# Patient Record
Sex: FEMALE | Race: ASIAN | NOT HISPANIC OR LATINO | Employment: OTHER | URBAN - METROPOLITAN AREA
[De-identification: names, ages, dates, MRNs, and addresses within clinical notes are randomized per-mention and may not be internally consistent; named-entity substitution may affect disease eponyms.]

---

## 2018-11-09 ENCOUNTER — OFFICE VISIT (OUTPATIENT)
Dept: URGENT CARE | Facility: CLINIC | Age: 63
End: 2018-11-09
Payer: MEDICARE

## 2018-11-09 VITALS
WEIGHT: 114 LBS | HEIGHT: 63 IN | SYSTOLIC BLOOD PRESSURE: 163 MMHG | DIASTOLIC BLOOD PRESSURE: 89 MMHG | TEMPERATURE: 98 F | BODY MASS INDEX: 20.2 KG/M2 | HEART RATE: 80 BPM | OXYGEN SATURATION: 97 %

## 2018-11-09 DIAGNOSIS — I10 HYPERTENSION, UNSPECIFIED TYPE: Primary | ICD-10-CM

## 2018-11-09 DIAGNOSIS — R51.9 NONINTRACTABLE HEADACHE, UNSPECIFIED CHRONICITY PATTERN, UNSPECIFIED HEADACHE TYPE: ICD-10-CM

## 2018-11-09 PROCEDURE — 99214 OFFICE O/P EST MOD 30 MIN: CPT | Mod: S$GLB,,, | Performed by: FAMILY MEDICINE

## 2018-11-09 PROCEDURE — 3008F BODY MASS INDEX DOCD: CPT | Mod: CPTII,S$GLB,, | Performed by: FAMILY MEDICINE

## 2018-11-09 RX ORDER — LOSARTAN POTASSIUM 25 MG/1
25 TABLET ORAL DAILY
COMMUNITY
Start: 2018-08-07 | End: 2020-08-05 | Stop reason: SDUPTHER

## 2018-11-09 RX ORDER — METRONIDAZOLE 250 MG/1
TABLET ORAL
Status: ON HOLD | COMMUNITY
Start: 2018-11-08 | End: 2019-11-27 | Stop reason: HOSPADM

## 2018-11-09 RX ORDER — BUTALBITAL, ACETAMINOPHEN AND CAFFEINE 50; 325; 40 MG/1; MG/1; MG/1
1 TABLET ORAL EVERY 6 HOURS PRN
Qty: 30 TABLET | Refills: 0 | Status: SHIPPED | OUTPATIENT
Start: 2018-11-09 | End: 2018-12-09

## 2018-11-09 NOTE — PROGRESS NOTES
"Subjective:       Patient ID: Meredith Cuellar is a 63 y.o. female.    Vitals:  height is 5' 3" (1.6 m) and weight is 51.7 kg (114 lb). Her oral temperature is 98.1 °F (36.7 °C). Her blood pressure is 163/89 (abnormal) and her pulse is 80. Her oxygen saturation is 97%.     Chief Complaint: Hypertension    Hypertension   This is a new problem. The current episode started in the past 7 days. The problem has been gradually worsening since onset. Associated symptoms include headaches, malaise/fatigue, palpitations and shortness of breath. Pertinent negatives include no blurred vision or chest pain.     Review of Systems   Constitution: Positive for malaise/fatigue. Negative for chills and fever.   HENT: Negative for sore throat.    Eyes: Negative for blurred vision.   Cardiovascular: Positive for palpitations. Negative for chest pain.   Respiratory: Positive for shortness of breath.    Skin: Negative for rash.   Musculoskeletal: Negative for back pain and joint pain.   Gastrointestinal: Negative for abdominal pain, diarrhea, nausea and vomiting.   Neurological: Positive for headaches and light-headedness.   Psychiatric/Behavioral: The patient is not nervous/anxious.        Objective:      Physical Exam   Constitutional: She is oriented to person, place, and time. She appears well-developed and well-nourished.   HENT:   Head: Normocephalic and atraumatic.   Right Ear: External ear normal.   Left Ear: External ear normal.   Mouth/Throat: Oropharynx is clear and moist.   Eyes: EOM are normal. Pupils are equal, round, and reactive to light.   Neck: Normal range of motion. Neck supple. No JVD present. No tracheal deviation present. No thyromegaly present.   Cardiovascular: Normal rate, regular rhythm and normal heart sounds. Exam reveals no gallop and no friction rub.   No murmur heard.  Pulmonary/Chest: Breath sounds normal. No respiratory distress. She has no wheezes. She has no rales. She exhibits no tenderness.   Abdominal: " Soft. Bowel sounds are normal. She exhibits no distension and no mass. There is no tenderness. There is no rebound and no guarding. No hernia.   Musculoskeletal: Normal range of motion. She exhibits no edema, tenderness or deformity.   Lymphadenopathy:     She has no cervical adenopathy.   Neurological: She is alert and oriented to person, place, and time. She displays normal reflexes. No cranial nerve deficit or sensory deficit. She exhibits normal muscle tone. Coordination normal.   Skin: Skin is warm. Capillary refill takes less than 2 seconds. No rash noted. No erythema. No pallor.   Psychiatric: She has a normal mood and affect. Her behavior is normal. Judgment and thought content normal.   Vitals reviewed.      Assessment:       1. Hypertension, unspecified type    2. Nonintractable headache, unspecified chronicity pattern, unspecified headache type        Plan:         Hypertension, unspecified type    Nonintractable headache, unspecified chronicity pattern, unspecified headache type  -     butalbital-acetaminophen-caffeine -40 mg (FIORICET, ESGIC) -40 mg per tablet; Take 1 tablet by mouth every 6 (six) hours as needed for Pain (headache).  Dispense: 30 tablet; Refill: 0          Patient Instructions     Established High Blood Pressure    High blood pressure (hypertension) is a chronic disease. Often, healthcare providers dont know what causes it. But it can be caused by certain health conditions and medicines.  If you have high blood pressure, you may not have any symptoms. If you do have symptoms, they may include headache, dizziness, changes in your vision, chest pain, and shortness of breath. But even without symptoms, high blood pressure thats not treated raises your risk for heart attack and stroke. High blood pressure is a serious health risk and shouldnt be ignored.  A blood pressure reading is made up of two numbers: a higher number over a lower number. The top number is the systolic  pressure. The bottom number is the diastolic pressure. A normal blood pressure is a systolic pressure of  less than 120 over a diastolic pressure of less than 80. You will see your blood pressure readings written together. For example, a person with a systolic pressure of 188 and a diastolic pressure of 78 will have 118/78 written in the medical record.  High blood pressure is when either the top number is 140 or higher, or the bottom number is 90 or higher. This must be the result when taking your blood pressure a number of times. The blood pressures between normal and high are called prehypertension.  Home care  If you have high blood pressure, you should do what is listed below to lower your blood pressure. If you are taking medicines for high blood pressure, these methods may reduce or end your need for medicines in the future.  · Begin a weight-loss program if you are overweight.  · Cut back on how much salt you get in your diet. Heres how to do this:  ¨ Dont eat foods that have a lot of salt. These include olives, pickles, smoked meats, and salted potato chips.  ¨ Dont add salt to your food at the table.  ¨ Use only small amounts of salt when cooking.  · Start an exercise program. Talk with your healthcare provider about the type of exercise program that would be best for you. It doesn't have to be hard. Even brisk walking for 20 minutes 3 times a week is a good form of exercise.  · Dont take medicines that stimulate the heart. This includes many over-the-counter cold and sinus decongestant pills and sprays, as well as diet pills. Check the warnings about hypertension on the label. Before buying any over-the-counter medicines or supplements, always ask the pharmacist about the product's potential interaction with your high blood pressure and your high blood pressure medicines.  · Stimulants such as amphetamine or cocaine could be deadly for someone with high blood pressure. Never take these.  · Limit how  much caffeine you get in your diet. Switch to caffeine-free products.  · Stop smoking. If you are a long-time smoker, this can be hard. Talk to your healthcare provider about medicines and nicotine replacement options to help you. Also, enroll in a stop-smoking program to make it more likely that you will quit for good.  · Learn how to handle stress. This is an important part of any program to lower blood pressure. Learn about relaxation methods like meditation, yoga, or biofeedback.  · If your provider prescribed medicines, take them exactly as directed. Missing doses may cause your blood pressure get out of control.  · If you miss a dose or doses, check with your healthcare provider or pharmacist about what to do.  · Consider buying an automatic blood pressure machine. Ask your provider for a recommendation. You can get one of these at most pharmacies.     The American Heart Association recommends the following guidelines for home blood pressure monitoring:  · Don't smoke or drink coffee for 30 minutes before taking your blood pressure.  · Go to the bathroom before the test.  · Relax for 5 minutes before taking the measurement.  · Sit with your back supported (don't sit on a couch or soft chair); keep your feet on the floor uncrossed. Place your arm on a solid flat surface (like a table) with the upper part of the arm at heart level. Place the middle of the cuff directly above the eye of the elbow. Check the monitor's instruction manual for an illustration.  · Take multiple readings. When you measure, take 2 to 3 readings one minute apart and record all of the results.  · Take your blood pressure at the same time every day, or as your healthcare provider recommends.  · Record the date, time, and blood pressure reading.  · Take the record with you to your next medical appointment. If your blood pressure monitor has a built-in memory, simply take the monitor with you to your next appointment.  · Call your provider  "if you have several high readings. Don't be frightened by a single high blood pressure reading, but if you get several high readings, check in with your healthcare provider.  · Note: When blood pressure reaches a systolic (top number) of 180 or higher OR diastolic (bottom number) of 110 or higher, seek emergency medical treatment.  Follow-up care  You will need to see your healthcare provider regularly. This is to check your blood pressure and to make changes to your medicines. Make a follow-up appointment as directed. Bring the record of your home blood pressure readings to the appointment.  When to seek medical advice  Call your healthcare provider right away if any of these occur:  · Blood pressure reaches a systolic (upper number) of 180 or higher OR a diastolic (bottom number) of 110 or higher  · Chest pain or shortness of breath  · Severe headache  · Throbbing or rushing sound in the ears  · Nosebleed  · Sudden severe pain in your belly (abdomen)  · Extreme drowsiness, confusion, or fainting  · Dizziness or spinning sensation (vertigo)  · Weakness of an arm or leg or one side of the face  · You have problems speaking or seeing   Date Last Reviewed: 12/1/2016  © 6691-9043 [employer]. [address] [place] [address]. All rights reserved. This information is not intended as a substitute for professional medical care. Always follow your healthcare professional's instructions.      Headache, Unspecified    A number of things can cause headaches. The cause of your headache isnt clear. But it doesnt seem to be a sign of any serious illness.  You could have a tension headache or a migraine headache.  Stress can cause a tension headache. This can happen if you tense the muscles of your shoulders, neck, and scalp without knowing it. If this stress lasts long enough, you may develop a tension headache.  It is not clear why migraines occur, but certain things called" triggers" can raise the risk " of having a migraine attack. Migraine triggers may include emotional stress or depression, or by hormone changes during the menstrual cycle. Other triggers include birth control pills and other medicines, alcohol or caffeine, foods with tyramine (such as aged cheese, wine), eyestrain, weather changes, missed meals, and lack of sleep or oversleeping.  Other causes of headache include:  · Viral illness with high fever  · Head injury with concussion  · Sinus, ear, or throat infection  · Dental pain and jaw joint (TMJ) pain  More serious but less common causes of headache include stroke, brain hemorrhage, brain tumor, meningitis, and encephalitis.  Home care  Follow these tips when taking care of yourself at home:  · Dont drive yourself home if you were given pain medicine for your headache. Instead, have someone else drive you home. Try to sleep when you get home. You should feel much better when you wake up.  · Apply heat to the back of your neck to ease a neck muscle spasm. Take care of a migraine headache by putting an ice pack on your forehead or at the base of your skull.  · If you have nausea or vomiting, eat a light diet until your headache eases.  · If you have a migraine headache, use sunglasses when in the daylight or around bright indoor lighting until your symptoms get better. Bright glaring light can make this type of headache worse.  Follow-up care  Follow up with your healthcare provider, or as advised. Talk with your provider if you have frequent headaches. He or she can help figure out a treatment plan. By knowing the earliest signs of headache, and starting treatment right away, you may be able to stop the pain yourself.  When to seek medical advice  Call your healthcare provider right away if any of these occur:  · Your head pain suddenly gets worse after sexual intercourse or strenuous activity  · Your head pain doesnt get better within 24 hours  · You arent able to keep liquids down (repeated  vomiting)  · Fever of 100.4ºF (38ºC) or higher, or as directed by your healthcare provider  · Stiff neck  · Extreme drowsiness, confusion, or fainting  · Dizziness or dizziness with spinning sensation (vertigo)  · Weakness in an arm or leg or one side of your face  · You have trouble talking or seeing  Date Last Reviewed: 8/1/2016  © 8879-4274 [employer]. [address] [place] [address], [place] [address]. All rights reserved. This information is not intended as a substitute for professional medical care. Always follow your healthcare professional's instructions.          Follow up with your doctor in a few days.  Go to the ER if symptoms get worse.    Loy Cuellar MD

## 2018-11-09 NOTE — PATIENT INSTRUCTIONS
Established High Blood Pressure    High blood pressure (hypertension) is a chronic disease. Often, healthcare providers dont know what causes it. But it can be caused by certain health conditions and medicines.  If you have high blood pressure, you may not have any symptoms. If you do have symptoms, they may include headache, dizziness, changes in your vision, chest pain, and shortness of breath. But even without symptoms, high blood pressure thats not treated raises your risk for heart attack and stroke. High blood pressure is a serious health risk and shouldnt be ignored.  A blood pressure reading is made up of two numbers: a higher number over a lower number. The top number is the systolic pressure. The bottom number is the diastolic pressure. A normal blood pressure is a systolic pressure of  less than 120 over a diastolic pressure of less than 80. You will see your blood pressure readings written together. For example, a person with a systolic pressure of 188 and a diastolic pressure of 78 will have 118/78 written in the medical record.  High blood pressure is when either the top number is 140 or higher, or the bottom number is 90 or higher. This must be the result when taking your blood pressure a number of times. The blood pressures between normal and high are called prehypertension.  Home care  If you have high blood pressure, you should do what is listed below to lower your blood pressure. If you are taking medicines for high blood pressure, these methods may reduce or end your need for medicines in the future.  · Begin a weight-loss program if you are overweight.  · Cut back on how much salt you get in your diet. Heres how to do this:  ¨ Dont eat foods that have a lot of salt. These include olives, pickles, smoked meats, and salted potato chips.  ¨ Dont add salt to your food at the table.  ¨ Use only small amounts of salt when cooking.  · Start an exercise program. Talk with your healthcare  provider about the type of exercise program that would be best for you. It doesn't have to be hard. Even brisk walking for 20 minutes 3 times a week is a good form of exercise.  · Dont take medicines that stimulate the heart. This includes many over-the-counter cold and sinus decongestant pills and sprays, as well as diet pills. Check the warnings about hypertension on the label. Before buying any over-the-counter medicines or supplements, always ask the pharmacist about the product's potential interaction with your high blood pressure and your high blood pressure medicines.  · Stimulants such as amphetamine or cocaine could be deadly for someone with high blood pressure. Never take these.  · Limit how much caffeine you get in your diet. Switch to caffeine-free products.  · Stop smoking. If you are a long-time smoker, this can be hard. Talk to your healthcare provider about medicines and nicotine replacement options to help you. Also, enroll in a stop-smoking program to make it more likely that you will quit for good.  · Learn how to handle stress. This is an important part of any program to lower blood pressure. Learn about relaxation methods like meditation, yoga, or biofeedback.  · If your provider prescribed medicines, take them exactly as directed. Missing doses may cause your blood pressure get out of control.  · If you miss a dose or doses, check with your healthcare provider or pharmacist about what to do.  · Consider buying an automatic blood pressure machine. Ask your provider for a recommendation. You can get one of these at most pharmacies.     The American Heart Association recommends the following guidelines for home blood pressure monitoring:  · Don't smoke or drink coffee for 30 minutes before taking your blood pressure.  · Go to the bathroom before the test.  · Relax for 5 minutes before taking the measurement.  · Sit with your back supported (don't sit on a couch or soft chair); keep your feet on  the floor uncrossed. Place your arm on a solid flat surface (like a table) with the upper part of the arm at heart level. Place the middle of the cuff directly above the eye of the elbow. Check the monitor's instruction manual for an illustration.  · Take multiple readings. When you measure, take 2 to 3 readings one minute apart and record all of the results.  · Take your blood pressure at the same time every day, or as your healthcare provider recommends.  · Record the date, time, and blood pressure reading.  · Take the record with you to your next medical appointment. If your blood pressure monitor has a built-in memory, simply take the monitor with you to your next appointment.  · Call your provider if you have several high readings. Don't be frightened by a single high blood pressure reading, but if you get several high readings, check in with your healthcare provider.  · Note: When blood pressure reaches a systolic (top number) of 180 or higher OR diastolic (bottom number) of 110 or higher, seek emergency medical treatment.  Follow-up care  You will need to see your healthcare provider regularly. This is to check your blood pressure and to make changes to your medicines. Make a follow-up appointment as directed. Bring the record of your home blood pressure readings to the appointment.  When to seek medical advice  Call your healthcare provider right away if any of these occur:  · Blood pressure reaches a systolic (upper number) of 180 or higher OR a diastolic (bottom number) of 110 or higher  · Chest pain or shortness of breath  · Severe headache  · Throbbing or rushing sound in the ears  · Nosebleed  · Sudden severe pain in your belly (abdomen)  · Extreme drowsiness, confusion, or fainting  · Dizziness or spinning sensation (vertigo)  · Weakness of an arm or leg or one side of the face  · You have problems speaking or seeing   Date Last Reviewed: 12/1/2016  © 1742-8989 [employer]. [address] [place] [address]  "[place] [address]. All rights reserved. This information is not intended as a substitute for professional medical care. Always follow your healthcare professional's instructions.      Headache, Unspecified    A number of things can cause headaches. The cause of your headache isnt clear. But it doesnt seem to be a sign of any serious illness.  You could have a tension headache or a migraine headache.  Stress can cause a tension headache. This can happen if you tense the muscles of your shoulders, neck, and scalp without knowing it. If this stress lasts long enough, you may develop a tension headache.  It is not clear why migraines occur, but certain things called" triggers" can raise the risk of having a migraine attack. Migraine triggers may include emotional stress or depression, or by hormone changes during the menstrual cycle. Other triggers include birth control pills and other medicines, alcohol or caffeine, foods with tyramine (such as aged cheese, wine), eyestrain, weather changes, missed meals, and lack of sleep or oversleeping.  Other causes of headache include:  · Viral illness with high fever  · Head injury with concussion  · Sinus, ear, or throat infection  · Dental pain and jaw joint (TMJ) pain  More serious but less common causes of headache include stroke, brain hemorrhage, brain tumor, meningitis, and encephalitis.  Home care  Follow these tips when taking care of yourself at home:  · Dont drive yourself home if you were given pain medicine for your headache. Instead, have someone else drive you home. Try to sleep when you get home. You should feel much better when you wake up.  · Apply heat to the back of your neck to ease a neck muscle spasm. Take care of a migraine headache by putting an ice pack on your forehead or at the base of your skull.  · If you have nausea or vomiting, eat a light diet until your headache eases.  · If you have a migraine headache, use sunglasses when in the " daylight or around bright indoor lighting until your symptoms get better. Bright glaring light can make this type of headache worse.  Follow-up care  Follow up with your healthcare provider, or as advised. Talk with your provider if you have frequent headaches. He or she can help figure out a treatment plan. By knowing the earliest signs of headache, and starting treatment right away, you may be able to stop the pain yourself.  When to seek medical advice  Call your healthcare provider right away if any of these occur:  · Your head pain suddenly gets worse after sexual intercourse or strenuous activity  · Your head pain doesnt get better within 24 hours  · You arent able to keep liquids down (repeated vomiting)  · Fever of 100.4ºF (38ºC) or higher, or as directed by your healthcare provider  · Stiff neck  · Extreme drowsiness, confusion, or fainting  · Dizziness or dizziness with spinning sensation (vertigo)  · Weakness in an arm or leg or one side of your face  · You have trouble talking or seeing  Date Last Reviewed: 8/1/2016  © 1797-4933 [employer]. [address] [place] [address], [place] [address]. All rights reserved. This information is not intended as a substitute for professional medical care. Always follow your healthcare professional's instructions.          Follow up with your doctor in a few days.  Go to the ER if symptoms get worse.    Loy Cuellar MD

## 2019-11-24 ENCOUNTER — HOSPITAL ENCOUNTER (INPATIENT)
Facility: HOSPITAL | Age: 64
LOS: 3 days | Discharge: HOME | DRG: 440 | End: 2019-11-27
Attending: FAMILY MEDICINE | Admitting: FAMILY MEDICINE
Payer: MEDICARE

## 2019-11-24 DIAGNOSIS — K85.90 PANCREATITIS: Primary | ICD-10-CM

## 2019-11-24 DIAGNOSIS — K85.10 ACUTE BILIARY PANCREATITIS WITHOUT INFECTION OR NECROSIS: ICD-10-CM

## 2019-11-24 DIAGNOSIS — R07.9 CHEST PAIN: ICD-10-CM

## 2019-11-24 PROCEDURE — 11000001 HC ACUTE MED/SURG PRIVATE ROOM

## 2019-11-24 RX ORDER — ACETAMINOPHEN 325 MG/1
650 TABLET ORAL EVERY 4 HOURS PRN
Status: DISCONTINUED | OUTPATIENT
Start: 2019-11-24 | End: 2019-11-27 | Stop reason: HOSPADM

## 2019-11-24 RX ORDER — TALC
6 POWDER (GRAM) TOPICAL NIGHTLY PRN
Status: DISCONTINUED | OUTPATIENT
Start: 2019-11-24 | End: 2019-11-27 | Stop reason: HOSPADM

## 2019-11-24 RX ORDER — ONDANSETRON 8 MG/1
8 TABLET, ORALLY DISINTEGRATING ORAL EVERY 8 HOURS PRN
Status: DISCONTINUED | OUTPATIENT
Start: 2019-11-24 | End: 2019-11-27 | Stop reason: HOSPADM

## 2019-11-24 RX ORDER — KETOROLAC TROMETHAMINE 30 MG/ML
15 INJECTION, SOLUTION INTRAMUSCULAR; INTRAVENOUS EVERY 6 HOURS PRN
Status: DISCONTINUED | OUTPATIENT
Start: 2019-11-24 | End: 2019-11-27 | Stop reason: HOSPADM

## 2019-11-24 RX ORDER — SODIUM CHLORIDE 0.9 % (FLUSH) 0.9 %
10 SYRINGE (ML) INJECTION
Status: DISCONTINUED | OUTPATIENT
Start: 2019-11-24 | End: 2019-11-27 | Stop reason: HOSPADM

## 2019-11-25 ENCOUNTER — ANESTHESIA EVENT (OUTPATIENT)
Dept: ENDOSCOPY | Facility: HOSPITAL | Age: 64
DRG: 440 | End: 2019-11-25
Payer: MEDICARE

## 2019-11-25 PROBLEM — K21.9 CHRONIC GERD: Status: ACTIVE | Noted: 2019-11-25

## 2019-11-25 PROBLEM — D72.829 LEUKOCYTOSIS: Status: ACTIVE | Noted: 2019-11-25

## 2019-11-25 PROBLEM — I10 HYPERTENSION: Status: ACTIVE | Noted: 2019-11-25

## 2019-11-25 LAB
ALBUMIN SERPL BCP-MCNC: 3.6 G/DL (ref 3.5–5.2)
ALP SERPL-CCNC: 180 U/L (ref 55–135)
ALT SERPL W/O P-5'-P-CCNC: 176 U/L (ref 10–44)
ANION GAP SERPL CALC-SCNC: 11 MMOL/L (ref 8–16)
ANION GAP SERPL CALC-SCNC: 13 MMOL/L (ref 8–16)
AST SERPL-CCNC: 85 U/L (ref 10–40)
BASOPHILS # BLD AUTO: 0.02 K/UL (ref 0–0.2)
BASOPHILS NFR BLD: 0.2 % (ref 0–1.9)
BILIRUB SERPL-MCNC: 0.6 MG/DL (ref 0.1–1)
BUN SERPL-MCNC: 13 MG/DL (ref 8–23)
BUN SERPL-MCNC: 16 MG/DL (ref 8–23)
CALCIUM SERPL-MCNC: 9.2 MG/DL (ref 8.7–10.5)
CALCIUM SERPL-MCNC: 9.2 MG/DL (ref 8.7–10.5)
CHLORIDE SERPL-SCNC: 105 MMOL/L (ref 95–110)
CHLORIDE SERPL-SCNC: 106 MMOL/L (ref 95–110)
CO2 SERPL-SCNC: 22 MMOL/L (ref 23–29)
CO2 SERPL-SCNC: 22 MMOL/L (ref 23–29)
CREAT SERPL-MCNC: 0.6 MG/DL (ref 0.5–1.4)
CREAT SERPL-MCNC: 0.6 MG/DL (ref 0.5–1.4)
DIFFERENTIAL METHOD: ABNORMAL
EOSINOPHIL # BLD AUTO: 0.1 K/UL (ref 0–0.5)
EOSINOPHIL NFR BLD: 1.2 % (ref 0–8)
ERYTHROCYTE [DISTWIDTH] IN BLOOD BY AUTOMATED COUNT: 14.2 % (ref 11.5–14.5)
EST. GFR  (AFRICAN AMERICAN): >60 ML/MIN/1.73 M^2
EST. GFR  (AFRICAN AMERICAN): >60 ML/MIN/1.73 M^2
EST. GFR  (NON AFRICAN AMERICAN): >60 ML/MIN/1.73 M^2
EST. GFR  (NON AFRICAN AMERICAN): >60 ML/MIN/1.73 M^2
GLUCOSE SERPL-MCNC: 66 MG/DL (ref 70–110)
GLUCOSE SERPL-MCNC: 67 MG/DL (ref 70–110)
HCT VFR BLD AUTO: 36.2 % (ref 37–48.5)
HGB BLD-MCNC: 11.8 G/DL (ref 12–16)
LIPASE SERPL-CCNC: 88 U/L (ref 4–60)
LYMPHOCYTES # BLD AUTO: 0.9 K/UL (ref 1–4.8)
LYMPHOCYTES NFR BLD: 10.8 % (ref 18–48)
MAGNESIUM SERPL-MCNC: 2 MG/DL (ref 1.6–2.6)
MCH RBC QN AUTO: 24.8 PG (ref 27–31)
MCHC RBC AUTO-ENTMCNC: 32.6 G/DL (ref 32–36)
MCV RBC AUTO: 76 FL (ref 82–98)
MONOCYTES # BLD AUTO: 0.6 K/UL (ref 0.3–1)
MONOCYTES NFR BLD: 7 % (ref 4–15)
NEUTROPHILS # BLD AUTO: 6.5 K/UL (ref 1.8–7.7)
NEUTROPHILS NFR BLD: 80.8 % (ref 38–73)
PHOSPHATE SERPL-MCNC: 3 MG/DL (ref 2.7–4.5)
PLATELET # BLD AUTO: 205 K/UL (ref 150–350)
PMV BLD AUTO: 9 FL (ref 9.2–12.9)
POTASSIUM SERPL-SCNC: 3.4 MMOL/L (ref 3.5–5.1)
POTASSIUM SERPL-SCNC: 3.4 MMOL/L (ref 3.5–5.1)
PROT SERPL-MCNC: 6.8 G/DL (ref 6–8.4)
RBC # BLD AUTO: 4.76 M/UL (ref 4–5.4)
SODIUM SERPL-SCNC: 139 MMOL/L (ref 136–145)
SODIUM SERPL-SCNC: 140 MMOL/L (ref 136–145)
WBC # BLD AUTO: 8.15 K/UL (ref 3.9–12.7)

## 2019-11-25 PROCEDURE — 84100 ASSAY OF PHOSPHORUS: CPT

## 2019-11-25 PROCEDURE — 63600175 PHARM REV CODE 636 W HCPCS: Performed by: NURSE PRACTITIONER

## 2019-11-25 PROCEDURE — 83735 ASSAY OF MAGNESIUM: CPT

## 2019-11-25 PROCEDURE — 85025 COMPLETE CBC W/AUTO DIFF WBC: CPT

## 2019-11-25 PROCEDURE — 83690 ASSAY OF LIPASE: CPT

## 2019-11-25 PROCEDURE — 25000003 PHARM REV CODE 250: Performed by: NURSE PRACTITIONER

## 2019-11-25 PROCEDURE — 11000001 HC ACUTE MED/SURG PRIVATE ROOM

## 2019-11-25 PROCEDURE — 36415 COLL VENOUS BLD VENIPUNCTURE: CPT

## 2019-11-25 PROCEDURE — 80048 BASIC METABOLIC PNL TOTAL CA: CPT

## 2019-11-25 PROCEDURE — 80053 COMPREHEN METABOLIC PANEL: CPT

## 2019-11-25 RX ORDER — SODIUM CHLORIDE 9 MG/ML
INJECTION, SOLUTION INTRAVENOUS CONTINUOUS
Status: DISCONTINUED | OUTPATIENT
Start: 2019-11-25 | End: 2019-11-25

## 2019-11-25 RX ORDER — BUTALBITAL, ACETAMINOPHEN AND CAFFEINE 50; 325; 40 MG/1; MG/1; MG/1
1 TABLET ORAL EVERY 4 HOURS PRN
Status: DISCONTINUED | OUTPATIENT
Start: 2019-11-25 | End: 2019-11-27 | Stop reason: HOSPADM

## 2019-11-25 RX ORDER — PANTOPRAZOLE SODIUM 40 MG/1
40 TABLET, DELAYED RELEASE ORAL DAILY
Status: DISCONTINUED | OUTPATIENT
Start: 2019-11-25 | End: 2019-11-27 | Stop reason: HOSPADM

## 2019-11-25 RX ORDER — LOSARTAN POTASSIUM 25 MG/1
25 TABLET ORAL DAILY
Status: DISCONTINUED | OUTPATIENT
Start: 2019-11-25 | End: 2019-11-27 | Stop reason: HOSPADM

## 2019-11-25 RX ORDER — ATORVASTATIN CALCIUM 40 MG/1
40 TABLET, FILM COATED ORAL DAILY
Status: DISCONTINUED | OUTPATIENT
Start: 2019-11-25 | End: 2019-11-27 | Stop reason: HOSPADM

## 2019-11-25 RX ORDER — HYDROMORPHONE HYDROCHLORIDE 2 MG/1
2 TABLET ORAL EVERY 4 HOURS PRN
Status: DISCONTINUED | OUTPATIENT
Start: 2019-11-25 | End: 2019-11-27 | Stop reason: HOSPADM

## 2019-11-25 RX ORDER — DEXTROSE MONOHYDRATE AND SODIUM CHLORIDE 5; .9 G/100ML; G/100ML
INJECTION, SOLUTION INTRAVENOUS CONTINUOUS
Status: DISCONTINUED | OUTPATIENT
Start: 2019-11-25 | End: 2019-11-25

## 2019-11-25 RX ORDER — AMLODIPINE BESYLATE 5 MG/1
5 TABLET ORAL DAILY
Status: DISCONTINUED | OUTPATIENT
Start: 2019-11-25 | End: 2019-11-27 | Stop reason: HOSPADM

## 2019-11-25 RX ADMIN — LOSARTAN POTASSIUM 25 MG: 25 TABLET ORAL at 08:11

## 2019-11-25 RX ADMIN — CEFTRIAXONE 1 G: 1 INJECTION, SOLUTION INTRAVENOUS at 11:11

## 2019-11-25 RX ADMIN — ATORVASTATIN CALCIUM 40 MG: 40 TABLET, FILM COATED ORAL at 08:11

## 2019-11-25 RX ADMIN — PANTOPRAZOLE SODIUM 40 MG: 40 TABLET, DELAYED RELEASE ORAL at 08:11

## 2019-11-25 RX ADMIN — SODIUM CHLORIDE: 0.9 INJECTION, SOLUTION INTRAVENOUS at 08:11

## 2019-11-25 RX ADMIN — CEFTRIAXONE 1 G: 1 INJECTION, SOLUTION INTRAVENOUS at 12:11

## 2019-11-25 RX ADMIN — AMLODIPINE BESYLATE 5 MG: 5 TABLET ORAL at 08:11

## 2019-11-25 NOTE — PROGRESS NOTES
[facility] Hospital Medicine  Progress Note    Patient Name: Meredith Cuellar  MRN: [id number]  Patient Class: IP- Inpatient   Admission Date: 11/24/2019  Length of Stay: 1 days  Attending Physician: Jayne Jackson*  Primary Care Provider: Di Cruz MD        Subjective:     Principal Problem:Pancreatitis        HPI:  64 y.o. female with PMH of SBO in September 2019, HTN and GERD who presents as a transfer from [facility] with pancreatitis.  Of Note, patient is Tajik speaking, daughter is at bedside and provides history.  Per patient's daughter, patient started to complain of aching severe abdominal pain and nausea Sunday morning and went to the hospital.  Denies aggravating or alleviating factors, SOB, chest pain, fever, chills, cough, change in bladder habits or change in bowel habits.  Per records, CT showed intra and extra hepatic ductal dilatation with CBD enlarged at 1.6 cm. AST and ALT of 60, Alk phos 160 and bili of 0.97. Leukocytosis of 14 K but afebrile. Patient transferred to [facility] hospital medicine for further medical management.     Overview/Hospital Course:  Patient admitted to medicine for pancreatitis. GI consulted, recommending ERCP on 11/26.    Interval History: No events overnight. Pain controlled. GI seen, ERCP tomorrow. Daughter at bedside translated, all questions answered.    Review of Systems   Constitutional: Negative for chills and fever.   Respiratory: Negative for chest tightness and shortness of breath.    Cardiovascular: Negative for chest pain and leg swelling.   Gastrointestinal: Positive for abdominal pain. Negative for nausea.   Neurological: Negative for dizziness and weakness.     Objective:     Vital Signs (Most Recent):  Temp: 98.1 °F (36.7 °C) (11/25/19 0750)  Pulse: 76 (11/25/19 1146)  Resp: 18 (11/25/19 0750)  BP: 117/61 (11/25/19 1146)  SpO2: 100 % (11/24/19 2112) Vital Signs (24h Range):  Temp:  [97.4 °F (36.3 °C)-98.7 °F (37.1  °C)] 98.1 °F (36.7 °C)  Pulse:  [72-77] 76  Resp:  [18-22] 18  SpO2:  [97 %-100 %] 100 %  BP: (112-142)/(56-78) 117/61     Weight: 54.2 kg (119 lb 7.8 oz)  Body mass index is 21.85 kg/m².    Intake/Output Summary (Last 24 hours) at 11/25/2019 1432  Last data filed at 11/25/2019 0547  Gross per 24 hour   Intake 50 ml   Output 200 ml   Net -150 ml      Physical Exam   Constitutional: She is oriented to person, place, and time. She appears well-developed and well-nourished.   Eyes: Pupils are equal, round, and reactive to light. EOM are normal.   Cardiovascular: Normal rate and regular rhythm.   Pulmonary/Chest: Effort normal and breath sounds normal.   Abdominal: Soft. There is tenderness in the epigastric area.   Musculoskeletal: She exhibits no edema or tenderness.   Neurological: She is alert and oriented to person, place, and time.   Skin: Skin is warm and dry.   Psychiatric: She has a normal mood and affect. Her behavior is normal.   Nursing note and vitals reviewed.      Significant Labs:   CBC:   Recent Labs   Lab 11/25/19  0803   WBC 8.15   HGB 11.8*   HCT 36.2*        CMP:   Recent Labs   Lab 11/25/19  0803 11/25/19  1047    140   K 3.4* 3.4*    105   CO2 22* 22*   GLU 66* 67*   BUN 13 16   CREATININE 0.6 0.6   CALCIUM 9.2 9.2   PROT  --  6.8   ALBUMIN  --  3.6   BILITOT  --  0.6   ALKPHOS  --  180*   AST  --  85*   ALT  --  176*   ANIONGAP 11 13   EGFRNONAA >60 >60       Significant Imaging: I have reviewed all pertinent imaging results/findings within the past 24 hours.      Assessment/Plan:      * Pancreatitis  Pancreatitis    Full liquid diet, NPO at MN  Daily Lipase//CBC/CMP/Mg/Phos  IVFs  Prn Pain Control   Prn antiemetics  CT Abdomen:CT showed intra and extra hepatic ductal dilatation with CBD enlarged at 1.6 cm.  Consult GI, ERCP on 11/26    Chronic GERD  Stable  Continue Protonix    Hypertension  Continue amlodipine and losartan      VTE Risk Mitigation (From admission, onward)          Ordered     IP VTE LOW RISK PATIENT  Once      11/24/19 2204     Place sequential compression device  Until discontinued      11/24/19 2204                      Thomas Herrera PA-C  Department of Hospital Medicine   [facility]

## 2019-11-25 NOTE — H&P
[facility] Medicine  History & Physical    Patient Name: Meredith Cuellar  MRN: [id number]  Admission Date: 11/24/2019  Attending Physician: Jayne Jackson*   Primary Care Provider: Primary Doctor No         Patient information was obtained from caregiver / friend and ER records.     Subjective:     Principal Problem:Pancreatitis    Chief Complaint: No chief complaint on file.       HPI: 64 y.o. female with PMH of SBO in September 2019, HTN and GERD who presents as a transfer from [facility] with pancreatitis.  Of Note, patient is Indonesian speaking, daughter is at bedside and provides history.  Per patient's daughter, patient started to complain of aching severe abdominal pain and nausea Sunday morning and went to the hospital.  Denies aggravating or alleviating factors, SOB, chest pain, fever, chills, cough, change in bladder habits or change in bowel habits.  Per records, CT showed intra and extra hepatic ductal dilatation with CBD enlarged at 1.6 cm. AST and ALT of 60, Alk phos 160 and bili of 0.97. Leukocytosis of 14 K but afebrile. Patient transferred to [facility] hospital medicine for further medical management.     Past Medical History:   Diagnosis Date    Hypertension        Past Surgical History:   Procedure Laterality Date    APPENDECTOMY      CHOLECYSTECTOMY      HYSTERECTOMY  2012    elective       Review of patient's allergies indicates:  No Known Allergies    No current facility-administered medications on file prior to encounter.      Current Outpatient Medications on File Prior to Encounter   Medication Sig    amlodipine (NORVASC) 5 MG tablet Take 5 mg by mouth once daily.    atorvastatin (LIPITOR) 40 MG tablet Take 40 mg by mouth once daily.    butalbital-acetaminophen-caffeine -40 mg (FIORICET, ESGIC) -40 mg per tablet Take 1 tablet by mouth every 4 (four) hours as needed for Pain.    DEXLANSOPRAZOLE ORAL Take by mouth.    diclofenac  (VOLTAREN) 75 MG EC tablet Take 1 tablet (75 mg total) by mouth 2 (two) times daily.    esomeprazole (NEXIUM) 20 MG capsule Take 20 mg by mouth before breakfast.    losartan (COZAAR) 25 MG tablet     metroNIDAZOLE (FLAGYL) 250 MG tablet     ondansetron (ZOFRAN-ODT) 4 MG TbDL Take 8 mg by mouth every 12 (twelve) hours.     Family History     None        Tobacco Use    Smoking status: Never Smoker    Smokeless tobacco: Never Used   Substance and Sexual Activity    Alcohol use: No    Drug use: No    Sexual activity: Yes     Partners: Male     Review of Systems   Constitutional: Negative for chills and fever.   HENT: Negative for congestion, postnasal drip and rhinorrhea.    Respiratory: Negative for chest tightness and shortness of breath.    Cardiovascular: Negative for chest pain and palpitations.   Gastrointestinal: Positive for abdominal pain and nausea.   Genitourinary: Negative for difficulty urinating.   Musculoskeletal: Negative for arthralgias and myalgias.   Skin: Negative for color change and wound.   Neurological: Negative for dizziness.   Hematological: Does not bruise/bleed easily.   Psychiatric/Behavioral: Negative for agitation.     Objective:     Vital Signs (Most Recent):  Temp: 98.7 °F (37.1 °C) (11/24/19 2112)  Pulse: 75 (11/24/19 2112)  Resp: (!) 22 (11/24/19 2112)  BP: 129/60 (11/24/19 2112)  SpO2: 100 % (11/24/19 2112) Vital Signs (24h Range):  Temp:  [98.4 °F (36.9 °C)-98.7 °F (37.1 °C)] 98.7 °F (37.1 °C)  Pulse:  [75-76] 75  Resp:  [22] 22  SpO2:  [97 %-100 %] 100 %  BP: (129-142)/(60-78) 129/60     Weight: 54.2 kg (119 lb 7.8 oz)  Body mass index is 21.85 kg/m².    Physical Exam   Constitutional: She is oriented to person, place, and time. She appears well-developed and well-nourished.   HENT:   Head: Normocephalic and atraumatic.   Eyes: Pupils are equal, round, and reactive to light. EOM are normal.   Neck: Normal range of motion.   Cardiovascular: Normal rate and regular rhythm.    Pulmonary/Chest: Effort normal and breath sounds normal.   Abdominal: Soft. Bowel sounds are normal.   Musculoskeletal: Normal range of motion. She exhibits no edema or deformity.   Neurological: She is alert and oriented to person, place, and time.   Skin: Skin is warm and dry.   Psychiatric: She has a normal mood and affect. Her behavior is normal.         CRANIAL NERVES     CN III, IV, VI   Pupils are equal, round, and reactive to light.  Extraocular motions are normal.         Significant Imaging: I have reviewed all pertinent imaging results/findings within the past 24 hours.    Assessment/Plan:     * Pancreatitis  leukocytosis    NPO  Daily Lipase//CBC/CMP/Mg/Phos  IVFs  Prn Pain Control   Prn antiemetics  CT Abdomen:CT showed intra and extra hepatic ductal dilatation with CBD enlarged at 1.6 cm.  Consult GI  Continue IV ceftriaxone          Chronic GERD  Stable  Continue Protonix      Hypertension  Continue amlodipine and losartan        VTE Risk Mitigation (From admission, onward)         Ordered     IP VTE LOW RISK PATIENT  Once      11/24/19 2204     Place sequential compression device  Until discontinued      11/24/19 2204                   Nancy Bowden NP  Department of Hospital Medicine   [facility]

## 2019-11-25 NOTE — NURSING
Pt arrived to unit from [facility]. Physician notified of patient's arrival. Admission questions completed by VN. Introduced self as VN for this shift.  Admission interview completed with patient and  daughter Anna. Patient's primary language is Turkmen. Please use  if family is not present in the room. Educated pt and daughter on VTE risk, safety precautions, and VN's role in pt care. Opportunity given for pt's questions. No questions or concerns at this time. Will continue to monitor and intervene as necessary.

## 2019-11-25 NOTE — PLAN OF CARE
Patient AAOx3  Speaks myron Cardonaer at bedside interpreting for patient  No dme or home health  Independent with ADL's    This  put name on white board and explained blue discharge folder to patient. Discharge planning brochure and/or business card given to patient.  Patient verbalized understanding.    PCP is Dr. Di Cruz  Follow-up With  Details  Why  Contact Info   Di Cruz MD  On 12/3/2019  8:15am  [address]  [facility]  [facility] [address]  [phone]                11/25/19 1410   Discharge Assessment   Assessment Type Discharge Planning Assessment   Confirmed/corrected address and phone number on facesheet? Yes   Assessment information obtained from? Patient   Prior to hospitilization cognitive status: Alert/Oriented   Prior to hospitalization functional status: Independent   Current cognitive status: Alert/Oriented   Current Functional Status: Independent   Lives With child(so), adult   Able to Return to Prior Arrangements yes   Is patient able to care for self after discharge? Yes   Patient's perception of discharge disposition home or selfcare   Readmission Within the Last 30 Days no previous admission in last 30 days   Patient currently being followed by outpatient case management? No   Patient currently receives any other outside agency services? No   Equipment Currently Used at Home none   Do you have any problems affording any of your prescribed medications? No   Is the patient taking medications as prescribed? yes   Does the patient have transportation home? Yes   Transportation Anticipated family or friend will provide   Discharge Plan A Home;Home with family   Discharge Plan B Home;Home with family   DME Needed Upon Discharge  none     Yuly Bell, RN, CCM, CMSRN  RN Transition Navigator  [phone]

## 2019-11-25 NOTE — SUBJECTIVE & OBJECTIVE
Interval History: No events overnight. Pain controlled. GI seen, ERCP tomorrow. Daughter at bedside translated, all questions answered.    Review of Systems   Constitutional: Negative for chills and fever.   Respiratory: Negative for chest tightness and shortness of breath.    Cardiovascular: Negative for chest pain and leg swelling.   Gastrointestinal: Positive for abdominal pain. Negative for nausea.   Neurological: Negative for dizziness and weakness.     Objective:     Vital Signs (Most Recent):  Temp: 98.1 °F (36.7 °C) (11/25/19 0750)  Pulse: 76 (11/25/19 1146)  Resp: 18 (11/25/19 0750)  BP: 117/61 (11/25/19 1146)  SpO2: 100 % (11/24/19 2112) Vital Signs (24h Range):  Temp:  [97.4 °F (36.3 °C)-98.7 °F (37.1 °C)] 98.1 °F (36.7 °C)  Pulse:  [72-77] 76  Resp:  [18-22] 18  SpO2:  [97 %-100 %] 100 %  BP: (112-142)/(56-78) 117/61     Weight: 54.2 kg (119 lb 7.8 oz)  Body mass index is 21.85 kg/m².    Intake/Output Summary (Last 24 hours) at 11/25/2019 1432  Last data filed at 11/25/2019 0547  Gross per 24 hour   Intake 50 ml   Output 200 ml   Net -150 ml      Physical Exam   Constitutional: She is oriented to person, place, and time. She appears well-developed and well-nourished.   Eyes: Pupils are equal, round, and reactive to light. EOM are normal.   Cardiovascular: Normal rate and regular rhythm.   Pulmonary/Chest: Effort normal and breath sounds normal.   Abdominal: Soft. There is tenderness in the epigastric area.   Musculoskeletal: She exhibits no edema or tenderness.   Neurological: She is alert and oriented to person, place, and time.   Skin: Skin is warm and dry.   Psychiatric: She has a normal mood and affect. Her behavior is normal.   Nursing note and vitals reviewed.      Significant Labs:   CBC:   Recent Labs   Lab 11/25/19  0803   WBC 8.15   HGB 11.8*   HCT 36.2*        CMP:   Recent Labs   Lab 11/25/19  0803 11/25/19  1047    140   K 3.4* 3.4*    105   CO2 22* 22*   GLU 66* 67*   BUN  13 16   CREATININE 0.6 0.6   CALCIUM 9.2 9.2   PROT  --  6.8   ALBUMIN  --  3.6   BILITOT  --  0.6   ALKPHOS  --  180*   AST  --  85*   ALT  --  176*   ANIONGAP 11 13   EGFRNONAA >60 >60       Significant Imaging: I have reviewed all pertinent imaging results/findings within the past 24 hours.

## 2019-11-25 NOTE — NURSING
Received patient from [facility] via stretcher.  VSS, no acute distress noted.   Patient's native language is Danish, family member at bedside fluent in English.   Patient slept during the night with no complaints of pain or significant needs.  Remained NPO for possible procedure this morning  Currently resting in bed with eyes closed, bed alarm on, call light with reach.  No other significant changes.  Reporting off to oncoming day nurse.

## 2019-11-25 NOTE — PROGRESS NOTES
Pharmacy New Medication Education    Patient and/or Caregiver ACCEPTED medication education.    Pharmacy has provided education on the name, indication, and possible side effects of the medication(s) prescribed, using teach-back method.     Learners of pharmacy medication education includes:  patient      The following medications have also been discussed, during this admission.     Current Facility-Administered Medications   Medication Frequency    0.9%  NaCl infusion Continuous    acetaminophen tablet 650 mg Q4H PRN    amLODIPine tablet 5 mg Daily    atorvastatin tablet 40 mg Daily    butalbital-acetaminophen-caffeine -40 mg per tablet 1 tablet Q4H PRN    cefTRIAXone (ROCEPHIN) 1 g in dextrose 5 % 50 mL IVPB Q24H    HYDROmorphone tablet 2 mg Q4H PRN    influenza (HIGH-DOSE PF) vaccine 0.5 mL vaccine x 1 dose    ketorolac injection 15 mg Q6H PRN    losartan tablet 25 mg Daily    melatonin tablet 6 mg Nightly PRN    ondansetron disintegrating tablet 8 mg Q8H PRN    pantoprazole EC tablet 40 mg Daily    pneumoc 13-niki conj-dip cr(PF) (PREVNAR 13 (PF)) 0.5 mL vaccine x 1 dose    promethazine (PHENERGAN) 6.25 mg in dextrose 5 % 50 mL IVPB Q6H PRN    sodium chloride 0.9% flush 10 mL PRN          Thank you  Bebo Cuellar, PharmD

## 2019-11-25 NOTE — SUBJECTIVE & OBJECTIVE
Past Medical History:   Diagnosis Date    Hypertension        Past Surgical History:   Procedure Laterality Date    APPENDECTOMY      CHOLECYSTECTOMY      HYSTERECTOMY  2012    elective       Review of patient's allergies indicates:  No Known Allergies    No current facility-administered medications on file prior to encounter.      Current Outpatient Medications on File Prior to Encounter   Medication Sig    amlodipine (NORVASC) 5 MG tablet Take 5 mg by mouth once daily.    atorvastatin (LIPITOR) 40 MG tablet Take 40 mg by mouth once daily.    butalbital-acetaminophen-caffeine -40 mg (FIORICET, ESGIC) -40 mg per tablet Take 1 tablet by mouth every 4 (four) hours as needed for Pain.    DEXLANSOPRAZOLE ORAL Take by mouth.    diclofenac (VOLTAREN) 75 MG EC tablet Take 1 tablet (75 mg total) by mouth 2 (two) times daily.    esomeprazole (NEXIUM) 20 MG capsule Take 20 mg by mouth before breakfast.    losartan (COZAAR) 25 MG tablet     metroNIDAZOLE (FLAGYL) 250 MG tablet     ondansetron (ZOFRAN-ODT) 4 MG TbDL Take 8 mg by mouth every 12 (twelve) hours.     Family History     None        Tobacco Use    Smoking status: Never Smoker    Smokeless tobacco: Never Used   Substance and Sexual Activity    Alcohol use: No    Drug use: No    Sexual activity: Yes     Partners: Male     Review of Systems   Constitutional: Negative for chills and fever.   HENT: Negative for congestion, postnasal drip and rhinorrhea.    Respiratory: Negative for chest tightness and shortness of breath.    Cardiovascular: Negative for chest pain and palpitations.   Gastrointestinal: Positive for abdominal pain and nausea.   Genitourinary: Negative for difficulty urinating.   Musculoskeletal: Negative for arthralgias and myalgias.   Skin: Negative for color change and wound.   Neurological: Negative for dizziness.   Hematological: Does not bruise/bleed easily.   Psychiatric/Behavioral: Negative for agitation.     Objective:      Vital Signs (Most Recent):  Temp: 98.7 °F (37.1 °C) (11/24/19 2112)  Pulse: 75 (11/24/19 2112)  Resp: (!) 22 (11/24/19 2112)  BP: 129/60 (11/24/19 2112)  SpO2: 100 % (11/24/19 2112) Vital Signs (24h Range):  Temp:  [98.4 °F (36.9 °C)-98.7 °F (37.1 °C)] 98.7 °F (37.1 °C)  Pulse:  [75-76] 75  Resp:  [22] 22  SpO2:  [97 %-100 %] 100 %  BP: (129-142)/(60-78) 129/60     Weight: 54.2 kg (119 lb 7.8 oz)  Body mass index is 21.85 kg/m².    Physical Exam   Constitutional: She is oriented to person, place, and time. She appears well-developed and well-nourished.   HENT:   Head: Normocephalic and atraumatic.   Eyes: Pupils are equal, round, and reactive to light. EOM are normal.   Neck: Normal range of motion.   Cardiovascular: Normal rate and regular rhythm.   Pulmonary/Chest: Effort normal and breath sounds normal.   Abdominal: Soft. Bowel sounds are normal.   Musculoskeletal: Normal range of motion. She exhibits no edema or deformity.   Neurological: She is alert and oriented to person, place, and time.   Skin: Skin is warm and dry.   Psychiatric: She has a normal mood and affect. Her behavior is normal.         CRANIAL NERVES     CN III, IV, VI   Pupils are equal, round, and reactive to light.  Extraocular motions are normal.         Significant Imaging: I have reviewed all pertinent imaging results/findings within the past 24 hours.

## 2019-11-25 NOTE — ASSESSMENT & PLAN NOTE
Pancreatitis    NPO  Daily Lipase//CBC/CMP/Mg/Phos  IVFs  Prn Pain Control   Prn antiemetics  CT Abdomen:CT showed intra and extra hepatic ductal dilatation with CBD enlarged at 1.6 cm.  Consult GI

## 2019-11-25 NOTE — HOSPITAL COURSE
Patient admitted to medicine for pancreatitis. GI consulted, recommending ERCP on 11/26.  11/126 pt seen, she is comfortable, she atre her clear liquid diet denies pain, and does not seems in pain or doscomfort, will advance her diet and monitor today.  She had an EUS:  Impression:           - Pancreatic parenchymal abnormalities consisting                         of lobularity were noted in the entire pancreas                         which can be seen with pancreatitis.                        - There was dilation in the common bile duct which                         measured up to 10 mm. Endosonographic imaging in                         the common bile duct showed no stones or sludge.                        - No specimens collected.  Recommendation:       - Return patient to hospital [facility] for ongoing care.                        - Observe patient's clinical course. If no                         improvement of symptoms or increasing LFTs, then                         may need ERCP.  11/27 pt seen, she is feeling good, she had roasted chicken last night and oatmeal this morning, tolerating oral food well , no N, V. LFT's trending down.  Pt is eager to go home  She will be dc home and f/u with pcp and gi.  Family at bedside updated

## 2019-11-25 NOTE — HPI
64 y.o. female with PMH of SBO in September 2019, HTN and GERD who presents as a transfer from [facility] with pancreatitis.  Of Note, patient is Kazakh speaking, daughter is at bedside and provides history.  Per patient's daughter, patient started to complain of aching severe abdominal pain and nausea Sunday morning and went to the hospital.  Denies aggravating or alleviating factors, SOB, chest pain, fever, chills, cough, change in bladder habits or change in bowel habits.  Per records, CT showed intra and extra hepatic ductal dilatation with CBD enlarged at 1.6 cm. AST and ALT of 60, Alk phos 160 and bili of 0.97. Leukocytosis of 14 K but afebrile. Patient transferred to [facility] hospital medicine for further medical management.

## 2019-11-25 NOTE — ASSESSMENT & PLAN NOTE
Pancreatitis    Full liquid diet, NPO at MN  Daily Lipase//CBC/CMP/Mg/Phos  IVFs  Prn Pain Control   Prn antiemetics  CT Abdomen:CT showed intra and extra hepatic ductal dilatation with CBD enlarged at 1.6 cm.  Consult GI, ERCP on 11/26

## 2019-11-26 ENCOUNTER — ANESTHESIA (OUTPATIENT)
Dept: ENDOSCOPY | Facility: HOSPITAL | Age: 64
DRG: 440 | End: 2019-11-26
Payer: MEDICARE

## 2019-11-26 LAB
ALBUMIN SERPL BCP-MCNC: 3.3 G/DL (ref 3.5–5.2)
ALP SERPL-CCNC: 156 U/L (ref 55–135)
ALT SERPL W/O P-5'-P-CCNC: 133 U/L (ref 10–44)
ANION GAP SERPL CALC-SCNC: 9 MMOL/L (ref 8–16)
AST SERPL-CCNC: 52 U/L (ref 10–40)
BASOPHILS # BLD AUTO: 0.03 K/UL (ref 0–0.2)
BASOPHILS NFR BLD: 0.5 % (ref 0–1.9)
BILIRUB SERPL-MCNC: 0.4 MG/DL (ref 0.1–1)
BUN SERPL-MCNC: 11 MG/DL (ref 8–23)
CALCIUM SERPL-MCNC: 8.9 MG/DL (ref 8.7–10.5)
CHLORIDE SERPL-SCNC: 108 MMOL/L (ref 95–110)
CO2 SERPL-SCNC: 22 MMOL/L (ref 23–29)
CREAT SERPL-MCNC: 0.6 MG/DL (ref 0.5–1.4)
DIFFERENTIAL METHOD: ABNORMAL
EOSINOPHIL # BLD AUTO: 0.3 K/UL (ref 0–0.5)
EOSINOPHIL NFR BLD: 4.9 % (ref 0–8)
ERYTHROCYTE [DISTWIDTH] IN BLOOD BY AUTOMATED COUNT: 14.1 % (ref 11.5–14.5)
EST. GFR  (AFRICAN AMERICAN): >60 ML/MIN/1.73 M^2
EST. GFR  (NON AFRICAN AMERICAN): >60 ML/MIN/1.73 M^2
GLUCOSE SERPL-MCNC: 79 MG/DL (ref 70–110)
HCT VFR BLD AUTO: 34.1 % (ref 37–48.5)
HGB BLD-MCNC: 11.3 G/DL (ref 12–16)
LYMPHOCYTES # BLD AUTO: 1.2 K/UL (ref 1–4.8)
LYMPHOCYTES NFR BLD: 18.2 % (ref 18–48)
MAGNESIUM SERPL-MCNC: 2.1 MG/DL (ref 1.6–2.6)
MCH RBC QN AUTO: 25 PG (ref 27–31)
MCHC RBC AUTO-ENTMCNC: 33.1 G/DL (ref 32–36)
MCV RBC AUTO: 75 FL (ref 82–98)
MONOCYTES # BLD AUTO: 0.9 K/UL (ref 0.3–1)
MONOCYTES NFR BLD: 12.9 % (ref 4–15)
NEUTROPHILS # BLD AUTO: 4.2 K/UL (ref 1.8–7.7)
NEUTROPHILS NFR BLD: 63.5 % (ref 38–73)
PHOSPHATE SERPL-MCNC: 3.2 MG/DL (ref 2.7–4.5)
PLATELET # BLD AUTO: 210 K/UL (ref 150–350)
PMV BLD AUTO: 8.5 FL (ref 9.2–12.9)
POTASSIUM SERPL-SCNC: 3.5 MMOL/L (ref 3.5–5.1)
PROT SERPL-MCNC: 6.3 G/DL (ref 6–8.4)
RBC # BLD AUTO: 4.52 M/UL (ref 4–5.4)
SODIUM SERPL-SCNC: 139 MMOL/L (ref 136–145)
WBC # BLD AUTO: 6.58 K/UL (ref 3.9–12.7)

## 2019-11-26 PROCEDURE — 43259 PR ENDOSCOPIC ULTRASOUND EXAM: ICD-10-PCS | Mod: ,,, | Performed by: INTERNAL MEDICINE

## 2019-11-26 PROCEDURE — 36415 COLL VENOUS BLD VENIPUNCTURE: CPT

## 2019-11-26 PROCEDURE — 63600175 PHARM REV CODE 636 W HCPCS: Performed by: NURSE PRACTITIONER

## 2019-11-26 PROCEDURE — 99223 1ST HOSP IP/OBS HIGH 75: CPT | Mod: ,,, | Performed by: INTERNAL MEDICINE

## 2019-11-26 PROCEDURE — 80053 COMPREHEN METABOLIC PANEL: CPT

## 2019-11-26 PROCEDURE — 99223 PR INITIAL HOSPITAL CARE,LEVL III: ICD-10-PCS | Mod: ,,, | Performed by: INTERNAL MEDICINE

## 2019-11-26 PROCEDURE — 11000001 HC ACUTE MED/SURG PRIVATE ROOM

## 2019-11-26 PROCEDURE — 63600175 PHARM REV CODE 636 W HCPCS: Performed by: NURSE ANESTHETIST, CERTIFIED REGISTERED

## 2019-11-26 PROCEDURE — 25000003 PHARM REV CODE 250: Performed by: NURSE PRACTITIONER

## 2019-11-26 PROCEDURE — 25000003 PHARM REV CODE 250: Performed by: NURSE ANESTHETIST, CERTIFIED REGISTERED

## 2019-11-26 PROCEDURE — 43259 EGD US EXAM DUODENUM/JEJUNUM: CPT | Mod: ,,, | Performed by: INTERNAL MEDICINE

## 2019-11-26 PROCEDURE — 43259 EGD US EXAM DUODENUM/JEJUNUM: CPT | Performed by: INTERNAL MEDICINE

## 2019-11-26 PROCEDURE — 37000008 HC ANESTHESIA 1ST 15 MINUTES: Performed by: INTERNAL MEDICINE

## 2019-11-26 PROCEDURE — 85025 COMPLETE CBC W/AUTO DIFF WBC: CPT

## 2019-11-26 PROCEDURE — 84100 ASSAY OF PHOSPHORUS: CPT

## 2019-11-26 PROCEDURE — 83735 ASSAY OF MAGNESIUM: CPT

## 2019-11-26 PROCEDURE — 37000009 HC ANESTHESIA EA ADD 15 MINS: Performed by: INTERNAL MEDICINE

## 2019-11-26 RX ORDER — LIDOCAINE HCL/PF 100 MG/5ML
SYRINGE (ML) INTRAVENOUS
Status: DISCONTINUED | OUTPATIENT
Start: 2019-11-26 | End: 2019-11-26

## 2019-11-26 RX ORDER — PROPOFOL 10 MG/ML
VIAL (ML) INTRAVENOUS CONTINUOUS PRN
Status: DISCONTINUED | OUTPATIENT
Start: 2019-11-26 | End: 2019-11-26

## 2019-11-26 RX ORDER — PROPOFOL 10 MG/ML
VIAL (ML) INTRAVENOUS
Status: DISCONTINUED | OUTPATIENT
Start: 2019-11-26 | End: 2019-11-26

## 2019-11-26 RX ORDER — SODIUM CHLORIDE 9 MG/ML
INJECTION, SOLUTION INTRAVENOUS CONTINUOUS PRN
Status: DISCONTINUED | OUTPATIENT
Start: 2019-11-26 | End: 2019-11-26

## 2019-11-26 RX ADMIN — TOPICAL ANESTHETIC 1 EACH: 200 SPRAY DENTAL; PERIODONTAL at 09:11

## 2019-11-26 RX ADMIN — ATORVASTATIN CALCIUM 40 MG: 40 TABLET, FILM COATED ORAL at 08:11

## 2019-11-26 RX ADMIN — PROPOFOL 80 MG: 10 INJECTION, EMULSION INTRAVENOUS at 09:11

## 2019-11-26 RX ADMIN — LOSARTAN POTASSIUM 25 MG: 25 TABLET ORAL at 08:11

## 2019-11-26 RX ADMIN — AMLODIPINE BESYLATE 5 MG: 5 TABLET ORAL at 08:11

## 2019-11-26 RX ADMIN — LIDOCAINE HYDROCHLORIDE 100 MG: 20 INJECTION, SOLUTION INTRAVENOUS at 09:11

## 2019-11-26 RX ADMIN — PANTOPRAZOLE SODIUM 40 MG: 40 TABLET, DELAYED RELEASE ORAL at 08:11

## 2019-11-26 RX ADMIN — PROPOFOL 150 MCG/KG/MIN: 10 INJECTION, EMULSION INTRAVENOUS at 09:11

## 2019-11-26 RX ADMIN — CEFTRIAXONE 1 G: 1 INJECTION, SOLUTION INTRAVENOUS at 10:11

## 2019-11-26 RX ADMIN — PROPOFOL 20 MG: 10 INJECTION, EMULSION INTRAVENOUS at 09:11

## 2019-11-26 RX ADMIN — SODIUM CHLORIDE: 9 INJECTION, SOLUTION INTRAVENOUS at 09:11

## 2019-11-26 NOTE — PLAN OF CARE
Pt's family requesting to speak w/physician regarding plan of care.  Nurse explained POC to pt's english speaking daughter and they continue to have questions regarding POC and procedure.  Informed Dr. Akbar and he stated he will come later due to admitting patients at this time. Family informed.

## 2019-11-26 NOTE — ANESTHESIA POSTPROCEDURE EVALUATION
Anesthesia Post Evaluation    Patient: Meredith Cuellar    Procedure(s) Performed: Procedure(s) (LRB):  ULTRASOUND, UPPER GI TRACT, ENDOSCOPIC (N/A)    Final Anesthesia Type: MAC    Patient location during evaluation: GI PACU  Patient participation: Yes- Able to Participate  Level of consciousness: awake and alert and oriented  Post-procedure vital signs: reviewed and stable  Pain management: adequate  Airway patency: patent    PONV status at discharge: No PONV  Anesthetic complications: no      Cardiovascular status: blood pressure returned to baseline, hemodynamically stable and stable  Respiratory status: unassisted, spontaneous ventilation and room air  Hydration status: euvolemic  Follow-up not needed.          Vitals Value Taken Time   BP 93/51 11/26/2019  9:39 AM   Temp 36.8 °C (98.3 °F) 11/26/2019  9:39 AM   Pulse 70 11/26/2019  9:39 AM   Resp 15 11/26/2019  9:39 AM   SpO2 97 % 11/26/2019  9:39 AM         No case tracking events are documented in the log.      Pain/Carlos Manuel Score: Carlos Manuel Score: 9 (11/26/2019  9:40 AM)

## 2019-11-26 NOTE — CONSULTS
[facility]  Gastroenterology  Consult Note    Patient Name: Meredith Cuellar  MRN: [id number]  Admission Date: 11/24/2019  Hospital Length of Stay: 2 days  Code Status: Full Code   Attending Provider: Jayne Jackson*   Consulting Provider: Pam Rich MD  Primary Care Physician: Di Cruz MD  Principal Problem:Pancreatitis    Inpatient consult to [facility]  Consult performed by: Pam Rich MD  Consult ordered by: Nancy Bowden NP  Reason for consult: Pancreatitis        Subjective:     HPI:  This is a 65yo female with a PMHx of htn, cholecystectomy remotely for suspected gallstones here with abdominal pain x 1 day. It is upper abdominal, sharp and non-radiating associated with nausea. No dark urine, jaundice. No changes in  Medications, etoh intake. No relation to food intake. Family present to give additional history.     The following portions of the patient's history were reviewed and updated as appropriate: allergies, current medications, past family history, past medical history, past social history, past surgical history and problem list.      Past Medical History:   Diagnosis Date    Hypertension        Past Surgical History:   Procedure Laterality Date    APPENDECTOMY      CHOLECYSTECTOMY      HYSTERECTOMY  2012    elective       Review of patient's allergies indicates:  No Known Allergies  Family History     None        Tobacco Use    Smoking status: Never Smoker    Smokeless tobacco: Never Used   Substance and Sexual Activity    Alcohol use: No    Drug use: No    Sexual activity: Yes     Partners: Male     Review of Systems   Constitutional: Negative for appetite change and fever.   HENT: Negative for postnasal drip and trouble swallowing.    Eyes: Negative for pain and redness.   Respiratory: Negative for cough, choking, chest tightness and shortness of breath.    Cardiovascular: Negative for chest pain and leg swelling.   Gastrointestinal:  Positive for abdominal pain and nausea. Negative for anal bleeding, blood in stool, constipation, diarrhea, rectal pain and vomiting.   Endocrine: Negative for cold intolerance and heat intolerance.   Genitourinary: Negative for difficulty urinating and hematuria.   Musculoskeletal: Negative for arthralgias and back pain.   Skin: Negative for color change and pallor.   Allergic/Immunologic: Negative for environmental allergies and food allergies.   Neurological: Negative for dizziness and light-headedness.   Hematological: Negative for adenopathy. Does not bruise/bleed easily.   Psychiatric/Behavioral: Negative for agitation and behavioral problems.     Objective:     Vital Signs (Most Recent):  Temp: 98.3 °F (36.8 °C) (11/26/19 0450)  Pulse: 72 (11/26/19 0450)  Resp: 16 (11/26/19 0450)  BP: 136/74 (11/26/19 0450)  SpO2: 95 % (11/26/19 0450) Vital Signs (24h Range):  Temp:  [96.3 °F (35.7 °C)-98.3 °F (36.8 °C)] 98.3 °F (36.8 °C)  Pulse:  [68-77] 72  Resp:  [16-18] 16  SpO2:  [95 %-98 %] 95 %  BP: (117-168)/(60-79) 136/74     Weight: 48.6 kg (107 lb 2.3 oz) (11/26/19 0450)  Body mass index is 19.6 kg/m².      Intake/Output Summary (Last 24 hours) at 11/26/2019 0724  Last data filed at 11/26/2019 0500  Gross per 24 hour   Intake 170 ml   Output 750 ml   Net -580 ml       Lines/Drains/Airways     Peripheral Intravenous Line                 Peripheral IV - Single Lumen 11/25/19 2000 20 G Anterior;Right Forearm less than 1 day                Physical Exam   Constitutional: She is oriented to person, place, and time. She appears well-developed and well-nourished. No distress.   HENT:   Head: Normocephalic and atraumatic.   Eyes: Conjunctivae are normal. No scleral icterus.   Neck: Normal range of motion. Neck supple. No tracheal deviation present. No thyromegaly present.   Cardiovascular: Normal rate and regular rhythm. Exam reveals no gallop and no friction rub.   Pulmonary/Chest: Effort normal and breath sounds normal.  No respiratory distress. She has no wheezes.   Abdominal: Soft. Bowel sounds are normal. She exhibits no distension. There is tenderness.   Musculoskeletal:        Right wrist: She exhibits normal range of motion and no tenderness.        Left wrist: She exhibits normal range of motion and no tenderness.   Lymphadenopathy:        Head (right side): No submental and no submandibular adenopathy present.        Head (left side): No submental and no submandibular adenopathy present.   Neurological: She is alert and oriented to person, place, and time.   Skin: Skin is warm and dry. No rash noted. She is not diaphoretic. No erythema.   Psychiatric: She has a normal mood and affect. Her behavior is normal.   Nursing note and vitals reviewed.      Significant Labs:  CBC:   Recent Labs   Lab 11/25/19  0803 11/26/19  0119   WBC 8.15 6.58   HGB 11.8* 11.3*   HCT 36.2* 34.1*    210     CMP:   Recent Labs   Lab 11/26/19  0119   GLU 79   CALCIUM 8.9   ALBUMIN 3.3*   PROT 6.3      K 3.5   CO2 22*      BUN 11   CREATININE 0.6   ALKPHOS 156*   *   AST 52*   BILITOT 0.4     Lipase:   Recent Labs   Lab 11/25/19  0803   LIPASE 88*       Significant Imaging:  Imaging results within the past 24 hours have been reviewed.    Assessment/Plan:     * Pancreatitis  - clinically improving, unclear etiology with significant ductal dilation  - will plan EUS +/- ERCP  - NPO  - IVF and pain control  - monitor clinically  - d/w family as well        Thank you for your consult. I will follow-up with patient. Please contact us if you have any additional questions.    Pam Rich MD  Gastroenterology  [facility]

## 2019-11-26 NOTE — PROGRESS NOTES
Pharmacy New Medication Education    Patient and/or Caregiver ACCEPTED medication education.    Pharmacy has provided education on the name, indication, and possible side effects of the medication(s) prescribed, using teach-back method.     Learners of pharmacy medication education includes:    patient  The following medications have also been discussed, during this admission.     Current Facility-Administered Medications   Medication Frequency    acetaminophen tablet 650 mg Q4H PRN    amLODIPine tablet 5 mg Daily    atorvastatin tablet 40 mg Daily    butalbital-acetaminophen-caffeine -40 mg per tablet 1 tablet Q4H PRN    cefTRIAXone (ROCEPHIN) 1 g in dextrose 5 % 50 mL IVPB Q24H    HYDROmorphone tablet 2 mg Q4H PRN    influenza (HIGH-DOSE PF) vaccine 0.5 mL vaccine x 1 dose    ketorolac injection 15 mg Q6H PRN    losartan tablet 25 mg Daily    melatonin tablet 6 mg Nightly PRN    ondansetron disintegrating tablet 8 mg Q8H PRN    pantoprazole EC tablet 40 mg Daily    pneumoc 13-niki conj-dip cr(PF) (PREVNAR 13 (PF)) 0.5 mL vaccine x 1 dose    promethazine (PHENERGAN) 6.25 mg in dextrose 5 % 50 mL IVPB Q6H PRN    sodium chloride 0.9% flush 10 mL PRN          Thank you  Bebo Cuellar, JiD

## 2019-11-26 NOTE — PROGRESS NOTES
[facility] Hospital Medicine  Progress Note    Patient Name: Meredith Cuellar  MRN: [id number]  Patient Class: IP- Inpatient   Admission Date: 11/24/2019  Length of Stay: 2 days  Attending Physician: Cholo Akbar DO  Primary Care Provider: Di Cruz MD        Subjective:     Principal Problem:Pancreatitis        HPI:  64 y.o. female with PMH of SBO in September 2019, HTN and GERD who presents as a transfer from [facility] with pancreatitis.  Of Note, patient is Greek speaking, daughter is at bedside and provides history.  Per patient's daughter, patient started to complain of aching severe abdominal pain and nausea Sunday morning and went to the hospital.  Denies aggravating or alleviating factors, SOB, chest pain, fever, chills, cough, change in bladder habits or change in bowel habits.  Per records, CT showed intra and extra hepatic ductal dilatation with CBD enlarged at 1.6 cm. AST and ALT of 60, Alk phos 160 and bili of 0.97. Leukocytosis of 14 K but afebrile. Patient transferred to [facility] hospital medicine for further medical management.     Overview/Hospital Course:  Patient admitted to medicine for pancreatitis. GI consulted, recommending ERCP on 11/26.  11/126 pt seen, she is comfortable, she atre her clear liquid diet denies pain, and does not seems in pain or doscomfort, will advance her diet and monitor today.  She had an EUS:  Impression:           - Pancreatic parenchymal abnormalities consisting                         of lobularity were noted in the entire pancreas                         which can be seen with pancreatitis.                        - There was dilation in the common bile duct which                         measured up to 10 mm. Endosonographic imaging in                         the common bile duct showed no stones or sludge.                        - No specimens collected.  Recommendation:       - Return patient to hospital [facility] for ongoing care.                         - Observe patient's clinical course. If no                         improvement of symptoms or increasing LFTs, then                         may need ERCP.    Past Medical History:   Diagnosis Date    Hypertension        Past Surgical History:   Procedure Laterality Date    APPENDECTOMY      CHOLECYSTECTOMY      HYSTERECTOMY  2012    elective       Review of patient's allergies indicates:  No Known Allergies  Family History     None        Tobacco Use    Smoking status: Never Smoker    Smokeless tobacco: Never Used   Substance and Sexual Activity    Alcohol use: No    Drug use: No    Sexual activity: Yes     Partners: Male     Review of Systems   Constitutional: Negative for appetite change and fever.   HENT: Negative for congestion and trouble swallowing.    Respiratory: Negative for chest tightness and shortness of breath.    Cardiovascular: Negative for chest pain and leg swelling.   Gastrointestinal: Negative for abdominal pain, nausea and vomiting.   Genitourinary: Negative for hematuria.   Musculoskeletal: Negative for arthralgias and back pain.   Skin: Negative for color change and pallor.   Neurological: Negative for light-headedness.   Psychiatric/Behavioral: Negative for agitation and behavioral problems.     Objective:     Vital Signs (Most Recent):  Temp: 98 °F (36.7 °C) (11/26/19 1635)  Pulse: 69 (11/26/19 1635)  Resp: 16 (11/26/19 1635)  BP: 132/65 (11/26/19 1635)  SpO2: 97 % (11/26/19 1635) Vital Signs (24h Range):  Temp:  [96.5 °F (35.8 °C)-98.3 °F (36.8 °C)] 98 °F (36.7 °C)  Pulse:  [62-73] 69  Resp:  [15-19] 16  SpO2:  [95 %-100 %] 97 %  BP: ()/(51-80) 132/65     Weight: 48.6 kg (107 lb 2.3 oz) (11/26/19 0450)  Body mass index is 19.6 kg/m².      Intake/Output Summary (Last 24 hours) at 11/26/2019 1729  Last data filed at 11/26/2019 0909  Gross per 24 hour   Intake 370 ml   Output 350 ml   Net 20 ml       Lines/Drains/Airways     Airway                 Airway -  Non-Surgical 11/26/19 0916 Nasal Cannula less than 1 day          Peripheral Intravenous Line                 Peripheral IV - Single Lumen 11/25/19 2000 20 G Anterior;Right Forearm less than 1 day                Physical Exam   Constitutional: She appears well-developed and well-nourished. No distress.   HENT:   Head: Normocephalic and atraumatic.   Eyes: Conjunctivae and EOM are normal. No scleral icterus.   Neck: Normal range of motion. Neck supple. No tracheal deviation present. No thyromegaly present.   Cardiovascular: Normal rate and regular rhythm. Exam reveals no gallop and no friction rub.   Pulmonary/Chest: Effort normal and breath sounds normal. No respiratory distress. She has no wheezes.   Abdominal: Soft. Bowel sounds are normal. She exhibits no distension. There is no tenderness.   Musculoskeletal:        Right wrist: She exhibits normal range of motion and no tenderness.        Left wrist: She exhibits normal range of motion and no tenderness.   Lymphadenopathy:        Head (right side): No submental and no submandibular adenopathy present.        Head (left side): No submental and no submandibular adenopathy present.   Neurological: She is alert.   Skin: Skin is warm and dry. No rash noted. She is not diaphoretic. No erythema.   Psychiatric: She has a normal mood and affect. Her behavior is normal.   Nursing note and vitals reviewed.      Significant Labs:  Recent Labs   Lab 11/25/19  0803 11/26/19  0119   WBC 8.15 6.58   HGB 11.8* 11.3*   HCT 36.2* 34.1*    210     Recent Labs   Lab 11/25/19  0803 11/25/19  1047 11/26/19  0119    140 139   K 3.4* 3.4* 3.5    105 108   CO2 22* 22* 22*   BUN 13 16 11   CREATININE 0.6 0.6 0.6   GLU 66* 67* 79   CALCIUM 9.2 9.2 8.9   MG 2.0  --  2.1   PHOS 3.0  --  3.2   LIPASE 88*  --   --      Recent Labs   Lab 11/25/19  1047 11/26/19  0119   ALKPHOS 180* 156*   * 133*   AST 85* 52*   ALBUMIN 3.6 3.3*   PROT 6.8 6.3   BILITOT 0.6 0.4      No  results for input(s): CPK, CPKMB, MB, TROPONINI in the last 72 hours.  No results for input(s): POCTGLUCOSE in the last 168 hours.  No results found for: HGBA1C  Scheduled Meds:   amLODIPine  5 mg Oral Daily    atorvastatin  40 mg Oral Daily    cefTRIAXone (ROCEPHIN) IVPB  1 g Intravenous Q24H    losartan  25 mg Oral Daily    pantoprazole  40 mg Oral Daily     Continuous Infusions:  As Needed: acetaminophen, butalbital-acetaminophen-caffeine -40 mg, HYDROmorphone, influenza, ketorolac, melatonin, ondansetron, pneumoc 13-niki conj-dip cr(PF), promethazine (PHENERGAN) IVPB, sodium chloride 0.9%    Significant Imaging:  Mammo Digital Screening Bilat With CAD  Narrative: BILATERAL MAMMOGRAM FINDINGS:  There are scattered fibroglandular densities.    No definite evidence of dominant nor spiculated mass, secondary malignant  characteristics, nor clustered microcalcifications is identified.  There are  small benign appearing nodes seen in the left axillary region.    These images  were processed to produce digital images analyzed for potential  abnormalities.  Impression: Findings in both breasts are benign-negative.    Mammogram in 1 year is recommended.    ACR BI-RADS Category 2: Benign    URMILA FANG M.D.  09/23/2015          Assessment/Plan:      * Pancreatitis  Pancreatitis    Full liquid diet, NPO at MN  Daily Lipase//CBC/CMP/Mg/Phos  IVFs  Prn Pain Control   Prn antiemetics  CT Abdomen:CT showed intra and extra hepatic ductal dilatation with CBD enlarged at 1.6 cm.  Consult GI, ERCP on 11/26 11/26 had unremarkble eus.  F/u cilnically with advancing diet     Chronic GERD  Stable  Continue Protonix    Hypertension  Continue amlodipine and losartan      VTE Risk Mitigation (From admission, onward)         Ordered     IP VTE LOW RISK PATIENT  Once      11/24/19 2204     Place sequential compression device  Until discontinued      11/24/19 2204                      Cholo Akbar DO  Department of  Hospital Medicine [facility]

## 2019-11-26 NOTE — SUBJECTIVE & OBJECTIVE
Past Medical History:   Diagnosis Date    Hypertension        Past Surgical History:   Procedure Laterality Date    APPENDECTOMY      CHOLECYSTECTOMY      HYSTERECTOMY  2012    elective       Review of patient's allergies indicates:  No Known Allergies  Family History     None        Tobacco Use    Smoking status: Never Smoker    Smokeless tobacco: Never Used   Substance and Sexual Activity    Alcohol use: No    Drug use: No    Sexual activity: Yes     Partners: Male     Review of Systems   Constitutional: Negative for appetite change and fever.   HENT: Negative for congestion and trouble swallowing.    Respiratory: Negative for chest tightness and shortness of breath.    Cardiovascular: Negative for chest pain and leg swelling.   Gastrointestinal: Negative for abdominal pain, nausea and vomiting.   Genitourinary: Negative for hematuria.   Musculoskeletal: Negative for arthralgias and back pain.   Skin: Negative for color change and pallor.   Neurological: Negative for light-headedness.   Psychiatric/Behavioral: Negative for agitation and behavioral problems.     Objective:     Vital Signs (Most Recent):  Temp: 98 °F (36.7 °C) (11/26/19 1635)  Pulse: 69 (11/26/19 1635)  Resp: 16 (11/26/19 1635)  BP: 132/65 (11/26/19 1635)  SpO2: 97 % (11/26/19 1635) Vital Signs (24h Range):  Temp:  [96.5 °F (35.8 °C)-98.3 °F (36.8 °C)] 98 °F (36.7 °C)  Pulse:  [62-73] 69  Resp:  [15-19] 16  SpO2:  [95 %-100 %] 97 %  BP: ()/(51-80) 132/65     Weight: 48.6 kg (107 lb 2.3 oz) (11/26/19 0450)  Body mass index is 19.6 kg/m².      Intake/Output Summary (Last 24 hours) at 11/26/2019 1726  Last data filed at 11/26/2019 0936  Gross per 24 hour   Intake 370 ml   Output 350 ml   Net 20 ml       Lines/Drains/Airways     Airway                 Airway - Non-Surgical 11/26/19 0916 Nasal Cannula less than 1 day          Peripheral Intravenous Line                 Peripheral IV - Single Lumen 11/25/19 2000 20 G Anterior;Right Forearm  less than 1 day                Physical Exam   Constitutional: She appears well-developed and well-nourished. No distress.   HENT:   Head: Normocephalic and atraumatic.   Eyes: Conjunctivae and EOM are normal. No scleral icterus.   Neck: Normal range of motion. Neck supple. No tracheal deviation present. No thyromegaly present.   Cardiovascular: Normal rate and regular rhythm. Exam reveals no gallop and no friction rub.   Pulmonary/Chest: Effort normal and breath sounds normal. No respiratory distress. She has no wheezes.   Abdominal: Soft. Bowel sounds are normal. She exhibits no distension. There is no tenderness.   Musculoskeletal:        Right wrist: She exhibits normal range of motion and no tenderness.        Left wrist: She exhibits normal range of motion and no tenderness.   Lymphadenopathy:        Head (right side): No submental and no submandibular adenopathy present.        Head (left side): No submental and no submandibular adenopathy present.   Neurological: She is alert.   Skin: Skin is warm and dry. No rash noted. She is not diaphoretic. No erythema.   Psychiatric: She has a normal mood and affect. Her behavior is normal.   Nursing note and vitals reviewed.      Significant Labs:  Recent Labs   Lab 11/25/19  0803 11/26/19  0119   WBC 8.15 6.58   HGB 11.8* 11.3*   HCT 36.2* 34.1*    210     Recent Labs   Lab 11/25/19  0803 11/25/19  1047 11/26/19  0119    140 139   K 3.4* 3.4* 3.5    105 108   CO2 22* 22* 22*   BUN 13 16 11   CREATININE 0.6 0.6 0.6   GLU 66* 67* 79   CALCIUM 9.2 9.2 8.9   MG 2.0  --  2.1   PHOS 3.0  --  3.2   LIPASE 88*  --   --      Recent Labs   Lab 11/25/19  1047 11/26/19  0119   ALKPHOS 180* 156*   * 133*   AST 85* 52*   ALBUMIN 3.6 3.3*   PROT 6.8 6.3   BILITOT 0.6 0.4      No results for input(s): CPK, CPKMB, MB, TROPONINI in the last 72 hours.  No results for input(s): POCTGLUCOSE in the last 168 hours.  No results found for: HGBA1C  Scheduled Meds:    amLODIPine  5 mg Oral Daily    atorvastatin  40 mg Oral Daily    cefTRIAXone (ROCEPHIN) IVPB  1 g Intravenous Q24H    losartan  25 mg Oral Daily    pantoprazole  40 mg Oral Daily     Continuous Infusions:  As Needed: acetaminophen, butalbital-acetaminophen-caffeine -40 mg, HYDROmorphone, influenza, ketorolac, melatonin, ondansetron, pneumoc 13-niki conj-dip cr(PF), promethazine (PHENERGAN) IVPB, sodium chloride 0.9%    Significant Imaging:  Mammo Digital Screening Bilat With CAD  Narrative: BILATERAL MAMMOGRAM FINDINGS:  There are scattered fibroglandular densities.    No definite evidence of dominant nor spiculated mass, secondary malignant  characteristics, nor clustered microcalcifications is identified.  There are  small benign appearing nodes seen in the left axillary region.    These images  were processed to produce digital images analyzed for potential  abnormalities.  Impression: Findings in both breasts are benign-negative.    Mammogram in 1 year is recommended.    ACR BI-RADS Category 2: Benign    URMILA FANG M.D.  09/23/2015

## 2019-11-26 NOTE — PLAN OF CARE
Report obtained from DOUG Bryant. VSS. NPO at MN. IV gauge 20 at Right Forearm. Speaks very little English. Speaks Ukrainian.

## 2019-11-26 NOTE — ANESTHESIA PREPROCEDURE EVALUATION
11/25/2019  Meredith Cuellar is a 64 y.o. [place]-speaking female with HTN and symptomatic GERD admitted 11/25/19 for acute pancreatitis.CT Abdomen shows intra -and extra hepatic ductal dilatation with CBD enlarged at 1.6 cm; for  ERCP.    Patient Active Problem List   Diagnosis    Headache(784.0)    Pancreatitis    Hypertension    Chronic GERD    Leukocytosis     Past Medical History Pertinent Negatives:   Diagnosis Date Noted    Encounter for blood transfusion 11/24/2019     Past Surgical History:   Procedure Laterality Date    APPENDECTOMY      CHOLECYSTECTOMY      HYSTERECTOMY  2012    elective     Review of patient's allergies indicates:  No Known Allergies    ANES-RELATED MAR [id number]  amlodipine pantoprazole  Losartan    Anesthesia Evaluation      I have reviewed the Medications.     Review of Systems  Anesthesia Hx:  History of prior surgery of interest to airway management or planning: Previous anesthesia: General   Social:  Non-Smoker, No Alcohol Use   Cardiovascular:   Exercise tolerance: good Hypertension, well controlled    Renal/:  Renal/ Normal     Hepatic/GI:   GERD, poorly controlled GERD symptoms two times daily on Nexium and Zofran. Unable to lie flat without symptoms, sleeps with three pillows at night.    Musculoskeletal:   Arthritis  Mild chronic neck pain without radiculopathy or ROM limitations.   Neurological:   Headaches    Endocrine:  Endocrine Normal      Wt Readings from Last 1 Encounters:   11/26/19 48.6 kg (107 lb 2.3 oz)     Temp Readings from Last 1 Encounters:   11/26/19 36.8 °C (98.3 °F) (Oral)     BP Readings from Last 1 Encounters:   11/26/19 136/74     Pulse Readings from Last 1 Encounters:   11/26/19 72     SpO2 Readings from Last 1 Encounters:   11/26/19 95%         Physical Exam  General:  Well nourished    Airway/Jaw/Neck:  Airway Findings: Mouth Opening:  Normal Mallampati: IV  TM Distance: Normal, at least 6 cm  Jaw/Neck Findings:  Neck ROM: Normal ROM  Neck Findings:      Dental:  Dental Findings: In tact, Upper partial dentures, Lower partial dentures   Chest/Lungs:  Chest/Lungs Findings: Clear to auscultation, Normal Respiratory Rate     Heart/Vascular:  Heart Findings: Rate: Normal  Rhythm: Regular Rhythm  Heart murmur: negative       Mental Status:  Mental Status Findings:  Cooperative, Alert and Oriented       Lab Results   Component Value Date    WBC 6.58 11/26/2019    HGB 11.3 (L) 11/26/2019    HCT 34.1 (L) 11/26/2019    MCV 75 (L) 11/26/2019     11/26/2019        Chemistry        Component Value Date/Time     11/26/2019 0119    K 3.5 11/26/2019 0119     11/26/2019 0119    CO2 22 (L) 11/26/2019 0119    BUN 11 11/26/2019 0119    CREATININE 0.6 11/26/2019 0119    GLU 79 11/26/2019 0119        Component Value Date/Time    CALCIUM 8.9 11/26/2019 0119    ALKPHOS 156 (H) 11/26/2019 0119    AST 52 (H) 11/26/2019 0119     (H) 11/26/2019 0119    BILITOT 0.4 11/26/2019 0119    ESTGFRAFRICA >60 11/26/2019 0119    EGFRNONAA >60 11/26/2019 0119            Lab Results   Component Value Date    ALBUMIN 3.3 (L) 11/26/2019    No results found for: TSH, D4ZQTUT, V0GCJCL, THYROIDAB   Lab Results   Component Value Date    APTT 31.6 10/19/2015      Lab Results   Component Value Date    INR 0.9 10/19/2015       CXR      ECG 10/19/19:  Normal sinus rhythm  Within normal limits  When compared with ECG of 15-OCT-2015 12:36,  No significant change was found  Confirmed    Anesthesia Plan  Type of Anesthesia, risks & benefits discussed:  Anesthesia Type:  MAC  Patient's Preference:   Intra-op Monitoring Plan: standard ASA monitors  Intra-op Monitoring Plan Comments:   Post Op Pain Control Plan: per primary service following discharge from PACU  Post Op Pain Control Plan Comments:   Induction:    Beta Blocker:  Patient is not currently on a Beta-Blocker (No  further documentation required).       Informed Consent: Patient understands risks and agrees with Anesthesia plan.  Questions answered.   ASA Score: 2     Day of Surgery Review of History & Physical:            Ready For Surgery From Anesthesia Perspective.

## 2019-11-26 NOTE — PLAN OF CARE
Nurse called Santos and Jeanne (nurse in the procedure w/pt today) to come up and explain in detail the procedure and findings w/the pt and pt's two daughters at the bedside.  Procedure explained and daughters given time to ask questions.  All questions answered.  Daughters translated in Telugu to the pt which triggered more questions and all questions answered.  Bedside nurse, charge nurse, Santos RN and Jeanne RN at bedside w/family.

## 2019-11-26 NOTE — PROVATION PATIENT INSTRUCTIONS
Discharge Summary/Instructions after an Endoscopic Procedure  Patient Name: Meredith Cuellar  Patient MRN: [id number]  Patient YOB: 1955 Tuesday, November 26, 2019  Britton Martinez MD  RESTRICTIONS:  During your procedure today, you received medications for sedation.  These   medications may affect your judgment, balance and coordination.  Therefore,   for 24 hours, you have the following restrictions:   - DO NOT drive a car, operate machinery, make legal/financial decisions,   sign important papers or drink alcohol.    ACTIVITY:  Today: no heavy lifting, straining or running due to procedural   sedation/anesthesia.  The following day: return to full activity including work.  DIET:  Eat and drink normally unless instructed otherwise.     TREATMENT FOR COMMON SIDE EFFECTS:  - Mild abdominal pain, nausea, belching, bloating or excessive gas:  rest,   eat lightly and use a heating pad.  - Sore Throat: treat with throat lozenges and/or gargle with warm salt   water.  - Because air was used during the procedure, expelling large amounts of air   from your rectum or belching is normal.  - If a bowel prep was taken, you may not have a bowel movement for 1-3 days.    This is normal.  SYMPTOMS TO WATCH FOR AND REPORT TO YOUR PHYSICIAN:  1. Abdominal pain or bloating, other than gas cramps.  2. Chest pain.  3. Back pain.  4. Signs of infection such as: chills or fever occurring within 24 hours   after the procedure.  5. Rectal bleeding, which would show as bright red, maroon, or black stools.   (A tablespoon of blood from the rectum is not serious, especially if   hemorrhoids are present.)  6. Vomiting.  7. Weakness or dizziness.  GO DIRECTLY TO THE NEAREST EMERGENCY ROOM IF YOU HAVE ANY OF THE FOLLOWING:      Difficulty breathing              Chills and/or fever over 101 F   Persistent vomiting and/or vomiting blood   Severe abdominal pain   Severe chest pain   Black, tarry stools   Bleeding- more than one  tablespoon   Any other symptom or condition that you feel may need urgent attention  Your doctor recommends these additional instructions:  If any biopsies were taken, your doctors clinic will contact you in 1 to 2   weeks with any results.  - Return patient to hospital [facility] for ongoing care.   - Observe patient's clinical course. If no improvement of symptoms or   increasing LFTs, then may need ERCP.  For questions, problems or results please call your physician - Britton Martinez MD at Work:  [phone].  EMERGENCY PHONE NUMBER: [phone],  LAB RESULTS: [phone]  IF A COMPLICATION OR EMERGENCY SITUATION ARISES AND YOU ARE UNABLE TO REACH   YOUR PHYSICIAN - GO DIRECTLY TO THE EMERGENCY ROOM.  Britton Martinez MD  11/26/2019 9:49:05 AM  This report has been verified and signed electronically.  [employer]ATION

## 2019-11-26 NOTE — HPI
This is a 65yo female with a PMHx of htn, cholecystectomy remotely for suspected gallstones here with abdominal pain x 1 day. It is upper abdominal, sharp and non-radiating associated with nausea. No dark urine, jaundice. No changes in  Medications, etoh intake. No relation to food intake. Family present to give additional history.     The following portions of the patient's history were reviewed and updated as appropriate: allergies, current medications, past family history, past medical history, past social history, past surgical history and problem list.

## 2019-11-26 NOTE — TRANSFER OF CARE
"Anesthesia Transfer of Care Note    Patient: Meredith Cuellar    Procedure(s) Performed: Procedure(s) (LRB):  ULTRASOUND, UPPER GI TRACT, ENDOSCOPIC (N/A)    Patient location: GI    Anesthesia Type: MAC    Transport from OR: Transported from OR on room air with adequate spontaneous ventilation    Post pain: adequate analgesia    Post assessment: no apparent anesthetic complications and tolerated procedure well    Post vital signs: stable    Level of consciousness: awake and alert    Nausea/Vomiting: no nausea/vomiting    Complications: none    Transfer of care protocol was followed      Last vitals:   Visit Vitals  BP 93/51   Pulse 72   Temp 36.4 °C (97.5 °F)   Resp 18   Ht 5' 2" (1.575 m)   Wt 48.6 kg (107 lb 2.3 oz)   SpO2 98%   Breastfeeding? No   BMI 19.60 kg/m²     "

## 2019-11-26 NOTE — PLAN OF CARE
Recovery complete. Patient recovered to baseline. Discharge instructions reviewed with patient. VSS.     Report call to attending RN Annette

## 2019-11-26 NOTE — SUBJECTIVE & OBJECTIVE
Past Medical History:   Diagnosis Date    Hypertension        Past Surgical History:   Procedure Laterality Date    APPENDECTOMY      CHOLECYSTECTOMY      HYSTERECTOMY  2012    elective       Review of patient's allergies indicates:  No Known Allergies  Family History     None        Tobacco Use    Smoking status: Never Smoker    Smokeless tobacco: Never Used   Substance and Sexual Activity    Alcohol use: No    Drug use: No    Sexual activity: Yes     Partners: Male     Review of Systems   Constitutional: Negative for appetite change and fever.   HENT: Negative for postnasal drip and trouble swallowing.    Eyes: Negative for pain and redness.   Respiratory: Negative for cough, choking, chest tightness and shortness of breath.    Cardiovascular: Negative for chest pain and leg swelling.   Gastrointestinal: Positive for abdominal pain and nausea. Negative for anal bleeding, blood in stool, constipation, diarrhea, rectal pain and vomiting.   Endocrine: Negative for cold intolerance and heat intolerance.   Genitourinary: Negative for difficulty urinating and hematuria.   Musculoskeletal: Negative for arthralgias and back pain.   Skin: Negative for color change and pallor.   Allergic/Immunologic: Negative for environmental allergies and food allergies.   Neurological: Negative for dizziness and light-headedness.   Hematological: Negative for adenopathy. Does not bruise/bleed easily.   Psychiatric/Behavioral: Negative for agitation and behavioral problems.     Objective:     Vital Signs (Most Recent):  Temp: 98.3 °F (36.8 °C) (11/26/19 0450)  Pulse: 72 (11/26/19 0450)  Resp: 16 (11/26/19 0450)  BP: 136/74 (11/26/19 0450)  SpO2: 95 % (11/26/19 0450) Vital Signs (24h Range):  Temp:  [96.3 °F (35.7 °C)-98.3 °F (36.8 °C)] 98.3 °F (36.8 °C)  Pulse:  [68-77] 72  Resp:  [16-18] 16  SpO2:  [95 %-98 %] 95 %  BP: (117-168)/(60-79) 136/74     Weight: 48.6 kg (107 lb 2.3 oz) (11/26/19 0450)  Body mass index is 19.6  kg/m².      Intake/Output Summary (Last 24 hours) at 11/26/2019 0724  Last data filed at 11/26/2019 0500  Gross per 24 hour   Intake 170 ml   Output 750 ml   Net -580 ml       Lines/Drains/Airways     Peripheral Intravenous Line                 Peripheral IV - Single Lumen 11/25/19 2000 20 G Anterior;Right Forearm less than 1 day                Physical Exam   Constitutional: She is oriented to person, place, and time. She appears well-developed and well-nourished. No distress.   HENT:   Head: Normocephalic and atraumatic.   Eyes: Conjunctivae are normal. No scleral icterus.   Neck: Normal range of motion. Neck supple. No tracheal deviation present. No thyromegaly present.   Cardiovascular: Normal rate and regular rhythm. Exam reveals no gallop and no friction rub.   Pulmonary/Chest: Effort normal and breath sounds normal. No respiratory distress. She has no wheezes.   Abdominal: Soft. Bowel sounds are normal. She exhibits no distension. There is tenderness.   Musculoskeletal:        Right wrist: She exhibits normal range of motion and no tenderness.        Left wrist: She exhibits normal range of motion and no tenderness.   Lymphadenopathy:        Head (right side): No submental and no submandibular adenopathy present.        Head (left side): No submental and no submandibular adenopathy present.   Neurological: She is alert and oriented to person, place, and time.   Skin: Skin is warm and dry. No rash noted. She is not diaphoretic. No erythema.   Psychiatric: She has a normal mood and affect. Her behavior is normal.   Nursing note and vitals reviewed.      Significant Labs:  CBC:   Recent Labs   Lab 11/25/19  0803 11/26/19 0119   WBC 8.15 6.58   HGB 11.8* 11.3*   HCT 36.2* 34.1*    210     CMP:   Recent Labs   Lab 11/26/19 0119   GLU 79   CALCIUM 8.9   ALBUMIN 3.3*   PROT 6.3      K 3.5   CO2 22*      BUN 11   CREATININE 0.6   ALKPHOS 156*   *   AST 52*   BILITOT 0.4     Lipase:    Recent Labs   Lab 11/25/19  0803   LIPASE 88*       Significant Imaging:  Imaging results within the past 24 hours have been reviewed.

## 2019-11-26 NOTE — PLAN OF CARE
No complaints of pain this shift. Fall precautions maintained.  Bed alarm on, patient was found turning alarm off.  Clear liquids tolerated last night.  NPO for procedure today.  Continue with plan of care.

## 2019-11-26 NOTE — ASSESSMENT & PLAN NOTE
- clinically improving, unclear etiology with significant ductal dilation  - will plan EUS +/- ERCP  - NPO  - IVF and pain control  - monitor clinically  - d/w family as well

## 2019-11-27 VITALS
TEMPERATURE: 98 F | HEIGHT: 62 IN | BODY MASS INDEX: 19.71 KG/M2 | HEART RATE: 73 BPM | OXYGEN SATURATION: 96 % | DIASTOLIC BLOOD PRESSURE: 59 MMHG | WEIGHT: 107.13 LBS | SYSTOLIC BLOOD PRESSURE: 114 MMHG | RESPIRATION RATE: 16 BRPM

## 2019-11-27 LAB
ALBUMIN SERPL BCP-MCNC: 3.4 G/DL (ref 3.5–5.2)
ALP SERPL-CCNC: 152 U/L (ref 55–135)
ALT SERPL W/O P-5'-P-CCNC: 101 U/L (ref 10–44)
ANION GAP SERPL CALC-SCNC: 9 MMOL/L (ref 8–16)
AST SERPL-CCNC: 34 U/L (ref 10–40)
BASOPHILS # BLD AUTO: 0.04 K/UL (ref 0–0.2)
BASOPHILS NFR BLD: 0.7 % (ref 0–1.9)
BILIRUB SERPL-MCNC: 0.3 MG/DL (ref 0.1–1)
BUN SERPL-MCNC: 14 MG/DL (ref 8–23)
CALCIUM SERPL-MCNC: 9 MG/DL (ref 8.7–10.5)
CHLORIDE SERPL-SCNC: 107 MMOL/L (ref 95–110)
CO2 SERPL-SCNC: 23 MMOL/L (ref 23–29)
CREAT SERPL-MCNC: 0.6 MG/DL (ref 0.5–1.4)
DIFFERENTIAL METHOD: ABNORMAL
EOSINOPHIL # BLD AUTO: 0.3 K/UL (ref 0–0.5)
EOSINOPHIL NFR BLD: 4.5 % (ref 0–8)
ERYTHROCYTE [DISTWIDTH] IN BLOOD BY AUTOMATED COUNT: 14.2 % (ref 11.5–14.5)
EST. GFR  (AFRICAN AMERICAN): >60 ML/MIN/1.73 M^2
EST. GFR  (NON AFRICAN AMERICAN): >60 ML/MIN/1.73 M^2
GLUCOSE SERPL-MCNC: 136 MG/DL (ref 70–110)
HCT VFR BLD AUTO: 34.7 % (ref 37–48.5)
HGB BLD-MCNC: 11.5 G/DL (ref 12–16)
LYMPHOCYTES # BLD AUTO: 1 K/UL (ref 1–4.8)
LYMPHOCYTES NFR BLD: 17.9 % (ref 18–48)
MAGNESIUM SERPL-MCNC: 2 MG/DL (ref 1.6–2.6)
MCH RBC QN AUTO: 24.9 PG (ref 27–31)
MCHC RBC AUTO-ENTMCNC: 33.1 G/DL (ref 32–36)
MCV RBC AUTO: 75 FL (ref 82–98)
MONOCYTES # BLD AUTO: 0.5 K/UL (ref 0.3–1)
MONOCYTES NFR BLD: 8.5 % (ref 4–15)
NEUTROPHILS # BLD AUTO: 3.8 K/UL (ref 1.8–7.7)
NEUTROPHILS NFR BLD: 68.4 % (ref 38–73)
PHOSPHATE SERPL-MCNC: 2.6 MG/DL (ref 2.7–4.5)
PLATELET # BLD AUTO: 243 K/UL (ref 150–350)
PMV BLD AUTO: 8.8 FL (ref 9.2–12.9)
POTASSIUM SERPL-SCNC: 3.6 MMOL/L (ref 3.5–5.1)
PROT SERPL-MCNC: 6.6 G/DL (ref 6–8.4)
RBC # BLD AUTO: 4.62 M/UL (ref 4–5.4)
SODIUM SERPL-SCNC: 139 MMOL/L (ref 136–145)
WBC # BLD AUTO: 5.54 K/UL (ref 3.9–12.7)

## 2019-11-27 PROCEDURE — 80053 COMPREHEN METABOLIC PANEL: CPT

## 2019-11-27 PROCEDURE — 36415 COLL VENOUS BLD VENIPUNCTURE: CPT

## 2019-11-27 PROCEDURE — 84100 ASSAY OF PHOSPHORUS: CPT

## 2019-11-27 PROCEDURE — 83735 ASSAY OF MAGNESIUM: CPT

## 2019-11-27 PROCEDURE — 85025 COMPLETE CBC W/AUTO DIFF WBC: CPT

## 2019-11-27 PROCEDURE — 25000003 PHARM REV CODE 250: Performed by: NURSE PRACTITIONER

## 2019-11-27 RX ORDER — METRONIDAZOLE 500 MG/1
500 TABLET ORAL EVERY 12 HOURS
Qty: 10 TABLET | Refills: 0 | Status: SHIPPED | OUTPATIENT
Start: 2019-11-27 | End: 2019-12-02

## 2019-11-27 RX ADMIN — PANTOPRAZOLE SODIUM 40 MG: 40 TABLET, DELAYED RELEASE ORAL at 08:11

## 2019-11-27 RX ADMIN — ATORVASTATIN CALCIUM 40 MG: 40 TABLET, FILM COATED ORAL at 08:11

## 2019-11-27 RX ADMIN — LOSARTAN POTASSIUM 25 MG: 25 TABLET ORAL at 08:11

## 2019-11-27 RX ADMIN — AMLODIPINE BESYLATE 5 MG: 5 TABLET ORAL at 08:11

## 2019-11-27 NOTE — NURSING
VN went into the patient's room with permission. I have reviewed the AVS in detail with the patient and her daughter.  was provided. The patient was informed about her diagnosis and when to follow up with her physician. Her medications and education were thoroughly explained. All questions and concerns were addressed.

## 2019-11-27 NOTE — NURSING
Pt AAOx4, resting comfortably, VSS, call light within reach, bed alarms on. Instructed to call with any questions/concerns and when wanting to get out of bed. No complaints of pain. POC reviewed with patient and daughter.

## 2019-11-27 NOTE — DISCHARGE SUMMARY
[facility] Hospital Medicine  Discharge Summary      Patient Name: Meredith Cuellar  MRN: [id number]  Admission Date: 11/24/2019  Hospital Length of Stay: 3 days  Discharge Date and Time: No discharge date for patient encounter.  Attending Physician: Cholo Akbar DO   Discharging Provider: Cholo Akbar DO  Primary Care Provider: Di Cruz MD      HPI:   64 y.o. female with PMH of SBO in September 2019, HTN and GERD who presents as a transfer from [facility] with pancreatitis.  Of Note, patient is Japanese speaking, daughter is at bedside and provides history.  Per patient's daughter, patient started to complain of aching severe abdominal pain and nausea Sunday morning and went to the hospital.  Denies aggravating or alleviating factors, SOB, chest pain, fever, chills, cough, change in bladder habits or change in bowel habits.  Per records, CT showed intra and extra hepatic ductal dilatation with CBD enlarged at 1.6 cm. AST and ALT of 60, Alk phos 160 and bili of 0.97. Leukocytosis of 14 K but afebrile. Patient transferred to [facility] hospital medicine for further medical management.     Procedure(s) (LRB):  ULTRASOUND, UPPER GI TRACT, ENDOSCOPIC (N/A)      Hospital Course:   Patient admitted to medicine for pancreatitis. GI consulted, recommending ERCP on 11/26.  11/126 pt seen, she is comfortable, she atre her clear liquid diet denies pain, and does not seems in pain or doscomfort, will advance her diet and monitor today.  She had an EUS:  Impression:           - Pancreatic parenchymal abnormalities consisting                         of lobularity were noted in the entire pancreas                         which can be seen with pancreatitis.                        - There was dilation in the common bile duct which                         measured up to 10 mm. Endosonographic imaging in                         the common bile duct showed no stones or sludge.                        - No  specimens collected.  Recommendation:       - Return patient to hospital [facility] for ongoing care.                        - Observe patient's clinical course. If no                         improvement of symptoms or increasing LFTs, then                         may need ERCP.  11/27 pt seen, she is feeling good, she had roasted chicken last night and oatmeal this morning, tolerating oral food well , no N, V. LFT's trending down.  Pt is eager to go home  She will be dc home and f/u with pcp and gi.  Family at bedside updated     Consults:   Consults (From admission, onward)        Status Ordering Provider     Inpatient consult to [facility]     Provider:  (Not yet assigned)    Completed ARIS SHUKLA          No new Assessment & Plan notes have been filed under this hospital service since the last note was generated.  Service: Hospital Medicine    Final Active Diagnoses:    Diagnosis Date Noted POA    PRINCIPAL PROBLEM:  Pancreatitis [K85.90] 11/24/2019 Yes    Hypertension [I10] 11/25/2019 Yes    Chronic GERD [K21.9] 11/25/2019 Yes    Leukocytosis [D72.829] 11/25/2019 Yes      Problems Resolved During this Admission:       Discharged Condition: stable    Disposition: Home or Self Care    Follow Up:  Follow-up Information     Di Cruz MD On 12/3/2019.    Specialty:  General Practice  Why:  8:15am  Contact information:  [address] [facility]  [place] [address]  [phone]             Pam Rich MD.    Specialty:  Gastroenterology  Why:  Office will contact you to schedule your next appointment. If you have not received a call or appointment within 1 week please call the office.  Contact information:  [address]  [address]  [place] [id number] [phone]             Di Cruz MD In 3 days.    Specialty:  General Practice  Contact information:  [address] [facility]  [place] [address]  [phone]                 Patient Instructions:       Ambulatory referral to Gastroenterology   Referral Priority: Routine Referral Type: Consultation   Referral Reason: Specialty Services Required   Requested Specialty: Gastroenterology   Number of Visits Requested: 1     Diet Adult Regular   Order Comments: Low fat diet     Notify your health care provider if you experience any of the following:  temperature >100.4     Notify your health care provider if you experience any of the following:  persistent nausea and vomiting or diarrhea     Notify your health care provider if you experience any of the following:  severe uncontrolled pain     Notify your health care provider if you experience any of the following:  increased confusion or weakness     Activity as tolerated       Significant Diagnostic Studies: Labs:   CMP   Recent Labs   Lab 11/26/19  0119 11/27/19  0426    139   K 3.5 3.6    107   CO2 22* 23   GLU 79 136*   BUN 11 14   CREATININE 0.6 0.6   CALCIUM 8.9 9.0   PROT 6.3 6.6   ALBUMIN 3.3* 3.4*   BILITOT 0.4 0.3   ALKPHOS 156* 152*   AST 52* 34   * 101*   ANIONGAP 9 9   ESTGFRAFRICA >60 >60   EGFRNONAA >60 >60    and CBC   Recent Labs   Lab 11/26/19  0119 11/27/19  0426   WBC 6.58 5.54   HGB 11.3* 11.5*   HCT 34.1* 34.7*    243     Microbiology: Blood Culture No results found for: LABBLOO    Pending Diagnostic Studies:     None         Medications:  Reconciled Home Medications:      Medication List      CHANGE how you take these medications    * metroNIDAZOLE 250 MG tablet  Commonly known as:  FLAGYL  What changed:  Another medication with the same name was added. Make sure you understand how and when to take each.     * metroNIDAZOLE 500 MG tablet  Commonly known as:  FLAGYL  Take 1 tablet (500 mg total) by mouth every 12 (twelve) hours. for 5 days  What changed:  You were already taking a medication with the same name, and this prescription was added. Make sure you understand how and when to take each.         * This list has 2  medication(s) that are the same as other medications prescribed for you. Read the directions carefully, and ask your doctor or other care provider to review them with you.            CONTINUE taking these medications    amLODIPine 5 MG tablet  Commonly known as:  NORVASC  Take 5 mg by mouth once daily.     atorvastatin 40 MG tablet  Commonly known as:  LIPITOR  Take 40 mg by mouth once daily.     butalbital-acetaminophen-caffeine -40 mg -40 mg per tablet  Commonly known as:  FIORICET, ESGIC  Take 1 tablet by mouth every 4 (four) hours as needed for Pain.     DEXLANSOPRAZOLE ORAL  Take by mouth.     diclofenac 75 MG EC tablet  Commonly known as:  VOLTAREN  Take 1 tablet (75 mg total) by mouth 2 (two) times daily.     esomeprazole 20 MG capsule  Commonly known as:  NEXIUM  Take 20 mg by mouth before breakfast.     losartan 25 MG tablet  Commonly known as:  COZAAR     ondansetron 4 MG Tbdl  Commonly known as:  ZOFRAN-ODT  Take 8 mg by mouth every 12 (twelve) hours.            Indwelling Lines/Drains at time of discharge:   Lines/Drains/Airways     Airway                 Airway - Non-Surgical 11/26/19 0916 Nasal Cannula 1 day                Time spent on the discharge of patient: 43 minutes  Patient was seen and examined on the date of discharge and determined to be suitable for discharge.         Cholo Akbar DO  Department of Hospital Medicine  [facility]

## 2019-11-27 NOTE — PLAN OF CARE
Problem: Adult Inpatient Plan of Care  Goal: Plan of Care Review  Outcome: Ongoing, Progressing  Goal: Patient-Specific Goal (Individualization)  Outcome: Ongoing, Progressing  Goal: Absence of Hospital-Acquired Illness or Injury  Outcome: Ongoing, Progressing  Goal: Optimal Comfort and Wellbeing  Outcome: Ongoing, Progressing  Goal: Readiness for Transition of Care  Outcome: Ongoing, Progressing     Problem: Infection  Goal: Infection Symptom Resolution  Outcome: Ongoing, Progressing     Problem: Fluid Imbalance (Pancreatitis)  Goal: Fluid Balance  Outcome: Ongoing, Progressing     Problem: Infection (Pancreatitis)  Goal: Infection Symptom Resolution  Outcome: Ongoing, Progressing     Problem: Nutrition Impaired (Pancreatitis)  Goal: Optimal Nutrition Intake  Outcome: Ongoing, Progressing     Problem: Pain (Pancreatitis)  Goal: Acceptable Pain Control  Outcome: Ongoing, Progressing     Problem: Fall Injury Risk  Goal: Absence of Fall and Fall-Related Injury  Outcome: Ongoing, Progressing     Problem: Hypertension Comorbidity  Goal: Blood Pressure in Desired Range  Outcome: Ongoing, Progressing

## 2019-12-31 ENCOUNTER — TELEPHONE (OUTPATIENT)
Dept: GASTROENTEROLOGY | Facility: CLINIC | Age: 64
End: 2019-12-31

## 2020-02-03 ENCOUNTER — OFFICE VISIT (OUTPATIENT)
Dept: GASTROENTEROLOGY | Facility: CLINIC | Age: 65
End: 2020-02-03
Payer: MEDICARE

## 2020-02-03 VITALS
WEIGHT: 106.5 LBS | DIASTOLIC BLOOD PRESSURE: 67 MMHG | HEART RATE: 70 BPM | SYSTOLIC BLOOD PRESSURE: 154 MMHG | BODY MASS INDEX: 19.48 KG/M2

## 2020-02-03 DIAGNOSIS — M54.9 BACK PAIN, UNSPECIFIED BACK LOCATION, UNSPECIFIED BACK PAIN LATERALITY, UNSPECIFIED CHRONICITY: ICD-10-CM

## 2020-02-03 DIAGNOSIS — K85.10 ACUTE BILIARY PANCREATITIS WITHOUT INFECTION OR NECROSIS: Primary | ICD-10-CM

## 2020-02-03 DIAGNOSIS — K59.00 CONSTIPATION, UNSPECIFIED CONSTIPATION TYPE: ICD-10-CM

## 2020-02-03 PROCEDURE — 1126F PR PAIN SEVERITY QUANTIFIED, NO PAIN PRESENT: ICD-10-PCS | Mod: S$GLB,,, | Performed by: INTERNAL MEDICINE

## 2020-02-03 PROCEDURE — 3078F PR MOST RECENT DIASTOLIC BLOOD PRESSURE < 80 MM HG: ICD-10-PCS | Mod: CPTII,S$GLB,, | Performed by: INTERNAL MEDICINE

## 2020-02-03 PROCEDURE — 99999 PR PBB SHADOW E&M-EST. PATIENT-LVL III: ICD-10-PCS | Mod: PBBFAC,,, | Performed by: INTERNAL MEDICINE

## 2020-02-03 PROCEDURE — 99214 OFFICE O/P EST MOD 30 MIN: CPT | Mod: S$GLB,,, | Performed by: INTERNAL MEDICINE

## 2020-02-03 PROCEDURE — 3077F PR MOST RECENT SYSTOLIC BLOOD PRESSURE >= 140 MM HG: ICD-10-PCS | Mod: CPTII,S$GLB,, | Performed by: INTERNAL MEDICINE

## 2020-02-03 PROCEDURE — 1159F PR MEDICATION LIST DOCUMENTED IN MEDICAL RECORD: ICD-10-PCS | Mod: S$GLB,,, | Performed by: INTERNAL MEDICINE

## 2020-02-03 PROCEDURE — 1101F PR PT FALLS ASSESS DOC 0-1 FALLS W/OUT INJ PAST YR: ICD-10-PCS | Mod: CPTII,S$GLB,, | Performed by: INTERNAL MEDICINE

## 2020-02-03 PROCEDURE — 3078F DIAST BP <80 MM HG: CPT | Mod: CPTII,S$GLB,, | Performed by: INTERNAL MEDICINE

## 2020-02-03 PROCEDURE — 99214 PR OFFICE/OUTPT VISIT, EST, LEVL IV, 30-39 MIN: ICD-10-PCS | Mod: S$GLB,,, | Performed by: INTERNAL MEDICINE

## 2020-02-03 PROCEDURE — 1126F AMNT PAIN NOTED NONE PRSNT: CPT | Mod: S$GLB,,, | Performed by: INTERNAL MEDICINE

## 2020-02-03 PROCEDURE — 1159F MED LIST DOCD IN RCRD: CPT | Mod: S$GLB,,, | Performed by: INTERNAL MEDICINE

## 2020-02-03 PROCEDURE — 99999 PR PBB SHADOW E&M-EST. PATIENT-LVL III: CPT | Mod: PBBFAC,,, | Performed by: INTERNAL MEDICINE

## 2020-02-03 PROCEDURE — 1101F PT FALLS ASSESS-DOCD LE1/YR: CPT | Mod: CPTII,S$GLB,, | Performed by: INTERNAL MEDICINE

## 2020-02-03 PROCEDURE — 3077F SYST BP >= 140 MM HG: CPT | Mod: CPTII,S$GLB,, | Performed by: INTERNAL MEDICINE

## 2020-02-03 NOTE — PROGRESS NOTES
Subjective:       Patient ID: Meredith Cuellar is a 64 y.o. female.    Chief Complaint: Abdominal Pain and Back Pain    This is a 64-year-old female here for a follow-up visit, accompanied by her family.  She presented to the hospital previously with suspected acute, biliary pancreatitis with a remote history of cholecystectomy for cholelithiasis.  She was noted biliary ductal dilation in addition to pain consistent with pancreatitis.  She underwent an a endoscopic ultrasound noting some pancreatic parenchymal abnormalities, no filling defects noted in the common bile duct.  No further abdominal discomfort is noted, though she does note mid thoracic back pain without another explanation for the symptoms.  There has been an associated constipation as well. They report that she had labs done approximately 1 week ago at her PCPs office.    The following portions of the patient's history were reviewed and updated as appropriate: allergies, current medications, past family history, past medical history, past social history, past surgical history and problem list.    (Portions of this note were dictated using voice recognition software and may contain dictation related errors in spelling/grammar/syntax not found on text review)  HPI  Review of Systems   Constitutional: Negative for appetite change and unexpected weight change.   Respiratory: Negative for shortness of breath and wheezing.    Cardiovascular: Negative for chest pain and palpitations.   Gastrointestinal: Negative for abdominal distention and abdominal pain.   Musculoskeletal: Positive for back pain. Negative for gait problem.         Objective:      Physical Exam   Constitutional: She is oriented to person, place, and time. She appears well-developed and well-nourished. No distress.   HENT:   Head: Normocephalic and atraumatic.   Eyes: Conjunctivae are normal. No scleral icterus.   Neck: Normal range of motion. Neck supple. No tracheal deviation present. No  thyromegaly present.   Cardiovascular: Normal rate and regular rhythm. Exam reveals no gallop and no friction rub.   No murmur heard.  Pulmonary/Chest: Effort normal and breath sounds normal. No respiratory distress. She has no wheezes.   Abdominal: Soft. Bowel sounds are normal. She exhibits no distension. There is no tenderness.   Neurological: She is alert and oriented to person, place, and time.   Skin: Skin is warm and dry. No rash noted. She is not diaphoretic. No erythema.   Psychiatric: She has a normal mood and affect. Her behavior is normal.   Nursing note and vitals reviewed.        Labs/imaging; reviewed  Assessment:       1. Acute biliary pancreatitis without infection or necrosis    2. Back pain, unspecified back location, unspecified back pain laterality, unspecified chronicity    3. Constipation, unspecified constipation type        Plan:   1. Trial of Linzess  2. Family to get copy of recent labs and message us; if LFTs elevated will d/w AES re: ercp

## 2020-02-27 RX ORDER — LINACLOTIDE 145 UG/1
CAPSULE, GELATIN COATED ORAL
Qty: 30 CAPSULE | Refills: 0 | Status: SHIPPED | OUTPATIENT
Start: 2020-02-27 | End: 2020-03-27

## 2020-03-02 ENCOUNTER — TELEPHONE (OUTPATIENT)
Dept: ENDOSCOPY | Facility: HOSPITAL | Age: 65
End: 2020-03-02

## 2020-03-02 ENCOUNTER — HOSPITAL ENCOUNTER (EMERGENCY)
Facility: HOSPITAL | Age: 65
Discharge: HOME | End: 2020-03-02
Attending: EMERGENCY MEDICINE
Payer: MEDICARE

## 2020-03-02 VITALS
HEART RATE: 56 BPM | SYSTOLIC BLOOD PRESSURE: 140 MMHG | HEIGHT: 62 IN | RESPIRATION RATE: 19 BRPM | WEIGHT: 107 LBS | DIASTOLIC BLOOD PRESSURE: 70 MMHG | OXYGEN SATURATION: 100 % | BODY MASS INDEX: 19.69 KG/M2 | TEMPERATURE: 98 F

## 2020-03-02 DIAGNOSIS — R10.9 ABDOMINAL PAIN: Primary | ICD-10-CM

## 2020-03-02 DIAGNOSIS — R06.02 SHORTNESS OF BREATH: ICD-10-CM

## 2020-03-02 LAB
ALBUMIN SERPL BCP-MCNC: 3.9 G/DL (ref 3.5–5.2)
ALP SERPL-CCNC: 110 U/L (ref 55–135)
ALT SERPL W/O P-5'-P-CCNC: 14 U/L (ref 10–44)
ANION GAP SERPL CALC-SCNC: 9 MMOL/L (ref 8–16)
AST SERPL-CCNC: 18 U/L (ref 10–40)
BASOPHILS # BLD AUTO: 0.04 K/UL (ref 0–0.2)
BASOPHILS NFR BLD: 0.6 % (ref 0–1.9)
BILIRUB SERPL-MCNC: 0.3 MG/DL (ref 0.1–1)
BUN SERPL-MCNC: 19 MG/DL (ref 8–23)
CALCIUM SERPL-MCNC: 9.7 MG/DL (ref 8.7–10.5)
CHLORIDE SERPL-SCNC: 107 MMOL/L (ref 95–110)
CO2 SERPL-SCNC: 22 MMOL/L (ref 23–29)
CREAT SERPL-MCNC: 0.6 MG/DL (ref 0.5–1.4)
DIFFERENTIAL METHOD: ABNORMAL
EOSINOPHIL # BLD AUTO: 0.2 K/UL (ref 0–0.5)
EOSINOPHIL NFR BLD: 3 % (ref 0–8)
ERYTHROCYTE [DISTWIDTH] IN BLOOD BY AUTOMATED COUNT: 14.1 % (ref 11.5–14.5)
EST. GFR  (AFRICAN AMERICAN): >60 ML/MIN/1.73 M^2
EST. GFR  (NON AFRICAN AMERICAN): >60 ML/MIN/1.73 M^2
GLUCOSE SERPL-MCNC: 106 MG/DL (ref 70–110)
HCT VFR BLD AUTO: 36.3 % (ref 37–48.5)
HGB BLD-MCNC: 11.8 G/DL (ref 12–16)
IMM GRANULOCYTES # BLD AUTO: 0.01 K/UL (ref 0–0.04)
IMM GRANULOCYTES NFR BLD AUTO: 0.2 % (ref 0–0.5)
LIPASE SERPL-CCNC: 77 U/L (ref 4–60)
LYMPHOCYTES # BLD AUTO: 1 K/UL (ref 1–4.8)
LYMPHOCYTES NFR BLD: 16.3 % (ref 18–48)
MCH RBC QN AUTO: 24.5 PG (ref 27–31)
MCHC RBC AUTO-ENTMCNC: 32.5 G/DL (ref 32–36)
MCV RBC AUTO: 75 FL (ref 82–98)
MONOCYTES # BLD AUTO: 0.5 K/UL (ref 0.3–1)
MONOCYTES NFR BLD: 7.3 % (ref 4–15)
NEUTROPHILS # BLD AUTO: 4.7 K/UL (ref 1.8–7.7)
NEUTROPHILS NFR BLD: 72.6 % (ref 38–73)
NRBC BLD-RTO: 0 /100 WBC
PLATELET # BLD AUTO: 218 K/UL (ref 150–350)
PMV BLD AUTO: 9.3 FL (ref 9.2–12.9)
POTASSIUM SERPL-SCNC: 3.8 MMOL/L (ref 3.5–5.1)
PROT SERPL-MCNC: 7.2 G/DL (ref 6–8.4)
RBC # BLD AUTO: 4.82 M/UL (ref 4–5.4)
SODIUM SERPL-SCNC: 138 MMOL/L (ref 136–145)
TROPONIN I SERPL DL<=0.01 NG/ML-MCNC: <0.006 NG/ML (ref 0–0.03)
WBC # BLD AUTO: 6.4 K/UL (ref 3.9–12.7)

## 2020-03-02 PROCEDURE — 85025 COMPLETE CBC W/AUTO DIFF WBC: CPT

## 2020-03-02 PROCEDURE — 83690 ASSAY OF LIPASE: CPT

## 2020-03-02 PROCEDURE — 84484 ASSAY OF TROPONIN QUANT: CPT

## 2020-03-02 PROCEDURE — 96374 THER/PROPH/DIAG INJ IV PUSH: CPT

## 2020-03-02 PROCEDURE — 93005 ELECTROCARDIOGRAM TRACING: CPT

## 2020-03-02 PROCEDURE — 96375 TX/PRO/DX INJ NEW DRUG ADDON: CPT

## 2020-03-02 PROCEDURE — 80053 COMPREHEN METABOLIC PANEL: CPT

## 2020-03-02 PROCEDURE — 99285 EMERGENCY DEPT VISIT HI MDM: CPT | Mod: 25

## 2020-03-02 PROCEDURE — 96376 TX/PRO/DX INJ SAME DRUG ADON: CPT

## 2020-03-02 PROCEDURE — 63600175 PHARM REV CODE 636 W HCPCS: Performed by: PHYSICIAN ASSISTANT

## 2020-03-02 RX ORDER — ONDANSETRON 4 MG/1
4 TABLET, ORALLY DISINTEGRATING ORAL EVERY 8 HOURS PRN
Qty: 20 TABLET | Refills: 0 | Status: SHIPPED | OUTPATIENT
Start: 2020-03-02 | End: 2020-08-03

## 2020-03-02 RX ORDER — HYDROCODONE BITARTRATE AND ACETAMINOPHEN 5; 325 MG/1; MG/1
1 TABLET ORAL EVERY 6 HOURS PRN
Qty: 10 TABLET | Refills: 0 | Status: SHIPPED | OUTPATIENT
Start: 2020-03-02 | End: 2022-05-18 | Stop reason: ALTCHOICE

## 2020-03-02 RX ORDER — ONDANSETRON 2 MG/ML
4 INJECTION INTRAMUSCULAR; INTRAVENOUS
Status: COMPLETED | OUTPATIENT
Start: 2020-03-02 | End: 2020-03-02

## 2020-03-02 RX ORDER — MORPHINE SULFATE 4 MG/ML
4 INJECTION, SOLUTION INTRAMUSCULAR; INTRAVENOUS
Status: COMPLETED | OUTPATIENT
Start: 2020-03-02 | End: 2020-03-02

## 2020-03-02 RX ADMIN — MORPHINE SULFATE 4 MG: 4 INJECTION INTRAVENOUS at 02:03

## 2020-03-02 RX ADMIN — ONDANSETRON 4 MG: 2 INJECTION INTRAMUSCULAR; INTRAVENOUS at 02:03

## 2020-03-02 RX ADMIN — ONDANSETRON 4 MG: 2 INJECTION INTRAMUSCULAR; INTRAVENOUS at 11:03

## 2020-03-02 RX ADMIN — MORPHINE SULFATE 4 MG: 4 INJECTION INTRAVENOUS at 11:03

## 2020-03-02 NOTE — TELEPHONE ENCOUNTER
----- Message from Renata Vicente sent at 3/2/2020  2:27 PM CST -----  Contact: El Linares with [facility]#[phone]  She states that pt needs and appt with Dr Moser. Pt needs an ERCP. Please call pt asap

## 2020-03-02 NOTE — ED TRIAGE NOTES
Pt presents to ED with C/O 8/10 R upper abd pain that radiates to the R upper back with onset x2 weeks and has progressively gotten worse.. Pt states she did have N/V last night ans SOB. Pt denies any lower abd pain or any urinary symptoms. Pt states a HX of pancreatitis but was treated with antibiotics.

## 2020-03-02 NOTE — ED NOTES
KRISTIAN Venegas at bedside updating pt with discharge instructions. Pt verbalized understanding. Provider states discontinue urinalysis and okay to discharge.

## 2020-03-02 NOTE — ED PROVIDER NOTES
Encounter Date: 3/2/2020       History     Chief Complaint   Patient presents with    Back Pain     mid back pain x 2 weeks.  Denies falling or trauma.  Denies dysuria.      Patient is a 64-year-old female with history of hypertension and pancreatitis who presents to the emergency department with abdominal pain.  Patient is Sammarinese speaking and have her daughter translate.  Patient states over the last 2 days she has had intermittent pain in the epigastric region.  She states the pain has been intermittent over the last couple of days.  She states over the last 24 hr the pain has become more severe.  She describes the pain is a burning, stabbing pain that radiates from the epigastric region into her mid back.  She states she has shortness of breath when the pain is severe.  She reports nausea with multiple episodes of vomiting. She denies fevers or chills. She denies urinary symptoms.  She states this is how she felt when she had pancreatitis.  She denies alcohol use.  She states she saw the GI doctor back in November and had a GI study.  She states she was told everything looked fine.  She states she had her gallbladder removed several years back.  She states she does take Nexium to help with her acid reflux.  She denies any chest pain.  She denies any recent travel.  She denies any recent antibiotic use.    The history is provided by the patient.     Review of patient's allergies indicates:  No Known Allergies  Past Medical History:   Diagnosis Date    Hypertension      Past Surgical History:   Procedure Laterality Date    APPENDECTOMY      CHOLECYSTECTOMY      ENDOSCOPIC ULTRASOUND OF UPPER GASTROINTESTINAL TRACT N/A 11/26/2019    Procedure: ULTRASOUND, UPPER GI TRACT, ENDOSCOPIC;  Surgeon: Britton Martinez MD;  Location: [facility];  Service: Endoscopy;  Laterality: N/A;    HYSTERECTOMY  2012    elective     No family history on file.  Social History     Tobacco Use    Smoking status: Never Smoker     Smokeless tobacco: Never Used   Substance Use Topics    Alcohol use: No    Drug use: No     Review of Systems   Constitutional: Positive for appetite change. Negative for activity change, chills, fatigue and fever.   HENT: Negative for congestion, ear discharge, ear pain, postnasal drip, rhinorrhea, sore throat and trouble swallowing.    Respiratory: Negative for cough.    Cardiovascular: Negative for chest pain.   Gastrointestinal: Positive for abdominal pain, nausea and vomiting. Negative for blood in stool, constipation and diarrhea.   Genitourinary: Negative for dysuria, flank pain and hematuria.   Musculoskeletal: Positive for back pain. Negative for neck pain and neck stiffness.   Skin: Negative for rash and wound.   Neurological: Negative for dizziness, weakness, light-headedness and headaches.       Physical Exam     Initial Vitals [03/02/20 1042]   BP Pulse Resp Temp SpO2   133/70 71 16 97.9 °F (36.6 °C) 98 %      MAP       --         Physical Exam    Nursing note and vitals reviewed.  Constitutional: She appears well-developed and well-nourished. She is not diaphoretic.  Non-toxic appearance. No distress.   HENT:   Head: Normocephalic.   Right Ear: Hearing and external ear normal.   Left Ear: Hearing and external ear normal.   Nose: Nose normal.   Mouth/Throat: Uvula is midline, oropharynx is clear and moist and mucous membranes are normal. No oropharyngeal exudate.   Eyes: Conjunctivae are normal. Pupils are equal, round, and reactive to light.   Neck: Normal range of motion.   Cardiovascular: Normal rate and regular rhythm.   Pulmonary/Chest: Breath sounds normal.   Abdominal: Soft. Bowel sounds are normal. She exhibits no distension and no mass. There is tenderness. There is no rebound, no guarding and no CVA tenderness.   Lymphadenopathy:     She has no cervical adenopathy.   Neurological: She is alert and oriented to person, place, and time.   Skin: Skin is warm and dry.   Psychiatric: She has a  normal mood and affect.         ED Course   Procedures  Labs Reviewed   CBC W/ AUTO DIFFERENTIAL - Abnormal; Notable for the following components:       Result Value    Hemoglobin 11.8 (*)     Hematocrit 36.3 (*)     Mean Corpuscular Volume 75 (*)     Mean Corpuscular Hemoglobin 24.5 (*)     Lymph% 16.3 (*)     All other components within normal limits   COMPREHENSIVE METABOLIC PANEL - Abnormal; Notable for the following components:    CO2 22 (*)     All other components within normal limits   LIPASE - Abnormal; Notable for the following components:    Lipase 77 (*)     All other components within normal limits   TROPONIN I   URINALYSIS, REFLEX TO URINE CULTURE     EKG Readings: (Independently Interpreted)   Initial Reading: No STEMI.     ECG Results          EKG 12-lead (In process)  Result time 03/02/20 14:21:24    In process by Interface, Lab In [facility] (03/02/20 14:21:24)                 Narrative:    Test Reason : R06.02,    Vent. Rate : 060 BPM     Atrial Rate : 060 BPM     P-R Int : 144 ms          QRS Dur : 078 ms      QT Int : 414 ms       P-R-T Axes : 000 089 069 degrees     QTc Int : 414 ms    Normal sinus rhythm  Normal ECG  When compared with ECG of 19-OCT-2015 12:04,  Nonspecific T wave abnormality now evident in Anterior leads    Referred By: [email or web address]   SELF           Confirmed By:                             Imaging Results          X-Ray Chest PA And Lateral (Final result)  Result time 03/02/20 12:03:26    Final result by Delfino Gimenez MD (03/02/20 12:03:26)                 Impression:      See above      Electronically signed by: Delfino Gimenez MD  Date:    03/02/2020  Time:    12:03             Narrative:    EXAMINATION:  XR CHEST PA AND LATERAL    CLINICAL HISTORY:  Shortness of breath    TECHNIQUE:  PA and lateral views of the chest were performed.    COMPARISON:  None    FINDINGS:  Heart size normal.  The lungs are clear.  No pleural effusion                               X-Ray Abdomen Flat And  Erect (Final result)  Result time 03/02/20 12:04:16    Final result by Delfino Gimenez MD (03/02/20 12:04:16)                 Impression:      See above      Electronically signed by: Delfino Gimenez MD  Date:    03/02/2020  Time:    12:04             Narrative:    EXAMINATION:  XR ABDOMEN FLAT AND ERECT    CLINICAL HISTORY:  Unspecified abdominal pain    TECHNIQUE:  Flat and erect AP views of the abdomen were performed.    COMPARISON:  None    FINDINGS:  Postoperative changes in the right upper quadrant of the abdomen and in the pelvis.  No free air in the abdomen.  No significant bowel dilatation identified.  Mild constipation.                              X-Rays:   Independently Interpreted Readings:   Other Readings:  No acute cardiopulmonary process.    Normal bowel gas pattern with no air under diaphragm.    Medical Decision Making:   Initial Assessment:   Urgent evaluation of a 64-year-old female with history of hypertension and pancreatitis who presents to the emergency department with abdominal pain. Patient is afebrile, nontoxic appearing, and hemodynamically stable.  On my initial exam, patient does appear uncomfortable.  She has significant epigastric tenderness. Patient has history of pancreatitis.  History of cholecystectomy.  No history of alcohol abuse.  Patient's chart is reviewed in depth.  Patient sees GI regularly, with her most recent visit being in February.  Plans for an ERCP was discussed, but this has not been completed.  With patient's presentation, I will obtain lab work to evaluate liver function tests and to ensure no acute pancreatitis.  I did consider aortic dissection, but doubt with patient's history and presentation.  Patient will be given antiemetics and analgesics.  Patient will be observed and re-evaluated.  Clinical Tests:   Lab Tests: Ordered and Reviewed  Radiological Study: Ordered and Reviewed  ED Management:  1230  Labs reveal mild anemia.  No elevated LFTs or significantly  elevated lipase to suggest acute pancreatitis.  No elevation in troponin.  Normal EKG.  Chest x-ray reveals no widened mediastinum.  Flat and erect reveals normal bowel gas pattern with no air under diaphragm.  With these findings, I do not suspect gastric perforation, ACS, aortic dissection.  Will discuss with patient's GI doctor.    1:30pm  Dr. Rich advised follow-up with Dr. Martinez for possible ERCP.  Discussed with patient who agrees with plan.  Pain is controlled at this time.    2:26 PM  On discharge, pt states her pain started again.  Will give another dose of pain meds at this time.  Offered admission for pain management but pt and daughter decline.  Attempting to schedule appt with Dr. Martinez at this time.    2:33 PM  Spoke with GI nurse.  They will call patient today to schedule appt for this week.  Discussed case in depth with Dr. Rich and Dr. Martinez.  Pt is discharged in stable condition with strict return precautions with any worsening symptoms.  Patient and daughter express understanding.  Other:   I have discussed this case with another health care provider.       <> Summary of the Discussion: Dr. Rich, Dr. Martinez                                 Clinical Impression:       ICD-10-CM ICD-9-CM   1. Abdominal pain R10.9 789.00   2. Shortness of breath R06.02 786.05             ED Disposition Condition    Discharge Stable        ED Prescriptions     Medication Sig Dispense Start Date End Date Auth. Provider    ondansetron (ZOFRAN-ODT) 4 MG TbDL Take 1 tablet (4 mg total) by mouth every 8 (eight) hours as needed (nausea and vomiting). 20 tablet 3/2/2020  Rubi Lua PA-C    HYDROcodone-acetaminophen (NORCO) 5-325 mg per tablet Take 1 tablet by mouth every 6 (six) hours as needed for Pain. 10 tablet 3/2/2020  Rubi Lua PA-C        Follow-up Information     Follow up With Specialties Details Why Contact Info    Di Cruz MD General Practice   [address]  [facility]  [place] [address]  [phone]      Britton Martinez MD Gastroenterology  at scheduled appt [address] [facility] [address]  [phone]                                       Rubi Lua PA-C  03/02/20 1213

## 2020-03-03 NOTE — TELEPHONE ENCOUNTER
MD Rebecca Maddox MA   Caller: Unspecified (Yesterday,  3:03 PM)             LFT's are normal. I can see her in clinic or she can see Dr Rich. I not convince that doing an ERCP with normal LFT's will help.   R

## 2020-03-27 RX ORDER — LINACLOTIDE 145 UG/1
CAPSULE, GELATIN COATED ORAL
Qty: 30 CAPSULE | Refills: 0 | Status: SHIPPED | OUTPATIENT
Start: 2020-03-27 | End: 2020-07-13

## 2020-04-28 ENCOUNTER — TELEPHONE (OUTPATIENT)
Dept: GASTROENTEROLOGY | Facility: CLINIC | Age: 65
End: 2020-04-28

## 2020-04-28 NOTE — TELEPHONE ENCOUNTER
----- Message from Amparo Fair sent at 4/28/2020  2:41 PM CDT -----  Contact: Patient  Patient would like to get an appointment as soon as one is available    Please call [phone]

## 2020-04-29 ENCOUNTER — OFFICE VISIT (OUTPATIENT)
Dept: GASTROENTEROLOGY | Facility: CLINIC | Age: 65
End: 2020-04-29
Payer: MEDICARE

## 2020-04-29 DIAGNOSIS — R10.10 UPPER ABDOMINAL PAIN: Primary | ICD-10-CM

## 2020-04-29 DIAGNOSIS — K59.00 CONSTIPATION, UNSPECIFIED CONSTIPATION TYPE: ICD-10-CM

## 2020-04-29 PROCEDURE — 99214 OFFICE O/P EST MOD 30 MIN: CPT | Mod: 95,,, | Performed by: INTERNAL MEDICINE

## 2020-04-29 PROCEDURE — 1101F PT FALLS ASSESS-DOCD LE1/YR: CPT | Mod: CPTII,95,, | Performed by: INTERNAL MEDICINE

## 2020-04-29 PROCEDURE — 1101F PR PT FALLS ASSESS DOC 0-1 FALLS W/OUT INJ PAST YR: ICD-10-PCS | Mod: CPTII,95,, | Performed by: INTERNAL MEDICINE

## 2020-04-29 PROCEDURE — 1159F MED LIST DOCD IN RCRD: CPT | Mod: 95,,, | Performed by: INTERNAL MEDICINE

## 2020-04-29 PROCEDURE — 1159F PR MEDICATION LIST DOCUMENTED IN MEDICAL RECORD: ICD-10-PCS | Mod: 95,,, | Performed by: INTERNAL MEDICINE

## 2020-04-29 PROCEDURE — 99214 PR OFFICE/OUTPT VISIT, EST, LEVL IV, 30-39 MIN: ICD-10-PCS | Mod: 95,,, | Performed by: INTERNAL MEDICINE

## 2020-04-29 NOTE — PROGRESS NOTES
Subjective:       Patient ID: Meredith Cuellar is a 64 y.o. female.    Chief Complaint: Abdominal pain    This is a 64-year-old female with a follow-up regarding abdominal/back pain.  She history of remote cholecystectomy due to biliary pancreatitis.  She was noted to have a chronically dilated common bile duct associated with some abdominal and back pain and underwent a subsequent endoscopic ultrasound previously.  Since that time she has noted intermittent elevation of liver function tests along with recurrent, relapsing symptoms.  Family present to give additional history.  She notes persistent back pain in the thoracic region associated with some flank pain and upper abdominal discomfort.  It is severe, nonradiating and aching.  No vomiting, melena, change in bowel habits.  She has chronic constipation.  Dark urine, jaundice, weight loss, flushing.  Recently she went to urgent care and was noted to have a mild elevation in amylase.  Most recent liver function tests are improved from prior. Prior CT noted a small bowel anastomosis, she is unaware of any prior intestinal surgery.     The following portions of the patient's history were reviewed and updated as appropriate: allergies, current medications, past family history, past medical history, past social history, past surgical history and problem list.    (Portions of this note were dictated using voice recognition software and may contain dictation related errors in spelling/grammar/syntax not found on text review)  HPI  Review of Systems   Constitutional: Negative for appetite change and unexpected weight change.   Respiratory: Negative for apnea and chest tightness.    Gastrointestinal: Positive for abdominal pain and constipation.   Genitourinary: Positive for flank pain. Negative for dysuria.   Musculoskeletal: Positive for back pain. Negative for arthralgias.         Objective:      Physical Exam   Constitutional: She is oriented to person, place, and time.  She appears well-nourished. No distress.   HENT:   Head: Normocephalic and atraumatic.   Eyes: Conjunctivae are normal. No scleral icterus.   Neck: Normal range of motion. No tracheal deviation present.   Pulmonary/Chest: Effort normal. No respiratory distress.   Neurological: She is alert and oriented to person, place, and time.   Skin: She is not diaphoretic. No erythema. No pallor.   Psychiatric: She has a normal mood and affect. Her behavior is normal. Thought content normal.         Labs/imaging; reviewed  Assessment:       1. Upper abdominal pain    2. Constipation, unspecified constipation type        Plan:   1. CT abd/pelvis  2. Endoscopic evaluation if needed      The patient location is: home  The chief complaint leading to consultation is: abdominal pain  Visit type: audiovisual  Each patient to whom he or she provides medical services by telemedicine is:  (1) informed of the relationship between the physician and patient and the respective role of any other health care provider with respect to management of the patient; and (2) notified that he or she may decline to receive medical services by telemedicine and may withdraw from such care at any time.

## 2020-04-30 ENCOUNTER — TELEPHONE (OUTPATIENT)
Dept: GASTROENTEROLOGY | Facility: CLINIC | Age: 65
End: 2020-04-30

## 2020-05-26 ENCOUNTER — TELEPHONE (OUTPATIENT)
Dept: GASTROENTEROLOGY | Facility: CLINIC | Age: 65
End: 2020-05-26

## 2020-05-26 ENCOUNTER — PATIENT MESSAGE (OUTPATIENT)
Dept: GASTROENTEROLOGY | Facility: CLINIC | Age: 65
End: 2020-05-26

## 2020-05-26 NOTE — TELEPHONE ENCOUNTER
Sent patient a [employer] message to call radiology to schedule her CT Scan. [phone].

## 2020-06-11 ENCOUNTER — TELEPHONE (OUTPATIENT)
Dept: GASTROENTEROLOGY | Facility: CLINIC | Age: 65
End: 2020-06-11

## 2020-06-11 NOTE — TELEPHONE ENCOUNTER
Called patient several times to schedule CT scan. Left message on voice mail to call [phone].

## 2020-06-11 NOTE — TELEPHONE ENCOUNTER
----- Message from Caity Sher MA sent at 6/10/2020  1:07 PM CDT -----  Contact: [phone]/ Anna Jin      ----- Message -----  From: Kashif Johnson  Sent: 6/10/2020  11:27 AM CDT  To: Hilario Orozco Staff    Patient daughter is requesting to speak with you regarding CT scan. Please call.

## 2020-06-16 ENCOUNTER — HOSPITAL ENCOUNTER (OUTPATIENT)
Dept: RADIOLOGY | Facility: HOSPITAL | Age: 65
Discharge: HOME | End: 2020-06-16
Attending: INTERNAL MEDICINE
Payer: MEDICARE

## 2020-06-16 DIAGNOSIS — R10.10 UPPER ABDOMINAL PAIN: ICD-10-CM

## 2020-06-16 LAB
CREAT SERPL-MCNC: 0.6 MG/DL (ref 0.5–1.4)
SAMPLE: NORMAL

## 2020-06-16 PROCEDURE — 74177 CT ABD & PELVIS W/CONTRAST: CPT | Mod: 26,,, | Performed by: RADIOLOGY

## 2020-06-16 PROCEDURE — A9698 NON-RAD CONTRAST MATERIALNOC: HCPCS | Performed by: INTERNAL MEDICINE

## 2020-06-16 PROCEDURE — 25500020 PHARM REV CODE 255: Performed by: INTERNAL MEDICINE

## 2020-06-16 PROCEDURE — 74177 CT ABDOMEN PELVIS WITH CONTRAST: ICD-10-PCS | Mod: 26,,, | Performed by: RADIOLOGY

## 2020-06-16 PROCEDURE — 74177 CT ABD & PELVIS W/CONTRAST: CPT | Mod: TC

## 2020-06-16 RX ADMIN — IOHEXOL 75 ML: 350 INJECTION, SOLUTION INTRAVENOUS at 10:06

## 2020-06-16 RX ADMIN — IOHEXOL 1000 ML: 9 SOLUTION ORAL at 09:06

## 2020-06-17 ENCOUNTER — TELEPHONE (OUTPATIENT)
Dept: ENDOSCOPY | Facility: HOSPITAL | Age: 65
End: 2020-06-17

## 2020-06-17 DIAGNOSIS — R93.5 ABNORMAL CT OF THE ABDOMEN: Primary | ICD-10-CM

## 2020-06-17 NOTE — TELEPHONE ENCOUNTER
----- Message from Britton Martinez MD sent at 6/17/2020  9:00 AM CDT -----  Please schedule EUS (linear) for abnormal CT scan. Urgent. Can be done by any [facility] physician.  Britton Martinez MD  ----- Message -----  From: Pam Rich MD  Sent: 6/17/2020   7:38 AM CDT  To: Britton Martinez MD, Hilario Orozco Staff    There is an abnormal area adjacent to the stomach; I think an egd/eus is warranted. She has had prior EUS with Dr. Martinez so I think it would be a good idea if she could do it again with him for continuity of care

## 2020-06-22 ENCOUNTER — TELEPHONE (OUTPATIENT)
Dept: GASTROENTEROLOGY | Facility: CLINIC | Age: 65
End: 2020-06-22

## 2020-06-22 ENCOUNTER — TELEPHONE (OUTPATIENT)
Dept: ENDOSCOPY | Facility: HOSPITAL | Age: 65
End: 2020-06-22

## 2020-06-22 ENCOUNTER — LAB VISIT (OUTPATIENT)
Dept: URGENT CARE | Facility: CLINIC | Age: 65
End: 2020-06-22
Payer: MEDICARE

## 2020-06-22 VITALS — HEART RATE: 66 BPM | TEMPERATURE: 98 F | WEIGHT: 107 LBS | BODY MASS INDEX: 19.57 KG/M2 | OXYGEN SATURATION: 97 %

## 2020-06-22 PROCEDURE — U0003 INFECTIOUS AGENT DETECTION BY NUCLEIC ACID (DNA OR RNA); SEVERE ACUTE RESPIRATORY SYNDROME CORONAVIRUS 2 (SARS-COV-2) (CORONAVIRUS DISEASE [COVID-19]), AMPLIFIED PROBE TECHNIQUE, MAKING USE OF HIGH THROUGHPUT TECHNOLOGIES AS DESCRIBED BY CMS-2020-01-R: HCPCS

## 2020-06-22 NOTE — TELEPHONE ENCOUNTER
----- Message from Delfino Jasso MD sent at 6/20/2020  3:20 PM CDT -----  yes  ----- Message -----  From: Rebecca Loaiza MA  Sent: 6/19/2020  11:04 AM CDT  To: Delfino Jasso MD    Can I add one day next week?  ----- Message -----  From: Petra Cook LPN  Sent: 6/18/2020   5:21 PM CDT  To: Rebecca Loaiza MA    This is marked urgent.  No availability here. Peewee is leaving early next week for meetings so that has decreased a lot of procedure time.  ----- Message -----  From: Rebecca Loaiza MA  Sent: 6/17/2020  11:56 AM CDT  To: Petra Cook LPN    Please advise if yall can get her in  ----- Message -----  From: Britton Martinez MD  Sent: 6/17/2020   9:00 AM CDT  To: Rebecca Loaiza MA    Please schedule EUS (linear) for abnormal CT scan. Urgent. Can be done by any [facility] physician.  Britton Martinez MD  ----- Message -----  From: Pam Rich MD  Sent: 6/17/2020   7:38 AM CDT  To: Britton Martinez MD, Hilario Orozco Staff    There is an abnormal area adjacent to the stomach; I think an egd/eus is warranted. She has had prior EUS with Dr. Martinez so I think it would be a good idea if she could do it again with him for continuity of care

## 2020-06-22 NOTE — TELEPHONE ENCOUNTER
----- Message from Mine Olivares sent at 6/22/2020 10:23 AM CDT -----  Regarding: Endoscopy Appointment  Contact: daughter [phone]  Patient's daughter is calling to schedule her mom endoscopy. Please call

## 2020-06-22 NOTE — TELEPHONE ENCOUNTER
Spoke with patient's daughter, janette, about instructions for UEUS scheduled 6/24/20 at 1130.  Patient speaks Cypriot; declined  and states daughter will accompany patient as .  Covid-19 test today 6/22/20 at 1230 at [facility].

## 2020-06-23 ENCOUNTER — TELEPHONE (OUTPATIENT)
Dept: GASTROENTEROLOGY | Facility: CLINIC | Age: 65
End: 2020-06-23

## 2020-06-23 LAB — SARS-COV-2 RNA RESP QL NAA+PROBE: NOT DETECTED

## 2020-06-23 NOTE — TELEPHONE ENCOUNTER
Called and spoke to pt's daughter and she stated pt is already scheduled at main campus for 6-24 for EGD/EUS

## 2020-06-24 ENCOUNTER — ANESTHESIA EVENT (OUTPATIENT)
Dept: ENDOSCOPY | Facility: HOSPITAL | Age: 65
End: 2020-06-24
Payer: MEDICARE

## 2020-06-24 ENCOUNTER — HOSPITAL ENCOUNTER (OUTPATIENT)
Facility: HOSPITAL | Age: 65
Discharge: HOME | End: 2020-06-24
Attending: INTERNAL MEDICINE | Admitting: INTERNAL MEDICINE
Payer: MEDICARE

## 2020-06-24 ENCOUNTER — ANESTHESIA (OUTPATIENT)
Dept: ENDOSCOPY | Facility: HOSPITAL | Age: 65
End: 2020-06-24
Payer: MEDICARE

## 2020-06-24 VITALS
TEMPERATURE: 97 F | DIASTOLIC BLOOD PRESSURE: 67 MMHG | HEIGHT: 62 IN | HEART RATE: 62 BPM | RESPIRATION RATE: 18 BRPM | WEIGHT: 113 LBS | SYSTOLIC BLOOD PRESSURE: 143 MMHG | OXYGEN SATURATION: 99 % | BODY MASS INDEX: 20.8 KG/M2

## 2020-06-24 DIAGNOSIS — R93.89 ABNORMAL FINDING OF DIAGNOSTIC IMAGING: Primary | ICD-10-CM

## 2020-06-24 PROCEDURE — 37000008 HC ANESTHESIA 1ST 15 MINUTES: Performed by: INTERNAL MEDICINE

## 2020-06-24 PROCEDURE — D9220A PRA ANESTHESIA: ICD-10-PCS | Mod: ANES,,, | Performed by: ANESTHESIOLOGY

## 2020-06-24 PROCEDURE — 88341 IMHCHEM/IMCYTCHM EA ADD ANTB: CPT | Mod: 59 | Performed by: PATHOLOGY

## 2020-06-24 PROCEDURE — 88173 PR  INTERPRETATION OF FNA SMEAR: ICD-10-PCS | Mod: 26,,, | Performed by: PATHOLOGY

## 2020-06-24 PROCEDURE — 88305 TISSUE EXAM BY PATHOLOGIST: CPT | Performed by: PATHOLOGY

## 2020-06-24 PROCEDURE — 88173 CYTOPATH EVAL FNA REPORT: CPT | Mod: 26,,, | Performed by: PATHOLOGY

## 2020-06-24 PROCEDURE — 25000003 PHARM REV CODE 250: Performed by: INTERNAL MEDICINE

## 2020-06-24 PROCEDURE — 88342 CHG IMMUNOCYTOCHEMISTRY: ICD-10-PCS | Mod: 26,,, | Performed by: PATHOLOGY

## 2020-06-24 PROCEDURE — 88177 CYTP FNA EVAL EA ADDL: CPT | Performed by: PATHOLOGY

## 2020-06-24 PROCEDURE — 88177 PR  EVALUATION OF FNA SMEAR TO DETERMINE ADEQUACY, EA ADD EVAL: ICD-10-PCS | Mod: 26,,, | Performed by: PATHOLOGY

## 2020-06-24 PROCEDURE — 88172 CYTP DX EVAL FNA 1ST EA SITE: CPT | Mod: 26,,, | Performed by: PATHOLOGY

## 2020-06-24 PROCEDURE — 88173 CYTOPATH EVAL FNA REPORT: CPT | Performed by: PATHOLOGY

## 2020-06-24 PROCEDURE — 43242 EGD US FINE NEEDLE BX/ASPIR: CPT | Mod: ,,, | Performed by: INTERNAL MEDICINE

## 2020-06-24 PROCEDURE — 43242 PR UPGI ENDOSCOPY,FN NEEDLE BX,GUIDED: ICD-10-PCS | Mod: ,,, | Performed by: INTERNAL MEDICINE

## 2020-06-24 PROCEDURE — 37000009 HC ANESTHESIA EA ADD 15 MINS: Performed by: INTERNAL MEDICINE

## 2020-06-24 PROCEDURE — 94761 N-INVAS EAR/PLS OXIMETRY MLT: CPT

## 2020-06-24 PROCEDURE — 25000003 PHARM REV CODE 250: Performed by: NURSE ANESTHETIST, CERTIFIED REGISTERED

## 2020-06-24 PROCEDURE — D9220A PRA ANESTHESIA: Mod: ANES,,, | Performed by: ANESTHESIOLOGY

## 2020-06-24 PROCEDURE — D9220A PRA ANESTHESIA: ICD-10-PCS | Mod: CRNA,,, | Performed by: NURSE ANESTHETIST, CERTIFIED REGISTERED

## 2020-06-24 PROCEDURE — 88172 PR  EVALUATION OF FNA SMEAR TO DETERMINE ADEQUACY, FIRST EVAL: ICD-10-PCS | Mod: 26,,, | Performed by: PATHOLOGY

## 2020-06-24 PROCEDURE — D9220A PRA ANESTHESIA: Mod: CRNA,,, | Performed by: NURSE ANESTHETIST, CERTIFIED REGISTERED

## 2020-06-24 PROCEDURE — 43242 EGD US FINE NEEDLE BX/ASPIR: CPT | Performed by: INTERNAL MEDICINE

## 2020-06-24 PROCEDURE — 88305 TISSUE EXAM BY PATHOLOGIST: CPT | Mod: 26,,, | Performed by: PATHOLOGY

## 2020-06-24 PROCEDURE — 27202131 HC NEEDLE, FNB SINGLE (ANY): Performed by: INTERNAL MEDICINE

## 2020-06-24 PROCEDURE — 88341 PR IHC OR ICC EACH ADD'L SINGLE ANTIBODY  STAINPR: ICD-10-PCS | Mod: 26,,, | Performed by: PATHOLOGY

## 2020-06-24 PROCEDURE — 88342 IMHCHEM/IMCYTCHM 1ST ANTB: CPT | Mod: 26,,, | Performed by: PATHOLOGY

## 2020-06-24 PROCEDURE — 63600175 PHARM REV CODE 636 W HCPCS: Performed by: NURSE ANESTHETIST, CERTIFIED REGISTERED

## 2020-06-24 PROCEDURE — 88172 CYTP DX EVAL FNA 1ST EA SITE: CPT | Performed by: PATHOLOGY

## 2020-06-24 PROCEDURE — 88342 IMHCHEM/IMCYTCHM 1ST ANTB: CPT | Performed by: PATHOLOGY

## 2020-06-24 PROCEDURE — 88305 TISSUE EXAM BY PATHOLOGIST: ICD-10-PCS | Mod: 26,,, | Performed by: PATHOLOGY

## 2020-06-24 PROCEDURE — 88177 CYTP FNA EVAL EA ADDL: CPT | Mod: 26,,, | Performed by: PATHOLOGY

## 2020-06-24 PROCEDURE — 88341 IMHCHEM/IMCYTCHM EA ADD ANTB: CPT | Mod: 26,,, | Performed by: PATHOLOGY

## 2020-06-24 RX ORDER — PHENYLEPHRINE HYDROCHLORIDE 10 MG/ML
INJECTION INTRAVENOUS
Status: DISCONTINUED | OUTPATIENT
Start: 2020-06-24 | End: 2020-06-24

## 2020-06-24 RX ORDER — LIDOCAINE HYDROCHLORIDE 10 MG/ML
INJECTION, SOLUTION INTRAVENOUS
Status: DISCONTINUED | OUTPATIENT
Start: 2020-06-24 | End: 2020-06-24

## 2020-06-24 RX ORDER — MIDAZOLAM HYDROCHLORIDE 1 MG/ML
INJECTION, SOLUTION INTRAMUSCULAR; INTRAVENOUS
Status: DISCONTINUED | OUTPATIENT
Start: 2020-06-24 | End: 2020-06-24

## 2020-06-24 RX ORDER — ONDANSETRON 2 MG/ML
INJECTION INTRAMUSCULAR; INTRAVENOUS
Status: DISCONTINUED | OUTPATIENT
Start: 2020-06-24 | End: 2020-06-24

## 2020-06-24 RX ORDER — SODIUM CHLORIDE 9 MG/ML
INJECTION, SOLUTION INTRAVENOUS CONTINUOUS
Status: DISCONTINUED | OUTPATIENT
Start: 2020-06-24 | End: 2020-06-24 | Stop reason: HOSPADM

## 2020-06-24 RX ORDER — PROPOFOL 10 MG/ML
VIAL (ML) INTRAVENOUS CONTINUOUS PRN
Status: DISCONTINUED | OUTPATIENT
Start: 2020-06-24 | End: 2020-06-24

## 2020-06-24 RX ORDER — PROPOFOL 10 MG/ML
VIAL (ML) INTRAVENOUS
Status: DISCONTINUED | OUTPATIENT
Start: 2020-06-24 | End: 2020-06-24

## 2020-06-24 RX ORDER — FENTANYL CITRATE 50 UG/ML
INJECTION, SOLUTION INTRAMUSCULAR; INTRAVENOUS
Status: DISCONTINUED | OUTPATIENT
Start: 2020-06-24 | End: 2020-06-24

## 2020-06-24 RX ADMIN — PROPOFOL 100 MG: 10 INJECTION, EMULSION INTRAVENOUS at 12:06

## 2020-06-24 RX ADMIN — PHENYLEPHRINE HYDROCHLORIDE 100 MCG: 10 INJECTION INTRAVENOUS at 01:06

## 2020-06-24 RX ADMIN — FENTANYL CITRATE 25 MCG: 50 INJECTION, SOLUTION INTRAMUSCULAR; INTRAVENOUS at 12:06

## 2020-06-24 RX ADMIN — SODIUM CHLORIDE, SODIUM GLUCONATE, SODIUM ACETATE, POTASSIUM CHLORIDE, MAGNESIUM CHLORIDE, SODIUM PHOSPHATE, DIBASIC, AND POTASSIUM PHOSPHATE: .53; .5; .37; .037; .03; .012; .00082 INJECTION, SOLUTION INTRAVENOUS at 01:06

## 2020-06-24 RX ADMIN — LIDOCAINE HYDROCHLORIDE 50 MG: 10 INJECTION, SOLUTION INTRAVENOUS at 12:06

## 2020-06-24 RX ADMIN — ONDANSETRON 4 MG: 2 INJECTION INTRAMUSCULAR; INTRAVENOUS at 01:06

## 2020-06-24 RX ADMIN — MIDAZOLAM HYDROCHLORIDE 2 MG: 1 INJECTION, SOLUTION INTRAMUSCULAR; INTRAVENOUS at 12:06

## 2020-06-24 RX ADMIN — SODIUM CHLORIDE: 0.9 INJECTION, SOLUTION INTRAVENOUS at 12:06

## 2020-06-24 RX ADMIN — PROPOFOL 50 MCG/KG/MIN: 10 INJECTION, EMULSION INTRAVENOUS at 12:06

## 2020-06-24 NOTE — PROGRESS NOTES
Dr. Hanson discussed procedure findings with patient and her daughter at bedside.  Discharge instructions given verbally and in writing. All answers answered to patient's and daughter's satisfaction. Patient denies nausea and pain. No complaints verbalized. Appears calm and comfortable. Patient dressed self without difficulty. To be discharged to home with daughter.  .

## 2020-06-24 NOTE — TRANSFER OF CARE
"Anesthesia Transfer of Care Note    Patient: Meredith Cuellar    Procedure(s) Performed: Procedure(s) (LRB):  ULTRASOUND, UPPER GI TRACT, ENDOSCOPIC (N/A)    Patient location: PACU    Anesthesia Type: general    Transport from OR: Transported from OR on 2-3 L/min O2 by NC with adequate spontaneous ventilation    Post pain: adequate analgesia    Post assessment: no apparent anesthetic complications    Post vital signs: stable    Level of consciousness: awake and alert    Nausea/Vomiting: no nausea/vomiting    Complications: none    Transfer of care protocol was followed      Last vitals:   Visit Vitals  BP (!) 142/68 (BP Location: Left arm, Patient Position: Lying)   Pulse 72   Temp 36.6 °C (97.9 °F) (Temporal)   Resp 18   Ht 5' 2" (1.575 m)   Wt 51.3 kg (113 lb)   SpO2 100%   Breastfeeding No   BMI 20.67 kg/m²     "

## 2020-06-24 NOTE — [employer] PATIENT INSTRUCTIONS
Discharge Summary/Instructions after an Endoscopic Procedure  Patient Name: Meredith Culelar  Patient MRN: [id number]  Patient YOB: 1955 Wednesday, June 24, 2020  Delfino Jasso MD  RESTRICTIONS:  During your procedure today, you received medications for sedation.  These   medications may affect your judgment, balance and coordination.  Therefore,   for 24 hours, you have the following restrictions:   - DO NOT drive a car, operate machinery, make legal/financial decisions,   sign important papers or drink alcohol.    ACTIVITY:  Today: no heavy lifting, straining or running due to procedural   sedation/anesthesia.  The following day: return to full activity including work.  DIET:  Eat and drink normally unless instructed otherwise.     TREATMENT FOR COMMON SIDE EFFECTS:  - Mild abdominal pain, nausea, belching, bloating or excessive gas:  rest,   eat lightly and use a heating pad.  - Sore Throat: treat with throat lozenges and/or gargle with warm salt   water.  - Because air was used during the procedure, expelling large amounts of air   from your rectum or belching is normal.  - If a bowel prep was taken, you may not have a bowel movement for 1-3 days.    This is normal.  SYMPTOMS TO WATCH FOR AND REPORT TO YOUR PHYSICIAN:  1. Abdominal pain or bloating, other than gas cramps.  2. Chest pain.  3. Back pain.  4. Signs of infection such as: chills or fever occurring within 24 hours   after the procedure.  5. Rectal bleeding, which would show as bright red, maroon, or black stools.   (A tablespoon of blood from the rectum is not serious, especially if   hemorrhoids are present.)  6. Vomiting.  7. Weakness or dizziness.  GO DIRECTLY TO THE NEAREST EMERGENCY ROOM IF YOU HAVE ANY OF THE FOLLOWING:      Difficulty breathing              Chills and/or fever over 101 F   Persistent vomiting and/or vomiting blood   Severe abdominal pain   Severe chest pain   Black, tarry stools   Bleeding- more than one tablespoon   Any  other symptom or condition that you feel may need urgent attention  Your doctor recommends these additional instructions:  If any biopsies were taken, your doctors clinic will contact you in 1 to 2   weeks with any results.  - Discharge patient to home.   - Resume previous diet.   - Await cytology results.   - Return to referring physician at appointment to be scheduled.   - Refer to a surgeon at appointment to be scheduled.  For questions, problems or results please call your physician - Delfino Jasso MD at Work:  [phone].  [facility], EMERGENCY ROOM PHONE NUMBER: [phone]  IF A COMPLICATION OR EMERGENCY SITUATION ARISES AND YOU ARE UNABLE TO REACH   YOUR PHYSICIAN - GO DIRECTLY TO THE EMERGENCY ROOM.  Delfino Jasso MD  6/24/2020 1:25:48 PM  This report has been verified and signed electronically.  [employer]

## 2020-06-24 NOTE — ANESTHESIA POSTPROCEDURE EVALUATION
Anesthesia Post Evaluation    Patient: Meredith Cuellar    Procedure(s) Performed: Procedure(s) (LRB):  ULTRASOUND, UPPER GI TRACT, ENDOSCOPIC (N/A)    Final Anesthesia Type: general    Patient location during evaluation: PACU  Patient participation: Yes- Able to Participate  Level of consciousness: awake and alert  Post-procedure vital signs: reviewed and stable  Pain management: adequate  Airway patency: patent    PONV status at discharge: No PONV  Anesthetic complications: no      Cardiovascular status: blood pressure returned to baseline  Respiratory status: unassisted  Hydration status: euvolemic  Follow-up not needed.          Vitals Value Taken Time   /67 06/24/20 1430   Temp 36.3 °C (97.3 °F) 06/24/20 1415   Pulse 63 06/24/20 1431   Resp 19 06/24/20 1431   SpO2 99 % 06/24/20 1431   Vitals shown include unvalidated device data.      Event Time   Out of Recovery 14:45:00         Pain/Carlos Manuel Score: Pain Rating Prior to Med Admin: 0 (6/24/2020  1:30 PM)  Carlos Manuel Score: 10 (6/24/2020  2:30 PM)

## 2020-06-24 NOTE — DISCHARGE INSTRUCTIONS
Endoscopic Ultrasound (EUS)    An endoscopic ultrasound (EUS) is a test to look at the inside of your gastrointestinal (GI) tract. It's commonly used to look for cancers or growths in the esophagus, stomach, pancreas, liver, and rectum. It can help to stage cancer (see how advanced a cancer is). EUS may also be used to help diagnose certain diseases or to drain cysts or abscesses.  What is EUS?  EUS shows both ultrasound images and live video of the GI tract. During the test, a flexible tube called an endoscope (scope) is used. At the end of the scope is a tiny video camera and light. The video camera sends live images to a monitor. The scope also contains a very small ultrasound device. This uses sound waves to create images and send them to a monitor.  A needle is passed through the scope. The needle can be used take a small sample of tissue for testing. This is called a biopsy. The needle can be used to take a sample of fluid. This is called fine-needle aspiration (FNA).  Risks and possible complications of EUS  Risks and possible complications include the following:  · Bleeding  · Infection  · A perforation (hole) in the digestive tract   · Risks of sedation or anesthesia   Before the test  Be prepared prior to the test:  · Tell your healthcare provider what medicine you take. This includes vitamins, herbs, and over-the-counter medicine. It also includes any blood thinners, such as warfarin, clopidogrel, ibuprofen, or daily aspirin. Ask your healthcare provider if you need to stop taking some or all of them before the test.  · You may be prescribed antibiotics to take before or after the test. This depends on the area being studied and what is done during the test. These medicines help prevent infection.  · Carefully follow the instructions for preparing for the test to make sure results are accurate. Instructions may include:  ¨ If youre having an EUS of the upper GI tract (esophagus, stomach, duodenum,  pancreas, liver):  § Do not eat or drink for 6 hours before the test.  ¨ If youre having an EUS of the lower GI tract (rectum):  § Before the test, do bowel prep as instructed to clean your rectum of stool. This may involve a clear liquid diet and using a laxative (liquid or pills) the night before the test. Or it may mean doing one or more enemas the morning of the test.  § Do not eat or drink for 6 hours before the test.  · Be sure to arrive on time at the facility. Bring your identification and health insurance card. Leave valuables at home. If you have them, bring X-rays or other test results with you.  Let the healthcare provider know  For your safety, tell the healthcare provider if you:  · Take insulin. Your dose may need to be changed on the day of your test.  · Are allergic to latex.  · Have any other allergies.  · Are taking blood thinners.   During the test  An endoscopic ultrasound usually takes place in a hospital. The procedure itself may take 1 to 2 hours. You will likely go home soon afterward. During the test:  · You lie on your left side on an exam table.  · An intravenous (IV) line will be put into a vein in your arm or hand. This line supplies fluids and medicines. To keep you comfortable during the test, you will be given a sedative medicine. This medicine prevents discomfort and will make you sleepy.  · If you are having an EUS of the upper GI tract, local anesthetic may be sprayed in your throat. This will help you be more comfortable as the healthcare provider inserts the scope. The healthcare provider then gently puts the flexible scope into your mouth or nose and down your throat.  · If youre having an EUS of the lower GI tract, the healthcare provider gently puts the flexible scope into your anus.  · During the test, the scope sends live video and ultrasound images from inside your body to nearby monitors. These are used to examine your GI tract. Specialized procedures, such as drainage,  are done as needed.  · The healthcare provider may discuss the results with you soon after the test. Biopsy results take several  days.  · In most cases, you can go home within a few hours of the test. When you leave the facility, have an adult family member or friend drive you, even if you don't feel that sleepy.  After the test  Here is what to expect after the test:  · You may feel tired from the sedative. This should wear off by the end of the day.  · If you had an upper digestive endoscopy, your throat may feel sore for a day or two. Over-the-counter sore throat lozenges and spray should help.  · You can eat and drink normally as soon as the test is done.  When to call the healthcare provider  Call your healthcare provider if you notice any of the following:  · Fever of 100.4°F (38.0°C) or higher, or as advised by your healthcare provider  · Shortness of breath  · Vomiting blood, blood in stool, or black stools  · Coughing or hoarse voice that wont go away   Date Last Reviewed: 7/1/2016  © 0289-3927 [employer]. [address] [place] [address]. All rights reserved. This information is not intended as a substitute for professional medical care. Always follow your healthcare professional's instructions.

## 2020-06-24 NOTE — H&P
Short Stay Endoscopy History and Physical    PCP - Di Cruz MD  Referring Physician - Britton Martinez MD  [address] [place] [address]    Procedure - eus  ASA - per anesthesia  Mallampati - per anesthesia  History of Anesthesia problems - no  Family history Anesthesia problems -  no   Plan of anesthesia - General    HPI:  This is a 64 y.o. female here for evaluation of: abnormal image    Reflux - no  Dysphagia - no  Abdominal pain - no  Diarrhea - no    ROS:  Constitutional: No fevers, chills, No weight loss  CV: No chest pain  Pulm: No cough, No shortness of breath  Ophtho: No vision changes  GI: see HPI  Derm: No rash    Medical History:  has a past medical history of Abdominal pain, Cholelithiasis, Constipation, chronic, Dilated bile duct, Headache, Hypertension, and Pancreatitis.    Surgical History:  has a past surgical history that includes Cholecystectomy; Appendectomy; Hysterectomy (2012); and Endoscopic ultrasound of upper gastrointestinal tract (N/A, 11/26/2019).    Family History: family history is not on file..    Social History:  reports that she has never smoked. She has never used smokeless tobacco. She reports that she does not drink alcohol or use drugs.    Review of patient's allergies indicates:  No Known Allergies    Medications:   Medications Prior to Admission   Medication Sig Dispense Refill Last Dose    amlodipine (NORVASC) 5 MG tablet Take 5 mg by mouth once daily.   6/24/2020 at Unknown time    atorvastatin (LIPITOR) 40 MG tablet Take 40 mg by mouth once daily.   6/23/2020 at Unknown time    butalbital-acetaminophen-caffeine -40 mg (FIORICET, ESGIC) -40 mg per tablet Take 1 tablet by mouth every 4 (four) hours as needed for Pain.   Past Month at Unknown time    HYDROcodone-acetaminophen (NORCO) 5-325 mg per tablet Take 1 tablet by mouth every 6 (six) hours as needed for Pain. 10 tablet 0 Past Month at Unknown time    LINZESS 145 mcg Cap capsule TAKE  1 CAPSULE (145 MCG TOTAL) BY MOUTH ONCE DAILY. 30 capsule 0 6/23/2020 at Unknown time    losartan (COZAAR) 25 MG tablet    6/23/2020 at Unknown time    ondansetron (ZOFRAN-ODT) 4 MG TbDL Take 1 tablet (4 mg total) by mouth every 8 (eight) hours as needed (nausea and vomiting). 20 tablet 0 Past Month at Unknown time    DEXLANSOPRAZOLE ORAL Take by mouth.       diclofenac (VOLTAREN) 75 MG EC tablet Take 1 tablet (75 mg total) by mouth 2 (two) times daily. 60 tablet 1 More than a month at Unknown time    esomeprazole (NEXIUM) 20 MG capsule Take 20 mg by mouth before breakfast.   More than a month at Unknown time       Physical Exam:    Vital Signs:   Vitals:    06/24/20 1031   BP: (!) 142/68   Pulse: 72   Resp: 18   Temp: 97.9 °F (36.6 °C)       General Appearance: Well appearing in no acute distress    Labs:  Lab Results   Component Value Date    WBC 6.40 03/02/2020    HGB 11.8 (L) 03/02/2020    HCT 36.3 (L) 03/02/2020     03/02/2020    ALT 14 03/02/2020    AST 18 03/02/2020     03/02/2020    K 3.8 03/02/2020     03/02/2020    CREATININE 0.6 03/02/2020    BUN 19 03/02/2020    CO2 22 (L) 03/02/2020    INR 0.9 10/19/2015       I have explained the risks and benefits of this endoscopic procedure to the patient including but not limited to bleeding, inflammation, infection, perforation, and death.      Delfino Jasso MD

## 2020-06-24 NOTE — ANESTHESIA PREPROCEDURE EVALUATION
06/24/2020  Meredith Cuellar is a 64 y.o., female.  Pre-operative evaluation for Procedure(s) (LRB):  ULTRASOUND, UPPER GI TRACT, ENDOSCOPIC (N/A)    Meredith Cuellar is a 64 y.o. female with history of back pain and abnormal CT.     LDA:     Prev airway:     Drips:     Patient Active Problem List   Diagnosis    Headache(784.0)    Pancreatitis    Hypertension    Chronic GERD    Leukocytosis       Review of patient's allergies indicates:  No Known Allergies     No current facility-administered medications on file prior to encounter.      Current Outpatient Medications on File Prior to Encounter   Medication Sig Dispense Refill    amlodipine (NORVASC) 5 MG tablet Take 5 mg by mouth once daily.      atorvastatin (LIPITOR) 40 MG tablet Take 40 mg by mouth once daily.      butalbital-acetaminophen-caffeine -40 mg (FIORICET, ESGIC) -40 mg per tablet Take 1 tablet by mouth every 4 (four) hours as needed for Pain.      HYDROcodone-acetaminophen (NORCO) 5-325 mg per tablet Take 1 tablet by mouth every 6 (six) hours as needed for Pain. 10 tablet 0    LINZESS 145 mcg Cap capsule TAKE 1 CAPSULE (145 MCG TOTAL) BY MOUTH ONCE DAILY. 30 capsule 0    losartan (COZAAR) 25 MG tablet       ondansetron (ZOFRAN-ODT) 4 MG TbDL Take 1 tablet (4 mg total) by mouth every 8 (eight) hours as needed (nausea and vomiting). 20 tablet 0    DEXLANSOPRAZOLE ORAL Take by mouth.      diclofenac (VOLTAREN) 75 MG EC tablet Take 1 tablet (75 mg total) by mouth 2 (two) times daily. 60 tablet 1    esomeprazole (NEXIUM) 20 MG capsule Take 20 mg by mouth before breakfast.         Past Surgical History:   Procedure Laterality Date    APPENDECTOMY      CHOLECYSTECTOMY      ENDOSCOPIC ULTRASOUND OF UPPER GASTROINTESTINAL TRACT N/A 11/26/2019    Procedure: ULTRASOUND, UPPER GI TRACT, ENDOSCOPIC;  Surgeon: Britton Martinez MD;   Location: [facility];  Service: Endoscopy;  Laterality: N/A;    HYSTERECTOMY  2012    elective           Vital Signs Range (Last 24H):  Temp:  [36.6 °C (97.9 °F)]   Pulse:  [72]   Resp:  [18]   BP: (142)/(68)   SpO2:  [100 %]             Diagnostic Studies:      [facility] Echo:        Anesthesia Evaluation    I have reviewed the Patient Summary Reports.    I have reviewed the Nursing Notes.    I have reviewed the Medications.     Review of Systems  Anesthesia Hx:  No problems with previous Anesthesia    Social:  Non-Smoker    Hematology/Oncology:  Hematology Normal   Oncology Normal     EENT/Dental:EENT/Dental Normal   Pulmonary:  Pulmonary Normal    Renal/:  Renal/ Normal     Musculoskeletal:  Musculoskeletal Normal    Endocrine:  Endocrine Normal    Dermatological:  Skin Normal    Psych:  Psychiatric Normal           Physical Exam  General:  Obesity, Well nourished    Airway/Jaw/Neck:  Airway Findings: Mouth Opening: Normal Tongue: Normal  General Airway Assessment: Adult  Mallampati: II  Improves to II with phonation.  TM Distance: Normal, at least 6 cm      Dental:  Dental Findings: In tact        Mental Status:  Mental Status Findings:  Cooperative, Alert and Oriented         Anesthesia Plan  Type of Anesthesia, risks & benefits discussed:  Anesthesia Type:  general  Patient's Preference:   Intra-op Monitoring Plan:   Intra-op Monitoring Plan Comments:   Post Op Pain Control Plan: multimodal analgesia  Post Op Pain Control Plan Comments:   Induction:   IV  Beta Blocker:         Informed Consent: Patient understands risks and agrees with Anesthesia plan.  Questions answered.   ASA Score: 2     Day of Surgery Review of History & Physical:            Ready For Surgery From Anesthesia Perspective.

## 2020-06-25 LAB
ADEQUACY: ABNORMAL
FINAL PATHOLOGIC DIAGNOSIS: ABNORMAL
Lab: ABNORMAL

## 2020-06-26 ENCOUNTER — TELEPHONE (OUTPATIENT)
Dept: GASTROENTEROLOGY | Facility: HOSPITAL | Age: 65
End: 2020-06-26

## 2020-06-26 ENCOUNTER — TELEPHONE (OUTPATIENT)
Dept: ENDOSCOPY | Facility: HOSPITAL | Age: 65
End: 2020-06-26

## 2020-06-26 DIAGNOSIS — D21.4 BENIGN GASTROINTESTINAL STROMAL TUMOR (GIST): Primary | ICD-10-CM

## 2020-06-26 NOTE — TELEPHONE ENCOUNTER
Delfino Jasso MD  You 2 hours ago (9:21 AM)     Can you set up surg onc appt for a GIST    Routing comment      Please sign referral

## 2020-06-26 NOTE — TELEPHONE ENCOUNTER
I discussed the FNA results with her daughter Anna  She has a GIST, we'll set her up to see surg onc

## 2020-07-07 ENCOUNTER — NURSE TRIAGE (OUTPATIENT)
Dept: ADMINISTRATIVE | Facility: CLINIC | Age: 65
End: 2020-07-07

## 2020-07-08 NOTE — TELEPHONE ENCOUNTER
RN contacted pt through the Post Procedural Symptom Tracker for Day 14.  RN spoke with pt and her daughter via phone.  Pt speaks primarily Kazakh and understands a little bit of english.  Pt is not currently having any fever or SOB, but she is experiencing H/As, body aches, congestion and a mild cough.  Pt was also exposed to two other family members who are +Covid, but is not interested in getting tested at this time.  Home care advised.  Pt and daughter instructed to contact [facility] or pt's provider for any further questions.  Pt and daughter also advised for pt to report to the nearest ED should pt's symptoms worsen and she begins to experience any distress.  Pt and daughter verbalized understanding to all.  No additional action needed at this time per protocol.    Reason for Disposition   [1] Symptoms of COVID-19 (e.g., cough, fever, SOB, or others) AND [2] within 14 days of EXPOSURE (close contact) with diagnosed or suspected COVID-19 patient   COVID-19, questions about    Protocols used: CORONAVIRUS (COVID-19) EXPOSURE-A-OH, CORONAVIRUS (COVID-19) DIAGNOSED OR [id number]

## 2020-07-16 ENCOUNTER — TELEPHONE (OUTPATIENT)
Dept: SURGERY | Facility: CLINIC | Age: 65
End: 2020-07-16

## 2020-08-03 ENCOUNTER — INITIAL CONSULT (OUTPATIENT)
Dept: SURGERY | Facility: CLINIC | Age: 65
End: 2020-08-03
Payer: MEDICARE

## 2020-08-03 VITALS
BODY MASS INDEX: 20.77 KG/M2 | DIASTOLIC BLOOD PRESSURE: 69 MMHG | TEMPERATURE: 97 F | WEIGHT: 112.88 LBS | SYSTOLIC BLOOD PRESSURE: 135 MMHG | HEART RATE: 72 BPM | HEIGHT: 62 IN

## 2020-08-03 DIAGNOSIS — D21.4 BENIGN GASTROINTESTINAL STROMAL TUMOR (GIST): ICD-10-CM

## 2020-08-03 DIAGNOSIS — Z01.818 PRE-OP TESTING: ICD-10-CM

## 2020-08-03 PROCEDURE — 1101F PR PT FALLS ASSESS DOC 0-1 FALLS W/OUT INJ PAST YR: ICD-10-PCS | Mod: CPTII,S$GLB,, | Performed by: SURGERY

## 2020-08-03 PROCEDURE — 1125F AMNT PAIN NOTED PAIN PRSNT: CPT | Mod: S$GLB,,, | Performed by: SURGERY

## 2020-08-03 PROCEDURE — 1159F PR MEDICATION LIST DOCUMENTED IN MEDICAL RECORD: ICD-10-PCS | Mod: S$GLB,,, | Performed by: SURGERY

## 2020-08-03 PROCEDURE — 3075F PR MOST RECENT SYSTOLIC BLOOD PRESS GE 130-139MM HG: ICD-10-PCS | Mod: CPTII,S$GLB,, | Performed by: SURGERY

## 2020-08-03 PROCEDURE — 99999 PR PBB SHADOW E&M-EST. PATIENT-LVL V: ICD-10-PCS | Mod: PBBFAC,,, | Performed by: SURGERY

## 2020-08-03 PROCEDURE — 3008F PR BODY MASS INDEX (BMI) DOCUMENTED: ICD-10-PCS | Mod: CPTII,S$GLB,, | Performed by: SURGERY

## 2020-08-03 PROCEDURE — 99204 PR OFFICE/OUTPT VISIT, NEW, LEVL IV, 45-59 MIN: ICD-10-PCS | Mod: S$GLB,,, | Performed by: SURGERY

## 2020-08-03 PROCEDURE — 3078F DIAST BP <80 MM HG: CPT | Mod: CPTII,S$GLB,, | Performed by: SURGERY

## 2020-08-03 PROCEDURE — 99204 OFFICE O/P NEW MOD 45 MIN: CPT | Mod: S$GLB,,, | Performed by: SURGERY

## 2020-08-03 PROCEDURE — 1159F MED LIST DOCD IN RCRD: CPT | Mod: S$GLB,,, | Performed by: SURGERY

## 2020-08-03 PROCEDURE — 99999 PR PBB SHADOW E&M-EST. PATIENT-LVL V: CPT | Mod: PBBFAC,,, | Performed by: SURGERY

## 2020-08-03 PROCEDURE — 3075F SYST BP GE 130 - 139MM HG: CPT | Mod: CPTII,S$GLB,, | Performed by: SURGERY

## 2020-08-03 PROCEDURE — 1101F PT FALLS ASSESS-DOCD LE1/YR: CPT | Mod: CPTII,S$GLB,, | Performed by: SURGERY

## 2020-08-03 PROCEDURE — 3008F BODY MASS INDEX DOCD: CPT | Mod: CPTII,S$GLB,, | Performed by: SURGERY

## 2020-08-03 PROCEDURE — 1125F PR PAIN SEVERITY QUANTIFIED, PAIN PRESENT: ICD-10-PCS | Mod: S$GLB,,, | Performed by: SURGERY

## 2020-08-03 PROCEDURE — 3078F PR MOST RECENT DIASTOLIC BLOOD PRESSURE < 80 MM HG: ICD-10-PCS | Mod: CPTII,S$GLB,, | Performed by: SURGERY

## 2020-08-03 RX ORDER — ENOXAPARIN SODIUM 100 MG/ML
40 INJECTION SUBCUTANEOUS EVERY 24 HOURS
Status: CANCELLED | OUTPATIENT
Start: 2020-08-03

## 2020-08-03 RX ORDER — ERGOCALCIFEROL 1.25 MG/1
CAPSULE ORAL
COMMUNITY
Start: 2020-07-23 | End: 2022-05-18

## 2020-08-03 RX ORDER — SODIUM CHLORIDE 9 MG/ML
INJECTION, SOLUTION INTRAVENOUS CONTINUOUS
Status: CANCELLED | OUTPATIENT
Start: 2020-08-03

## 2020-08-03 NOTE — PROGRESS NOTES
History & Physical    SUBJECTIVE:     History of Present Illness:  Patient is a 65 y.o. 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 Patient has undergone extensive workup for severe epigastric and back pain that has been worsening over the past several years. This pain is described as sharp, is unrelated to eating, and seems to be worse at night. Its character seems similar to the pain she experienced with prior episodes for pancreatitis - presumed biliary pancreatitis. She underwent cholecystectomy about 10 years ago; however, she states she had a subsequent episode of pancreatitis last Nov. Most recent lipase in 3/2020 was elevated (77).    As part of her workup for the above pain, she underwent a CT scan that revealed a roughly 3 x 2 cm mass adjacent to the GE junction. Subsequent EUS was performed revealing an intramural mass that appeared to originate from the muscularis propria. FNA was performed and final path consistent with GIST.     Patient has prior CT images dating back to 2013 and 2015. This lesion is not apparent on her 2013 study; however, it is seen on the 2015 images but was significantly smaller, indicated significant interval growth.     Surgical history includes cholecystectomy as noted above as well as hysterectomy - daughter notes that this surgery was associated with an early post operative complication requiring a return to the operating room, but does not recall the details of this.     Chief Complaint   Patient presents with    Consult       Review of patient's allergies indicates:  No Known Allergies    Current Outpatient Medications   Medication Sig Dispense Refill    atorvastatin (LIPITOR) 40 MG tablet Take 40 mg by mouth once daily.      ergocalciferol (ERGOCALCIFEROL) 50,000 unit Cap       HYDROcodone-acetaminophen (NORCO) 5-325 mg per tablet Take 1 tablet by mouth every 6 (six) hours as needed for Pain. 10 tablet 0     losartan (COZAAR) 25 MG tablet        No current facility-administered medications for this visit.        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     Past Surgical History:   Procedure Laterality Date    APPENDECTOMY      CHOLECYSTECTOMY      ENDOSCOPIC ULTRASOUND OF UPPER GASTROINTESTINAL TRACT N/A 11/26/2019    Procedure: ULTRASOUND, UPPER GI TRACT, ENDOSCOPIC;  Surgeon: Britton Martinez MD;  Location: [facility] ENDO;  Service: Endoscopy;  Laterality: N/A;    ENDOSCOPIC ULTRASOUND OF UPPER GASTROINTESTINAL TRACT N/A 6/24/2020    Procedure: ULTRASOUND, UPPER GI TRACT, ENDOSCOPIC;  Surgeon: Delfino Jasso MD;  Location: [facility] ENDO (2ND FLR);  Service: Endoscopy;  Laterality: N/A;  EUS (linear) for abnormal CT scan. Urgent. Can be done by any [facility] physician.  Britton Martinez MD  Covid-19 test 6/22/20 at [facility] - pg  Speaks Azerbaijani;  offered and declined; daughter will accompany patient as interp    HYSTERECTOMY  2012    elective     No family history on file.  Social History     Tobacco Use    Smoking status: Never Smoker    Smokeless tobacco: Never Used   Substance Use Topics    Alcohol use: No    Drug use: No        Review of Systems:  Review of Systems   Constitutional: Negative for activity change, appetite change, fatigue and unexpected weight change.   HENT: Negative.    Respiratory: Negative for shortness of breath and wheezing.    Cardiovascular: Negative for chest pain and palpitations.   Gastrointestinal: Positive for abdominal distention, abdominal pain (epigastric, radiating to back) and constipation (chronic). Negative for blood in stool, diarrhea, nausea and vomiting.   Genitourinary: Negative.    Musculoskeletal: Positive for back pain. Negative for arthralgias and myalgias.  "  Skin: Negative for color change and pallor.   Neurological: Negative for dizziness, weakness and numbness.   Hematological: Negative for adenopathy.       OBJECTIVE:     Vital Signs (Most Recent)  Temp: 97.4 °F (36.3 °C) (08/03/20 1006)  Pulse: 72 (08/03/20 1006)  BP: 135/69 (08/03/20 1006)  5' 2" (1.575 m)  51.2 kg (112 lb 14.4 oz)     Physical Exam:  Physical Exam  Constitutional:       General: She is not in acute distress.     Appearance: Normal appearance. She is normal weight.   HENT:      Head: Normocephalic and atraumatic.   Eyes:      General: No scleral icterus.     Extraocular Movements: Extraocular movements intact.      Conjunctiva/sclera: Conjunctivae normal.   Neck:      Musculoskeletal: Normal range of motion and neck supple.   Cardiovascular:      Rate and Rhythm: Normal rate and regular rhythm.   Pulmonary:      Effort: Pulmonary effort is normal. No respiratory distress.   Abdominal:      General: There is no distension.      Palpations: Abdomen is soft.      Tenderness: There is abdominal tenderness (epigastric). There is no guarding or rebound.      Comments: Prior laparoscopic incisional scars  Lower midline laparotomy scar   Musculoskeletal: Normal range of motion.         General: No swelling.   Skin:     General: Skin is warm and dry.   Neurological:      Mental Status: She is oriented to person, place, and time.   Psychiatric:         Mood and Affect: Mood normal.         Behavior: Behavior normal.         Laboratory  None recent. Labs from 3/2020 reveal slightly elevated Lipase at 77; T bili normal at 0.3    Diagnostic Results:  CT reviewed: 3 x 2 cm mass noted about the GE junction; difficult to say if this is involving the esophagus or the gastric cardia; based on comparison to prior studies, favor gastric origin. Biliary ductal dilation noted in this patient with prior cholecystectomy, as well as slight pancreatic ductal dilation.     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    PLAN:Plan     - Plan for robotic assisted resection which may include partial gastrectomy vs esophagectomy with or without fundoplication. Ultimate surgical planning will have to be made intraoperatively, depending upon the exact origin of the tumor.   - In regards to her ongoing pain, it seems to represent ongoing chronic pancreatitis and is very unlikely to be associated with this GIST, which is more likely an incidental finding. Unclear etiology at this time, and we recommend continued workup by GI.   - Surgical and blood consents signed in clinic.

## 2020-08-03 NOTE — LETTER
August 7, 2020      Delfino Jasso MD  [facility] [address] [facility]  [address]  [facility] [address]           [facility] On[facility]  [address]  [place] [address]  Phone: [phone]          Patient: Meredith Cuellar   MR Number: [id number]   YOB: 1955   Date of Visit: 8/3/2020       Dear Dr. Delfino Jasso:    Thank you for referring Meredith Cuellar to me for evaluation. Attached you will find relevant portions of my assessment and plan of care.    If you have questions, please do not hesitate to call me. I look forward to following Meredith Cuellar along with you.    Sincerely,    Devyn Miles MD    Enclosure  CC:  No Recipients    If you would like to receive this communication electronically, please contact [email or web address] or [phone] to request more information on [employer] Link access.    For providers and/or their staff who would like to refer a patient to [facility], please contact us through our one-stop-shop provider referral line, [facility], at [phone].    If you feel you have received this communication in error or would no longer like to receive these types of communications, please e-mail [email or web address]

## 2020-08-04 ENCOUNTER — TELEPHONE (OUTPATIENT)
Dept: SURGERY | Facility: CLINIC | Age: 65
End: 2020-08-04

## 2020-08-04 DIAGNOSIS — D21.4 BENIGN GASTROINTESTINAL STROMAL TUMOR (GIST): Primary | ICD-10-CM

## 2020-08-05 ENCOUNTER — OFFICE VISIT (OUTPATIENT)
Dept: CARDIOLOGY | Facility: CLINIC | Age: 65
End: 2020-08-05
Payer: MEDICARE

## 2020-08-05 VITALS
HEIGHT: 62 IN | SYSTOLIC BLOOD PRESSURE: 130 MMHG | WEIGHT: 112.44 LBS | HEART RATE: 68 BPM | DIASTOLIC BLOOD PRESSURE: 70 MMHG | BODY MASS INDEX: 20.69 KG/M2

## 2020-08-05 DIAGNOSIS — Z01.810 PREOPERATIVE CARDIOVASCULAR EXAMINATION: ICD-10-CM

## 2020-08-05 DIAGNOSIS — I10 ESSENTIAL HYPERTENSION: Primary | ICD-10-CM

## 2020-08-05 DIAGNOSIS — E78.2 MIXED HYPERLIPIDEMIA: ICD-10-CM

## 2020-08-05 DIAGNOSIS — D21.4 BENIGN GASTROINTESTINAL STROMAL TUMOR (GIST): ICD-10-CM

## 2020-08-05 DIAGNOSIS — C49.A0 GASTROINTESTINAL STROMAL TUMOR (GIST): ICD-10-CM

## 2020-08-05 PROCEDURE — 93000 ELECTROCARDIOGRAM COMPLETE: CPT | Mod: S$GLB,,, | Performed by: INTERNAL MEDICINE

## 2020-08-05 PROCEDURE — 1159F MED LIST DOCD IN RCRD: CPT | Mod: S$GLB,,, | Performed by: INTERNAL MEDICINE

## 2020-08-05 PROCEDURE — 1101F PT FALLS ASSESS-DOCD LE1/YR: CPT | Mod: CPTII,S$GLB,, | Performed by: INTERNAL MEDICINE

## 2020-08-05 PROCEDURE — 99999 PR PBB SHADOW E&M-EST. PATIENT-LVL III: CPT | Mod: PBBFAC,,, | Performed by: INTERNAL MEDICINE

## 2020-08-05 PROCEDURE — 1126F PR PAIN SEVERITY QUANTIFIED, NO PAIN PRESENT: ICD-10-PCS | Mod: S$GLB,,, | Performed by: INTERNAL MEDICINE

## 2020-08-05 PROCEDURE — 99203 OFFICE O/P NEW LOW 30 MIN: CPT | Mod: 25,S$GLB,, | Performed by: INTERNAL MEDICINE

## 2020-08-05 PROCEDURE — 99999 PR PBB SHADOW E&M-EST. PATIENT-LVL III: ICD-10-PCS | Mod: PBBFAC,,, | Performed by: INTERNAL MEDICINE

## 2020-08-05 PROCEDURE — 93010 EKG 12-LEAD: ICD-10-PCS | Mod: S$GLB,,, | Performed by: INTERNAL MEDICINE

## 2020-08-05 PROCEDURE — 93005 ELECTROCARDIOGRAM TRACING: CPT

## 2020-08-05 PROCEDURE — 99203 PR OFFICE/OUTPT VISIT, NEW, LEVL III, 30-44 MIN: ICD-10-PCS | Mod: 25,S$GLB,, | Performed by: INTERNAL MEDICINE

## 2020-08-05 PROCEDURE — 1159F PR MEDICATION LIST DOCUMENTED IN MEDICAL RECORD: ICD-10-PCS | Mod: S$GLB,,, | Performed by: INTERNAL MEDICINE

## 2020-08-05 PROCEDURE — 1126F AMNT PAIN NOTED NONE PRSNT: CPT | Mod: S$GLB,,, | Performed by: INTERNAL MEDICINE

## 2020-08-05 PROCEDURE — 3078F PR MOST RECENT DIASTOLIC BLOOD PRESSURE < 80 MM HG: ICD-10-PCS | Mod: CPTII,S$GLB,, | Performed by: INTERNAL MEDICINE

## 2020-08-05 PROCEDURE — 3008F PR BODY MASS INDEX (BMI) DOCUMENTED: ICD-10-PCS | Mod: CPTII,S$GLB,, | Performed by: INTERNAL MEDICINE

## 2020-08-05 PROCEDURE — 1101F PR PT FALLS ASSESS DOC 0-1 FALLS W/OUT INJ PAST YR: ICD-10-PCS | Mod: CPTII,S$GLB,, | Performed by: INTERNAL MEDICINE

## 2020-08-05 PROCEDURE — 3078F DIAST BP <80 MM HG: CPT | Mod: CPTII,S$GLB,, | Performed by: INTERNAL MEDICINE

## 2020-08-05 PROCEDURE — 3008F BODY MASS INDEX DOCD: CPT | Mod: CPTII,S$GLB,, | Performed by: INTERNAL MEDICINE

## 2020-08-05 PROCEDURE — 3075F PR MOST RECENT SYSTOLIC BLOOD PRESS GE 130-139MM HG: ICD-10-PCS | Mod: CPTII,S$GLB,, | Performed by: INTERNAL MEDICINE

## 2020-08-05 PROCEDURE — 93000 PR ELECTROCARDIOGRAM, COMPLETE: ICD-10-PCS | Mod: S$GLB,,, | Performed by: INTERNAL MEDICINE

## 2020-08-05 PROCEDURE — 3075F SYST BP GE 130 - 139MM HG: CPT | Mod: CPTII,S$GLB,, | Performed by: INTERNAL MEDICINE

## 2020-08-05 PROCEDURE — 93010 ELECTROCARDIOGRAM REPORT: CPT | Mod: S$GLB,,, | Performed by: INTERNAL MEDICINE

## 2020-08-05 RX ORDER — LOSARTAN POTASSIUM 25 MG/1
25 TABLET ORAL DAILY
Qty: 90 TABLET | Refills: 1 | Status: SHIPPED | OUTPATIENT
Start: 2020-08-05 | End: 2022-05-18 | Stop reason: DRUGHIGH

## 2020-08-11 ENCOUNTER — HOSPITAL ENCOUNTER (OUTPATIENT)
Dept: CARDIOLOGY | Facility: OTHER | Age: 65
Discharge: HOME | End: 2020-08-11
Attending: INTERNAL MEDICINE
Payer: MEDICARE

## 2020-08-11 VITALS
SYSTOLIC BLOOD PRESSURE: 130 MMHG | HEIGHT: 62 IN | DIASTOLIC BLOOD PRESSURE: 70 MMHG | BODY MASS INDEX: 20.61 KG/M2 | WEIGHT: 112 LBS

## 2020-08-11 DIAGNOSIS — Z01.810 PREOPERATIVE CARDIOVASCULAR EXAMINATION: ICD-10-CM

## 2020-08-11 DIAGNOSIS — I10 ESSENTIAL HYPERTENSION: ICD-10-CM

## 2020-08-11 LAB
ASCENDING AORTA: 2.35 CM
AV INDEX (PROSTH): 0.75
AV MEAN GRADIENT: 4 MMHG
AV PEAK GRADIENT: 6 MMHG
AV VALVE AREA: 1.94 CM2
AV VELOCITY RATIO: 0.73
BSA FOR ECHO PROCEDURE: 1.49 M2
CV ECHO LV RWT: 0.36 CM
DOP CALC AO PEAK VEL: 1.25 M/S
DOP CALC AO VTI: 26.25 CM
DOP CALC LVOT AREA: 2.6 CM2
DOP CALC LVOT DIAMETER: 1.81 CM
DOP CALC LVOT PEAK VEL: 0.91 M/S
DOP CALC LVOT STROKE VOLUME: 50.87 CM3
DOP CALCLVOT PEAK VEL VTI: 19.78 CM
E WAVE DECELERATION TIME: 219.29 MSEC
E/A RATIO: 0.84
E/E' RATIO: 7.53 M/S
ECHO LV POSTERIOR WALL: 0.85 CM (ref 0.6–1.1)
FRACTIONAL SHORTENING: 41 % (ref 28–44)
INTERVENTRICULAR SEPTUM: 0.84 CM (ref 0.6–1.1)
LA MAJOR: 5.13 CM
LA MINOR: 4.6 CM
LA WIDTH: 3.9 CM
LEFT ATRIUM SIZE: 3.62 CM
LEFT ATRIUM VOLUME INDEX MOD: 24.8 ML/M2
LEFT ATRIUM VOLUME INDEX: 39 ML/M2
LEFT ATRIUM VOLUME MOD: 37 CM3
LEFT ATRIUM VOLUME: 58.21 CM3
LEFT INTERNAL DIMENSION IN SYSTOLE: 2.74 CM (ref 2.1–4)
LEFT VENTRICLE DIASTOLIC VOLUME INDEX: 67.58 ML/M2
LEFT VENTRICLE DIASTOLIC VOLUME: 100.96 ML
LEFT VENTRICLE MASS INDEX: 87 G/M2
LEFT VENTRICLE SYSTOLIC VOLUME INDEX: 18.8 ML/M2
LEFT VENTRICLE SYSTOLIC VOLUME: 28.02 ML
LEFT VENTRICULAR INTERNAL DIMENSION IN DIASTOLE: 4.67 CM (ref 3.5–6)
LEFT VENTRICULAR MASS: 129.9 G
LV LATERAL E/E' RATIO: 7.11 M/S
LV SEPTAL E/E' RATIO: 8 M/S
MV PEAK A VEL: 0.76 M/S
MV PEAK E VEL: 0.64 M/S
MV STENOSIS PRESSURE HALF TIME: 63.59 MS
MV VALVE AREA P 1/2 METHOD: 3.46 CM2
PISA MRMAX VEL: 0.04 M/S
PISA TR MAX VEL: 2.51 M/S
PV PEAK VELOCITY: 0.92 CM/S
RA MAJOR: 4.91 CM
RA PRESSURE: 3 MMHG
RIGHT VENTRICULAR END-DIASTOLIC DIMENSION: 3.08 CM
SINUS: 2.91 CM
STJ: 2.38 CM
TDI LATERAL: 0.09 M/S
TDI SEPTAL: 0.08 M/S
TDI: 0.09 M/S
TR MAX PG: 25 MMHG
TRICUSPID ANNULAR PLANE SYSTOLIC EXCURSION: 1.91 CM
TV REST PULMONARY ARTERY PRESSURE: 28 MMHG

## 2020-08-11 PROCEDURE — 93306 ECHO (CUPID ONLY): ICD-10-PCS | Mod: 26,,, | Performed by: INTERNAL MEDICINE

## 2020-08-11 PROCEDURE — 93306 TTE W/DOPPLER COMPLETE: CPT | Mod: 26,,, | Performed by: INTERNAL MEDICINE

## 2020-08-11 PROCEDURE — 93306 TTE W/DOPPLER COMPLETE: CPT

## 2020-08-19 ENCOUNTER — TELEPHONE (OUTPATIENT)
Dept: UROLOGY | Facility: CLINIC | Age: 65
End: 2020-08-19

## 2020-08-19 NOTE — TELEPHONE ENCOUNTER
----- Message from Ofelia Horton sent at 5/7/2020 10:01 AM CDT -----  Contact: Patient   Type:  Patient Returning Call    Who Called : Patient   Who Left Message for Patient: Nurse Lou   Does the patient know what this is regarding?: Scheduling   Would the patient rather a call back or a response via [facility]? Yes   Best Call Back Number:[phone] call back anytime   Additional Information: Just giving office call back. Received call from nurse

## 2020-08-22 ENCOUNTER — LAB VISIT (OUTPATIENT)
Dept: URGENT CARE | Facility: CLINIC | Age: 65
End: 2020-08-22
Payer: MEDICARE

## 2020-08-22 VITALS — TEMPERATURE: 98 F

## 2020-08-22 DIAGNOSIS — Z01.818 PRE-OP TESTING: ICD-10-CM

## 2020-08-22 PROCEDURE — U0003 INFECTIOUS AGENT DETECTION BY NUCLEIC ACID (DNA OR RNA); SEVERE ACUTE RESPIRATORY SYNDROME CORONAVIRUS 2 (SARS-COV-2) (CORONAVIRUS DISEASE [COVID-19]), AMPLIFIED PROBE TECHNIQUE, MAKING USE OF HIGH THROUGHPUT TECHNOLOGIES AS DESCRIBED BY CMS-2020-01-R: HCPCS

## 2020-08-23 LAB — SARS-COV-2 RNA RESP QL NAA+PROBE: NOT DETECTED

## 2020-08-24 ENCOUNTER — TELEPHONE (OUTPATIENT)
Dept: SURGERY | Facility: CLINIC | Age: 65
End: 2020-08-24

## 2020-08-24 NOTE — TELEPHONE ENCOUNTER
----- Message from Bernardo Otero sent at 8/24/2020 12:59 PM CDT -----  Regarding: confirming new surgery date        The Caller(Pt's daughter) is confirming the new surgery date of 9/09/20.      Phone # [phone]

## 2020-08-24 NOTE — TELEPHONE ENCOUNTER
12:43pm- called patient's daughter to inform her mother's surgery has been postponed, per Dr. Devyn Miles's order to 09/09 due to weather conditions. Left message.

## 2020-09-04 ENCOUNTER — TELEPHONE (OUTPATIENT)
Dept: SURGERY | Facility: CLINIC | Age: 65
End: 2020-09-04

## 2020-09-04 DIAGNOSIS — Z01.818 PRE-OP TESTING: ICD-10-CM

## 2020-09-07 ENCOUNTER — LAB VISIT (OUTPATIENT)
Dept: URGENT CARE | Facility: CLINIC | Age: 65
DRG: 983 | End: 2020-09-07
Payer: MEDICARE

## 2020-09-07 VITALS — HEART RATE: 97 BPM | TEMPERATURE: 98 F | OXYGEN SATURATION: 96 %

## 2020-09-07 DIAGNOSIS — Z01.818 PRE-OP TESTING: ICD-10-CM

## 2020-09-07 PROCEDURE — U0003 INFECTIOUS AGENT DETECTION BY NUCLEIC ACID (DNA OR RNA); SEVERE ACUTE RESPIRATORY SYNDROME CORONAVIRUS 2 (SARS-COV-2) (CORONAVIRUS DISEASE [COVID-19]), AMPLIFIED PROBE TECHNIQUE, MAKING USE OF HIGH THROUGHPUT TECHNOLOGIES AS DESCRIBED BY CMS-2020-01-R: HCPCS

## 2020-09-08 ENCOUNTER — TELEPHONE (OUTPATIENT)
Dept: SURGERY | Facility: CLINIC | Age: 65
End: 2020-09-08

## 2020-09-08 ENCOUNTER — ANESTHESIA EVENT (OUTPATIENT)
Dept: SURGERY | Facility: HOSPITAL | Age: 65
DRG: 983 | End: 2020-09-08
Payer: MEDICARE

## 2020-09-08 LAB — SARS-COV-2 RNA RESP QL NAA+PROBE: NOT DETECTED

## 2020-09-08 NOTE — TELEPHONE ENCOUNTER
----- Message from Alisia Malik sent at 9/8/2020 12:51 PM CDT -----  Pt daughter has some questions about the pt surgery on 9/9/ and would like for the nurse to give her a call back at [phone]

## 2020-09-08 NOTE — TELEPHONE ENCOUNTER
Pre-operative instructions given to daughter Anna verbally as pt sleeping. Instructed to park on second level in patient parking garage and report to Day of Surgery for 1030. Taught to shower with Hibiclens/drink Recovery Aid tonight and morning of surgery. Instructed to wear comfortable clothes, bring robe and wear  shoes for after surgery. Instructed clear liquid diet today, nothing to eat after midnight. Instructed no meds morn ing of surgery. COVID negative, Visitor policy given.  Daughter verbalized, pt's other daughter Winifred will bring her tomorrow for surgery. All questions addressed, Pt verbalized understanding.

## 2020-09-09 ENCOUNTER — HOSPITAL ENCOUNTER (INPATIENT)
Facility: HOSPITAL | Age: 65
LOS: 2 days | Discharge: HOME | DRG: 983 | End: 2020-09-11
Attending: SURGERY | Admitting: SURGERY
Payer: MEDICARE

## 2020-09-09 ENCOUNTER — ANESTHESIA (OUTPATIENT)
Dept: SURGERY | Facility: HOSPITAL | Age: 65
DRG: 983 | End: 2020-09-09
Payer: MEDICARE

## 2020-09-09 DIAGNOSIS — D21.4 BENIGN GASTROINTESTINAL STROMAL TUMOR (GIST): ICD-10-CM

## 2020-09-09 LAB
ABO + RH BLD: NORMAL
BLD GP AB SCN CELLS X3 SERPL QL: NORMAL

## 2020-09-09 PROCEDURE — 63600175 PHARM REV CODE 636 W HCPCS: Performed by: STUDENT IN AN ORGANIZED HEALTH CARE EDUCATION/TRAINING PROGRAM

## 2020-09-09 PROCEDURE — 94761 N-INVAS EAR/PLS OXIMETRY MLT: CPT

## 2020-09-09 PROCEDURE — 76942 ECHO GUIDE FOR BIOPSY: CPT | Performed by: STUDENT IN AN ORGANIZED HEALTH CARE EDUCATION/TRAINING PROGRAM

## 2020-09-09 PROCEDURE — 25000003 PHARM REV CODE 250: Performed by: STUDENT IN AN ORGANIZED HEALTH CARE EDUCATION/TRAINING PROGRAM

## 2020-09-09 PROCEDURE — 27200750 HC INSULATED NEEDLE/ STIMUPLEX: Performed by: STUDENT IN AN ORGANIZED HEALTH CARE EDUCATION/TRAINING PROGRAM

## 2020-09-09 PROCEDURE — 43280 PR LAP,ESOPHAGOGAST FUNDOPLASTY: ICD-10-PCS | Mod: ,,, | Performed by: SURGERY

## 2020-09-09 PROCEDURE — 36000712 HC OR TIME LEV V 1ST 15 MIN: Performed by: SURGERY

## 2020-09-09 PROCEDURE — D9220A PRA ANESTHESIA: ICD-10-PCS | Mod: ,,, | Performed by: ANESTHESIOLOGY

## 2020-09-09 PROCEDURE — 86920 COMPATIBILITY TEST SPIN: CPT

## 2020-09-09 PROCEDURE — 64461 ERECTOR SPINAE PLANE SINGLE INJECTION BLOCK: ICD-10-PCS | Mod: 59,50,, | Performed by: ANESTHESIOLOGY

## 2020-09-09 PROCEDURE — 88307 TISSUE EXAM BY PATHOLOGIST: CPT | Performed by: PATHOLOGY

## 2020-09-09 PROCEDURE — D9220A PRA ANESTHESIA: Mod: ,,, | Performed by: ANESTHESIOLOGY

## 2020-09-09 PROCEDURE — 25000003 PHARM REV CODE 250: Performed by: SURGERY

## 2020-09-09 PROCEDURE — 88307 TISSUE EXAM BY PATHOLOGIST: CPT | Mod: 26,,, | Performed by: PATHOLOGY

## 2020-09-09 PROCEDURE — 43280 LAPAROSCOPY FUNDOPLASTY: CPT | Mod: ,,, | Performed by: SURGERY

## 2020-09-09 PROCEDURE — 63600175 PHARM REV CODE 636 W HCPCS: Performed by: NURSE ANESTHETIST, CERTIFIED REGISTERED

## 2020-09-09 PROCEDURE — 37000009 HC ANESTHESIA EA ADD 15 MINS: Performed by: SURGERY

## 2020-09-09 PROCEDURE — 64461 PVB THORACIC SINGLE INJ SITE: CPT | Performed by: STUDENT IN AN ORGANIZED HEALTH CARE EDUCATION/TRAINING PROGRAM

## 2020-09-09 PROCEDURE — 99900035 HC TECH TIME PER 15 MIN (STAT)

## 2020-09-09 PROCEDURE — 43289 UNLISTED LAPS PX ESOPH: CPT | Mod: ,,, | Performed by: SURGERY

## 2020-09-09 PROCEDURE — 37000008 HC ANESTHESIA 1ST 15 MINUTES: Performed by: SURGERY

## 2020-09-09 PROCEDURE — 43289: ICD-10-PCS | Mod: ,,, | Performed by: SURGERY

## 2020-09-09 PROCEDURE — 94664 DEMO&/EVAL PT USE INHALER: CPT

## 2020-09-09 PROCEDURE — 71000015 HC POSTOP RECOV 1ST HR: Performed by: SURGERY

## 2020-09-09 PROCEDURE — 88307 PR  SURG PATH,LEVEL V: ICD-10-PCS | Mod: 26,,, | Performed by: PATHOLOGY

## 2020-09-09 PROCEDURE — 27201423 OPTIME MED/SURG SUP & DEVICES STERILE SUPPLY: Performed by: SURGERY

## 2020-09-09 PROCEDURE — 64461 PVB THORACIC SINGLE INJ SITE: CPT | Mod: 59,50,, | Performed by: ANESTHESIOLOGY

## 2020-09-09 PROCEDURE — 63600175 PHARM REV CODE 636 W HCPCS: Performed by: SURGERY

## 2020-09-09 PROCEDURE — S0020 INJECTION, BUPIVICAINE HYDRO: HCPCS | Performed by: STUDENT IN AN ORGANIZED HEALTH CARE EDUCATION/TRAINING PROGRAM

## 2020-09-09 PROCEDURE — 36000713 HC OR TIME LEV V EA ADD 15 MIN: Performed by: SURGERY

## 2020-09-09 PROCEDURE — 86901 BLOOD TYPING SEROLOGIC RH(D): CPT

## 2020-09-09 PROCEDURE — 20600001 HC STEP DOWN PRIVATE ROOM

## 2020-09-09 PROCEDURE — 71000033 HC RECOVERY, INTIAL HOUR: Performed by: SURGERY

## 2020-09-09 PROCEDURE — 71000016 HC POSTOP RECOV ADDL HR: Performed by: SURGERY

## 2020-09-09 RX ORDER — FENTANYL CITRATE 50 UG/ML
25 INJECTION, SOLUTION INTRAMUSCULAR; INTRAVENOUS EVERY 5 MIN PRN
Status: DISCONTINUED | OUTPATIENT
Start: 2020-09-09 | End: 2020-09-09 | Stop reason: HOSPADM

## 2020-09-09 RX ORDER — ONDANSETRON 4 MG/1
4 TABLET, ORALLY DISINTEGRATING ORAL EVERY 6 HOURS
Status: COMPLETED | OUTPATIENT
Start: 2020-09-09 | End: 2020-09-10

## 2020-09-09 RX ORDER — LOSARTAN POTASSIUM 25 MG/1
25 TABLET ORAL DAILY
Status: DISCONTINUED | OUTPATIENT
Start: 2020-09-10 | End: 2020-09-12 | Stop reason: HOSPADM

## 2020-09-09 RX ORDER — DIPHENHYDRAMINE HYDROCHLORIDE 50 MG/ML
12.5 INJECTION INTRAMUSCULAR; INTRAVENOUS EVERY 6 HOURS PRN
Status: DISCONTINUED | OUTPATIENT
Start: 2020-09-09 | End: 2020-09-10

## 2020-09-09 RX ORDER — HYDROMORPHONE HYDROCHLORIDE 1 MG/ML
0.2 INJECTION, SOLUTION INTRAMUSCULAR; INTRAVENOUS; SUBCUTANEOUS EVERY 5 MIN PRN
Status: DISCONTINUED | OUTPATIENT
Start: 2020-09-09 | End: 2020-09-09 | Stop reason: HOSPADM

## 2020-09-09 RX ORDER — ONDANSETRON 2 MG/ML
INJECTION INTRAMUSCULAR; INTRAVENOUS
Status: DISCONTINUED | OUTPATIENT
Start: 2020-09-09 | End: 2020-09-09

## 2020-09-09 RX ORDER — NALOXONE HCL 0.4 MG/ML
0.2 VIAL (ML) INJECTION
Status: DISCONTINUED | OUTPATIENT
Start: 2020-09-09 | End: 2020-09-10

## 2020-09-09 RX ORDER — ONDANSETRON HYDROCHLORIDE 4 MG/5ML
4 SOLUTION ORAL EVERY 6 HOURS
Status: DISCONTINUED | OUTPATIENT
Start: 2020-09-09 | End: 2020-09-09

## 2020-09-09 RX ORDER — KETAMINE HCL IN 0.9 % NACL 50 MG/5 ML
SYRINGE (ML) INTRAVENOUS
Status: DISCONTINUED | OUTPATIENT
Start: 2020-09-09 | End: 2020-09-09

## 2020-09-09 RX ORDER — LIDOCAINE HYDROCHLORIDE 10 MG/ML
1 INJECTION INFILTRATION; PERINEURAL ONCE
Status: COMPLETED | OUTPATIENT
Start: 2020-09-09 | End: 2020-09-09

## 2020-09-09 RX ORDER — PROMETHAZINE HYDROCHLORIDE 12.5 MG/1
12.5 TABLET ORAL EVERY 6 HOURS PRN
Status: DISCONTINUED | OUTPATIENT
Start: 2020-09-09 | End: 2020-09-11

## 2020-09-09 RX ORDER — DEXTROSE MONOHYDRATE, SODIUM CHLORIDE, AND POTASSIUM CHLORIDE 50; 1.49; 9 G/1000ML; G/1000ML; G/1000ML
INJECTION, SOLUTION INTRAVENOUS CONTINUOUS
Status: DISCONTINUED | OUTPATIENT
Start: 2020-09-09 | End: 2020-09-11

## 2020-09-09 RX ORDER — DEXAMETHASONE SODIUM PHOSPHATE 4 MG/ML
INJECTION, SOLUTION INTRA-ARTICULAR; INTRALESIONAL; INTRAMUSCULAR; INTRAVENOUS; SOFT TISSUE
Status: DISCONTINUED | OUTPATIENT
Start: 2020-09-09 | End: 2020-09-09

## 2020-09-09 RX ORDER — DEXTROSE MONOHYDRATE, SODIUM CHLORIDE, AND POTASSIUM CHLORIDE 50; 1.49; 9 G/1000ML; G/1000ML; G/1000ML
INJECTION, SOLUTION INTRAVENOUS CONTINUOUS
Status: DISCONTINUED | OUTPATIENT
Start: 2020-09-09 | End: 2020-09-09

## 2020-09-09 RX ORDER — LEVALBUTEROL INHALATION SOLUTION 0.63 MG/3ML
0.63 SOLUTION RESPIRATORY (INHALATION)
Status: DISCONTINUED | OUTPATIENT
Start: 2020-09-09 | End: 2020-09-11 | Stop reason: HOSPADM

## 2020-09-09 RX ORDER — PROPOFOL 10 MG/ML
VIAL (ML) INTRAVENOUS
Status: DISCONTINUED | OUTPATIENT
Start: 2020-09-09 | End: 2020-09-09

## 2020-09-09 RX ORDER — ONDANSETRON 2 MG/ML
4 INJECTION INTRAMUSCULAR; INTRAVENOUS EVERY 12 HOURS PRN
Status: DISCONTINUED | OUTPATIENT
Start: 2020-09-09 | End: 2020-09-11

## 2020-09-09 RX ORDER — ENOXAPARIN SODIUM 100 MG/ML
40 INJECTION SUBCUTANEOUS EVERY 24 HOURS
Status: DISCONTINUED | OUTPATIENT
Start: 2020-09-09 | End: 2020-09-09 | Stop reason: HOSPADM

## 2020-09-09 RX ORDER — ONDANSETRON 2 MG/ML
4 INJECTION INTRAMUSCULAR; INTRAVENOUS ONCE AS NEEDED
Status: DISCONTINUED | OUTPATIENT
Start: 2020-09-09 | End: 2020-09-09 | Stop reason: HOSPADM

## 2020-09-09 RX ORDER — BUPIVACAINE HYDROCHLORIDE 7.5 MG/ML
INJECTION, SOLUTION EPIDURAL; RETROBULBAR
Status: DISCONTINUED | OUTPATIENT
Start: 2020-09-09 | End: 2020-09-09

## 2020-09-09 RX ORDER — MIDAZOLAM HYDROCHLORIDE 1 MG/ML
0.5 INJECTION INTRAMUSCULAR; INTRAVENOUS
Status: DISCONTINUED | OUTPATIENT
Start: 2020-09-09 | End: 2020-09-09 | Stop reason: HOSPADM

## 2020-09-09 RX ORDER — HYDROMORPHONE HCL IN 0.9% NACL 6 MG/30 ML
PATIENT CONTROLLED ANALGESIA SYRINGE INTRAVENOUS CONTINUOUS
Status: DISCONTINUED | OUTPATIENT
Start: 2020-09-09 | End: 2020-09-10

## 2020-09-09 RX ORDER — ATORVASTATIN CALCIUM 20 MG/1
40 TABLET, FILM COATED ORAL DAILY
Status: DISCONTINUED | OUTPATIENT
Start: 2020-09-10 | End: 2020-09-12 | Stop reason: HOSPADM

## 2020-09-09 RX ORDER — FENTANYL CITRATE 50 UG/ML
INJECTION, SOLUTION INTRAMUSCULAR; INTRAVENOUS
Status: DISCONTINUED | OUTPATIENT
Start: 2020-09-09 | End: 2020-09-09

## 2020-09-09 RX ORDER — PHENYLEPHRINE HYDROCHLORIDE 10 MG/ML
INJECTION INTRAVENOUS
Status: DISCONTINUED | OUTPATIENT
Start: 2020-09-09 | End: 2020-09-09

## 2020-09-09 RX ORDER — ROCURONIUM BROMIDE 10 MG/ML
INJECTION, SOLUTION INTRAVENOUS
Status: DISCONTINUED | OUTPATIENT
Start: 2020-09-09 | End: 2020-09-09

## 2020-09-09 RX ORDER — ACETAMINOPHEN 10 MG/ML
INJECTION, SOLUTION INTRAVENOUS
Status: DISCONTINUED | OUTPATIENT
Start: 2020-09-09 | End: 2020-09-09

## 2020-09-09 RX ORDER — SODIUM CHLORIDE 9 MG/ML
INJECTION, SOLUTION INTRAVENOUS CONTINUOUS
Status: DISCONTINUED | OUTPATIENT
Start: 2020-09-09 | End: 2020-09-09

## 2020-09-09 RX ORDER — LIDOCAINE HCL/PF 100 MG/5ML
SYRINGE (ML) INTRAVENOUS
Status: DISCONTINUED | OUTPATIENT
Start: 2020-09-09 | End: 2020-09-09

## 2020-09-09 RX ORDER — SODIUM CHLORIDE 9 MG/ML
INJECTION, SOLUTION INTRAVENOUS CONTINUOUS PRN
Status: DISCONTINUED | OUTPATIENT
Start: 2020-09-09 | End: 2020-09-09

## 2020-09-09 RX ORDER — EPHEDRINE SULFATE 50 MG/ML
INJECTION, SOLUTION INTRAVENOUS
Status: DISCONTINUED | OUTPATIENT
Start: 2020-09-09 | End: 2020-09-09

## 2020-09-09 RX ADMIN — ROCURONIUM BROMIDE 10 MG: 10 INJECTION, SOLUTION INTRAVENOUS at 02:09

## 2020-09-09 RX ADMIN — DEXAMETHASONE SODIUM PHOSPHATE 4 MG: 4 INJECTION, SOLUTION INTRAMUSCULAR; INTRAVENOUS at 12:09

## 2020-09-09 RX ADMIN — LIDOCAINE HYDROCHLORIDE 60 MG: 20 INJECTION, SOLUTION INTRAVENOUS at 12:09

## 2020-09-09 RX ADMIN — Medication: at 04:09

## 2020-09-09 RX ADMIN — ACETAMINOPHEN 1000 MG: 10 INJECTION, SOLUTION INTRAVENOUS at 12:09

## 2020-09-09 RX ADMIN — PROPOFOL 200 MG: 10 INJECTION, EMULSION INTRAVENOUS at 12:09

## 2020-09-09 RX ADMIN — ROCURONIUM BROMIDE 50 MG: 10 INJECTION, SOLUTION INTRAVENOUS at 12:09

## 2020-09-09 RX ADMIN — PIPERACILLIN AND TAZOBACTAM 4.5 G: 4; .5 INJECTION, POWDER, LYOPHILIZED, FOR SOLUTION INTRAVENOUS; PARENTERAL at 02:09

## 2020-09-09 RX ADMIN — SODIUM CHLORIDE, SODIUM GLUCONATE, SODIUM ACETATE, POTASSIUM CHLORIDE, MAGNESIUM CHLORIDE, SODIUM PHOSPHATE, DIBASIC, AND POTASSIUM PHOSPHATE: .53; .5; .37; .037; .03; .012; .00082 INJECTION, SOLUTION INTRAVENOUS at 02:09

## 2020-09-09 RX ADMIN — FENTANYL CITRATE 50 MCG: 50 INJECTION, SOLUTION INTRAMUSCULAR; INTRAVENOUS at 03:09

## 2020-09-09 RX ADMIN — MIDAZOLAM 1 MG: 1 INJECTION INTRAMUSCULAR; INTRAVENOUS at 11:09

## 2020-09-09 RX ADMIN — Medication 10 MG: at 01:09

## 2020-09-09 RX ADMIN — ONDANSETRON 4 MG: 2 INJECTION, SOLUTION INTRAMUSCULAR; INTRAVENOUS at 03:09

## 2020-09-09 RX ADMIN — EPHEDRINE SULFATE 5 MG: 50 INJECTION INTRAVENOUS at 03:09

## 2020-09-09 RX ADMIN — EPHEDRINE SULFATE 10 MG: 50 INJECTION INTRAVENOUS at 02:09

## 2020-09-09 RX ADMIN — EPHEDRINE SULFATE 10 MG: 50 INJECTION INTRAVENOUS at 03:09

## 2020-09-09 RX ADMIN — PIPERACILLIN AND TAZOBACTAM 4.5 G: 4; .5 INJECTION, POWDER, LYOPHILIZED, FOR SOLUTION INTRAVENOUS; PARENTERAL at 12:09

## 2020-09-09 RX ADMIN — FENTANYL CITRATE 50 MCG: 50 INJECTION, SOLUTION INTRAMUSCULAR; INTRAVENOUS at 12:09

## 2020-09-09 RX ADMIN — PHENYLEPHRINE HYDROCHLORIDE 200 MCG: 10 INJECTION INTRAVENOUS at 01:09

## 2020-09-09 RX ADMIN — PHENYLEPHRINE HYDROCHLORIDE 100 MCG: 10 INJECTION INTRAVENOUS at 12:09

## 2020-09-09 RX ADMIN — LIDOCAINE HYDROCHLORIDE 0.2 ML: 10 INJECTION, SOLUTION INFILTRATION; PERINEURAL at 11:09

## 2020-09-09 RX ADMIN — BUPIVACAINE HYDROCHLORIDE 30 ML: 7.5 INJECTION, SOLUTION EPIDURAL; RETROBULBAR at 11:09

## 2020-09-09 RX ADMIN — ONDANSETRON 4 MG: 4 TABLET, ORALLY DISINTEGRATING ORAL at 10:09

## 2020-09-09 RX ADMIN — PHENYLEPHRINE HYDROCHLORIDE 100 MCG: 10 INJECTION INTRAVENOUS at 01:09

## 2020-09-09 RX ADMIN — SODIUM CHLORIDE: 0.9 INJECTION, SOLUTION INTRAVENOUS at 11:09

## 2020-09-09 RX ADMIN — ROCURONIUM BROMIDE 10 MG: 10 INJECTION, SOLUTION INTRAVENOUS at 01:09

## 2020-09-09 RX ADMIN — MIDAZOLAM HYDROCHLORIDE 2 MG: 1 INJECTION, SOLUTION INTRAMUSCULAR; INTRAVENOUS at 11:09

## 2020-09-09 RX ADMIN — ROCURONIUM BROMIDE 10 MG: 10 INJECTION, SOLUTION INTRAVENOUS at 12:09

## 2020-09-09 RX ADMIN — Medication 10 MG: at 03:09

## 2020-09-09 RX ADMIN — EPHEDRINE SULFATE 10 MG: 50 INJECTION INTRAVENOUS at 01:09

## 2020-09-09 RX ADMIN — FENTANYL CITRATE 50 MCG: 50 INJECTION INTRAMUSCULAR; INTRAVENOUS at 11:09

## 2020-09-09 RX ADMIN — DEXTROSE MONOHYDRATE, SODIUM CHLORIDE, AND POTASSIUM CHLORIDE: 50; 9; 1.49 INJECTION, SOLUTION INTRAVENOUS at 04:09

## 2020-09-09 RX ADMIN — Medication 20 MG: at 12:09

## 2020-09-09 RX ADMIN — ENOXAPARIN SODIUM 40 MG: 40 INJECTION SUBCUTANEOUS at 11:09

## 2020-09-09 RX ADMIN — Medication 10 MG: at 02:09

## 2020-09-09 RX ADMIN — SODIUM CHLORIDE, SODIUM GLUCONATE, SODIUM ACETATE, POTASSIUM CHLORIDE, MAGNESIUM CHLORIDE, SODIUM PHOSPHATE, DIBASIC, AND POTASSIUM PHOSPHATE: .53; .5; .37; .037; .03; .012; .00082 INJECTION, SOLUTION INTRAVENOUS at 12:09

## 2020-09-09 NOTE — H&P
History & Physical     SUBJECTIVE:      History of Present Illness:  Patient is a 65 y.o. 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 Patient has undergone extensive workup for severe epigastric and back pain that has been worsening over the past several years. This pain is described as sharp, is unrelated to eating, and seems to be worse at night. Its character seems similar to the pain she experienced with prior episodes for pancreatitis - presumed biliary pancreatitis. She underwent cholecystectomy about 10 years ago; however, she states she had a subsequent episode of pancreatitis last Nov. Most recent lipase in 3/2020 was elevated (77).     As part of her workup for the above pain, she underwent a CT scan that revealed a roughly 3 x 2 cm mass adjacent to the GE junction. Subsequent EUS was performed revealing an intramural mass that appeared to originate from the muscularis propria. FNA was performed and final path consistent with GIST.      Patient has prior CT images dating back to 2013 and 2015. This lesion is not apparent on her 2013 study; however, it is seen on the 2015 images but was significantly smaller, indicated significant interval growth.      Surgical history includes cholecystectomy as noted above as well as hysterectomy - daughter notes that this surgery was associated with an early post operative complication requiring a return to the operating room, but does not recall the details of this.          Chief Complaint   Patient presents with    Consult         Review of patient's allergies indicates:  No Known Allergies     Current Medications          Current Outpatient Medications   Medication Sig Dispense Refill    atorvastatin (LIPITOR) 40 MG tablet Take 40 mg by mouth once daily.        ergocalciferol (ERGOCALCIFEROL) 50,000 unit Cap          HYDROcodone-acetaminophen (NORCO) 5-325 mg per tablet Take 1 tablet by mouth every 6  (six) hours as needed for Pain. 10 tablet 0    losartan (COZAAR) 25 MG tablet            No current facility-administered medications for this visit.                 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 ENDO (2ND FLR);  Service: Endoscopy;  Laterality: N/A;  EUS (linear) for abnormal CT scan. Urgent. Can be done by any [facility] physician.  Britton Martinez MD  Covid-19 test 6/22/20 at [facility]  Speaks Bol[place]ian;  offered and declined; daughter will accompany patient as interp    HYSTERECTOMY   2012     elective      No family history on file.  Social History           Tobacco Use    Smoking status: Never Smoker    Smokeless tobacco: Never Used   Substance Use Topics    Alcohol use: No    Drug use: No         Review of Systems:  Review of Systems   Constitutional: Negative for activity change, appetite change, fatigue and unexpected weight change.   HENT: Negative.    Respiratory: Negative for shortness of breath and wheezing.    Cardiovascular: Negative for chest pain and palpitations.   Gastrointestinal: Positive for abdominal distention, abdominal pain (epigastric, radiating to back) and constipation (chronic). Negative for blood in stool, diarrhea, nausea and vomiting.   Genitourinary:  "Negative.    Musculoskeletal: Positive for back pain. Negative for arthralgias and myalgias.   Skin: Negative for color change and pallor.   Neurological: Negative for dizziness, weakness and numbness.   Hematological: Negative for adenopathy.         OBJECTIVE:      Vital Signs (Most Recent)  Temp: 97.4 °F (36.3 °C) (08/03/20 1006)  Pulse: 72 (08/03/20 1006)  BP: 135/69 (08/03/20 1006)  5' 2" (1.575 m)  51.2 kg (112 lb 14.4 oz)      Physical Exam:  Physical Exam  Constitutional:       General: She is not in acute distress.     Appearance: Normal appearance. She is normal weight.   HENT:      Head: Normocephalic and atraumatic.   Eyes:      General: No scleral icterus.     Extraocular Movements: Extraocular movements intact.      Conjunctiva/sclera: Conjunctivae normal.   Neck:      Musculoskeletal: Normal range of motion and neck supple.   Cardiovascular:      Rate and Rhythm: Normal rate and regular rhythm.   Pulmonary:      Effort: Pulmonary effort is normal. No respiratory distress.   Abdominal:      General: There is no distension.      Palpations: Abdomen is soft.      Tenderness: There is abdominal tenderness (epigastric). There is no guarding or rebound.      Comments: Prior laparoscopic incisional scars  Lower midline laparotomy scar   Musculoskeletal: Normal range of motion.         General: No swelling.   Skin:     General: Skin is warm and dry.   Neurological:      Mental Status: She is oriented to person, place, and time.   Psychiatric:         Mood and Affect: Mood normal.         Behavior: Behavior normal.            Laboratory  None recent. Labs from 3/2020 reveal slightly elevated Lipase at 77; T bili normal at 0.3     Diagnostic Results:  CT reviewed: 3 x 2 cm mass noted about the GE junction; difficult to say if this is involving the esophagus or the gastric cardia; based on comparison to prior studies, favor gastric origin. Biliary ductal dilation noted in this patient with prior cholecystectomy, " as well as slight pancreatic ductal dilation.      ASSESSMENT/PLAN:      64 yo F with a proximal gastric vs distal esophageal GIST that has shown significant growth in the last 5 years and meets criteria for resection.     PLAN:Plan      - Plan for robotic assisted resection which may include partial gastrectomy vs esophagectomy with or without fundoplication. Ultimate surgical planning will have to be made intraoperatively, depending upon the exact origin of the tumor.   - In regards to her ongoing pain, it seems to represent ongoing chronic pancreatitis and is very unlikely to be associated with this GIST, which is more likely an incidental finding. Unclear etiology at this time, and we recommend continued workup by GI.   - Surgical and blood consents signed in clinic.

## 2020-09-09 NOTE — ANESTHESIA PREPROCEDURE EVALUATION
[facility]  Anesthesia Pre-Operative Evaluation         Patient Name: Meredith Cuellar  YOB: 1955  MRN: [id number]    SUBJECTIVE:     Pre-operative evaluation for Procedure(s) (LRB):  XI ROBOTIC GASTRECTOMY/GIST PROXIMAL STOMACH (N/A)     09/08/2020    Meredith Cuellar is a 65 y.o. female w/ a significant PMHx of HTN, HLD, chronic GERD, hx of pancreatitis, 3x2cm mass @ GE junction.     Patient now presents for the above procedure(s).       Prev airway: None documented.      Patient Active Problem List   Diagnosis    Headache(784.0)    Pancreatitis    Hypertension    Chronic GERD    Leukocytosis    Gastrointestinal stromal tumor (GIST)    Mixed hyperlipidemia       Review of patient's allergies indicates:  No Known Allergies    Current Inpatient Medications:      No current facility-administered medications on file prior to encounter.      Current Outpatient Medications on File Prior to Encounter   Medication Sig Dispense Refill    atorvastatin (LIPITOR) 40 MG tablet Take 40 mg by mouth once daily.      ergocalciferol (ERGOCALCIFEROL) 50,000 unit Cap       HYDROcodone-acetaminophen (NORCO) 5-325 mg per tablet Take 1 tablet by mouth every 6 (six) hours as needed for Pain. 10 tablet 0       Past Surgical History:   Procedure Laterality Date    APPENDECTOMY      CHOLECYSTECTOMY      ENDOSCOPIC ULTRASOUND OF UPPER GASTROINTESTINAL TRACT N/A 11/26/2019    Procedure: ULTRASOUND, UPPER GI TRACT, ENDOSCOPIC;  Surgeon: Britton Martinez MD;  Location: [facility];  Service: Endoscopy;  Laterality: N/A;    ENDOSCOPIC ULTRASOUND OF UPPER GASTROINTESTINAL TRACT N/A 6/24/2020    Procedure: ULTRASOUND, UPPER GI TRACT, ENDOSCOPIC;  Surgeon: Delfino Jasso MD;  Location: [facility] ([address] [place] [address]);  Service: Endoscopy;  Laterality: N/A;  EUS (linear) for abnormal CT scan. Urgent. Can be done by any [facility] physician.  Britton Martinez MD  Covid-19 test 6/22/20 at [facility]  Speaks  [place]o;  offered and declined; daughter will accompany patient as interp    HYSTERECTOMY  2012    elective       Social History     Socioeconomic History    Marital status:      Spouse name: Not on file    Number of children: Not on file    Years of education: Not on file    Highest education level: Not on file   Occupational History    Not on file   Social Needs    Financial resource strain: Not on file    Food insecurity     Worry: Not on file     Inability: Not on file    Transportation needs     Medical: Not on file     Non-medical: Not on file   Tobacco Use    Smoking status: Never Smoker    Smokeless tobacco: Never Used   Substance and Sexual Activity    Alcohol use: No    Drug use: No    Sexual activity: Yes     Partners: Male   Lifestyle    Physical activity     Days per week: Not on file     Minutes per session: Not on file    Stress: Not on file   Relationships    Social connections     Talks on phone: Not on file     Gets together: Not on file     Attends Yazdanism service: Not on file     Active member of club or organization: Not on file     Attends meetings of clubs or organizations: Not on file     Relationship status: Not on file   Other Topics Concern    Not on file   Social History Narrative    Not on file       OBJECTIVE:     Vital Signs Range (Last 24H):         Significant Labs:  Lab Results   Component Value Date    WBC 6.40 03/02/2020    HGB 11.8 (L) 03/02/2020    HCT 36.3 (L) 03/02/2020     03/02/2020    ALT 14 03/02/2020    AST 18 03/02/2020     03/02/2020    K 3.8 03/02/2020     03/02/2020    CREATININE 0.6 03/02/2020    BUN 19 03/02/2020    CO2 22 (L) 03/02/2020    INR 0.9 10/19/2015       Diagnostic Studies: No relevant studies.    EKG:   Results for orders placed or performed in visit on 08/05/20   IN OFFICE EKG 12-LEAD (to [place])    Collection Time: 08/05/20  9:39 AM    Narrative    Test Reason : I10,Z01.810,I10,    Vent. Rate :  068 BPM     Atrial Rate : 068 BPM     P-R Int : 148 ms          QRS Dur : 094 ms      QT Int : 414 ms       P-R-T Axes : -09 089 066 degrees     QTc Int : 440 ms    Normal sinus rhythm  Normal ECG  When compared with ECG of 02-MAR-2020 11:27,  Nonspecific T wave abnormality no longer evident in Anterior leads  Confirmed by Alisa FRANCISCO, Dharmesh CABAN ([id number]) on 8/11/2020 1:29:47 PM    Referred By: [email or web address]   SELF           Confirmed By:Dharmesh Cuellar MD       2D ECHO:  TTE:  Results for orders placed or performed during the hospital encounter of 08/11/20   Echo Color Flow Doppler? Yes   Result Value Ref Range    TDI SEPTAL 0.08 m/s    LV LATERAL E/E' RATIO 7.11 m/s    LV SEPTAL E/E' RATIO 8.00 m/s    LA WIDTH 3.90 cm    TDI LATERAL 0.09 m/s    PV PEAK VELOCITY 0.92 cm/s    LVIDD 4.67 3.5 - 6.0 cm    IVS 0.84 0.6 - 1.1 cm    PW 0.85 0.6 - 1.1 cm    LVIDS 2.74 2.1 - 4.0 cm    FS 41 28 - 44 %    LA volume 58.21 cm3    Sinus 2.91 cm    STJ 2.38 cm    Ascending aorta 2.35 cm    LV mass 129.90 g    LA size 3.62 cm    RVDD 3.08 cm    TAPSE 1.91 cm    Left Ventricle Relative Wall Thickness 0.36 cm    AV mean gradient 4 mmHg    AV valve area 1.94 cm2    AV Velocity Ratio 0.73     AV index (prosthetic) 0.75     MV valve area p 1/2 method 3.46 cm2    E/A ratio 0.84     Mean e' 0.09 m/s    E wave decelartion time 219.29 msec    LVOT diameter 1.81 cm    LVOT area 2.6 cm2    LVOT peak roberto 0.91 m/s    LVOT peak VTI 19.78 cm    Ao peak roberto 1.25 m/s    Ao VTI 26.25 cm    Mr max roberto 0.04 m/s    LVOT stroke volume 50.87 cm3    AV peak gradient 6 mmHg    E/E' ratio 7.53 m/s    MV Peak E Roberto 0.64 m/s    TR Max Roberto 2.51 m/s    MV stenosis pressure 1/2 time 63.59 ms    MV Peak A Roberto 0.76 m/s    LV Systolic Volume 28.02 mL    LV Diastolic Volume 100.96 mL    RA Major Axis 4.91 cm    Left Atrium Minor Axis 4.60 cm    Left Atrium Major Axis 5.13 cm    Triscuspid Valve Regurgitation Peak Gradient 25 mmHg    LA volume (mod) 37.00 cm3    BSA 1.49 m2     Right Atrial Pressure (from IVC) 3 mmHg    TV rest pulmonary artery pressure 28 mmHg    LV Systolic Volume Index 18.8 mL/m2    LV Diastolic Volume Index 67.58 mL/m2    LA Volume Index 39.0 mL/m2    LV Mass Index 87 g/m2    LA Volume Index (Mod) 24.8 mL/m2    Narrative    · Mild left atrial enlargement.  · Normal central venous pressure (3 mmHg).  · The estimated PA systolic pressure is 28 mmHg.  · Normal left ventricular systolic function. The estimated ejection   fraction is 72%.  · Indeterminate left ventricular diastolic function.  · No wall motion abnormalities.  · Normal right ventricular systolic function.          STEFANI:  No results found for this or any previous visit.    ASSESSMENT/PLAN:         Anesthesia Evaluation    I have reviewed the Patient Summary Reports.      I have reviewed the Medications.     Review of Systems  Anesthesia Hx:  No problems with previous Anesthesia Denies Hx of Anesthetic complications  History of prior surgery of interest to airway management or planning:  Denies Personal Hx of Anesthesia complications.   Social:  Non-Smoker    Cardiovascular:   Exercise tolerance: good Hypertension Denies MI.  Denies CAD.     Denies Angina.     hyperlipidemia  Functional Capacity Can you climb two flights of stairs? ==> Yes    Pulmonary:  Pulmonary Normal  Denies Asthma.  Denies Recent URI.  Denies Sleep Apnea.    Renal/:  Renal/ Normal     Hepatic/GI:   Denies PUD. Denies Hiatal Hernia. GERD, well controlled Denies Liver Disease.  Denies Hepatitis.    Neurological:   Denies CVA. Headaches Denies Seizures.    Endocrine:  Endocrine Normal Denies Diabetes. Denies Hypothyroidism.        Physical Exam  General:  Well nourished    Airway/Jaw/Neck:  Airway Findings: Mouth Opening: Normal Tongue: Normal  General Airway Assessment: Adult  Mallampati: II  Improves to II with phonation.  TM Distance: Normal, at least 6 cm         Dental:  Dental Findings: Periodontal disease, Severe      Abdomen:  Abdomen Findings: Normal    Musculoskeletal:  Musculoskeletal Findings: Normal    Mental Status:  Mental Status Findings:  Cooperative, Alert and Oriented         Anesthesia Plan  Type of Anesthesia, risks & benefits discussed:  Anesthesia Type:  general  Patient's Preference: Proceed with anesthesia understanding that the risks are very small but could be serious or life threatening.  Intra-op Monitoring Plan: standard ASA monitors and arterial line  Intra-op Monitoring Plan Comments:   Post Op Pain Control Plan: multimodal analgesia, IV/PO Opioids PRN and per primary service following discharge from PACU  Post Op Pain Control Plan Comments:   Induction:   IV  Beta Blocker:  Patient is not currently on a Beta-Blocker (No further documentation required).       Informed Consent: Patient understands risks and agrees with Anesthesia plan.  Questions answered. Anesthesia consent signed with patient.  ASA Score: 2     Day of Surgery Review of History & Physical: I have interviewed and examined the patient. I have reviewed the patient's H&P dated:    H&P update referred to the surgeon.         Ready For Surgery From Anesthesia Perspective.

## 2020-09-09 NOTE — TRANSFER OF CARE
"Anesthesia Transfer of Care Note    Patient: Meredith Cuellar    Procedure(s) Performed: Procedure(s) (LRB):  XI ROBOTIC GASTRECTOMY/GIST PROXIMAL STOMACH (N/A)  LYSIS, ADHESIONS, LAPAROSCOPIC  EGD (ESOPHAGOGASTRODUODENOSCOPY) (N/A)    Patient location: PACU    Anesthesia Type: general    Transport from OR: Transported from OR on 6-10 L/min O2 by face mask with adequate spontaneous ventilation    Post pain: adequate analgesia    Post assessment: no apparent anesthetic complications and tolerated procedure well    Post vital signs: stable    Level of consciousness: awake and alert    Nausea/Vomiting: no nausea/vomiting    Complications: none    Transfer of care protocol was followed      Last vitals:   Visit Vitals  BP (!) 116/59 (BP Location: Left arm, Patient Position: Lying)   Pulse 83   Temp 36.1 °C (97 °F) (Temporal)   Resp 20   Ht 5' 1" (1.549 m)   Wt 51.7 kg (114 lb)   SpO2 98%   Breastfeeding No   BMI 21.54 kg/m²     "

## 2020-09-09 NOTE — ANESTHESIA PROCEDURE NOTES
Erector Spinae Plane Single Injection Block    Patient location during procedure: pre-op   Block not for primary anesthetic.  Reason for block: at surgeon's request and post-op pain management   Post-op Pain Location: bilateral abdominal pain  Start time: 9/9/2020 11:30 AM  Timeout: 9/9/2020 11:30 AM   End time: 9/9/2020 11:45 AM    Staffing  Authorizing Provider: Gia Hodgson MD  Performing Provider: Raul Reid MD    Preanesthetic Checklist  Completed: patient identified, site marked, surgical consent, pre-op evaluation, timeout performed, IV checked, risks and benefits discussed and monitors and equipment checked  Peripheral Block  Patient position: sitting  Prep: ChloraPrep  Patient monitoring: heart rate, cardiac monitor, continuous pulse ox, continuous capnometry and frequent blood pressure checks  Block type: erector spinae plane (Erector Spinae Plane Block)  Laterality: bilateral  Injection technique: single shot  Location: T7-8  Needle  Needle type: Tuohy   Needle gauge: 17 G  Needle length: 3.5 in  Needle localization: anatomical landmarks and ultrasound guidance   -ultrasound image captured on disc.  Assessment  Injection assessment: negative aspiration, negative parasthesia and local visualized surrounding nerve  Paresthesia pain: none  Heart rate change: no  Slow fractionated injection: yes  Additional Notes  Patient tolerated well.  See [facility] RN record for vitals.  30cc 0.345% Bupiv with epi and additives administered per side.

## 2020-09-09 NOTE — ANESTHESIA PROCEDURE NOTES
Airway Management  Performed by: Stephen Fisher MD  Authorized by: Tahir Temple MD     Intubation:     Induction:  Intravenous    Intubated:  Postinduction    Mask Ventilation:  Easy mask    Attempts:  2    Attempted By:  Resident anesthesiologist    Method of Intubation:  Direct    Blade:  [place] 2    Laryngeal View Grade: Grade IIb - only the arytenoids and epiglottis seen      Attempted By (2nd Attempt):  Staff anesthesiologist    Method of Intubation (2nd Attempt):  Direct    Blade (2nd Attempt):  Neno 3    Laryngeal View Grade (2nd Attempt): Grade IIa - cords partially seen      Difficult Airway Encountered?: No      Complications:  Esophageal intubation - immediately recognized and removed    Airway Device:  Oral endotracheal tube    Airway Device Size:  7.0    Style/Cuff Inflation:  Cuffed    Tube secured:  23    Secured at:  The lips    Placement Verified By:  Capnometry    Complicating Factors:  Anterior larynx    Findings Post-Intubation:  BS equal bilateral

## 2020-09-09 NOTE — BRIEF OP NOTE
[facility]  Brief Operative Note    SUMMARY     Surgery Date: 9/9/2020     Surgeon(s) and Role:     * Devyn Miles MD - Primary     * Jenn Beltrán MD - Resident - Assisting    Pre-op Diagnosis:  Benign gastrointestinal stromal tumor (GIST) [D21.4]    Post-op Diagnosis:  Post-Op Diagnosis Codes:     * Benign gastrointestinal stromal tumor (GIST) [D21.4]    Procedure(s) (LRB):  XI ROBOTIC GASTRECTOMY/GIST PROXIMAL STOMACH (N/A)  LYSIS, ADHESIONS, LAPAROSCOPIC  EGD (ESOPHAGOGASTRODUODENOSCOPY) (N/A)    Anesthesia: General/MAC    Description of Procedure:   1) robotic laparoscopic assisted enucleation of esophageal mass  2) robotic nissen fundoplication  3) laparoscopic lysis of adhesions  4) EGD    Description of the findings of the procedure:   1) no intraperitoneal metastatic disease noted  2) 3 x 2 cm esophageal mass at GEJ   3) no mucosal defects noted on EGD    Estimated Blood Loss: 25 cc    Estimated Blood Loss has been documented.         Specimens:   1) esophageal mass    [id number]

## 2020-09-10 LAB
ALBUMIN SERPL BCP-MCNC: 3.5 G/DL (ref 3.5–5.2)
ALP SERPL-CCNC: 101 U/L (ref 55–135)
ALT SERPL W/O P-5'-P-CCNC: 101 U/L (ref 10–44)
ANION GAP SERPL CALC-SCNC: 5 MMOL/L (ref 8–16)
AST SERPL-CCNC: 71 U/L (ref 10–40)
BASOPHILS # BLD AUTO: 0.01 K/UL (ref 0–0.2)
BASOPHILS NFR BLD: 0.1 % (ref 0–1.9)
BILIRUB SERPL-MCNC: 0.5 MG/DL (ref 0.1–1)
BUN SERPL-MCNC: 10 MG/DL (ref 8–23)
CALCIUM SERPL-MCNC: 8.9 MG/DL (ref 8.7–10.5)
CHLORIDE SERPL-SCNC: 113 MMOL/L (ref 95–110)
CO2 SERPL-SCNC: 24 MMOL/L (ref 23–29)
CREAT SERPL-MCNC: 0.6 MG/DL (ref 0.5–1.4)
DIFFERENTIAL METHOD: ABNORMAL
EOSINOPHIL # BLD AUTO: 0 K/UL (ref 0–0.5)
EOSINOPHIL NFR BLD: 0 % (ref 0–8)
ERYTHROCYTE [DISTWIDTH] IN BLOOD BY AUTOMATED COUNT: 14.6 % (ref 11.5–14.5)
EST. GFR  (AFRICAN AMERICAN): >60 ML/MIN/1.73 M^2
EST. GFR  (NON AFRICAN AMERICAN): >60 ML/MIN/1.73 M^2
GLUCOSE SERPL-MCNC: 124 MG/DL (ref 70–110)
HCT VFR BLD AUTO: 35 % (ref 37–48.5)
HGB BLD-MCNC: 11.2 G/DL (ref 12–16)
IMM GRANULOCYTES # BLD AUTO: 0.03 K/UL (ref 0–0.04)
IMM GRANULOCYTES NFR BLD AUTO: 0.3 % (ref 0–0.5)
LYMPHOCYTES # BLD AUTO: 1 K/UL (ref 1–4.8)
LYMPHOCYTES NFR BLD: 10.1 % (ref 18–48)
MAGNESIUM SERPL-MCNC: 2 MG/DL (ref 1.6–2.6)
MCH RBC QN AUTO: 24.9 PG (ref 27–31)
MCHC RBC AUTO-ENTMCNC: 32 G/DL (ref 32–36)
MCV RBC AUTO: 78 FL (ref 82–98)
MONOCYTES # BLD AUTO: 1 K/UL (ref 0.3–1)
MONOCYTES NFR BLD: 9.7 % (ref 4–15)
NEUTROPHILS # BLD AUTO: 7.9 K/UL (ref 1.8–7.7)
NEUTROPHILS NFR BLD: 79.8 % (ref 38–73)
NRBC BLD-RTO: 0 /100 WBC
PHOSPHATE SERPL-MCNC: 3.5 MG/DL (ref 2.7–4.5)
PLATELET # BLD AUTO: 201 K/UL (ref 150–350)
PMV BLD AUTO: 9.3 FL (ref 9.2–12.9)
POTASSIUM SERPL-SCNC: 4.4 MMOL/L (ref 3.5–5.1)
PROT SERPL-MCNC: 6.3 G/DL (ref 6–8.4)
RBC # BLD AUTO: 4.5 M/UL (ref 4–5.4)
SODIUM SERPL-SCNC: 142 MMOL/L (ref 136–145)
WBC # BLD AUTO: 9.85 K/UL (ref 3.9–12.7)

## 2020-09-10 PROCEDURE — 97161 PT EVAL LOW COMPLEX 20 MIN: CPT

## 2020-09-10 PROCEDURE — 25000003 PHARM REV CODE 250: Performed by: STUDENT IN AN ORGANIZED HEALTH CARE EDUCATION/TRAINING PROGRAM

## 2020-09-10 PROCEDURE — 63600175 PHARM REV CODE 636 W HCPCS: Performed by: NURSE PRACTITIONER

## 2020-09-10 PROCEDURE — 80053 COMPREHEN METABOLIC PANEL: CPT

## 2020-09-10 PROCEDURE — 94770 HC EXHALED C02 TEST: CPT

## 2020-09-10 PROCEDURE — 63600175 PHARM REV CODE 636 W HCPCS: Performed by: STUDENT IN AN ORGANIZED HEALTH CARE EDUCATION/TRAINING PROGRAM

## 2020-09-10 PROCEDURE — 97165 OT EVAL LOW COMPLEX 30 MIN: CPT

## 2020-09-10 PROCEDURE — 25000242 PHARM REV CODE 250 ALT 637 W/ HCPCS: Performed by: NURSE PRACTITIONER

## 2020-09-10 PROCEDURE — 84100 ASSAY OF PHOSPHORUS: CPT

## 2020-09-10 PROCEDURE — 83735 ASSAY OF MAGNESIUM: CPT

## 2020-09-10 PROCEDURE — 97530 THERAPEUTIC ACTIVITIES: CPT

## 2020-09-10 PROCEDURE — 94640 AIRWAY INHALATION TREATMENT: CPT

## 2020-09-10 PROCEDURE — 94761 N-INVAS EAR/PLS OXIMETRY MLT: CPT

## 2020-09-10 PROCEDURE — 85025 COMPLETE CBC W/AUTO DIFF WBC: CPT

## 2020-09-10 PROCEDURE — 20600001 HC STEP DOWN PRIVATE ROOM

## 2020-09-10 PROCEDURE — 97535 SELF CARE MNGMENT TRAINING: CPT

## 2020-09-10 PROCEDURE — 36415 COLL VENOUS BLD VENIPUNCTURE: CPT

## 2020-09-10 PROCEDURE — 99900035 HC TECH TIME PER 15 MIN (STAT)

## 2020-09-10 RX ORDER — HYDROCODONE BITARTRATE AND ACETAMINOPHEN 7.5; 325 MG/15ML; MG/15ML
10 SOLUTION ORAL EVERY 4 HOURS PRN
Status: DISCONTINUED | OUTPATIENT
Start: 2020-09-10 | End: 2020-09-12 | Stop reason: HOSPADM

## 2020-09-10 RX ORDER — ENOXAPARIN SODIUM 100 MG/ML
40 INJECTION SUBCUTANEOUS DAILY
Qty: 5.6 ML | Refills: 0 | Status: SHIPPED | OUTPATIENT
Start: 2020-09-10 | End: 2020-09-25

## 2020-09-10 RX ORDER — MIDAZOLAM HYDROCHLORIDE 1 MG/ML
INJECTION, SOLUTION INTRAMUSCULAR; INTRAVENOUS
Status: DISCONTINUED | OUTPATIENT
Start: 2020-09-09 | End: 2020-09-10

## 2020-09-10 RX ORDER — HYDROCODONE BITARTRATE AND ACETAMINOPHEN 7.5; 325 MG/15ML; MG/15ML
15 SOLUTION ORAL EVERY 4 HOURS PRN
Status: DISCONTINUED | OUTPATIENT
Start: 2020-09-10 | End: 2020-09-12 | Stop reason: HOSPADM

## 2020-09-10 RX ORDER — ENOXAPARIN SODIUM 100 MG/ML
40 INJECTION SUBCUTANEOUS EVERY 24 HOURS
Status: DISCONTINUED | OUTPATIENT
Start: 2020-09-10 | End: 2020-09-12 | Stop reason: HOSPADM

## 2020-09-10 RX ADMIN — LEVALBUTEROL HYDROCHLORIDE 0.63 MG: 0.63 SOLUTION RESPIRATORY (INHALATION) at 08:09

## 2020-09-10 RX ADMIN — ENOXAPARIN SODIUM 40 MG: 40 INJECTION SUBCUTANEOUS at 04:09

## 2020-09-10 RX ADMIN — ATORVASTATIN CALCIUM 40 MG: 20 TABLET, FILM COATED ORAL at 09:09

## 2020-09-10 RX ADMIN — DEXTROSE MONOHYDRATE, SODIUM CHLORIDE, AND POTASSIUM CHLORIDE: 50; 9; 1.49 INJECTION, SOLUTION INTRAVENOUS at 03:09

## 2020-09-10 RX ADMIN — ONDANSETRON 4 MG: 2 INJECTION INTRAMUSCULAR; INTRAVENOUS at 12:09

## 2020-09-10 RX ADMIN — ONDANSETRON 4 MG: 4 TABLET, ORALLY DISINTEGRATING ORAL at 05:09

## 2020-09-10 RX ADMIN — LOSARTAN POTASSIUM 25 MG: 25 TABLET ORAL at 09:09

## 2020-09-10 RX ADMIN — ONDANSETRON 4 MG: 4 TABLET, ORALLY DISINTEGRATING ORAL at 01:09

## 2020-09-10 RX ADMIN — DEXTROSE MONOHYDRATE, SODIUM CHLORIDE, AND POTASSIUM CHLORIDE: 50; 9; 1.49 INJECTION, SOLUTION INTRAVENOUS at 06:09

## 2020-09-10 NOTE — PT/OT/SLP EVAL
Physical Therapy Evaluation and Discharge Note    Patient Name:  Meredith Cuellar   MRN:  [id number]    Recommendations:     Discharge Recommendations:  home   Discharge Equipment Recommendations: none   Barriers to discharge: None    Assessment:     Meredith Cuellar is a 65 y.o. female admitted with a medical diagnosis of Benign gastrointestinal stromal tumor (GIST). Patient speaks Bolivian and phone translation used for session.  Patient demonstrated good functional mobility and performed bed mobility, transfers and ambulation with supervision.  Patient understood importance of ambulate and encouraged to continue to ambulate during hospitalization. Patient is without further skilled PT needs at this time.      Recent Surgery: Procedure(s) (LRB):  XI ROBOTIC GASTRECTOMY/GIST PROXIMAL STOMACH (N/A)  LYSIS, ADHESIONS, LAPAROSCOPIC  EGD (ESOPHAGOGASTRODUODENOSCOPY) (N/A) 1 Day Post-Op    Plan:     During this hospitalization, patient does not require further acute PT services.  Please re-consult if situation changes.      Subjective     Chief Complaint: pain  Patient/Family Comments/goals: To go home.  Pain/Comfort:  · Pain Rating 1: 6/10  · Location - Orientation 1: generalized  · Location 1: abdomen  · Pain Addressed 1: Pre-medicate for activity, Reposition, Cessation of Activity    Patients cultural, spiritual, Cheondoism conflicts given the current situation: no    Living Environment:  Prior level of function: independent  Residence: lives with her daughter apartment, number of outside stairs: 0, walk-in shower, built in shower chair   Support available upon discharge: family  Equipment owned: none  Objective:     Communicated with RN prior to session.  Patient found supine with peripheral IV  upon PT entry to room.    General Precautions: Standard, fall   Orthopedic Precautions:N/A   Braces: N/A     Exams:  · RLE ROM: WFL  · RLE Strength: WFL  · LLE ROM: WFL  · LLE Strength: WFL    Functional Mobility:  · Bed Mobility:      · Supine to Sit: supervision  · Transfers:     · Sit to Stand:  supervision with no AD  · Gait: Patient ambulated 150 ft with no AD and supervision.    Therapeutic Activities and Exercises:   patient demonstrated good endurance and ambulated increased distance to ensure appropriate home mobility.    Patient Education:    Patient educated on Fall risk, gait training, home safety, Home exercise program, plan of care and transfer training by translation.  Patient was receptive to education and verbalizes understanding.   AM-PAC 6 CLICK MOBILITY  Total Score:24     Patient left up in chair with all lines intact and call button in reach.    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       Past Surgical History:   Procedure Laterality Date    APPENDECTOMY      CHOLECYSTECTOMY      ENDOSCOPIC ULTRASOUND OF UPPER GASTROINTESTINAL TRACT N/A 11/26/2019    Procedure: ULTRASOUND, UPPER GI TRACT, ENDOSCOPIC;  Surgeon: Britton Martinez MD;  Location: [facility];  Service: Endoscopy;  Laterality: N/A;    ENDOSCOPIC ULTRASOUND OF UPPER GASTROINTESTINAL TRACT N/A 6/24/2020    Procedure: ULTRASOUND, UPPER GI TRACT, ENDOSCOPIC;  Surgeon: Delfino Jasso MD;  Location: [facility] ([address] [place] [address]);  Service: Endoscopy;  Laterality: N/A;  EUS (linear) for abnormal CT scan. Urgent. Can be done by any [facility] physician.  Britton Martinez MD  Covid-19 test 6/22/20 at [facility] - [facility]  Speaks Chinese;  offered and  declined; daughter will accompany patient as interp    ESOPHAGOGASTRODUODENOSCOPY N/A 9/9/2020    Procedure: EGD (ESOPHAGOGASTRODUODENOSCOPY);  Surgeon: Devyn Miles MD;  Location: [facility] OR [address] [place] [address];  Service: General;  Laterality: N/A;    HYSTERECTOMY  2012    elective    LAPAROSCOPIC LYSIS OF ADHESIONS  9/9/2020    Procedure: LYSIS, ADHESIONS, LAPAROSCOPIC;  Surgeon: Devyn Miles MD;  Location: [facility] OR [address] [place] [address];  Service: General;;    ROBOT-ASSISTED SURGICAL REMOVAL OF STOMACH USING DA RACHEL XI N/A 9/9/2020    Procedure: XI ROBOTIC GASTRECTOMY/GIST PROXIMAL STOMACH;  Surgeon: Devyn Miles MD;  Location: [facility] OR [address] [place] [address];  Service: General;  Laterality: N/A;       Time Tracking:     PT Received On: 09/10/20  PT Start Time: 1403     PT Stop Time: 1422  PT Total Time (min): 19 min     Billable Minutes: Evaluation 10 min Therapeutic Activity 9 min      Jamal Wang, PT  09/10/2020

## 2020-09-10 NOTE — ASSESSMENT & PLAN NOTE
65 y F s/p robotic gastrectomy for GIST on 9/9/20.  - POD1  - Diet: CLD  - Pain: d/c PCA, begin hycet  - PRN nausea  - OOBTC, ambulate in halls  - DVTppx

## 2020-09-10 NOTE — PLAN OF CARE
Pt arrived from PACU around 1850. Daughter approved to stay - pt Chinese speaking, daughter helps with most translations.    POC reviewed with pt/daughter, verbalized understanding. VSS on RA, drowsy but awakes easily with touch. Remains free of falls and injury.     - lap sites x 7 babatunde w/ dermabond   - voiding post lucero removal via bedpan     IVF infusing. NPO, denies nausea. Pain controlled w/ PCA. Patient denies chest pain & SOB. TEDS /SCDS in place. No acute events or distress noted. Bed in lowest position, call light in reach, frequent rounds made for safety.     Night shift nurse will be resuming care.

## 2020-09-10 NOTE — PLAN OF CARE
Problem: Physical Therapy Goal  Goal: Physical Therapy Goal  Outcome: Met    PT evaluation completed and patient demonstrates safe mobility.  Patient speaks Pashto and phone translation used for session.  Patient demonstrated good functional mobility and performed bed mobility, transfers and ambulation with supervision.  Patient understood importance of ambulate and encouraged to continue to ambulate during hospitalization. Patient is without further skilled PT needs at this time.      Jamal Wang PT, DPT  9/10/2020  Pager #: [phone]

## 2020-09-10 NOTE — ANESTHESIA POSTPROCEDURE EVALUATION
Anesthesia Post Evaluation    Patient: Meredith Cuellar    Procedure(s) Performed: Procedure(s) (LRB):  XI ROBOTIC GASTRECTOMY/GIST PROXIMAL STOMACH (N/A)  LYSIS, ADHESIONS, LAPAROSCOPIC  EGD (ESOPHAGOGASTRODUODENOSCOPY) (N/A)    Final Anesthesia Type: general    Patient location during evaluation: PACU  Patient participation: Yes- Able to Participate  Level of consciousness: awake  Post-procedure vital signs: reviewed and stable  Pain management: adequate  Airway patency: patent    PONV status at discharge: No PONV  Anesthetic complications: no      Cardiovascular status: blood pressure returned to baseline  Respiratory status: unassisted  Hydration status: euvolemic  Follow-up not needed.          Vitals Value Taken Time   /69 09/10/20 0426   Temp 36.8 °C (98.3 °F) 09/10/20 0426   Pulse 67 09/10/20 0426   Resp 20 09/10/20 0426   SpO2 96 % 09/10/20 0426         Event Time   Out of Recovery 17:00:00         Pain/Carlos Manuel Score: Pain Rating Prior to Med Admin: 0 (9/9/2020  4:27 PM)  Pain Rating Post Med Admin: 0 (9/9/2020 11:40 AM)  Carlos Manuel Score: 10 (9/9/2020  6:00 PM)

## 2020-09-10 NOTE — PLAN OF CARE
POC reviewed w/ pt and daughter, verbalized understanding. Pt AAOx4. VSS on RA. x7 lap sites w/ dermabond. Pain managed w/ PCA pain meds. Pt voids per bedpan w/ adequate UOP overnight. Pt tolerated NPO diet. Pt reports nausea overnight PRN zofran administered. Daughter at bedside, helps w/ most translations. Pt slept b/w care. Frequent rounds made for pt safety. Call light in reach. Bed in lowest position and locked. WCTM.

## 2020-09-10 NOTE — SUBJECTIVE & OBJECTIVE
Interval History: Feeling okay this morning. Some tenderness in belly. No nausea. Pain under control. Ambulating.     Medications:  Continuous Infusions:   dextrose 5 % and 0.9 % NaCl with KCl 20 mEq 100 mL/hr at 09/10/20 0305     Scheduled Meds:   atorvastatin  40 mg Oral Daily    levalbuterol  0.63 mg Nebulization Q6H WAKE    losartan  25 mg Oral Daily    ondansetron  4 mg Oral Q6H     PRN Meds:hydrocodone-apap 7.5-325 MG/15 ML, hydrocodone-apap 7.5-325 MG/15 ML, ondansetron, promethazine     Review of patient's allergies indicates:  No Known Allergies  Objective:     Vital Signs (Most Recent):  Temp: 98.4 °F (36.9 °C) (09/10/20 0724)  Pulse: 75 (09/10/20 0855)  Resp: 18 (09/10/20 0855)  BP: 133/65 (09/10/20 0724)  SpO2: 98 % (09/10/20 0855) Vital Signs (24h Range):  Temp:  [97 °F (36.1 °C)-98.4 °F (36.9 °C)] 98.4 °F (36.9 °C)  Pulse:  [65-95] 75  Resp:  [13-25] 18  SpO2:  [95 %-100 %] 98 %  BP: (109-170)/(57-80) 133/65     Weight: 51.7 kg (114 lb)  Body mass index is 21.54 kg/m².    Intake/Output - Last 3 Shifts       09/08 0700 - 09/09 0659 09/09 0700 - 09/10 0659 09/10 0700 - 09/11 0659    I.V. (mL/kg)  4826.7 (93.4)     Total Intake(mL/kg)  4826.7 (93.4)     Urine (mL/kg/hr)  1725     Emesis/NG output  0     Other  0     Stool  0     Total Output  1725     Net  +3101.7            Urine Occurrence  0 x     Stool Occurrence  0 x     Emesis Occurrence  0 x           Physical Exam  Constitutional:       Appearance: She is normal weight.   HENT:      Head: Normocephalic and atraumatic.      Mouth/Throat:      Mouth: Mucous membranes are moist.   Cardiovascular:      Rate and Rhythm: Normal rate.   Pulmonary:      Effort: Pulmonary effort is normal. No respiratory distress.   Abdominal:      General: Abdomen is flat.      Palpations: Abdomen is soft.      Tenderness: There is abdominal tenderness.   Skin:     General: Skin is warm and dry.   Neurological:      General: No focal deficit present.      Mental  Status: She is alert and oriented to person, place, and time.   Psychiatric:         Mood and Affect: Mood normal.         Behavior: Behavior normal.         Significant Labs:  CBC:   Recent Labs   Lab 09/10/20  0718   WBC 9.85   RBC 4.50   HGB 11.2*   HCT 35.0*      MCV 78*   MCH 24.9*   MCHC 32.0     BMP:   Recent Labs   Lab 09/10/20  0718   *      K 4.4   *   CO2 24   BUN 10   CREATININE 0.6   CALCIUM 8.9   MG 2.0     CMP:   Recent Labs   Lab 09/10/20  0718   *   CALCIUM 8.9   ALBUMIN 3.5   PROT 6.3      K 4.4   CO2 24   *   BUN 10   CREATININE 0.6   ALKPHOS 101   *   AST 71*   BILITOT 0.5       Significant Diagnostics:  I have reviewed all pertinent imaging results/findings within the past 24 hours.

## 2020-09-10 NOTE — PLAN OF CARE
Patient is a 65 year old female admitted from home 9/9/2020 and underwent Robotic Laparoscopic HERNANDEZ, Partial Gastrectomy (GIST).  Patient is expected to discharge home with no needs +/- 9/12/2020.    Patient alone in room sleeping when attempted to visit, NP stated patient does not speak English.  Daughter just left, called on phone and got information over the phone for admit assessment.  Patient lives in a two story home with her daughter with a bed and bathroom on the first floor.  Patient's daughter will drive her home and obtain anything she needs after discharge.  [facility] Dis[facility] Booklet given to patient and/or family with understanding verbalized.  CM name and number written on white board in patient's room with request to call for any questions and concerns.  Will continue to follow for needs.    PCP  Bebo Cuellar MD  [address] [place] [address] / [place] [address]  [phone]      [id number] - [place], [place] - [address] [facility]  [address] [place] [address]  Phone: [phone] Fax: [phone]    [id number] - [place], [place] - [address]  [address]  [facility] [address]  Phone: [phone] Fax: [phone]    Extended Emergency Contact Information  Primary Emergency Contact: Winifred Cuellar  Address: [address] [place] [address] of [facility]  Home Phone: [phone]  Mobile Phone: [phone]  Relation: Daughter       09/10/20 1318   Discharge Assessment   Assessment Type Discharge Planning Assessment   Confirmed/corrected address and phone number on facesheet? Yes   Assessment information obtained from? Other  (daughter)   Expected Length of Stay (days) 3   Communicated expected length of stay with patient/caregiver yes   Prior to hospitilization cognitive status: Alert/Oriented   Prior to hospitalization functional status: Independent   Current cognitive status: Alert/Oriented   Current Functional Status: Independent;Needs Assistance    Lives With child(so), adult   Able to Return to Prior Arrangements yes   Is patient able to care for self after discharge? Yes   Who are your caregiver(s) and their phone number(s)? family   Patient's perception of discharge disposition home or selfcare   Equipment Currently Used at Home none   Do you have any problems affording any of your prescribed medications? No   Is the patient taking medications as prescribed? yes   Does the patient have transportation home? Yes   Transportation Anticipated family or friend will provide   Discharge Plan A Home with family   Discharge Plan B Home Health   Patient/Family in Agreement with Plan yes

## 2020-09-10 NOTE — PLAN OF CARE
Patient alert and oriented.  Able to make needs known. Up and down to the bathroom and the chair.  Pain rated at about a 5/6 but refused pain meds.  She said if it was about an 8/9, then she would take medicine.  Lap sites dry and intact.  No draiange noted.  Wll continue to monitor.

## 2020-09-10 NOTE — PROGRESS NOTES
[facility]  General Surgery  Progress Note    Subjective:       Post-Op Info:  Procedure(s) (LRB):  XI ROBOTIC GASTRECTOMY/GIST PROXIMAL STOMACH (N/A)  LYSIS, ADHESIONS, LAPAROSCOPIC  EGD (ESOPHAGOGASTRODUODENOSCOPY) (N/A)   1 Day Post-Op     Interval History: Feeling okay this morning. Some tenderness in belly. Some nausea overnight. Pain under control. Ambulating.     Medications:  Continuous Infusions:   dextrose 5 % and 0.9 % NaCl with KCl 20 mEq 100 mL/hr at 09/10/20 0305     Scheduled Meds:   atorvastatin  40 mg Oral Daily    levalbuterol  0.63 mg Nebulization Q6H WAKE    losartan  25 mg Oral Daily    ondansetron  4 mg Oral Q6H     PRN Meds:hydrocodone-apap 7.5-325 MG/15 ML, hydrocodone-apap 7.5-325 MG/15 ML, ondansetron, promethazine     Review of patient's allergies indicates:  No Known Allergies  Objective:     Vital Signs (Most Recent):  Temp: 98.4 °F (36.9 °C) (09/10/20 0724)  Pulse: 75 (09/10/20 0855)  Resp: 18 (09/10/20 0855)  BP: 133/65 (09/10/20 0724)  SpO2: 98 % (09/10/20 0855) Vital Signs (24h Range):  Temp:  [97 °F (36.1 °C)-98.4 °F (36.9 °C)] 98.4 °F (36.9 °C)  Pulse:  [65-95] 75  Resp:  [13-25] 18  SpO2:  [95 %-100 %] 98 %  BP: (109-170)/(57-80) 133/65     Weight: 51.7 kg (114 lb)  Body mass index is 21.54 kg/m².    Intake/Output - Last 3 Shifts       09/08 0700 - 09/09 0659 09/09 0700 - 09/10 0659 09/10 0700 - 09/11 0659    I.V. (mL/kg)  4826.7 (93.4)     Total Intake(mL/kg)  4826.7 (93.4)     Urine (mL/kg/hr)  1725     Emesis/NG output  0     Other  0     Stool  0     Total Output  1725     Net  +3101.7            Urine Occurrence  0 x     Stool Occurrence  0 x     Emesis Occurrence  0 x           Physical Exam  Constitutional:       Appearance: She is normal weight.   HENT:      Head: Normocephalic and atraumatic.      Mouth/Throat:      Mouth: Mucous membranes are moist.   Cardiovascular:      Rate and Rhythm: Normal rate.   Pulmonary:      Effort: Pulmonary effort is  normal. No respiratory distress.   Abdominal:      General: Abdomen is flat.      Palpations: Abdomen is soft.      Tenderness: There is abdominal tenderness.   Skin:     General: Skin is warm and dry.   Neurological:      General: No focal deficit present.      Mental Status: She is alert and oriented to person, place, and time.   Psychiatric:         Mood and Affect: Mood normal.         Behavior: Behavior normal.         Significant Labs:  CBC:   Recent Labs   Lab 09/10/20  0718   WBC 9.85   RBC 4.50   HGB 11.2*   HCT 35.0*      MCV 78*   MCH 24.9*   MCHC 32.0     BMP:   Recent Labs   Lab 09/10/20  0718   *      K 4.4   *   CO2 24   BUN 10   CREATININE 0.6   CALCIUM 8.9   MG 2.0     CMP:   Recent Labs   Lab 09/10/20  0718   *   CALCIUM 8.9   ALBUMIN 3.5   PROT 6.3      K 4.4   CO2 24   *   BUN 10   CREATININE 0.6   ALKPHOS 101   *   AST 71*   BILITOT 0.5       Significant Diagnostics:  I have reviewed all pertinent imaging results/findings within the past 24 hours.    Assessment/Plan:     Benign gastrointestinal stromal tumor (GIST)  65 y F s/p robotic gastrectomy for GIST on 9/9/20.  - POD1  - Diet: CLD  - Pain: d/c PCA, begin hycet  - PRN nausea  - OOBTC, ambulate in halls  - DVTppx         Jess Duarte MD  General Surgery  [facility]

## 2020-09-10 NOTE — CONSULTS
"      Received consult for "full liquid, post nissen diet education".       Pt resting in bed with no family members at bedside. Spoke with pt's daughter via phone. Will provide diet education to pt and pt's daughter tomorrow AM. Left "Diet After Gastric Surgery" handout at bedside.       Follow-up: Yes     Please re-consult as needed.     "

## 2020-09-10 NOTE — PT/OT/SLP EVAL
Occupational Therapy   Evaluation and Discharge Note    Name: Meredith Cuellar  MRN: [id number]    1 Day Post-Op  Pre-op Diagnosis: Benign gastrointestinal stromal tumor (GIST) [D21.4]    Procedure(s):  XI ROBOTIC GASTRECTOMY/GIST PROXIMAL STOMACH  LYSIS, ADHESIONS, LAPAROSCOPIC  EGD (ESOPHAGOGASTRODUODENOSCOPY)     Recommendations:     Discharge Recommendations: home  Discharge Equipment Recommendations:  none  Barriers to discharge:  None    Assessment:     Meredith Cuellar is a 65 y.o. female with a medical diagnosis of Benign gastrointestinal stromal tumor (GIST). At this time, patient is functioning at their prior level of function and does not require further acute OT services.     Plan:     During this hospitalization, patient does not require further acute OT services.  Please re-consult if situation changes.    · Plan of Care Reviewed with: patient    Subjective     Chief Complaint: abdominal pain  Patient Comments: to get better and go home    Occupational Profile:  Living Environment: Patient lives with daughter in ground floor apartment. Patient has a walk in shower with built in bench and grab bar.  Previous level of function: Indepdent  Roles and Routines: Active; drives  Equipment Used at home: none  Assistance upon Discharge: Daughter can assist    Pain/Comfort:  · Pain Rating 1: 6/10  · Location 1: abdomen  · Pain Addressed 1: Reposition, Distraction, Cessation of Activity    Patients cultural, spiritual, Congregational conflicts given the current situation: no    Objective:     Communicated with: RN prior to session. Patient found HOB elevated with peripheral IV upon OT entry to room. OT student present for session. SOTERO used for Algerian translation.    General Precautions: Standard  Orthopedic Precautions:N/A   Braces: N/A     Occupational Performance:    Bed Mobility:    · Patient completed Supine to Sit with modified independence; HOB elevated    Functional Mobility/Transfers:  · Patient completed Sit <>  Stand Transfer with independence  with no assistive device   · Patient completed Bed > Chair Transfer using Step Transfer technique with supervision with no assistive device  · Patient completed Toilet Transfer Step Transfer technique with supervision with no AD  · Functional Mobility: Patient ambulated EOB > bathroom and bathroom > bedside chair with supervision with no AD    Activities of Daily Living:  · Grooming: independence standing at sink  · Toileting: independence clothing management and hygiene    Cognitive/Visual Perceptual:  Cognitive/Psychosocial Skills:    -       Oriented to: Person, Place, Time and Situation   -       Follows Commands/attention:Follows multistep commands  -       Communication: clear/fluent  -       Memory: No Deficits noted  -       Safety awareness/insight to disability: intact   -       Mood/Affect/Coping skills/emotional control: Appropriate to situation, Cooperative and Pleasant    Physical Exam:  Postural examination/scapula alignment:    -       No postural abnormalities identified  Upper Extremity Range of Motion:     -       Right Upper Extremity: WNL  -       Left Upper Extremity: WNL  Upper Extremity Strength:    -       Right Upper Extremity: WFL  -       Left Upper Extremity: WFL    AMPAC 6 Click ADL:  AMPAC Total Score: 22    Treatment & Education:   Therapist provided facilitation and instruction of proper body mechanics and fall prevention strategies during tasks listed above.   Instructed patient to sit in bedside chair daily to increase OOB/activity tolerance.   Instructed patient to use call light to have nursing staff assist with needs/transfers.   Discussed OT POC and answered all questions within OT scope of practice.   Whiteboard updated   Education:    Patient left up in chair with all lines intact and call button in reach    GOALS:   Multidisciplinary Problems     Occupational Therapy Goals     Not on file          Multidisciplinary Problems (Resolved)         Problem: Occupational Therapy Goal    Goal Priority Disciplines Outcome Interventions   Occupational Therapy Goal   (Resolved)     OT, PT/OT Met    Description: Skilled OT services not necessary at this time secondary to patient performing ADLs at [facility]. Patient in agreement. Please re-consult OT if change in patient's functional status is noted.                    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;  Service: Endoscopy;  Laterality: N/A;    ENDOSCOPIC ULTRASOUND OF UPPER GASTROINTESTINAL TRACT N/A 6/24/2020    Procedure: ULTRASOUND, UPPER GI TRACT, ENDOSCOPIC;  Surgeon: Delfino Jasso MD;  Location: [facility] (2ND FLR);  Service: Endoscopy;  Laterality: N/A;  EUS (linear) for abnormal CT scan. Urgent. Can be done by any [facility] physician.  Britton Martinez MD  Covid-19 test 6/22/20 at [facility]  Speaks Fijian;  offered and declined; daughter will accompany patient as interp    ESOPHAGOGASTRODUODENOSCOPY N/A 9/9/2020    Procedure: EGD (ESOPHAGOGASTRODUODENOSCOPY);  Surgeon: Devyn Miles MD;  Location: [facility] OR [address] [place] [address];  Service: General;  Laterality: N/A;    HYSTERECTOMY  2012    elective    LAPAROSCOPIC LYSIS OF ADHESIONS  9/9/2020    Procedure: LYSIS, ADHESIONS, LAPAROSCOPIC;  Surgeon: Devyn Miles MD;  Location: [facility] OR [address] [place] [address];  Service: General;;    ROBOT-ASSISTED SURGICAL REMOVAL OF STOMACH USING DA RACHEL XI N/A 9/9/2020    Procedure: XI ROBOTIC GASTRECTOMY/GIST PROXIMAL STOMACH;   Surgeon: Devyn Miles MD;  Location: [facility] OR [address] [place] [address];  Service: General;  Laterality: N/A;       Time Tracking:     OT Date of Treatment: 09/10/20  OT Start Time: 1012  OT Stop Time: 1030  OT Total Time (min): 18 min    Billable Minutes:Evaluation 10  Self Care/Home Management 8    Elena Dobbins OT  9/10/2020

## 2020-09-11 VITALS
SYSTOLIC BLOOD PRESSURE: 131 MMHG | TEMPERATURE: 100 F | RESPIRATION RATE: 20 BRPM | HEIGHT: 61 IN | DIASTOLIC BLOOD PRESSURE: 66 MMHG | BODY MASS INDEX: 21.52 KG/M2 | OXYGEN SATURATION: 95 % | HEART RATE: 85 BPM | WEIGHT: 114 LBS

## 2020-09-11 LAB
ALBUMIN SERPL BCP-MCNC: 3.4 G/DL (ref 3.5–5.2)
ALP SERPL-CCNC: 103 U/L (ref 55–135)
ALT SERPL W/O P-5'-P-CCNC: 77 U/L (ref 10–44)
ANION GAP SERPL CALC-SCNC: 6 MMOL/L (ref 8–16)
AST SERPL-CCNC: 43 U/L (ref 10–40)
BASOPHILS # BLD AUTO: 0.04 K/UL (ref 0–0.2)
BASOPHILS NFR BLD: 0.5 % (ref 0–1.9)
BILIRUB SERPL-MCNC: 0.6 MG/DL (ref 0.1–1)
BUN SERPL-MCNC: 9 MG/DL (ref 8–23)
CALCIUM SERPL-MCNC: 8.7 MG/DL (ref 8.7–10.5)
CHLORIDE SERPL-SCNC: 112 MMOL/L (ref 95–110)
CO2 SERPL-SCNC: 24 MMOL/L (ref 23–29)
CREAT SERPL-MCNC: 0.7 MG/DL (ref 0.5–1.4)
DIFFERENTIAL METHOD: ABNORMAL
EOSINOPHIL # BLD AUTO: 0.1 K/UL (ref 0–0.5)
EOSINOPHIL NFR BLD: 1.3 % (ref 0–8)
ERYTHROCYTE [DISTWIDTH] IN BLOOD BY AUTOMATED COUNT: 15 % (ref 11.5–14.5)
EST. GFR  (AFRICAN AMERICAN): >60 ML/MIN/1.73 M^2
EST. GFR  (NON AFRICAN AMERICAN): >60 ML/MIN/1.73 M^2
GLUCOSE SERPL-MCNC: 116 MG/DL (ref 70–110)
HCT VFR BLD AUTO: 33.7 % (ref 37–48.5)
HGB BLD-MCNC: 10.4 G/DL (ref 12–16)
IMM GRANULOCYTES # BLD AUTO: 0.02 K/UL (ref 0–0.04)
IMM GRANULOCYTES NFR BLD AUTO: 0.2 % (ref 0–0.5)
LYMPHOCYTES # BLD AUTO: 1.4 K/UL (ref 1–4.8)
LYMPHOCYTES NFR BLD: 17.5 % (ref 18–48)
MAGNESIUM SERPL-MCNC: 1.9 MG/DL (ref 1.6–2.6)
MCH RBC QN AUTO: 24.2 PG (ref 27–31)
MCHC RBC AUTO-ENTMCNC: 30.9 G/DL (ref 32–36)
MCV RBC AUTO: 78 FL (ref 82–98)
MONOCYTES # BLD AUTO: 0.5 K/UL (ref 0.3–1)
MONOCYTES NFR BLD: 6.2 % (ref 4–15)
NEUTROPHILS # BLD AUTO: 6.1 K/UL (ref 1.8–7.7)
NEUTROPHILS NFR BLD: 74.3 % (ref 38–73)
NRBC BLD-RTO: 0 /100 WBC
PHOSPHATE SERPL-MCNC: 2.9 MG/DL (ref 2.7–4.5)
PLATELET # BLD AUTO: 201 K/UL (ref 150–350)
PMV BLD AUTO: 9.2 FL (ref 9.2–12.9)
POTASSIUM SERPL-SCNC: 3.6 MMOL/L (ref 3.5–5.1)
PROT SERPL-MCNC: 6.1 G/DL (ref 6–8.4)
RBC # BLD AUTO: 4.3 M/UL (ref 4–5.4)
SODIUM SERPL-SCNC: 142 MMOL/L (ref 136–145)
WBC # BLD AUTO: 8.22 K/UL (ref 3.9–12.7)

## 2020-09-11 PROCEDURE — 94640 AIRWAY INHALATION TREATMENT: CPT

## 2020-09-11 PROCEDURE — 99900035 HC TECH TIME PER 15 MIN (STAT)

## 2020-09-11 PROCEDURE — 94664 DEMO&/EVAL PT USE INHALER: CPT

## 2020-09-11 PROCEDURE — 25000003 PHARM REV CODE 250: Performed by: STUDENT IN AN ORGANIZED HEALTH CARE EDUCATION/TRAINING PROGRAM

## 2020-09-11 PROCEDURE — 94761 N-INVAS EAR/PLS OXIMETRY MLT: CPT

## 2020-09-11 PROCEDURE — 83735 ASSAY OF MAGNESIUM: CPT

## 2020-09-11 PROCEDURE — 36415 COLL VENOUS BLD VENIPUNCTURE: CPT

## 2020-09-11 PROCEDURE — 80053 COMPREHEN METABOLIC PANEL: CPT

## 2020-09-11 PROCEDURE — 85025 COMPLETE CBC W/AUTO DIFF WBC: CPT

## 2020-09-11 PROCEDURE — 63600175 PHARM REV CODE 636 W HCPCS: Performed by: NURSE PRACTITIONER

## 2020-09-11 PROCEDURE — 25000242 PHARM REV CODE 250 ALT 637 W/ HCPCS: Performed by: NURSE PRACTITIONER

## 2020-09-11 PROCEDURE — 94799 UNLISTED PULMONARY SVC/PX: CPT

## 2020-09-11 PROCEDURE — 97803 MED NUTRITION INDIV SUBSEQ: CPT

## 2020-09-11 PROCEDURE — 84100 ASSAY OF PHOSPHORUS: CPT

## 2020-09-11 RX ORDER — HYDROCODONE BITARTRATE AND ACETAMINOPHEN 7.5; 325 MG/15ML; MG/15ML
10 SOLUTION ORAL EVERY 6 HOURS PRN
Qty: 236 ML | Refills: 0 | Status: SHIPPED | OUTPATIENT
Start: 2020-09-11 | End: 2022-05-18 | Stop reason: ALTCHOICE

## 2020-09-11 RX ORDER — ONDANSETRON 4 MG/1
4 TABLET, FILM COATED ORAL EVERY 6 HOURS PRN
Status: DISCONTINUED | OUTPATIENT
Start: 2020-09-11 | End: 2020-09-12 | Stop reason: HOSPADM

## 2020-09-11 RX ADMIN — ATORVASTATIN CALCIUM 40 MG: 20 TABLET, FILM COATED ORAL at 09:09

## 2020-09-11 RX ADMIN — LEVALBUTEROL HYDROCHLORIDE 0.63 MG: 0.63 SOLUTION RESPIRATORY (INHALATION) at 07:09

## 2020-09-11 RX ADMIN — LOSARTAN POTASSIUM 25 MG: 25 TABLET ORAL at 09:09

## 2020-09-11 RX ADMIN — LEVALBUTEROL HYDROCHLORIDE 0.63 MG: 0.63 SOLUTION RESPIRATORY (INHALATION) at 02:09

## 2020-09-11 RX ADMIN — ENOXAPARIN SODIUM 40 MG: 40 INJECTION SUBCUTANEOUS at 05:09

## 2020-09-11 NOTE — PLAN OF CARE
Patient discharging home today with no needs.    Future Appointments   Date Time Provider Department Center   9/21/2020  9:30 AM Devyn Miles MD [facility]          09/11/20 1647   Final Note   Assessment Type Final Discharge Note   Anticipated Discharge Disposition Home   Hospital Follow Up  Appt(s) scheduled? Yes   Right Care Referral Info   Post Acute Recommendation No Care   Post-Acute Status   Post-Acute Authorization Other   Other Status No Post-Acute Service Needs

## 2020-09-11 NOTE — PLAN OF CARE
POC reviewed w/ pt and daughter, verbalized understanding. Pt AAOx4. VSS on RA. x7 lap sites w/ dermabond. Pt stated pain at 6/10, but refused pain meds. Pt up to bathroom w/ adequate UOP overnight. Pt ambulated in room and through halls overnight. Pt tolerated clear liquid diet. No c/o nausea overnight. Daughter at bedside, helps w/ most translations. Pt slept b/w care. Frequent rounds made for pt safety. Call light in reach. Bed in lowest position and locked. WCTM.

## 2020-09-11 NOTE — OP NOTE
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 Same    Anesthesia: General/MAC    Operative Findings:   1. No evidence of distant intraperitoneal metastases   2. Large mass originating from the esophageal wall    Procedures:  1. Excision esophageal tumor  2. Closure of esophagotomy via abdominal approach  3. Nissen fundoplication    Estimated Blood Loss (EBL):  10 mL           Indications:  Meredith Cuellar presents for the above procedures.  Risks and benefits were reviewed including bleeding, infection, damage to local structures, cardiovascular and pulmonary complications, need for additional procedures, death, and imponderables.  She understands and gave informed consent to proceed.    Details:  The patient was transported to the operating room and satisfactory anesthesia established.  Preoperative antibiotics were administered.  The patient was placed in the supine position and extremities were padded and protected throughout.  A lucero catheter was placed. An appropriate timeout was performed.    Peritoneal access is obtained with the Optiview trocar and the abdomen is insufflated and inspected.  Robotic trocars are placed across the abdomen in the [place]en robot is docked after a liver retractor disposition and to elevate the left bryan liver.  The gastrohepatic ligament is divided to expose the lesser sac and the right efrain.  There is a sizable visible tumor and as I dissected up the right efrain and get into the mediastinum it becomes clear that this tumor is really  esophageal in origin not gastric.  Peritoneum overlying the esophagus is divided and the mediastinal esophagus is circumferentially mobilized to bring the entire GE junction further down into the abdomen.  The omentum is then divided along the greater curve moving superiorly to divide the short gastrics and mobilized the fundus of the stomach.  Attachments of this mass were then divided with cautery until it is essentially hanging directly off the distal esophagus to the patient's right.  Using cautery is then removed from the esophagus leaving a small defect.  This esophagoscopy is repaired with a series of interrupted 3 0 Monocryl sutures longitudinal.  An endoscopy is performed which reveals a patent esophagus without any narrowing.    To protect this repair number vent reflux due to esophageal mobilization and reduction of the GE junction a floppy Nissen fundoplication is performed over a 52 Syriac bougie dilator.  The fundus having artery then mobilized this brought posteriorly to wrap around the esophageal repair.  The fundoplication is performed with interrupted 2 0 silk suture, tacking the 1st 2 stitches to the esophagus to prevent slippage.  The bougie is removed and again an EGD is performed by inserting the endoscope through the oropharynx, through the esophagus and into the stomach and duodenum.  This then drawn back to the area of the repair and the wrap and examined.  This is not angulated or narrowed.    The tumors then removed through the midline trocar and an Endo-Catch bag.  This site is closed with interrupted Vicryl and the remaining 8 mm trocar sites are closed with 4 0 Monocryl and Dermabond.    All needle, lap, and sponge counts were reported as correct. I communicated the intraoperative findings with the family following the procedure.     Implants: * No implants in log *    Specimens:   Specimen (12h ago, onward)    None                  Condition: Good    Disposition: PACU - hemodynamically  stable.    Attestation: I was present and scrubbed for the entire procedure.

## 2020-09-11 NOTE — DISCHARGE SUMMARY
[facility]  DISCHARGE SUMMARY  General Surgery      Admit Date:  9/9/2020    Discharge Date and Time:  9/11/2020  4 PM    Attending Physician:  Devyn Miles MD     Discharge Provider:  Jess Duarte MD     Reason for Admission:  Benign gastrointestinal stromal tumor (GIST)     Procedures Performed:  Procedure(s) (LRB):  XI ROBOTIC GASTRECTOMY/GIST PROXIMAL STOMACH (N/A)  LYSIS, ADHESIONS, LAPAROSCOPIC  EGD (ESOPHAGOGASTRODUODENOSCOPY) (N/A)    Hospital Course:  Please see the preoperative H&P and other available documentation for full details related to history prior to this admission.  Briefly, Meredith Cuellar is a 65 y.o. female who was admitted following scheduled elective surgery for Benign gastrointestinal stromal tumor (GIST)    Following a complete preoperative discussion of the risks and benefits of surgery with signed informed consent, the patient was taken to the operating room on 9/9/2020 and underwent the above stated procedures.  The patient tolerated surgery well and there were no complications.  Please see the operative report for full intraoperative findings and details.      Postoperatively, the patient did well and was transferred from the PACU to the floor in stable condition where they had a stable and uncomplicated hospital course. POD1 the patient had some nausea and pain that was managed well with PO and PCA medications. She was progressed to PO medications, her diet was advanced, and she and her family received teaching on her post-nissen soft diet.     Labs and vital signs remained stable and appropriate throughout course.  Diet was advanced as tolerated and the patient's pain was controlled on oral pain medications without problem.  Currently, the patient is doing well at 2 Days Post-Op and is stable and appropriate for discharge home at this time.      Physical Exam   Constitutional: She is oriented to person, place, and time and well-developed, well-nourished,  and in no distress.   HENT:   Head: Normocephalic and atraumatic.   Eyes: Conjunctivae and EOM are normal.   Neck: Normal range of motion. Neck supple.   Cardiovascular: Normal rate.   Pulmonary/Chest: Effort normal. No respiratory distress.   Abdominal: Soft. Bowel sounds are normal. She exhibits no distension. There is abdominal tenderness.   Incisions clean, dry, intact. Minimal tenderness.    Neurological: She is alert and oriented to person, place, and time.   Skin: Skin is warm.   Psychiatric: Affect and judgment normal.       Consults:  None.    Significant Diagnostic Studies:   Recent Labs   Lab 09/10/20  0718 09/11/20  0512   WBC 9.85 8.22   HGB 11.2* 10.4*   HCT 35.0* 33.7*    201     Recent Labs   Lab 09/10/20  0718 09/11/20  0512    142   K 4.4 3.6   * 112*   CO2 24 24   BUN 10 9   CREATININE 0.6 0.7   * 116*   CALCIUM 8.9 8.7   MG 2.0 1.9   PHOS 3.5 2.9   AST 71* 43*   * 77*   ALKPHOS 101 103   BILITOT 0.5 0.6   PROT 6.3 6.1   ALBUMIN 3.5 3.4*   No results for input(s): INR, PTT, LABHEPA, LACTATE, TROPONINI, CPK, CPKMB, MB, BNP in the last 72 hours.No results for input(s): PH, PCO2, PO2, HCO3 in the last 72 hours.      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       Discharged Condition:  Good    Disposition:  Home or Self Care    Follow Up/Patient Instructions:     Medications:  Reconciled Home Medications:    Current Discharge Medication List      START taking these medications    Details   enoxaparin (LOVENOX) 40 mg/0.4 mL Syrg Inject 0.4 mLs (40 mg total) into the skin once daily. for 14 days  Qty: 5.6 mL, Refills: 0      hydrocodone-acetaminophen (HYCET) solution 7.5-325 mg/15mL Take 10 mLs by mouth every 6 (six) hours as  needed.  Qty: 236 mL, Refills: 0    Comments: Quantity prescribed more than 7 day supply? No         CONTINUE these medications which have NOT CHANGED    Details   atorvastatin (LIPITOR) 40 MG tablet Take 40 mg by mouth once daily.      ergocalciferol (ERGOCALCIFEROL) 50,000 unit Cap       losartan (COZAAR) 25 MG tablet Take 1 tablet (25 mg total) by mouth once daily.  Qty: 90 tablet, Refills: 1    Comments: .  Associated Diagnoses: Essential hypertension      HYDROcodone-acetaminophen (NORCO) 5-325 mg per tablet Take 1 tablet by mouth every 6 (six) hours as needed for Pain.  Qty: 10 tablet, Refills: 0    Comments: Quantity prescribed more than 7 day supply? No           Discharge Procedure Orders   Diet Dysphagia Soft     Lifting restrictions   Order Comments: Do not lift anything >10lbs for 6 weeks.     Notify your health care provider if you experience any of the following:  temperature >100.4     Notify your health care provider if you experience any of the following:  persistent nausea and vomiting or diarrhea     Notify your health care provider if you experience any of the following:  severe uncontrolled pain     Notify your health care provider if you experience any of the following:  redness, tenderness, or signs of infection (pain, swelling, redness, odor or green/yellow discharge around incision site)     Notify your health care provider if you experience any of the following:  difficulty breathing or increased cough     Notify your health care provider if you experience any of the following:  severe persistent headache     Notify your health care provider if you experience any of the following:  worsening rash     No dressing needed   Order Comments: OK to shower, do not soak or submerge in water.     Activity as tolerated     Follow-up Information     Devyn Miles MD On 9/21/2020.    Specialties: General Surgery, Surgical Oncology  Why: SURGICAL FOLLOW UP  Contact information:  [address]  [facility] [address]  [phone]                   Jess Duarte MD

## 2020-09-11 NOTE — PROGRESS NOTES
Food & Nutrition  Education    Diet Education: Nissen   Learners: Pt and daughter       Nutrition Education provided with handouts: Diet After Gastric Surgery       Comments: S/p robotic esophageal mass enuclation and nissen fundoplication 9/9. Spoke with daughter via phone with pt present. No questions or concerns about post nissen diet. Noted pt diet advanced to soft diet this AM.       Pt reports UBW 114lbs. Pt appears nourished.       Follow-Up: Yes     Please consult as needed.

## 2020-09-11 NOTE — PLAN OF CARE
Patient alert and oriented.  Able to make needs known.  Ambulated up and down the dumont several times independently. Refused pain medicine.  She says she doesn't have that much pain. Incision clean and dry.  She and her daughter comprehends her plan and her discharge instructions. PIV's removed.  Awaiting transport for discharge.

## 2020-09-13 LAB
BLD PROD TYP BPU: NORMAL
BLD PROD TYP BPU: NORMAL
BLOOD UNIT EXPIRATION DATE: NORMAL
BLOOD UNIT EXPIRATION DATE: NORMAL
BLOOD UNIT TYPE CODE: 6200
BLOOD UNIT TYPE CODE: 6200
BLOOD UNIT TYPE: NORMAL
BLOOD UNIT TYPE: NORMAL
CODING SYSTEM: NORMAL
CODING SYSTEM: NORMAL
DISPENSE STATUS: NORMAL
DISPENSE STATUS: NORMAL
TRANS ERYTHROCYTES VOL PATIENT: NORMAL ML
TRANS ERYTHROCYTES VOL PATIENT: NORMAL ML

## 2020-09-21 ENCOUNTER — OFFICE VISIT (OUTPATIENT)
Dept: SURGERY | Facility: CLINIC | Age: 65
End: 2020-09-21
Payer: MEDICARE

## 2020-09-21 VITALS
HEART RATE: 69 BPM | WEIGHT: 112.69 LBS | BODY MASS INDEX: 20.74 KG/M2 | SYSTOLIC BLOOD PRESSURE: 112 MMHG | TEMPERATURE: 98 F | HEIGHT: 62 IN | DIASTOLIC BLOOD PRESSURE: 64 MMHG

## 2020-09-21 DIAGNOSIS — D21.4 BENIGN GASTROINTESTINAL STROMAL TUMOR (GIST): Primary | ICD-10-CM

## 2020-09-21 LAB — FINAL PATHOLOGIC DIAGNOSIS: NORMAL

## 2020-09-21 PROCEDURE — 99024 POSTOP FOLLOW-UP VISIT: CPT | Mod: S$GLB,,, | Performed by: SURGERY

## 2020-09-21 PROCEDURE — 99024 PR POST-OP FOLLOW-UP VISIT: ICD-10-PCS | Mod: S$GLB,,, | Performed by: SURGERY

## 2020-09-21 PROCEDURE — 99999 PR PBB SHADOW E&M-EST. PATIENT-LVL III: ICD-10-PCS | Mod: PBBFAC,,, | Performed by: SURGERY

## 2020-09-21 PROCEDURE — 99999 PR PBB SHADOW E&M-EST. PATIENT-LVL III: CPT | Mod: PBBFAC,,, | Performed by: SURGERY

## 2020-09-21 NOTE — PROGRESS NOTES
GENERAL SURGERY CLINIC  HISTORY AND PHYSICAL      HPI:  Patient is a 65 y.o. female with GIST of the proximal stomach vs distal esophagus. Patient has undergone extensive workup for severe epigastric and back pain that has been worsening over the past several years. This pain is described as sharp, is unrelated to eating, and seems to be worse at night. Its character seems similar to the pain she experienced with prior episodes for pancreatitis - presumed biliary pancreatitis. She underwent cholecystectomy about 10 years ago; however, she states she had a subsequent episode of pancreatitis last Nov. Most recent lipase in 3/2020 was elevated (77).     As part of her workup for the above pain, she underwent a CT scan that revealed a roughly 3 x 2 cm mass adjacent to the GE junction. Subsequent EUS was performed revealing an intramural mass that appeared to originate from the muscularis propria. FNA was performed and final path consistent with GIST.      Interval History:   Patient presents to clinic today for f/u s/p robotic laparoscopic assisted enucleation of esophageal mass, robotic nissen fundoplication, laparoscopic lysis of adhesions. She is here today with her daughter. She denies abdominal pain. Her incisions are healing well with minor bruising. She is eating a soft diet, nausea under control - only slight nausea in mornings.    Her main complaint today is back pain. She takes her pain meds at night for the back pain. Also reports taking a steroid that she had from another doctor for her pain. She had the pain prior to the surgery, but it has increased in severity post-op. It is a sharp stabbing.  Also endorses constipation, not a new problem for her for which she uses enema and stool softeners.   Pathology still pending.     ROS:   Review of Systems   Constitutional: Positive for malaise/fatigue. Negative for chills and fever.   Respiratory: Negative for cough and shortness of breath.    Cardiovascular:  Negative for chest pain and palpitations.   Gastrointestinal: Positive for constipation. Negative for abdominal pain, diarrhea, nausea and vomiting.   Musculoskeletal: Positive for back pain.   Neurological: Negative for dizziness and headaches.        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       Past Surgical History:   Procedure Laterality Date    APPENDECTOMY      CHOLECYSTECTOMY      ENDOSCOPIC ULTRASOUND OF UPPER GASTROINTESTINAL TRACT N/A 11/26/2019    Procedure: ULTRASOUND, UPPER GI TRACT, ENDOSCOPIC;  Surgeon: Britton Martinez MD;  Location: [facility] ENDO;  Service: Endoscopy;  Laterality: N/A;    ENDOSCOPIC ULTRASOUND OF UPPER GASTROINTESTINAL TRACT N/A 6/24/2020    Procedure: ULTRASOUND, UPPER GI TRACT, ENDOSCOPIC;  Surgeon: Delfino Jasso MD;  Location: [facility] (2ND FLR);  Service: Endoscopy;  Laterality: N/A;  EUS (linear) for abnormal CT scan. Urgent. Can be done by any [facility] physician.  Britton Martinez MD  Covid-19 test 6/22/20 at [facility]  Speaks Turkmen;  offered and declined; daughter will accompany patient as interp    ESOPHAGOGASTRODUODENOSCOPY N/A 9/9/2020    Procedure: EGD (ESOPHAGOGASTRODUODENOSCOPY);  Surgeon: Devyn Miles MD;  Location: [facility] OR [address] [place] [address];  Service: General;  Laterality: N/A;    HYSTERECTOMY  2012    elective    LAPAROSCOPIC LYSIS OF ADHESIONS  9/9/2020    Procedure: LYSIS, ADHESIONS, LAPAROSCOPIC;  Surgeon: Devyn Miles MD;  Location: [facility] OR [address] [place] [address];  Service: General;;    ROBOT-ASSISTED SURGICAL REMOVAL OF STOMACH USING DA RACHEL XI N/A 9/9/2020    Procedure: XI ROBOTIC GASTRECTOMY/GIST PROXIMAL STOMACH;  Surgeon: Devyn Miles MD;  Location: [facility] OR [address] [place] [address];  Service: General;  Laterality: N/A;       Social History      Socioeconomic History    Marital status:      Spouse name: Not on file    Number of children: Not on file    Years of education: Not on file    Highest education level: Not on file   Occupational History    Not on file   Social Needs    Financial resource strain: Not on file    Food insecurity     Worry: Not on file     Inability: Not on file    Transportation needs     Medical: Not on file     Non-medical: Not on file   Tobacco Use    Smoking status: Never Smoker    Smokeless tobacco: Never Used   Substance and Sexual Activity    Alcohol use: No    Drug use: No    Sexual activity: Yes     Partners: Male   Lifestyle    Physical activity     Days per week: Not on file     Minutes per session: Not on file    Stress: Not on file   Relationships    Social connections     Talks on phone: Not on file     Gets together: Not on file     Attends Bahai service: Not on file     Active member of club or organization: Not on file     Attends meetings of clubs or organizations: Not on file     Relationship status: Not on file   Other Topics Concern    Not on file   Social History Narrative    Not on file       Review of patient's allergies indicates:  No Known Allergies      PHYSICAL EXAM:  There were no vitals filed for this visit.    General: NAD  Neuro: AAOx3  Cardio: RRR  Resp: Breathing even and unlabored  Abd: Soft, ND, NT, no palpable mass, BS+. Incisions clean, dry, intact. Healing well. Minor bruising.   Ext: Warm and well perfused      PERTINENT LABS:  Reviewed.       PERTINENT IMAGING:  Reviewed.       ASSESSMENT/PLAN:  Mrs. Cuellar presents to clinic today for f/u s/p robotic laparoscopic assisted enucleation of esophageal mass, robotic nissen fundoplication, laparoscopic lysis of adhesions on 9/9/20.  - f/u pathology       Jess Duarte MD  [facility] PGY1  Pager: [phone]

## 2020-09-21 NOTE — Clinical Note
Can we set up Ms. Cuellar for CT C/A/P 1 year for surveillance. I will call her family today with her path results.

## 2020-11-09 ENCOUNTER — LAB VISIT (OUTPATIENT)
Dept: LAB | Facility: HOSPITAL | Age: 65
End: 2020-11-09
Attending: STUDENT IN AN ORGANIZED HEALTH CARE EDUCATION/TRAINING PROGRAM
Payer: MEDICARE

## 2020-11-09 ENCOUNTER — OFFICE VISIT (OUTPATIENT)
Dept: GASTROENTEROLOGY | Facility: CLINIC | Age: 65
End: 2020-11-09
Payer: MEDICARE

## 2020-11-09 VITALS
DIASTOLIC BLOOD PRESSURE: 79 MMHG | SYSTOLIC BLOOD PRESSURE: 134 MMHG | HEART RATE: 72 BPM | WEIGHT: 110.88 LBS | HEIGHT: 62 IN | BODY MASS INDEX: 20.4 KG/M2

## 2020-11-09 DIAGNOSIS — Z01.818 PRE-OP TESTING: ICD-10-CM

## 2020-11-09 DIAGNOSIS — M54.9 BACK PAIN, UNSPECIFIED BACK LOCATION, UNSPECIFIED BACK PAIN LATERALITY, UNSPECIFIED CHRONICITY: ICD-10-CM

## 2020-11-09 DIAGNOSIS — R13.10 DYSPHAGIA, UNSPECIFIED TYPE: Primary | ICD-10-CM

## 2020-11-09 DIAGNOSIS — R93.2 ABNORMAL FINDINGS ON DIAGNOSTIC IMAGING OF LIVER AND BILIARY TRACT: ICD-10-CM

## 2020-11-09 DIAGNOSIS — C49.A0 GASTROINTESTINAL STROMAL TUMOR (GIST): ICD-10-CM

## 2020-11-09 DIAGNOSIS — R13.10 DYSPHAGIA, UNSPECIFIED TYPE: ICD-10-CM

## 2020-11-09 LAB
ALBUMIN SERPL BCP-MCNC: 3.9 G/DL (ref 3.5–5.2)
ALP SERPL-CCNC: 107 U/L (ref 55–135)
ALT SERPL W/O P-5'-P-CCNC: 14 U/L (ref 10–44)
ANION GAP SERPL CALC-SCNC: 7 MMOL/L (ref 8–16)
AST SERPL-CCNC: 16 U/L (ref 10–40)
BASOPHILS # BLD AUTO: 0.05 K/UL (ref 0–0.2)
BASOPHILS NFR BLD: 1.1 % (ref 0–1.9)
BILIRUB SERPL-MCNC: 0.5 MG/DL (ref 0.1–1)
BUN SERPL-MCNC: 16 MG/DL (ref 8–23)
CALCIUM SERPL-MCNC: 9.7 MG/DL (ref 8.7–10.5)
CHLORIDE SERPL-SCNC: 108 MMOL/L (ref 95–110)
CO2 SERPL-SCNC: 30 MMOL/L (ref 23–29)
CREAT SERPL-MCNC: 0.7 MG/DL (ref 0.5–1.4)
DIFFERENTIAL METHOD: ABNORMAL
EOSINOPHIL # BLD AUTO: 0.2 K/UL (ref 0–0.5)
EOSINOPHIL NFR BLD: 3.2 % (ref 0–8)
ERYTHROCYTE [DISTWIDTH] IN BLOOD BY AUTOMATED COUNT: 14 % (ref 11.5–14.5)
EST. GFR  (AFRICAN AMERICAN): >60 ML/MIN/1.73 M^2
EST. GFR  (NON AFRICAN AMERICAN): >60 ML/MIN/1.73 M^2
GLUCOSE SERPL-MCNC: 103 MG/DL (ref 70–110)
HCT VFR BLD AUTO: 39.6 % (ref 37–48.5)
HGB BLD-MCNC: 12.4 G/DL (ref 12–16)
IMM GRANULOCYTES # BLD AUTO: 0.01 K/UL (ref 0–0.04)
IMM GRANULOCYTES NFR BLD AUTO: 0.2 % (ref 0–0.5)
LYMPHOCYTES # BLD AUTO: 1.2 K/UL (ref 1–4.8)
LYMPHOCYTES NFR BLD: 26.3 % (ref 18–48)
MCH RBC QN AUTO: 24.7 PG (ref 27–31)
MCHC RBC AUTO-ENTMCNC: 31.3 G/DL (ref 32–36)
MCV RBC AUTO: 79 FL (ref 82–98)
MONOCYTES # BLD AUTO: 0.4 K/UL (ref 0.3–1)
MONOCYTES NFR BLD: 8.9 % (ref 4–15)
NEUTROPHILS # BLD AUTO: 2.8 K/UL (ref 1.8–7.7)
NEUTROPHILS NFR BLD: 60.3 % (ref 38–73)
NRBC BLD-RTO: 0 /100 WBC
PLATELET # BLD AUTO: 227 K/UL (ref 150–350)
PMV BLD AUTO: 9.4 FL (ref 9.2–12.9)
POTASSIUM SERPL-SCNC: 4.1 MMOL/L (ref 3.5–5.1)
PROT SERPL-MCNC: 7 G/DL (ref 6–8.4)
RBC # BLD AUTO: 5.03 M/UL (ref 4–5.4)
SODIUM SERPL-SCNC: 145 MMOL/L (ref 136–145)
WBC # BLD AUTO: 4.63 K/UL (ref 3.9–12.7)

## 2020-11-09 PROCEDURE — 99214 OFFICE O/P EST MOD 30 MIN: CPT | Mod: S$GLB,,, | Performed by: STUDENT IN AN ORGANIZED HEALTH CARE EDUCATION/TRAINING PROGRAM

## 2020-11-09 PROCEDURE — 99214 PR OFFICE/OUTPT VISIT, EST, LEVL IV, 30-39 MIN: ICD-10-PCS | Mod: S$GLB,,, | Performed by: STUDENT IN AN ORGANIZED HEALTH CARE EDUCATION/TRAINING PROGRAM

## 2020-11-09 PROCEDURE — 3008F PR BODY MASS INDEX (BMI) DOCUMENTED: ICD-10-PCS | Mod: CPTII,S$GLB,, | Performed by: STUDENT IN AN ORGANIZED HEALTH CARE EDUCATION/TRAINING PROGRAM

## 2020-11-09 PROCEDURE — 99999 PR PBB SHADOW E&M-EST. PATIENT-LVL III: ICD-10-PCS | Mod: PBBFAC,,, | Performed by: STUDENT IN AN ORGANIZED HEALTH CARE EDUCATION/TRAINING PROGRAM

## 2020-11-09 PROCEDURE — 99999 PR PBB SHADOW E&M-EST. PATIENT-LVL III: CPT | Mod: PBBFAC,,, | Performed by: STUDENT IN AN ORGANIZED HEALTH CARE EDUCATION/TRAINING PROGRAM

## 2020-11-09 PROCEDURE — 1125F AMNT PAIN NOTED PAIN PRSNT: CPT | Mod: S$GLB,,, | Performed by: STUDENT IN AN ORGANIZED HEALTH CARE EDUCATION/TRAINING PROGRAM

## 2020-11-09 PROCEDURE — 1159F PR MEDICATION LIST DOCUMENTED IN MEDICAL RECORD: ICD-10-PCS | Mod: S$GLB,,, | Performed by: STUDENT IN AN ORGANIZED HEALTH CARE EDUCATION/TRAINING PROGRAM

## 2020-11-09 PROCEDURE — 3075F PR MOST RECENT SYSTOLIC BLOOD PRESS GE 130-139MM HG: ICD-10-PCS | Mod: CPTII,S$GLB,, | Performed by: STUDENT IN AN ORGANIZED HEALTH CARE EDUCATION/TRAINING PROGRAM

## 2020-11-09 PROCEDURE — 3078F PR MOST RECENT DIASTOLIC BLOOD PRESSURE < 80 MM HG: ICD-10-PCS | Mod: CPTII,S$GLB,, | Performed by: STUDENT IN AN ORGANIZED HEALTH CARE EDUCATION/TRAINING PROGRAM

## 2020-11-09 PROCEDURE — 3075F SYST BP GE 130 - 139MM HG: CPT | Mod: CPTII,S$GLB,, | Performed by: STUDENT IN AN ORGANIZED HEALTH CARE EDUCATION/TRAINING PROGRAM

## 2020-11-09 PROCEDURE — 3078F DIAST BP <80 MM HG: CPT | Mod: CPTII,S$GLB,, | Performed by: STUDENT IN AN ORGANIZED HEALTH CARE EDUCATION/TRAINING PROGRAM

## 2020-11-09 PROCEDURE — 80053 COMPREHEN METABOLIC PANEL: CPT

## 2020-11-09 PROCEDURE — 36415 COLL VENOUS BLD VENIPUNCTURE: CPT

## 2020-11-09 PROCEDURE — 1159F MED LIST DOCD IN RCRD: CPT | Mod: S$GLB,,, | Performed by: STUDENT IN AN ORGANIZED HEALTH CARE EDUCATION/TRAINING PROGRAM

## 2020-11-09 PROCEDURE — 1125F PR PAIN SEVERITY QUANTIFIED, PAIN PRESENT: ICD-10-PCS | Mod: S$GLB,,, | Performed by: STUDENT IN AN ORGANIZED HEALTH CARE EDUCATION/TRAINING PROGRAM

## 2020-11-09 PROCEDURE — 85025 COMPLETE CBC W/AUTO DIFF WBC: CPT

## 2020-11-09 PROCEDURE — 1101F PT FALLS ASSESS-DOCD LE1/YR: CPT | Mod: CPTII,S$GLB,, | Performed by: STUDENT IN AN ORGANIZED HEALTH CARE EDUCATION/TRAINING PROGRAM

## 2020-11-09 PROCEDURE — 3008F BODY MASS INDEX DOCD: CPT | Mod: CPTII,S$GLB,, | Performed by: STUDENT IN AN ORGANIZED HEALTH CARE EDUCATION/TRAINING PROGRAM

## 2020-11-09 PROCEDURE — 1101F PR PT FALLS ASSESS DOC 0-1 FALLS W/OUT INJ PAST YR: ICD-10-PCS | Mod: CPTII,S$GLB,, | Performed by: STUDENT IN AN ORGANIZED HEALTH CARE EDUCATION/TRAINING PROGRAM

## 2020-11-09 RX ORDER — OMEPRAZOLE 20 MG/1
20 CAPSULE, DELAYED RELEASE ORAL
Qty: 60 CAPSULE | Refills: 3 | Status: SHIPPED | OUTPATIENT
Start: 2020-11-09 | End: 2022-05-18 | Stop reason: DRUGHIGH

## 2020-11-09 NOTE — PROGRESS NOTES
[facility] Consultation Note    Reason for Consult:  The primary encounter diagnosis was Dysphagia, unspecified type. Diagnoses of Gastrointestinal stromal tumor (GIST), Back pain, unspecified back location, unspecified back pain laterality, unspecified chronicity, and Abnormal findings on diagnostic imaging of liver and biliary tract  were also pertinent to this visit.    PCP:   Bebo Cuellar       Referring MD:  No referring provider defined for this encounter.    HPI:  This is a 65 y.o. female here for evaluation of dysphagia and back pain.    The patient is accompanied by her daughter who helps translate for todays visit.      The patient notes that she has had back pain for the last year.  The pain is constant, but worse during the evening hours.  She also reports a fullness in her epigastrium that is bothersome.  This is mostly when she is in the sitting position, but the fullness resolves when she lays back.   She denies any nausea or vomiting.    The patient also reports difficulty swallowing various foods.  She feels that certain solid foods would get stuck in her throat and she needs to regurgitate the food.  This began roughly one week after her recent surgery.  She underwent GIST removal in September of this year.      Recent EUS in June of this year was notable for a normal esophagus.     She denies any symptoms of heartburn or acid reflux.  Denies any pain with swallowing.      Of note, the patient was informed by her primary care provider that she had multiple lab abnormalities and was advised to follow up with her gastroenterologist.  I do not have these labs for review.    ROS:  Constitutional: No fevers, chills, No weight loss  ENT: No allergies  CV: No chest pain  Pulm: No cough, No shortness of breath  Ophtho: No vision changes  GI: see HPI  Derm: No rash  Heme: No lymphadenopathy, No bruising  MSK: No arthritis  : No dysuria, No hematuria  Endo: No hot or cold  "intolerance  Neuro: No syncope, No seizure  Psych: No anxiety, No depression    Medical History:  has a past medical history of Abdominal pain, Cholelithiasis, Constipation, chronic, Dilated bile duct, Headache, Hypertension, Pancreatitis, and Subepithelial esophageal lesion.    Surgical History:  has a past surgical history that includes Cholecystectomy; Appendectomy; Hysterectomy (2012); Endoscopic ultrasound of upper gastrointestinal tract (N/A, 11/26/2019); Endoscopic ultrasound of upper gastrointestinal tract (N/A, 6/24/2020); Robot-assisted surgical removal of stomach using da Huyen Xi (N/A, 9/9/2020); Laparoscopic lysis of adhesions (9/9/2020); and Esophagogastroduodenoscopy (N/A, 9/9/2020).    Family History: family history is not on file..     Social History:  reports that she has never smoked. She has never used smokeless tobacco. She reports that she does not drink alcohol or use drugs.    Review of patient's allergies indicates:  No Known Allergies    Current Outpatient Rx   Medication Sig Dispense Refill    atorvastatin (LIPITOR) 40 MG tablet Take 40 mg by mouth once daily.      ergocalciferol (ERGOCALCIFEROL) 50,000 unit Cap       losartan (COZAAR) 25 MG tablet Take 1 tablet (25 mg total) by mouth once daily. 90 tablet 1    hydrocodone-acetaminophen (HYCET) solution 7.5-325 mg/15mL Take 10 mLs by mouth every 6 (six) hours as needed. (Patient not taking: Reported on 11/9/2020) 236 mL 0    HYDROcodone-acetaminophen (NORCO) 5-325 mg per tablet Take 1 tablet by mouth every 6 (six) hours as needed for Pain. (Patient not taking: Reported on 9/21/2020) 10 tablet 0    omeprazole (PRILOSEC) 20 MG capsule Take 1 capsule (20 mg total) by mouth 2 (two) times daily before meals. 60 capsule 3       Objective Findings:    Vital Signs:  /79   Pulse 72   Ht 5' 2" (1.575 m)   Wt 50.3 kg (110 lb 14.3 oz)   BMI 20.28 kg/m²   Body mass index is 20.28 kg/m².    Physical Exam:  General Appearance: Well " appearing in no acute distress  Head:   Normocephalic, without obvious abnormality  Eyes:    No scleral icterus, EOMI  ENT: Neck supple, Lips, mucosa, and tongue normal; teeth and gums normal  Lungs: CTA bilaterally in anterior and posterior fields, no wheezes, no crackles.  Heart:  Regular rate and rhythm, S1, S2 normal, no murmurs heard  Abdomen: Soft, non tender, non distended with positive bowel sounds in all four quadrants. No hepatosplenomegaly, ascites, or mass  Extremities: 2+ pulses, no clubbing, cyanosis or edema  Skin: No rash  Neurologic: CN II-XII intact      Labs:  Lab Results   Component Value Date    WBC 8.22 09/11/2020    HGB 10.4 (L) 09/11/2020    HCT 33.7 (L) 09/11/2020     09/11/2020    ALT 77 (H) 09/11/2020    AST 43 (H) 09/11/2020     09/11/2020    K 3.6 09/11/2020     (H) 09/11/2020    CREATININE 0.7 09/11/2020    BUN 9 09/11/2020    CO2 24 09/11/2020    INR 0.9 10/19/2015       Imaging:  CT A/P 6/16/2020  Impression:     Abnormal soft tissue collection identified arising from or immediately adjacent to the stomach wall at the gastroesophageal junction.  Differential considerations include a nonspecific enlarged lymph node which versus gastric tumor such as a GIST.     This report was flagged in Epic as abnormal.    Endoscopy:    EUS 6/24/20  Impression: - Normal esophagus.   - Normal stomach.   - Normal examined duodenum.   - An intramural (subepithelial) lesion was found in   the gastroesophageal junction. It appeared to   originate from within the muscularis propria (Layer   4). Tissue was obtained from this exam, and results   are pending. However, the endosonographic   appearance is consistent with a stromal cell   (smooth muscle) neoplasm, of indeterminate   biological behavior. Fine needle biopsy performed.    GASTRIC WALL, EUS-FNA WITH ON-SITE ADEQUACY AND CELL BLOCK:   Consistent with gastrointestinal stromal tumor     Assessment:  1. Dysphagia, unspecified type     2. Gastrointestinal stromal tumor (GIST)    3. Back pain, unspecified back location, unspecified back pain laterality, unspecified chronicity    4. Abnormal findings on diagnostic imaging of liver and biliary tract          In summary, the patient is a 65-year-old female who presents to GI Clinic in the setting of dysphagia and back pain.    The patient reports a longstanding history of back pain dating back to approximately 1 year ago.  This pain is associated with some fullness in her epigastrium raises concern for reflux and/or esophagitis.  I have recommended patient begin twice daily PPI.  Additionally, her back pain does not sound gastrointestinal in etiology and she need to follow up with her primary care provider for further evaluation.  CT scan done earlier this year was notable for a gastrointestinal stromal tumor, but no other abnormalities to explain her back pain.    Interestingly, she reports dysphagia that began shortly after her recent GIST removal.  EGD done as part of her endoscopic ultrasound in June remarked on a normal esophagus.  Given the acuity of onset of present symptoms with both solids and liquids I am concerned about a motility abnormality.  I have scheduled her for an esophageal manometry.  I will also schedule a barium swallow to assess for luminal abnormalities.    I will obtain a CBC and CMP today.  Additionally I will reach out to her primary care provider to determine what lab abnormalities were of concern.    Follow up in about 3 months (around 2/9/2021).      Order summary:  Orders Placed This Encounter    FL Esophagram Complete    CBC Auto Differential    Comprehensive Metabolic Panel    Manometry esophageal    omeprazole (PRILOSEC) 20 MG capsule         Thank you so much for allowing me to participate in the care of Meredith Dickerson MD

## 2020-11-09 NOTE — PATIENT INSTRUCTIONS
-You will be scheduled for a barium swallow  -We will check labs today  -You will be scheduled for an esophageal manometry  -Begin taking omeprazole 20mg twice daily.

## 2020-11-11 ENCOUNTER — HOSPITAL ENCOUNTER (OUTPATIENT)
Dept: RADIOLOGY | Facility: HOSPITAL | Age: 65
Discharge: HOME | End: 2020-11-11
Attending: STUDENT IN AN ORGANIZED HEALTH CARE EDUCATION/TRAINING PROGRAM
Payer: MEDICARE

## 2020-11-11 DIAGNOSIS — R13.10 DYSPHAGIA, UNSPECIFIED TYPE: ICD-10-CM

## 2020-11-11 PROCEDURE — 74220 FL ESOPHAGRAM COMPLETE: ICD-10-PCS | Mod: 26,,, | Performed by: RADIOLOGY

## 2020-11-11 PROCEDURE — A9698 NON-RAD CONTRAST MATERIALNOC: HCPCS | Performed by: STUDENT IN AN ORGANIZED HEALTH CARE EDUCATION/TRAINING PROGRAM

## 2020-11-11 PROCEDURE — 25500020 PHARM REV CODE 255: Performed by: STUDENT IN AN ORGANIZED HEALTH CARE EDUCATION/TRAINING PROGRAM

## 2020-11-11 PROCEDURE — 74220 X-RAY XM ESOPHAGUS 1CNTRST: CPT | Mod: 26,,, | Performed by: RADIOLOGY

## 2020-11-11 PROCEDURE — 74220 X-RAY XM ESOPHAGUS 1CNTRST: CPT | Mod: TC

## 2020-11-11 RX ADMIN — BARIUM SULFATE 340 ML: 0.6 SUSPENSION ORAL at 08:11

## 2020-12-05 ENCOUNTER — LAB VISIT (OUTPATIENT)
Dept: URGENT CARE | Facility: CLINIC | Age: 65
End: 2020-12-05
Payer: MEDICARE

## 2020-12-05 DIAGNOSIS — Z01.818 PRE-OP TESTING: ICD-10-CM

## 2020-12-05 PROCEDURE — U0003 INFECTIOUS AGENT DETECTION BY NUCLEIC ACID (DNA OR RNA); SEVERE ACUTE RESPIRATORY SYNDROME CORONAVIRUS 2 (SARS-COV-2) (CORONAVIRUS DISEASE [COVID-19]), AMPLIFIED PROBE TECHNIQUE, MAKING USE OF HIGH THROUGHPUT TECHNOLOGIES AS DESCRIBED BY CMS-2020-01-R: HCPCS

## 2020-12-06 LAB — SARS-COV-2 RNA RESP QL NAA+PROBE: NOT DETECTED

## 2021-01-31 NOTE — PROGRESS NOTES
Cardiology Consult  8/5/2020  9:48 AM    Attending Cardiologist: Dharmesh Cuellar M.D.  Primary Care Provider: Bebo Cuellar MD  Chief Complaint/Reason For Consultation:  Preoperative evaluation      Problem list  Patient Active Problem List   Diagnosis    Headache(784.0)    Pancreatitis    Hypertension    Chronic GERD    Leukocytosis    Gastrointestinal stromal tumor (GIST)       CC:  Preoperative evaluation    HPI:  Meredith Cuellar is a 65 y.o.year-old female with hypertension, hyperlipidemia with GIST tumor who scheduled for surgery on August 25th.  She is referred here for surgical clearance.  She denies any prior cardiac problems.  She denies any angina, dyspnea on exertion, palpitation or syncope.  Her blood pressure is well controlled on losartan 25 mg daily.  She does not smoke or drink.  There is no family history for premature coronary disease.    Medications  Current Outpatient Medications   Medication Sig Dispense Refill    atorvastatin (LIPITOR) 40 MG tablet Take 40 mg by mouth once daily.      HYDROcodone-acetaminophen (NORCO) 5-325 mg per tablet Take 1 tablet by mouth every 6 (six) hours as needed for Pain. 10 tablet 0    losartan (COZAAR) 25 MG tablet 25 mg once daily.       ergocalciferol (ERGOCALCIFEROL) 50,000 unit Cap        No current facility-administered medications for this visit.       Prior to Admission medications    Medication Sig Start Date End Date Taking? Authorizing Provider   atorvastatin (LIPITOR) 40 MG tablet Take 40 mg by mouth once daily.   Yes Historical Provider, MD   HYDROcodone-acetaminophen (NORCO) 5-325 mg per tablet Take 1 tablet by mouth every 6 (six) hours as needed for Pain. 3/2/20  Yes Rubi Lua PA-C   losartan (COZAAR) 25 MG tablet 25 mg once daily.  8/7/18  Yes Historical Provider, MD   ergocalciferol (ERGOCALCIFEROL) 50,000 unit Cap  7/23/20   Historical Provider, MD         History  Past Medical History:   Diagnosis Date    Abdominal  Patient brought back to room. Reviewed screening/triage note.     3 months post partum. Not breast feeding. States stomach feels \"bloated\"                    pain     Cholelithiasis     Constipation, chronic     Dilated bile duct     Headache     Hypertension     Pancreatitis     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     Past Surgical History:   Procedure Laterality Date    APPENDECTOMY      CHOLECYSTECTOMY      ENDOSCOPIC ULTRASOUND OF UPPER GASTROINTESTINAL TRACT N/A 11/26/2019    Procedure: ULTRASOUND, UPPER GI TRACT, ENDOSCOPIC;  Surgeon: Britton Martinez MD;  Location: [facility] ENDO;  Service: Endoscopy;  Laterality: N/A;    ENDOSCOPIC ULTRASOUND OF UPPER GASTROINTESTINAL TRACT N/A 6/24/2020    Procedure: ULTRASOUND, UPPER GI TRACT, ENDOSCOPIC;  Surgeon: Delfino Jasso MD;  Location: [facility] ENDO (2ND FLR);  Service: Endoscopy;  Laterality: N/A;  EUS (linear) for abnormal CT scan. Urgent. Can be done by any [facility] physician.  Britton Martinez MD  Covid-19 test 6/22/20 at [facility] - pg  Speaks Guamanian;  offered and declined; daughter will accompany patient as interp    HYSTERECTOMY  2012    elective     Social History     Socioeconomic History    Marital status:      Spouse name: Not on file    Number of children: Not on file    Years of education: Not on file    Highest education level: Not on file   Occupational History    Not on file   Social Needs    Financial resource strain: Not on file    Food insecurity     Worry: Not on file     Inability: Not on file    Transportation needs     Medical: Not on file     Non-medical: Not on file   Tobacco Use    Smoking status: Never Smoker    Smokeless tobacco: Never Used   Substance and Sexual Activity    Alcohol use: No    Drug use: No    Sexual activity: Yes     Partners: Male   Lifestyle    Physical activity     Days per week: Not on file     Minutes per session: Not on file    Stress: Not on file   Relationships    Social connections     Talks on phone: Not on file     Gets together: Not on file     Attends Shinto service: Not on file     Active  member of club or organization: Not on file     Attends meetings of clubs or organizations: Not on file     Relationship status: Not on file   Other Topics Concern    Not on file   Social History Narrative    Not on file         Allergies  Review of patient's allergies indicates:  No Known Allergies      Review of Systems   Review of Systems   Constitution: Negative for decreased appetite, fever and weight loss.   HENT: Negative for congestion and nosebleeds.    Eyes: Negative for double vision, vision loss in left eye, vision loss in right eye and visual disturbance.   Cardiovascular: Negative for chest pain, claudication, cyanosis, dyspnea on exertion, irregular heartbeat, leg swelling, near-syncope, orthopnea, palpitations, paroxysmal nocturnal dyspnea and syncope.   Respiratory: Negative for cough, hemoptysis, shortness of breath, sleep disturbances due to breathing, snoring, sputum production and wheezing.    Endocrine: Negative for cold intolerance and heat intolerance.   Skin: Negative for nail changes and rash.   Musculoskeletal: Negative for joint pain, muscle cramps, muscle weakness and myalgias.   Gastrointestinal: Negative for change in bowel habit, heartburn, hematemesis, hematochezia, hemorrhoids and melena.   Neurological: Negative for dizziness, focal weakness and headaches.         Physical Exam  Wt Readings from Last 1 Encounters:   08/05/20 51 kg (112 lb 7 oz)     BP Readings from Last 3 Encounters:   08/05/20 130/70   08/03/20 135/69   06/24/20 (!) 143/67     Pulse Readings from Last 1 Encounters:   08/05/20 68     Body mass index is 20.56 kg/m².    Physical Exam   Constitutional: She is oriented to person, place, and time. She appears well-developed and well-nourished.   HENT:   Head: Atraumatic.   Eyes: EOM are normal. No scleral icterus.   Neck: Neck supple. Normal carotid pulses, no hepatojugular reflux and no JVD present. Carotid bruit is not present. No edema present.   Cardiovascular:  Normal rate, regular rhythm, S1 normal, S2 normal, normal heart sounds and intact distal pulses. PMI is not displaced. Exam reveals no friction rub.   No murmur heard.  Pulses:       Carotid pulses are 2+ on the right side and 2+ on the left side.       Radial pulses are 2+ on the right side and 2+ on the left side.        Dorsalis pedis pulses are 2+ on the right side and 2+ on the left side.   Pulmonary/Chest: Effort normal and breath sounds normal. No stridor. No respiratory distress. She has no wheezes. She has no rales. She exhibits no tenderness.   Abdominal: Soft. Normal aorta and bowel sounds are normal.   Musculoskeletal:         General: No edema.   Neurological: She is alert and oriented to person, place, and time.   Skin: Skin is warm and dry. No cyanosis. Nails show no clubbing.   Psychiatric: She has a normal mood and affect. Her behavior is normal. Thought content normal.   Vitals reviewed.            EKG:  Normal sinus rate 68    Assessment:  1.  HTN   Controlled    2. HLP  Stable    3.  GIST tumor  Low risk for perioperative cardiac events.      Plan:  Get echocardiogram.  Refill losartan 25mg qd.  She has appointment with PCP next week.      Follow up:  PRN    Time spent evaluating and treating patient 15 minutes with >50% of this time being face-to-face.     Dharmesh Cuellar MD, F.A.C.C, F.S.C.A.I.

## 2021-04-15 ENCOUNTER — PATIENT MESSAGE (OUTPATIENT)
Dept: RESEARCH | Facility: HOSPITAL | Age: 66
End: 2021-04-15

## 2021-04-23 DIAGNOSIS — K86.1 CHRONIC PANCREATITIS: Primary | ICD-10-CM

## 2021-04-23 DIAGNOSIS — G89.29 CHRONIC GENERALIZED PAIN: ICD-10-CM

## 2021-04-23 DIAGNOSIS — R52 CHRONIC GENERALIZED PAIN: ICD-10-CM

## 2021-04-23 DIAGNOSIS — R74.8: ICD-10-CM

## 2021-04-26 ENCOUNTER — HOSPITAL ENCOUNTER (OUTPATIENT)
Dept: RADIOLOGY | Facility: HOSPITAL | Age: 66
Discharge: HOME | End: 2021-04-26
Attending: NURSE PRACTITIONER
Payer: MEDICARE

## 2021-04-26 DIAGNOSIS — K86.1 CHRONIC PANCREATITIS: ICD-10-CM

## 2021-04-26 DIAGNOSIS — G89.29 CHRONIC GENERALIZED PAIN: ICD-10-CM

## 2021-04-26 DIAGNOSIS — R52 CHRONIC GENERALIZED PAIN: ICD-10-CM

## 2021-04-26 DIAGNOSIS — R74.8: ICD-10-CM

## 2021-04-26 LAB
CREAT SERPL-MCNC: 0.6 MG/DL (ref 0.5–1.4)
SAMPLE: NORMAL

## 2021-04-26 PROCEDURE — A9585 GADOBUTROL INJECTION: HCPCS

## 2021-04-26 PROCEDURE — 74183 MRI ABD W/O CNTR FLWD CNTR: CPT | Mod: 26,,, | Performed by: RADIOLOGY

## 2021-04-26 PROCEDURE — 25500020 PHARM REV CODE 255

## 2021-04-26 PROCEDURE — 74183 MRI ABDOMEN W WO CONTRAST: ICD-10-PCS | Mod: 26,,, | Performed by: RADIOLOGY

## 2021-04-26 PROCEDURE — 74183 MRI ABD W/O CNTR FLWD CNTR: CPT | Mod: TC

## 2021-04-26 RX ORDER — GADOBUTROL 604.72 MG/ML
10 INJECTION INTRAVENOUS
Status: COMPLETED | OUTPATIENT
Start: 2021-04-26 | End: 2021-04-26

## 2021-04-26 RX ADMIN — GADOBUTROL 10 ML: 604.72 INJECTION INTRAVENOUS at 01:04

## 2021-05-04 ENCOUNTER — TELEPHONE (OUTPATIENT)
Dept: ENDOSCOPY | Facility: HOSPITAL | Age: 66
End: 2021-05-04

## 2021-05-05 DIAGNOSIS — K86.89 DILATED PANCREATIC DUCT: Primary | ICD-10-CM

## 2021-05-10 ENCOUNTER — TELEPHONE (OUTPATIENT)
Dept: ENDOSCOPY | Facility: HOSPITAL | Age: 66
End: 2021-05-10

## 2021-05-12 ENCOUNTER — HOSPITAL ENCOUNTER (EMERGENCY)
Facility: HOSPITAL | Age: 66
Discharge: HOME | End: 2021-05-12
Attending: EMERGENCY MEDICINE
Payer: MEDICARE

## 2021-05-12 VITALS
BODY MASS INDEX: 21.35 KG/M2 | HEART RATE: 72 BPM | HEIGHT: 62 IN | WEIGHT: 116 LBS | OXYGEN SATURATION: 98 % | SYSTOLIC BLOOD PRESSURE: 193 MMHG | RESPIRATION RATE: 17 BRPM | DIASTOLIC BLOOD PRESSURE: 95 MMHG | TEMPERATURE: 99 F

## 2021-05-12 DIAGNOSIS — K86.1 CHRONIC PANCREATITIS, UNSPECIFIED PANCREATITIS TYPE: ICD-10-CM

## 2021-05-12 DIAGNOSIS — R10.13 EPIGASTRIC PAIN: Primary | ICD-10-CM

## 2021-05-12 PROBLEM — E78.5 HYPERLIPIDEMIA: Status: ACTIVE | Noted: 2018-02-19

## 2021-05-12 PROBLEM — K21.9 GASTROESOPHAGEAL REFLUX DISEASE: Status: ACTIVE | Noted: 2018-02-19

## 2021-05-12 PROBLEM — I10 ESSENTIAL HYPERTENSION: Status: ACTIVE | Noted: 2017-05-28

## 2021-05-12 LAB
ALBUMIN SERPL BCP-MCNC: 3.8 G/DL (ref 3.5–5.2)
ALP SERPL-CCNC: 120 U/L (ref 55–135)
ALT SERPL W/O P-5'-P-CCNC: 18 U/L (ref 10–44)
ANION GAP SERPL CALC-SCNC: 7 MMOL/L (ref 8–16)
AST SERPL-CCNC: 18 U/L (ref 10–40)
BACTERIA #/AREA URNS AUTO: ABNORMAL /HPF
BASOPHILS # BLD AUTO: 0.07 K/UL (ref 0–0.2)
BASOPHILS NFR BLD: 1.1 % (ref 0–1.9)
BILIRUB SERPL-MCNC: 0.3 MG/DL (ref 0.1–1)
BILIRUB UR QL STRIP: NEGATIVE
BUN SERPL-MCNC: 21 MG/DL (ref 8–23)
BUN SERPL-MCNC: 24 MG/DL (ref 6–30)
CALCIUM SERPL-MCNC: 9.6 MG/DL (ref 8.7–10.5)
CAOX CRY UR QL COMP ASSIST: ABNORMAL
CHLORIDE SERPL-SCNC: 105 MMOL/L (ref 95–110)
CHLORIDE SERPL-SCNC: 107 MMOL/L (ref 95–110)
CLARITY UR REFRACT.AUTO: CLEAR
CO2 SERPL-SCNC: 26 MMOL/L (ref 23–29)
COLOR UR AUTO: YELLOW
CREAT SERPL-MCNC: 0.7 MG/DL (ref 0.5–1.4)
CREAT SERPL-MCNC: 0.7 MG/DL (ref 0.5–1.4)
DIFFERENTIAL METHOD: ABNORMAL
EOSINOPHIL # BLD AUTO: 0.3 K/UL (ref 0–0.5)
EOSINOPHIL NFR BLD: 4.6 % (ref 0–8)
ERYTHROCYTE [DISTWIDTH] IN BLOOD BY AUTOMATED COUNT: 14.7 % (ref 11.5–14.5)
EST. GFR  (AFRICAN AMERICAN): >60 ML/MIN/1.73 M^2
EST. GFR  (NON AFRICAN AMERICAN): >60 ML/MIN/1.73 M^2
GLUCOSE SERPL-MCNC: 101 MG/DL (ref 70–110)
GLUCOSE SERPL-MCNC: 103 MG/DL (ref 70–110)
GLUCOSE UR QL STRIP: NEGATIVE
HCT VFR BLD AUTO: 36 % (ref 37–48.5)
HCT VFR BLD CALC: 36 %PCV (ref 36–54)
HGB BLD-MCNC: 11.6 G/DL (ref 12–16)
HGB UR QL STRIP: NEGATIVE
IMM GRANULOCYTES # BLD AUTO: 0.02 K/UL (ref 0–0.04)
IMM GRANULOCYTES NFR BLD AUTO: 0.3 % (ref 0–0.5)
KETONES UR QL STRIP: NEGATIVE
LACTATE SERPL-SCNC: 0.7 MMOL/L (ref 0.5–2.2)
LEUKOCYTE ESTERASE UR QL STRIP: NEGATIVE
LIPASE SERPL-CCNC: 65 U/L (ref 4–60)
LYMPHOCYTES # BLD AUTO: 1.7 K/UL (ref 1–4.8)
LYMPHOCYTES NFR BLD: 27.3 % (ref 18–48)
MCH RBC QN AUTO: 24.8 PG (ref 27–31)
MCHC RBC AUTO-ENTMCNC: 32.2 G/DL (ref 32–36)
MCV RBC AUTO: 77 FL (ref 82–98)
MICROSCOPIC COMMENT: ABNORMAL
MONOCYTES # BLD AUTO: 0.6 K/UL (ref 0.3–1)
MONOCYTES NFR BLD: 8.9 % (ref 4–15)
NEUTROPHILS # BLD AUTO: 3.6 K/UL (ref 1.8–7.7)
NEUTROPHILS NFR BLD: 57.8 % (ref 38–73)
NITRITE UR QL STRIP: NEGATIVE
NRBC BLD-RTO: 0 /100 WBC
PH UR STRIP: 6 [PH] (ref 5–8)
PLATELET # BLD AUTO: 270 K/UL (ref 150–450)
PMV BLD AUTO: 9.1 FL (ref 9.2–12.9)
POC IONIZED CALCIUM: 1.12 MMOL/L (ref 1.06–1.42)
POC TCO2 (MEASURED): 28 MMOL/L (ref 23–29)
POTASSIUM BLD-SCNC: 4.4 MMOL/L (ref 3.5–5.1)
POTASSIUM SERPL-SCNC: 4.3 MMOL/L (ref 3.5–5.1)
PROT SERPL-MCNC: 6.9 G/DL (ref 6–8.4)
PROT UR QL STRIP: NEGATIVE
RBC # BLD AUTO: 4.68 M/UL (ref 4–5.4)
SAMPLE: NORMAL
SODIUM BLD-SCNC: 140 MMOL/L (ref 136–145)
SODIUM SERPL-SCNC: 140 MMOL/L (ref 136–145)
SP GR UR STRIP: 1.02 (ref 1–1.03)
SQUAMOUS #/AREA URNS AUTO: 0 /HPF
TROPONIN I SERPL DL<=0.01 NG/ML-MCNC: <0.006 NG/ML (ref 0–0.03)
URN SPEC COLLECT METH UR: NORMAL
WBC # BLD AUTO: 6.26 K/UL (ref 3.9–12.7)
WBC #/AREA URNS AUTO: 2 /HPF (ref 0–5)
YEAST UR QL AUTO: ABNORMAL

## 2021-05-12 PROCEDURE — 93010 EKG 12-LEAD: ICD-10-PCS | Mod: ,,, | Performed by: INTERNAL MEDICINE

## 2021-05-12 PROCEDURE — 93005 ELECTROCARDIOGRAM TRACING: CPT

## 2021-05-12 PROCEDURE — 96374 THER/PROPH/DIAG INJ IV PUSH: CPT | Mod: 59

## 2021-05-12 PROCEDURE — 25500020 PHARM REV CODE 255: Performed by: EMERGENCY MEDICINE

## 2021-05-12 PROCEDURE — 99285 EMERGENCY DEPT VISIT HI MDM: CPT | Mod: 25

## 2021-05-12 PROCEDURE — 63600175 PHARM REV CODE 636 W HCPCS: Performed by: PHYSICIAN ASSISTANT

## 2021-05-12 PROCEDURE — 81001 URINALYSIS AUTO W/SCOPE: CPT | Performed by: PHYSICIAN ASSISTANT

## 2021-05-12 PROCEDURE — 99284 PR EMERGENCY DEPT VISIT,LEVEL IV: ICD-10-PCS | Mod: ,,, | Performed by: EMERGENCY MEDICINE

## 2021-05-12 PROCEDURE — 83605 ASSAY OF LACTIC ACID: CPT | Performed by: PHYSICIAN ASSISTANT

## 2021-05-12 PROCEDURE — 96361 HYDRATE IV INFUSION ADD-ON: CPT

## 2021-05-12 PROCEDURE — 83690 ASSAY OF LIPASE: CPT | Performed by: PHYSICIAN ASSISTANT

## 2021-05-12 PROCEDURE — 84484 ASSAY OF TROPONIN QUANT: CPT | Performed by: PHYSICIAN ASSISTANT

## 2021-05-12 PROCEDURE — 82330 ASSAY OF CALCIUM: CPT

## 2021-05-12 PROCEDURE — 85025 COMPLETE CBC W/AUTO DIFF WBC: CPT | Performed by: PHYSICIAN ASSISTANT

## 2021-05-12 PROCEDURE — 93010 ELECTROCARDIOGRAM REPORT: CPT | Mod: ,,, | Performed by: INTERNAL MEDICINE

## 2021-05-12 PROCEDURE — 80047 BASIC METABLC PNL IONIZED CA: CPT

## 2021-05-12 PROCEDURE — 25000003 PHARM REV CODE 250: Performed by: PHYSICIAN ASSISTANT

## 2021-05-12 PROCEDURE — 99284 EMERGENCY DEPT VISIT MOD MDM: CPT | Mod: ,,, | Performed by: EMERGENCY MEDICINE

## 2021-05-12 PROCEDURE — 80053 COMPREHEN METABOLIC PANEL: CPT | Performed by: PHYSICIAN ASSISTANT

## 2021-05-12 RX ORDER — AMLODIPINE BESYLATE 5 MG/1
TABLET ORAL
COMMUNITY
End: 2021-06-15 | Stop reason: CLARIF

## 2021-05-12 RX ORDER — MAG HYDROX/ALUMINUM HYD/SIMETH 200-200-20
30 SUSPENSION, ORAL (FINAL DOSE FORM) ORAL
Qty: 769 ML | Refills: 0 | Status: SHIPPED | OUTPATIENT
Start: 2021-05-12 | End: 2022-01-01 | Stop reason: SDUPTHER

## 2021-05-12 RX ORDER — MAG HYDROX/ALUMINUM HYD/SIMETH 200-200-20
30 SUSPENSION, ORAL (FINAL DOSE FORM) ORAL
Status: COMPLETED | OUTPATIENT
Start: 2021-05-12 | End: 2021-05-12

## 2021-05-12 RX ORDER — LIDOCAINE HYDROCHLORIDE 20 MG/ML
5 SOLUTION OROPHARYNGEAL
Status: COMPLETED | OUTPATIENT
Start: 2021-05-12 | End: 2021-05-12

## 2021-05-12 RX ORDER — ONDANSETRON 4 MG/1
4 TABLET, ORALLY DISINTEGRATING ORAL EVERY 6 HOURS PRN
Qty: 15 TABLET | Refills: 0 | Status: SHIPPED | OUTPATIENT
Start: 2021-05-12 | End: 2022-05-18

## 2021-05-12 RX ORDER — CYCLOBENZAPRINE HCL 10 MG
10 TABLET ORAL NIGHTLY PRN
COMMUNITY
Start: 2021-03-10 | End: 2022-05-18 | Stop reason: ALTCHOICE

## 2021-05-12 RX ORDER — ONDANSETRON 2 MG/ML
8 INJECTION INTRAMUSCULAR; INTRAVENOUS
Status: COMPLETED | OUTPATIENT
Start: 2021-05-12 | End: 2021-05-12

## 2021-05-12 RX ORDER — DEXLANSOPRAZOLE 60 MG/1
CAPSULE, DELAYED RELEASE ORAL
COMMUNITY
End: 2022-05-18

## 2021-05-12 RX ORDER — PANTOPRAZOLE SODIUM 40 MG/1
40 TABLET, DELAYED RELEASE ORAL DAILY
COMMUNITY
Start: 2021-02-10 | End: 2022-05-18 | Stop reason: CLARIF

## 2021-05-12 RX ORDER — MORPHINE SULFATE 4 MG/ML
4 INJECTION, SOLUTION INTRAMUSCULAR; INTRAVENOUS
Status: DISCONTINUED | OUTPATIENT
Start: 2021-05-12 | End: 2021-05-13 | Stop reason: HOSPADM

## 2021-05-12 RX ORDER — LACTULOSE 10 G/15ML
SOLUTION ORAL; RECTAL
COMMUNITY
Start: 2021-03-10 | End: 2022-05-18

## 2021-05-12 RX ORDER — ALBUTEROL SULFATE 90 UG/1
AEROSOL, METERED RESPIRATORY (INHALATION)
COMMUNITY
End: 2022-05-18

## 2021-05-12 RX ADMIN — LIDOCAINE HYDROCHLORIDE 5 ML: 20 SOLUTION ORAL at 09:05

## 2021-05-12 RX ADMIN — ALUMINUM HYDROXIDE, MAGNESIUM HYDROXIDE, AND SIMETHICONE 30 ML: 200; 200; 20 SUSPENSION ORAL at 09:05

## 2021-05-12 RX ADMIN — ONDANSETRON 8 MG: 2 INJECTION INTRAMUSCULAR; INTRAVENOUS at 07:05

## 2021-05-12 RX ADMIN — IOHEXOL 100 ML: 350 INJECTION, SOLUTION INTRAVENOUS at 07:05

## 2021-05-12 RX ADMIN — SODIUM CHLORIDE 1000 ML: 0.9 INJECTION, SOLUTION INTRAVENOUS at 07:05

## 2021-05-14 ENCOUNTER — PATIENT MESSAGE (OUTPATIENT)
Dept: ENDOSCOPY | Facility: HOSPITAL | Age: 66
End: 2021-05-14

## 2021-05-14 ENCOUNTER — TELEPHONE (OUTPATIENT)
Dept: ENDOSCOPY | Facility: HOSPITAL | Age: 66
End: 2021-05-14

## 2021-05-14 DIAGNOSIS — Z01.818 PRE-OP TESTING: ICD-10-CM

## 2021-05-18 ENCOUNTER — PATIENT MESSAGE (OUTPATIENT)
Dept: ENDOSCOPY | Facility: HOSPITAL | Age: 66
End: 2021-05-18

## 2021-05-18 ENCOUNTER — TELEPHONE (OUTPATIENT)
Dept: ENDOSCOPY | Facility: HOSPITAL | Age: 66
End: 2021-05-18

## 2021-05-26 ENCOUNTER — HOSPITAL ENCOUNTER (OUTPATIENT)
Facility: HOSPITAL | Age: 66
Discharge: HOME | End: 2021-05-26
Attending: INTERNAL MEDICINE | Admitting: INTERNAL MEDICINE
Payer: MEDICARE

## 2021-05-26 ENCOUNTER — ANESTHESIA (OUTPATIENT)
Dept: ENDOSCOPY | Facility: HOSPITAL | Age: 66
End: 2021-05-26
Payer: MEDICARE

## 2021-05-26 ENCOUNTER — ANESTHESIA EVENT (OUTPATIENT)
Dept: ENDOSCOPY | Facility: HOSPITAL | Age: 66
End: 2021-05-26
Payer: MEDICARE

## 2021-05-26 VITALS
HEIGHT: 62 IN | TEMPERATURE: 98 F | BODY MASS INDEX: 20.98 KG/M2 | HEART RATE: 68 BPM | WEIGHT: 114 LBS | SYSTOLIC BLOOD PRESSURE: 157 MMHG | OXYGEN SATURATION: 99 % | RESPIRATION RATE: 15 BRPM | DIASTOLIC BLOOD PRESSURE: 73 MMHG

## 2021-05-26 DIAGNOSIS — K83.8 DILATED BILE DUCT: Primary | ICD-10-CM

## 2021-05-26 PROCEDURE — D9220A PRA ANESTHESIA: Mod: CRNA,,, | Performed by: NURSE ANESTHETIST, CERTIFIED REGISTERED

## 2021-05-26 PROCEDURE — 37000008 HC ANESTHESIA 1ST 15 MINUTES: Performed by: INTERNAL MEDICINE

## 2021-05-26 PROCEDURE — 25000003 PHARM REV CODE 250: Performed by: INTERNAL MEDICINE

## 2021-05-26 PROCEDURE — D9220A PRA ANESTHESIA: ICD-10-PCS | Mod: CRNA,,, | Performed by: NURSE ANESTHETIST, CERTIFIED REGISTERED

## 2021-05-26 PROCEDURE — D9220A PRA ANESTHESIA: ICD-10-PCS | Mod: ANES,,, | Performed by: ANESTHESIOLOGY

## 2021-05-26 PROCEDURE — 37000009 HC ANESTHESIA EA ADD 15 MINS: Performed by: INTERNAL MEDICINE

## 2021-05-26 PROCEDURE — 43259 EGD US EXAM DUODENUM/JEJUNUM: CPT | Performed by: INTERNAL MEDICINE

## 2021-05-26 PROCEDURE — 25000003 PHARM REV CODE 250: Performed by: NURSE ANESTHETIST, CERTIFIED REGISTERED

## 2021-05-26 PROCEDURE — 43259 EGD US EXAM DUODENUM/JEJUNUM: CPT | Mod: ,,, | Performed by: INTERNAL MEDICINE

## 2021-05-26 PROCEDURE — 43259 PR ENDOSCOPIC ULTRASOUND EXAM: ICD-10-PCS | Mod: ,,, | Performed by: INTERNAL MEDICINE

## 2021-05-26 PROCEDURE — D9220A PRA ANESTHESIA: Mod: ANES,,, | Performed by: ANESTHESIOLOGY

## 2021-05-26 PROCEDURE — 63600175 PHARM REV CODE 636 W HCPCS: Performed by: NURSE ANESTHETIST, CERTIFIED REGISTERED

## 2021-05-26 RX ORDER — PROPOFOL 10 MG/ML
INJECTION, EMULSION INTRAVENOUS CONTINUOUS PRN
Status: DISCONTINUED | OUTPATIENT
Start: 2021-05-26 | End: 2021-05-26

## 2021-05-26 RX ORDER — PROCHLORPERAZINE EDISYLATE 5 MG/ML
5 INJECTION INTRAMUSCULAR; INTRAVENOUS EVERY 30 MIN PRN
Status: DISCONTINUED | OUTPATIENT
Start: 2021-05-26 | End: 2021-05-26 | Stop reason: HOSPADM

## 2021-05-26 RX ORDER — SODIUM CHLORIDE 0.9 % (FLUSH) 0.9 %
10 SYRINGE (ML) INJECTION
Status: DISCONTINUED | OUTPATIENT
Start: 2021-05-26 | End: 2021-05-26 | Stop reason: HOSPADM

## 2021-05-26 RX ORDER — OXYCODONE HYDROCHLORIDE 5 MG/1
5 TABLET ORAL
Status: DISCONTINUED | OUTPATIENT
Start: 2021-05-26 | End: 2021-05-26 | Stop reason: HOSPADM

## 2021-05-26 RX ORDER — HYDROMORPHONE HYDROCHLORIDE 1 MG/ML
0.2 INJECTION, SOLUTION INTRAMUSCULAR; INTRAVENOUS; SUBCUTANEOUS EVERY 5 MIN PRN
Status: DISCONTINUED | OUTPATIENT
Start: 2021-05-26 | End: 2021-05-26 | Stop reason: HOSPADM

## 2021-05-26 RX ORDER — SODIUM CHLORIDE 9 MG/ML
INJECTION, SOLUTION INTRAVENOUS CONTINUOUS
Status: DISCONTINUED | OUTPATIENT
Start: 2021-05-26 | End: 2021-05-26 | Stop reason: HOSPADM

## 2021-05-26 RX ORDER — LIDOCAINE HYDROCHLORIDE 20 MG/ML
INJECTION, SOLUTION EPIDURAL; INFILTRATION; INTRACAUDAL; PERINEURAL
Status: DISCONTINUED | OUTPATIENT
Start: 2021-05-26 | End: 2021-05-26

## 2021-05-26 RX ORDER — ONDANSETRON 2 MG/ML
4 INJECTION INTRAMUSCULAR; INTRAVENOUS DAILY PRN
Status: DISCONTINUED | OUTPATIENT
Start: 2021-05-26 | End: 2021-05-26 | Stop reason: HOSPADM

## 2021-05-26 RX ORDER — PROPOFOL 10 MG/ML
INJECTION, EMULSION INTRAVENOUS
Status: DISCONTINUED | OUTPATIENT
Start: 2021-05-26 | End: 2021-05-26

## 2021-05-26 RX ORDER — FENTANYL CITRATE 50 UG/ML
25 INJECTION, SOLUTION INTRAMUSCULAR; INTRAVENOUS EVERY 5 MIN PRN
Status: DISCONTINUED | OUTPATIENT
Start: 2021-05-26 | End: 2021-05-26 | Stop reason: HOSPADM

## 2021-05-26 RX ADMIN — PROPOFOL 150 MCG/KG/MIN: 10 INJECTION, EMULSION INTRAVENOUS at 11:05

## 2021-05-26 RX ADMIN — PROPOFOL 60 MG: 10 INJECTION, EMULSION INTRAVENOUS at 11:05

## 2021-05-26 RX ADMIN — LIDOCAINE HYDROCHLORIDE 60 MG: 20 INJECTION, SOLUTION EPIDURAL; INFILTRATION; INTRACAUDAL at 11:05

## 2021-05-26 RX ADMIN — SODIUM CHLORIDE: 0.9 INJECTION, SOLUTION INTRAVENOUS at 10:05

## 2021-06-15 ENCOUNTER — HOSPITAL ENCOUNTER (EMERGENCY)
Facility: HOSPITAL | Age: 66
Discharge: HOME | End: 2021-06-15
Attending: EMERGENCY MEDICINE
Payer: MEDICARE

## 2021-06-15 VITALS
TEMPERATURE: 98 F | SYSTOLIC BLOOD PRESSURE: 156 MMHG | OXYGEN SATURATION: 97 % | HEART RATE: 72 BPM | WEIGHT: 115 LBS | BODY MASS INDEX: 21.03 KG/M2 | DIASTOLIC BLOOD PRESSURE: 67 MMHG | RESPIRATION RATE: 15 BRPM

## 2021-06-15 DIAGNOSIS — I10 HYPERTENSION: Primary | ICD-10-CM

## 2021-06-15 LAB — TROPONIN I SERPL DL<=0.01 NG/ML-MCNC: <0.006 NG/ML (ref 0–0.03)

## 2021-06-15 PROCEDURE — 99285 EMERGENCY DEPT VISIT HI MDM: CPT | Mod: 25

## 2021-06-15 PROCEDURE — 84484 ASSAY OF TROPONIN QUANT: CPT | Performed by: EMERGENCY MEDICINE

## 2021-06-15 PROCEDURE — 86803 HEPATITIS C AB TEST: CPT | Performed by: EMERGENCY MEDICINE

## 2021-06-15 PROCEDURE — 93010 ELECTROCARDIOGRAM REPORT: CPT | Mod: ,,, | Performed by: INTERNAL MEDICINE

## 2021-06-15 PROCEDURE — 63600175 PHARM REV CODE 636 W HCPCS: Performed by: EMERGENCY MEDICINE

## 2021-06-15 PROCEDURE — 25000003 PHARM REV CODE 250: Performed by: EMERGENCY MEDICINE

## 2021-06-15 PROCEDURE — 93010 EKG 12-LEAD: ICD-10-PCS | Mod: ,,, | Performed by: INTERNAL MEDICINE

## 2021-06-15 PROCEDURE — 93005 ELECTROCARDIOGRAM TRACING: CPT

## 2021-06-15 PROCEDURE — 96374 THER/PROPH/DIAG INJ IV PUSH: CPT

## 2021-06-15 PROCEDURE — 99285 EMERGENCY DEPT VISIT HI MDM: CPT | Mod: ,,, | Performed by: EMERGENCY MEDICINE

## 2021-06-15 PROCEDURE — 99285 PR EMERGENCY DEPT VISIT,LEVEL V: ICD-10-PCS | Mod: ,,, | Performed by: EMERGENCY MEDICINE

## 2021-06-15 RX ORDER — HYDROCHLOROTHIAZIDE 25 MG/1
25 TABLET ORAL
Status: COMPLETED | OUTPATIENT
Start: 2021-06-15 | End: 2021-06-15

## 2021-06-15 RX ORDER — HYDRALAZINE HYDROCHLORIDE 20 MG/ML
10 INJECTION INTRAMUSCULAR; INTRAVENOUS
Status: COMPLETED | OUTPATIENT
Start: 2021-06-15 | End: 2021-06-15

## 2021-06-15 RX ORDER — HYDROCHLOROTHIAZIDE 25 MG/1
25 TABLET ORAL DAILY
Qty: 30 TABLET | Refills: 0 | Status: SHIPPED | OUTPATIENT
Start: 2021-06-15 | End: 2022-05-18

## 2021-06-15 RX ADMIN — HYDRALAZINE HYDROCHLORIDE 10 MG: 20 INJECTION INTRAMUSCULAR; INTRAVENOUS at 10:06

## 2021-06-15 RX ADMIN — HYDROCHLOROTHIAZIDE 25 MG: 25 TABLET ORAL at 10:06

## 2021-06-16 ENCOUNTER — TELEPHONE (OUTPATIENT)
Dept: HEMATOLOGY/ONCOLOGY | Facility: CLINIC | Age: 66
End: 2021-06-16

## 2021-06-16 LAB
BUN SERPL-MCNC: 20 MG/DL (ref 6–30)
CHLORIDE SERPL-SCNC: 103 MMOL/L (ref 95–110)
CREAT SERPL-MCNC: 0.7 MG/DL (ref 0.5–1.4)
GLUCOSE SERPL-MCNC: 107 MG/DL (ref 70–110)
HCT VFR BLD CALC: 36 %PCV (ref 36–54)
HCV AB SERPL QL IA: NEGATIVE
POC IONIZED CALCIUM: 1.27 MMOL/L (ref 1.06–1.42)
POC TCO2 (MEASURED): 24 MMOL/L (ref 23–29)
POTASSIUM BLD-SCNC: 3.8 MMOL/L (ref 3.5–5.1)
SAMPLE: NORMAL
SODIUM BLD-SCNC: 140 MMOL/L (ref 136–145)

## 2021-08-23 ENCOUNTER — TELEPHONE (OUTPATIENT)
Dept: SURGERY | Facility: CLINIC | Age: 66
End: 2021-08-23

## 2021-08-23 DIAGNOSIS — D21.4 BENIGN GASTROINTESTINAL STROMAL TUMOR (GIST): Primary | ICD-10-CM

## 2021-09-23 ENCOUNTER — TELEPHONE (OUTPATIENT)
Dept: SURGERY | Facility: CLINIC | Age: 66
End: 2021-09-23

## 2021-12-31 PROCEDURE — 93005 ELECTROCARDIOGRAM TRACING: CPT

## 2021-12-31 PROCEDURE — 99285 PR EMERGENCY DEPT VISIT,LEVEL V: ICD-10-PCS | Mod: CS,,, | Performed by: EMERGENCY MEDICINE

## 2021-12-31 PROCEDURE — 93010 ELECTROCARDIOGRAM REPORT: CPT | Mod: ,,, | Performed by: INTERNAL MEDICINE

## 2021-12-31 PROCEDURE — 93010 EKG 12-LEAD: ICD-10-PCS | Mod: ,,, | Performed by: INTERNAL MEDICINE

## 2021-12-31 PROCEDURE — 99285 EMERGENCY DEPT VISIT HI MDM: CPT | Mod: CS,,, | Performed by: EMERGENCY MEDICINE

## 2021-12-31 PROCEDURE — 99284 EMERGENCY DEPT VISIT MOD MDM: CPT | Mod: 25

## 2022-01-01 ENCOUNTER — HOSPITAL ENCOUNTER (EMERGENCY)
Facility: HOSPITAL | Age: 67
Discharge: HOME | End: 2022-01-01
Attending: EMERGENCY MEDICINE
Payer: MEDICARE

## 2022-01-01 VITALS
WEIGHT: 115 LBS | BODY MASS INDEX: 21.16 KG/M2 | OXYGEN SATURATION: 99 % | HEART RATE: 62 BPM | DIASTOLIC BLOOD PRESSURE: 70 MMHG | SYSTOLIC BLOOD PRESSURE: 148 MMHG | TEMPERATURE: 98 F | RESPIRATION RATE: 16 BRPM | HEIGHT: 62 IN

## 2022-01-01 DIAGNOSIS — D64.9 CHRONIC ANEMIA: ICD-10-CM

## 2022-01-01 DIAGNOSIS — R07.9 CHEST PAIN: Primary | ICD-10-CM

## 2022-01-01 LAB
ALBUMIN SERPL BCP-MCNC: 3.8 G/DL (ref 3.5–5.2)
ALP SERPL-CCNC: 122 U/L (ref 55–135)
ALT SERPL W/O P-5'-P-CCNC: 14 U/L (ref 10–44)
ANION GAP SERPL CALC-SCNC: 9 MMOL/L (ref 8–16)
AST SERPL-CCNC: 17 U/L (ref 10–40)
BASOPHILS # BLD AUTO: 0.08 K/UL (ref 0–0.2)
BASOPHILS NFR BLD: 1 % (ref 0–1.9)
BILIRUB SERPL-MCNC: 0.6 MG/DL (ref 0.1–1)
BNP SERPL-MCNC: 14 PG/ML (ref 0–99)
BUN SERPL-MCNC: 13 MG/DL (ref 8–23)
CALCIUM SERPL-MCNC: 9.7 MG/DL (ref 8.7–10.5)
CHLORIDE SERPL-SCNC: 106 MMOL/L (ref 95–110)
CO2 SERPL-SCNC: 25 MMOL/L (ref 23–29)
CREAT SERPL-MCNC: 0.7 MG/DL (ref 0.5–1.4)
CTP QC/QA: YES
DIFFERENTIAL METHOD: ABNORMAL
EOSINOPHIL # BLD AUTO: 0.4 K/UL (ref 0–0.5)
EOSINOPHIL NFR BLD: 4.5 % (ref 0–8)
ERYTHROCYTE [DISTWIDTH] IN BLOOD BY AUTOMATED COUNT: 15.6 % (ref 11.5–14.5)
EST. GFR  (AFRICAN AMERICAN): >60 ML/MIN/1.73 M^2
EST. GFR  (NON AFRICAN AMERICAN): >60 ML/MIN/1.73 M^2
GLUCOSE SERPL-MCNC: 106 MG/DL (ref 70–110)
HCT VFR BLD AUTO: 34.3 % (ref 37–48.5)
HGB BLD-MCNC: 11 G/DL (ref 12–16)
IMM GRANULOCYTES # BLD AUTO: 0.01 K/UL (ref 0–0.04)
IMM GRANULOCYTES NFR BLD AUTO: 0.1 % (ref 0–0.5)
LIPASE SERPL-CCNC: 40 U/L (ref 4–60)
LYMPHOCYTES # BLD AUTO: 1.5 K/UL (ref 1–4.8)
LYMPHOCYTES NFR BLD: 19.3 % (ref 18–48)
MCH RBC QN AUTO: 24.5 PG (ref 27–31)
MCHC RBC AUTO-ENTMCNC: 32.1 G/DL (ref 32–36)
MCV RBC AUTO: 76 FL (ref 82–98)
MONOCYTES # BLD AUTO: 0.7 K/UL (ref 0.3–1)
MONOCYTES NFR BLD: 8.8 % (ref 4–15)
NEUTROPHILS # BLD AUTO: 5.3 K/UL (ref 1.8–7.7)
NEUTROPHILS NFR BLD: 66.3 % (ref 38–73)
NRBC BLD-RTO: 0 /100 WBC
PLATELET # BLD AUTO: 221 K/UL (ref 150–450)
PMV BLD AUTO: 9.3 FL (ref 9.2–12.9)
POTASSIUM SERPL-SCNC: 4.3 MMOL/L (ref 3.5–5.1)
PROT SERPL-MCNC: 7.2 G/DL (ref 6–8.4)
RBC # BLD AUTO: 4.49 M/UL (ref 4–5.4)
SARS-COV-2 RDRP RESP QL NAA+PROBE: NEGATIVE
SODIUM SERPL-SCNC: 140 MMOL/L (ref 136–145)
TROPONIN I SERPL DL<=0.01 NG/ML-MCNC: 0.01 NG/ML (ref 0–0.03)
WBC # BLD AUTO: 7.93 K/UL (ref 3.9–12.7)

## 2022-01-01 PROCEDURE — 86803 HEPATITIS C AB TEST: CPT | Performed by: EMERGENCY MEDICINE

## 2022-01-01 PROCEDURE — U0002 COVID-19 LAB TEST NON-CDC: HCPCS | Performed by: EMERGENCY MEDICINE

## 2022-01-01 PROCEDURE — 84484 ASSAY OF TROPONIN QUANT: CPT | Performed by: EMERGENCY MEDICINE

## 2022-01-01 PROCEDURE — 85025 COMPLETE CBC W/AUTO DIFF WBC: CPT | Performed by: EMERGENCY MEDICINE

## 2022-01-01 PROCEDURE — 80053 COMPREHEN METABOLIC PANEL: CPT | Performed by: EMERGENCY MEDICINE

## 2022-01-01 PROCEDURE — 83690 ASSAY OF LIPASE: CPT | Performed by: EMERGENCY MEDICINE

## 2022-01-01 PROCEDURE — 83880 ASSAY OF NATRIURETIC PEPTIDE: CPT | Performed by: EMERGENCY MEDICINE

## 2022-01-01 RX ORDER — MAG HYDROX/ALUMINUM HYD/SIMETH 200-200-20
30 SUSPENSION, ORAL (FINAL DOSE FORM) ORAL EVERY 6 HOURS PRN
Qty: 354 ML | Refills: 0 | Status: SHIPPED | OUTPATIENT
Start: 2022-01-01 | End: 2024-03-08

## 2022-01-01 NOTE — FIRST PROVIDER EVALUATION
12-LEAD EKG INTERPRETATION BY ME:  Rate/Rhythm: Normal Sinus Rhythm with rate of 66 beats per minute  QRS, ST, T-waves: No changes consistent with acute ischemia  Impression: No evidence of ischemia or arrhythmia

## 2022-01-01 NOTE — ED PROVIDER NOTES
History:  Meredith Keen is a 66 y.o. female who presents to the ED with Chest Pain (Chest pain and shortness of breath x 1 week. )    Described as 67yo F with PMH pancreatitis in the past presenting to the ER with CP. She reports for the past week she has had a constant tight-type pain in her lower anterior chest, radiating to her back, 8/10, with associated SOB. She endorses mild relief with coughing, dry, no fevers or chills, no sick contacts. She denies any nausea or vomiting, abdominal pain. Her SOB is worse with lying flat, better with sitting up. She denies any history of CAD/COPD/asthma in the past. She does not smoke. Of note, her pain in the back is chronic feeling like her pancreatitis pain.     History obtained via video .    Review of Systems: All systems reviewed and are negative except as per history of present illness.  Medications:   Discharge Medication List as of 1/1/2022  3:05 AM      CONTINUE these medications which have NOT CHANGED    Details   albuterol (PROAIR HFA) 90 mcg/actuation inhaler ProAir HFA 90 mcg/actuation aerosol inhaler   Inhale 2 puffs every 4 hours by inhalation route as needed., Historical Med      atorvastatin (LIPITOR) 40 MG tablet Take 40 mg by mouth once daily., Until Discontinued, Historical Med      cyclobenzaprine (FLEXERIL) 10 MG tablet Take 10 mg by mouth nightly as needed., Starting Wed 3/10/2021, Historical Med      dexlansoprazole (DEXILANT) 60 mg capsule Dexilant 60 mg capsule, delayed release   Take 1 capsule every day by oral route for 56 days., Historical Med      ergocalciferol (ERGOCALCIFEROL) 50,000 unit Cap Starting Thu 7/23/2020, Historical Med      hydroCHLOROthiazide (HYDRODIURIL) 25 MG tablet Take 1 tablet (25 mg total) by mouth once daily., Starting Tue 6/15/2021, Until Thu 7/15/2021, Print      hydrocodone-acetaminophen (HYCET) solution 7.5-325 mg/15mL Take 10 mLs by mouth every 6 (six) hours as needed., Starting Fri 9/11/2020, Print       HYDROcodone-acetaminophen (NORCO) 5-325 mg per tablet Take 1 tablet by mouth every 6 (six) hours as needed for Pain., Starting Mon 3/2/2020, Print      lactulose (CHRONULAC) 10 gram/15 mL solution TAKE BY MOUTH 15 MILLILITERS 2 TIMES A DAY AS NEEDED, Historical Med      losartan (COZAAR) 25 MG tablet Take 1 tablet (25 mg total) by mouth once daily., Starting Wed 8/5/2020, Normal      omeprazole (PRILOSEC) 20 MG capsule Take 1 capsule (20 mg total) by mouth 2 (two) times daily before meals., Starting Mon 11/9/2020, Until Tue 11/9/2021, Normal      ondansetron (ZOFRAN-ODT) 4 MG TbDL Take 1 tablet (4 mg total) by mouth every 6 (six) hours as needed (nausea)., Starting Wed 5/12/2021, Print      pantoprazole (PROTONIX) 40 MG tablet Take 40 mg by mouth once daily., Starting Wed 2/10/2021, Historical Med      aluminum-magnesium hydroxide-simethicone (MAALOX ADVANCED) 200-200-20 mg/5 mL Susp Take 30 mLs by mouth 4 (four) times daily before meals and nightly., Starting Wed 5/12/2021, Until Thu 5/12/2022, Print             PMH:   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     PSH:   Past Surgical History:   Procedure Laterality Date    APPENDECTOMY      CHOLECYSTECTOMY      ENDOSCOPIC ULTRASOUND OF UPPER GASTROINTESTINAL TRACT N/A 11/26/2019    Procedure: ULTRASOUND, UPPER GI TRACT, ENDOSCOPIC;  Surgeon: Britton Martinez MD;  Location: [facility] ENDO;  Service: Endoscopy;  Laterality: N/A;    ENDOSCOPIC ULTRASOUND OF UPPER GASTROINTESTINAL TRACT N/A 6/24/2020    Procedure: ULTRASOUND, UPPER GI TRACT, ENDOSCOPIC;  Surgeon: Delfino Jsaso MD;  Location: [facility] ENDO ([address] [place] [address]);  Service: Endoscopy;  Laterality: N/A;  EUS (linear) for abnormal CT scan. Urgent. Can be done by any [facility] physician.  Britton Martinez MD  Covid-19 test 6/22/20 at  [facility] - pg  Speaks Turkmen;  offered and declined; daughter will accompany patient as interp    ENDOSCOPIC ULTRASOUND OF UPPER GASTROINTESTINAL TRACT N/A 5/26/2021    Procedure: ULTRASOUND, UPPER GI TRACT, ENDOSCOPIC;  Surgeon: Britton Martinez MD;  Location: [facility] ENDO (2ND FLR);  Service: Endoscopy;  Laterality: N/A;  Need EUS (linear) for dilated bile duct on MRI. Main or [facility]. 45 minutes.   Britton Martinez MD   Fully vaccinated - sending in copy of card and bringing it on day of procedure. ttr  *NEEDS Brazilian *    ESOPHAGOGASTRODUODENOSCOPY N/A 9/9/2020    Procedure: EGD (ESOPHAGOGASTRODUODENOSCOPY);  Surgeon: Devyn Miles MD;  Location: [facility] OR 2ND FLR;  Service: General;  Laterality: N/A;    HYSTERECTOMY  2012    elective    LAPAROSCOPIC LYSIS OF ADHESIONS  9/9/2020    Procedure: LYSIS, ADHESIONS, LAPAROSCOPIC;  Surgeon: Devyn Miles MD;  [facility];  Service: General;;    ROBOT-ASSISTED SURGICAL REMOVAL OF STOMACH USING DA RACHEL XI N/A 9/9/2020    Procedure: XI ROBOTIC GASTRECTOMY/GIST PROXIMAL STOMACH;  Surgeon: Devyn Miles MD;  [facility];  Service: General;  Laterality: N/A;     Allergies: She is allergic to amlodipine.  Social History: Marital Status: . She  reports that she has never smoked. She has never used smokeless tobacco.. She  reports no history of alcohol use..       Exam:  VITAL SIGNS:   Vitals:    01/01/22 0441   BP: (!) 148/70   Pulse: 62   Resp: 16   Temp:      Const: Awake and alert, NAD  Head: Atraumatic  Eyes: Normal Conjunctiva  ENT: Normal External Ears, Nose and Mouth.  Neck: Full range of motion. No meningismus.  Resp: CTAB, Normal respiratory effort, No distress  Cardio: RRR, no murmurs. Equal and intact distal pulses  Abd: Soft, non tender, non distended.  Skin: No petechiae or rashes  Ext: No cyanosis, or edema  Neur: Awake and alert  Psych: Normal Mood and  Affect    Data:  Results for orders placed or performed during the hospital encounter of 01/01/22   CBC auto differential   Result Value Ref Range    WBC 7.93 3.90 - 12.70 K/uL    RBC 4.49 4.00 - 5.40 M/uL    Hemoglobin 11.0 (L) 12.0 - 16.0 g/dL    Hematocrit 34.3 (L) 37.0 - 48.5 %    MCV 76 (L) 82 - 98 fL    MCH 24.5 (L) 27.0 - 31.0 pg    MCHC 32.1 32.0 - 36.0 g/dL    RDW 15.6 (H) 11.5 - 14.5 %    Platelets 221 150 - 450 K/uL    MPV 9.3 9.2 - 12.9 fL    Immature Granulocytes 0.1 0.0 - 0.5 %    Gran # (ANC) 5.3 1.8 - 7.7 K/uL    Immature Grans (Abs) 0.01 0.00 - 0.04 K/uL    Lymph # 1.5 1.0 - 4.8 K/uL    Mono # 0.7 0.3 - 1.0 K/uL    Eos # 0.4 0.0 - 0.5 K/uL    Baso # 0.08 0.00 - 0.20 K/uL    nRBC 0 0 /100 WBC    Gran % 66.3 38.0 - 73.0 %    Lymph % 19.3 18.0 - 48.0 %    Mono % 8.8 4.0 - 15.0 %    Eosinophil % 4.5 0.0 - 8.0 %    Basophil % 1.0 0.0 - 1.9 %    Differential Method Automated    Comprehensive metabolic panel   Result Value Ref Range    Sodium 140 136 - 145 mmol/L    Potassium 4.3 3.5 - 5.1 mmol/L    Chloride 106 95 - 110 mmol/L    CO2 25 23 - 29 mmol/L    Glucose 106 70 - 110 mg/dL    BUN 13 8 - 23 mg/dL    Creatinine 0.7 0.5 - 1.4 mg/dL    Calcium 9.7 8.7 - 10.5 mg/dL    Total Protein 7.2 6.0 - 8.4 g/dL    Albumin 3.8 3.5 - 5.2 g/dL    Total Bilirubin 0.6 0.1 - 1.0 mg/dL    Alkaline Phosphatase 122 55 - 135 U/L    AST 17 10 - 40 U/L    ALT 14 10 - 44 U/L    Anion Gap 9 8 - 16 mmol/L    eGFR if African American >60.0 >60 mL/min/1.73 m^2    eGFR if non African American >60.0 >60 mL/min/1.73 m^2   Troponin I #1   Result Value Ref Range    Troponin I 0.006 0.000 - 0.026 ng/mL   B-Type natriuretic peptide (BNP)   Result Value Ref Range    BNP 14 0 - 99 pg/mL   Lipase   Result Value Ref Range    Lipase 40 4 - 60 U/L   POCT COVID-19 Rapid Screening   Result Value Ref Range    POC Rapid COVID Negative Negative     Acceptable Yes       Imaging Results          X-Ray Chest AP Portable (Final result)   Result time 22 02:33:49    Final result by Adelfo Mckeon MD (22 02:33:49)                 Impression:      No acute findings in the chest.    No significant change from prior study.      Electronically signed by: Adelfo Mckeon MD  Date:    2022  Time:    02:33             Narrative:    EXAMINATION:  XR CHEST AP PORTABLE    CLINICAL HISTORY:  Chest Pain;    TECHNIQUE:  Single frontal view of the chest was performed.    COMPARISON:  [facility].    FINDINGS:  Surgical clips right upper quadrant abdomen.    Heart and lungs  appear unchanged when allowing for differences in technique and positioning.                              12-LEAD EKG INTERPRETATION BY ME:  Rate/Rhythm: Normal Sinus Rhythm with rate of 66 beats per minute  QRS, ST, T-waves: No changes consistent with acute ischemia  Impression: No evidence of ischemia or arrhythmia   Labs & Imaging studies were reviewed independently by me.     Medical Decision Makinyo F presenting to the ER with 1 week constant CP.     I considered high risk diagnoses such as acute myocardial infarction, pulmonary embolism, aortic dissection, pericarditis/myocarditis, and pneumonia among other diagnoses and I reviewed the ECG for ischemia, arrhythmia, myocardial disease or other cardiac abnormality.    I independently reviewed the labs and CXR images with CXR showing no evidence of pneumonia, pneumothorax or widened mediastinum.    Pulmonary embolism unlikely.    Dissection unlikely and no further work-up performed for the following reasons: low probability by history/physical.    Pericarditis unlikely as no evidence on history, PE or EKG.    Patient underwent evaluation for ACS. The HEART pathway/score was utilized for risk stratification and found to be less than or equal to 3. Shared decision making occurred with the patient and they will discuss with PMD/Cardiology within 72 hours regarding outpatient management.      Considered abdominal  pathology, though no GI symptoms, abdominal examination benign,  lipase normal, doubt acute on chronic pancreatitis. Considered possible GERD vs gastritis. Patient declined pain medication in the ER, reports improvement in pain with rest. Maalox refilled. Instructed to follow up with PCP for stress test and GI. Labs otherwise show chronic anemia, stable at baseline.    Clinical Impression:  1. Chest pain    2. Chronic anemia             Medication List      CHANGE how you take these medications    aluminum-magnesium hydroxide-simethicone 200-200-20 mg/5 mL Susp  Commonly known as: MAALOX ADVANCED  Take 30 mLs by mouth every 6 (six) hours as needed (nausea, abdominal pain, chest pain).  What changed:   · when to take this  · reasons to take this        ASK your doctor about these medications    atorvastatin 40 MG tablet  Commonly known as: LIPITOR     cyclobenzaprine 10 MG tablet  Commonly known as: FLEXERIL     DEXILANT 60 mg capsule  Generic drug: dexlansoprazole     ergocalciferol 50,000 unit Cap  Commonly known as: ERGOCALCIFEROL     hydroCHLOROthiazide 25 MG tablet  Commonly known as: HYDRODIURIL  Take 1 tablet (25 mg total) by mouth once daily.     * HYDROcodone-acetaminophen 5-325 mg per tablet  Commonly known as: NORCO  Take 1 tablet by mouth every 6 (six) hours as needed for Pain.     * hydrocodone-apap 7.5-325 MG/15 ML oral solution  Commonly known as: HYCET  Take 10 mLs by mouth every 6 (six) hours as needed.     lactulose 10 gram/15 mL solution  Commonly known as: CHRONULAC     losartan 25 MG tablet  Commonly known as: COZAAR  Take 1 tablet (25 mg total) by mouth once daily.     omeprazole 20 MG capsule  Commonly known as: PRILOSEC  Take 1 capsule (20 mg total) by mouth 2 (two) times daily before meals.     ondansetron 4 MG Tbdl  Commonly known as: ZOFRAN-ODT  Take 1 tablet (4 mg total) by mouth every 6 (six) hours as needed (nausea).     pantoprazole 40 MG tablet  Commonly known as: PROTONIX     PROAIR  HFA 90 mcg/actuation inhaler  Generic drug: albuterol         * This list has 2 medication(s) that are the same as other medications prescribed for you. Read the directions carefully, and ask your doctor or other care provider to review them with you.               Where to Get Your Medications      You can get these medications from any pharmacy    Bring a paper prescription for each of these medications  · aluminum-magnesium hydroxide-simethicone 200-200-20 mg/5 mL Susp         Follow-up Information     Bebo Cuellar MD. Schedule an appointment as soon as possible for a visit in 2 days.    Specialty: Internal Medicine  Why: Ask about further testing for your heart, such as an echocardiogram or stress test.  Contact information:  [facility] [facility]  [place] [address] [phone]                           Cecilia Juares MD  01/01/22 0606

## 2022-01-03 LAB — HCV AB SERPL QL IA: NEGATIVE

## 2022-01-11 ENCOUNTER — HOSPITAL ENCOUNTER (INPATIENT)
Facility: HOSPITAL | Age: 67
LOS: 2 days | Discharge: HOME | DRG: 390 | End: 2022-01-13
Attending: EMERGENCY MEDICINE | Admitting: SURGERY
Payer: MEDICARE

## 2022-01-11 DIAGNOSIS — R07.9 CHEST PAIN: ICD-10-CM

## 2022-01-11 DIAGNOSIS — R10.30 LOWER ABDOMINAL PAIN: Primary | ICD-10-CM

## 2022-01-11 DIAGNOSIS — Z01.89 ENCOUNTER FOR IMAGING STUDY TO CONFIRM NASOGASTRIC (NG) TUBE PLACEMENT: ICD-10-CM

## 2022-01-11 DIAGNOSIS — K56.609 SBO (SMALL BOWEL OBSTRUCTION): ICD-10-CM

## 2022-01-11 LAB
ALBUMIN SERPL BCP-MCNC: 3.7 G/DL (ref 3.5–5.2)
ALP SERPL-CCNC: 111 U/L (ref 55–135)
ALT SERPL W/O P-5'-P-CCNC: 14 U/L (ref 10–44)
ANION GAP SERPL CALC-SCNC: 8 MMOL/L (ref 8–16)
AST SERPL-CCNC: 28 U/L (ref 10–40)
BACTERIA #/AREA URNS AUTO: ABNORMAL /HPF
BASOPHILS # BLD AUTO: 0.03 K/UL (ref 0–0.2)
BASOPHILS NFR BLD: 0.5 % (ref 0–1.9)
BILIRUB SERPL-MCNC: 1.2 MG/DL (ref 0.1–1)
BILIRUB UR QL STRIP: NEGATIVE
BUN SERPL-MCNC: 22 MG/DL (ref 8–23)
CALCIUM SERPL-MCNC: 9.3 MG/DL (ref 8.7–10.5)
CHLORIDE SERPL-SCNC: 103 MMOL/L (ref 95–110)
CLARITY UR REFRACT.AUTO: ABNORMAL
CO2 SERPL-SCNC: 24 MMOL/L (ref 23–29)
COLOR UR AUTO: YELLOW
CREAT SERPL-MCNC: 0.7 MG/DL (ref 0.5–1.4)
CTP QC/QA: YES
DIFFERENTIAL METHOD: ABNORMAL
EOSINOPHIL # BLD AUTO: 0.1 K/UL (ref 0–0.5)
EOSINOPHIL NFR BLD: 1.8 % (ref 0–8)
ERYTHROCYTE [DISTWIDTH] IN BLOOD BY AUTOMATED COUNT: 15.1 % (ref 11.5–14.5)
EST. GFR  (AFRICAN AMERICAN): >60 ML/MIN/1.73 M^2
EST. GFR  (NON AFRICAN AMERICAN): >60 ML/MIN/1.73 M^2
GLUCOSE SERPL-MCNC: 106 MG/DL (ref 70–110)
GLUCOSE UR QL STRIP: NEGATIVE
HCT VFR BLD AUTO: 37.5 % (ref 37–48.5)
HGB BLD-MCNC: 12.2 G/DL (ref 12–16)
HGB UR QL STRIP: ABNORMAL
IMM GRANULOCYTES # BLD AUTO: 0.01 K/UL (ref 0–0.04)
IMM GRANULOCYTES NFR BLD AUTO: 0.2 % (ref 0–0.5)
KETONES UR QL STRIP: ABNORMAL
LACTATE SERPL-SCNC: 1.3 MMOL/L (ref 0.5–2.2)
LEUKOCYTE ESTERASE UR QL STRIP: NEGATIVE
LIPASE SERPL-CCNC: 23 U/L (ref 4–60)
LYMPHOCYTES # BLD AUTO: 0.8 K/UL (ref 1–4.8)
LYMPHOCYTES NFR BLD: 13.3 % (ref 18–48)
MCH RBC QN AUTO: 24.6 PG (ref 27–31)
MCHC RBC AUTO-ENTMCNC: 32.5 G/DL (ref 32–36)
MCV RBC AUTO: 76 FL (ref 82–98)
MICROSCOPIC COMMENT: ABNORMAL
MONOCYTES # BLD AUTO: 0.7 K/UL (ref 0.3–1)
MONOCYTES NFR BLD: 12.1 % (ref 4–15)
NEUTROPHILS # BLD AUTO: 4.3 K/UL (ref 1.8–7.7)
NEUTROPHILS NFR BLD: 72.1 % (ref 38–73)
NITRITE UR QL STRIP: NEGATIVE
NRBC BLD-RTO: 0 /100 WBC
PH UR STRIP: 5 [PH] (ref 5–8)
PLATELET # BLD AUTO: 234 K/UL (ref 150–450)
PMV BLD AUTO: 9.6 FL (ref 9.2–12.9)
POTASSIUM SERPL-SCNC: 5 MMOL/L (ref 3.5–5.1)
PROT SERPL-MCNC: 7.4 G/DL (ref 6–8.4)
PROT UR QL STRIP: NEGATIVE
RBC # BLD AUTO: 4.96 M/UL (ref 4–5.4)
RBC #/AREA URNS AUTO: 2 /HPF (ref 0–4)
SARS-COV-2 RDRP RESP QL NAA+PROBE: NEGATIVE
SODIUM SERPL-SCNC: 135 MMOL/L (ref 136–145)
SP GR UR STRIP: 1.03 (ref 1–1.03)
SQUAMOUS #/AREA URNS AUTO: 2 /HPF
URN SPEC COLLECT METH UR: ABNORMAL
WBC # BLD AUTO: 5.96 K/UL (ref 3.9–12.7)
WBC #/AREA URNS AUTO: 2 /HPF (ref 0–5)

## 2022-01-11 PROCEDURE — 20600001 HC STEP DOWN PRIVATE ROOM

## 2022-01-11 PROCEDURE — 96376 TX/PRO/DX INJ SAME DRUG ADON: CPT

## 2022-01-11 PROCEDURE — 63600175 PHARM REV CODE 636 W HCPCS: Performed by: EMERGENCY MEDICINE

## 2022-01-11 PROCEDURE — 81001 URINALYSIS AUTO W/SCOPE: CPT | Performed by: EMERGENCY MEDICINE

## 2022-01-11 PROCEDURE — 99284 PR EMERGENCY DEPT VISIT,LEVEL IV: ICD-10-PCS | Mod: CS,,, | Performed by: EMERGENCY MEDICINE

## 2022-01-11 PROCEDURE — 93010 ELECTROCARDIOGRAM REPORT: CPT | Mod: ,,, | Performed by: INTERNAL MEDICINE

## 2022-01-11 PROCEDURE — 85025 COMPLETE CBC W/AUTO DIFF WBC: CPT | Performed by: EMERGENCY MEDICINE

## 2022-01-11 PROCEDURE — 99285 EMERGENCY DEPT VISIT HI MDM: CPT | Mod: 25

## 2022-01-11 PROCEDURE — 83690 ASSAY OF LIPASE: CPT | Performed by: EMERGENCY MEDICINE

## 2022-01-11 PROCEDURE — 99284 EMERGENCY DEPT VISIT MOD MDM: CPT | Mod: CS,,, | Performed by: EMERGENCY MEDICINE

## 2022-01-11 PROCEDURE — 96374 THER/PROPH/DIAG INJ IV PUSH: CPT

## 2022-01-11 PROCEDURE — 93005 ELECTROCARDIOGRAM TRACING: CPT

## 2022-01-11 PROCEDURE — 25500020 PHARM REV CODE 255: Performed by: EMERGENCY MEDICINE

## 2022-01-11 PROCEDURE — 25000003 PHARM REV CODE 250: Performed by: EMERGENCY MEDICINE

## 2022-01-11 PROCEDURE — 96361 HYDRATE IV INFUSION ADD-ON: CPT

## 2022-01-11 PROCEDURE — 83605 ASSAY OF LACTIC ACID: CPT | Performed by: EMERGENCY MEDICINE

## 2022-01-11 PROCEDURE — 93010 EKG 12-LEAD: ICD-10-PCS | Mod: ,,, | Performed by: INTERNAL MEDICINE

## 2022-01-11 PROCEDURE — 96375 TX/PRO/DX INJ NEW DRUG ADDON: CPT

## 2022-01-11 PROCEDURE — 25000003 PHARM REV CODE 250: Performed by: STUDENT IN AN ORGANIZED HEALTH CARE EDUCATION/TRAINING PROGRAM

## 2022-01-11 PROCEDURE — 80047 BASIC METABLC PNL IONIZED CA: CPT

## 2022-01-11 PROCEDURE — 80053 COMPREHEN METABOLIC PANEL: CPT | Performed by: EMERGENCY MEDICINE

## 2022-01-11 PROCEDURE — U0002 COVID-19 LAB TEST NON-CDC: HCPCS | Performed by: EMERGENCY MEDICINE

## 2022-01-11 RX ORDER — SODIUM CHLORIDE 0.9 % (FLUSH) 0.9 %
10 SYRINGE (ML) INJECTION
Status: DISCONTINUED | OUTPATIENT
Start: 2022-01-11 | End: 2022-01-13 | Stop reason: HOSPADM

## 2022-01-11 RX ORDER — MORPHINE SULFATE 4 MG/ML
4 INJECTION, SOLUTION INTRAMUSCULAR; INTRAVENOUS
Status: COMPLETED | OUTPATIENT
Start: 2022-01-11 | End: 2022-01-11

## 2022-01-11 RX ORDER — HYDROMORPHONE HYDROCHLORIDE 1 MG/ML
0.5 INJECTION, SOLUTION INTRAMUSCULAR; INTRAVENOUS; SUBCUTANEOUS
Status: COMPLETED | OUTPATIENT
Start: 2022-01-11 | End: 2022-01-11

## 2022-01-11 RX ORDER — ACETAMINOPHEN 325 MG/1
650 TABLET ORAL EVERY 8 HOURS PRN
Status: DISCONTINUED | OUTPATIENT
Start: 2022-01-11 | End: 2022-01-13 | Stop reason: HOSPADM

## 2022-01-11 RX ORDER — LIDOCAINE HYDROCHLORIDE 10 MG/ML
1 INJECTION, SOLUTION EPIDURAL; INFILTRATION; INTRACAUDAL; PERINEURAL ONCE
Status: DISCONTINUED | OUTPATIENT
Start: 2022-01-11 | End: 2022-01-13 | Stop reason: HOSPADM

## 2022-01-11 RX ORDER — ONDANSETRON 2 MG/ML
8 INJECTION INTRAMUSCULAR; INTRAVENOUS EVERY 8 HOURS PRN
Status: DISCONTINUED | OUTPATIENT
Start: 2022-01-11 | End: 2022-01-13 | Stop reason: HOSPADM

## 2022-01-11 RX ORDER — SODIUM CHLORIDE 9 MG/ML
INJECTION, SOLUTION INTRAVENOUS CONTINUOUS
Status: DISCONTINUED | OUTPATIENT
Start: 2022-01-11 | End: 2022-01-13 | Stop reason: HOSPADM

## 2022-01-11 RX ORDER — ONDANSETRON 2 MG/ML
4 INJECTION INTRAMUSCULAR; INTRAVENOUS
Status: COMPLETED | OUTPATIENT
Start: 2022-01-11 | End: 2022-01-11

## 2022-01-11 RX ADMIN — ONDANSETRON 4 MG: 2 INJECTION INTRAMUSCULAR; INTRAVENOUS at 07:01

## 2022-01-11 RX ADMIN — MORPHINE SULFATE 4 MG: 4 INJECTION INTRAVENOUS at 07:01

## 2022-01-11 RX ADMIN — MORPHINE SULFATE 4 MG: 4 INJECTION INTRAVENOUS at 05:01

## 2022-01-11 RX ADMIN — SODIUM CHLORIDE 1000 ML: 0.9 INJECTION, SOLUTION INTRAVENOUS at 05:01

## 2022-01-11 RX ADMIN — HYDROMORPHONE HYDROCHLORIDE 0.5 MG: 1 INJECTION, SOLUTION INTRAMUSCULAR; INTRAVENOUS; SUBCUTANEOUS at 07:01

## 2022-01-11 RX ADMIN — IOHEXOL 75 ML: 350 INJECTION, SOLUTION INTRAVENOUS at 05:01

## 2022-01-11 RX ADMIN — SODIUM CHLORIDE: 0.9 INJECTION, SOLUTION INTRAVENOUS at 09:01

## 2022-01-11 NOTE — ED NOTES
I-STAT Chem-8+ Results:   Value Reference Range   Sodium 138 136-145 mmol/L   Potassium  5.0 3.5-5.1 mmol/L   Chloride 104  mmol/L   Ionized Calcium 1.20 1.06-1.42 mmol/L   CO2 (measured) 28 23-29 mmol/L   Glucose 107  mg/dL   BUN 31 6-30 mg/dL   Creatinine 0.6 0.5-1.4 mg/dL   Hematocrit 38 36-54%

## 2022-01-11 NOTE — ED NOTES
Two patient identifiers have been checked and are correct.      Appearance: Pt awake, alert & oriented to person, place & time. Pt in no acute distress at present time. Pt is clean and well groomed with clothes appropriately fastened.   Skin: Skin warm, dry & intact. Color consistent with ethnicity. Mucous membranes moist. No breakdown or brusing noted.   Musculoskeletal: Patient moving all extremities well, no obvious swelling or deformities noted.   Respiratory: Respirations spontaneous, even, and non-labored. Visible chest rise noted. Airway is open and patent. No accessory muscle use noted.   Neurologic: Sensation is intact. Speech is clear and appropriate. Eyes open spontaneously, behavior appropriate to situation, follows commands, facial expression symmetrical, bilateral hand grasp equal and even, purposeful motor response noted.  Cardiac: All peripheral pulses present. No Bilateral lower extremity edema. Cap refill is <3 seconds.  Abdomen: Abdomen soft, tender to palpation. Reports N/V/D  : Pt reports no dysuria or hematuria.

## 2022-01-11 NOTE — PROVIDER PROGRESS NOTES - EMERGENCY DEPT.
Encounter Date: 1/11/2022    ED Physician Progress Notes           I assumed care of this patient at change of shift from Dr. Crawford. Briefly, this is a 66 y.o. female who presented with mult episodes of vomiting and bloating, went to UC today. Today has diarrhea, abd exam nonfocal today, lower abd pain > upper. S/p ESTEFANIA and CCY.     CT abdomen/pelvis by my independent interpretation is notable for acute SBO. I discussed the patient with general surgery and have ordered an NG tube. Plan for admission for further management.     During ED stay, she did develop lower chest pain, and EKG obtained at that time by my independent interpretation is NSR at rate of 67, diffuse ST flattening but no obvious ischemia.     Workup notable for:     Labs Reviewed   CBC W/ AUTO DIFFERENTIAL - Abnormal; Notable for the following components:       Result Value    MCV 76 (*)     MCH 24.6 (*)     RDW 15.1 (*)     Lymph # 0.8 (*)     Lymph % 13.3 (*)     All other components within normal limits   COMPREHENSIVE METABOLIC PANEL - Abnormal; Notable for the following components:    Sodium 135 (*)     Total Bilirubin 1.2 (*)     All other components within normal limits   URINALYSIS, REFLEX TO URINE CULTURE - Abnormal; Notable for the following components:    Appearance, UA Hazy (*)     Ketones, UA 1+ (*)     Occult Blood UA 1+ (*)     All other components within normal limits    Narrative:     Specimen Source->Urine   URINALYSIS MICROSCOPIC - Abnormal; Notable for the following components:    Bacteria Moderate (*)     All other components within normal limits    Narrative:     Specimen Source->Urine   LIPASE   LACTIC ACID, PLASMA   SARS-COV-2 RDRP GENE   ISTAT CHEM8     CT Abdomen Pelvis With Contrast   Final Result   Abnormal      At least partial small bowel obstruction measuring up to 3.5 cm.  Developing high-grade obstruction not excluded.  Nondilated loops of small bowel within the mid abdomen.  Decompressed colon.  Findings raise the  question of underlying adhesions.  No free air or inflammatory changes.      Grossly similar postoperative changes of the GE junction for distal esophageal tumor resection.  No residual recurrent tumor seen.      Stable extrahepatic CBD dilatation, may relate to post cholecystectomy status.      Indeterminate right hepatic lobe hypodensity, grossly stable.  Fatty infiltration at the falciform ligament.      Additional findings in the body of the report.      This report was flagged in Epic as abnormal.      Electronically signed by resident: Navneet Melgoza   Date:    01/11/2022   Time:    17:51      Electronically signed by: Martir Wright MD   Date:    01/11/2022   Time:    18:23          Medications   sodium chloride 0.9% bolus 1,000 mL (0 mLs Intravenous Stopped 1/11/22 1803)   morphine injection 4 mg (4 mg Intravenous Given 1/11/22 1711)   iohexoL (OMNIPAQUE 350) injection 75 mL (75 mLs Intravenous Given 1/11/22 1751)   morphine injection 4 mg (4 mg Intravenous Given 1/11/22 1904)         Final diagnoses:  [R10.30] Lower abdominal pain (Primary)  [R07.9] Chest pain  [K56.609] SBO (small bowel obstruction)

## 2022-01-11 NOTE — ED PROVIDER NOTES
"Encounter Date: 1/11/2022    SCRIBE #1 NOTE: I, Rona Argueta, am scribing for, and in the presence of,  Monster Crawford MD. I have scribed the following portions of the note - Other sections scribed: HPI ROS.       History     Chief Complaint   Patient presents with    Abdominal Pain     SBO, has disc from [facility], having , abd pain nvd     HPI   Meredith Keen is a 66 y.o. female, with a PMHx of cholelithiasis, hypertension, hysterectomy, and cholecystectomy who presents to the ED with abdo pain, vomiting, diarrhea. Patient was seen at an urgent care for the same symptoms earlier today. She told her family occlusion from the urgent care visit was that she had food poisoning. Family member in the room was contacted because of the care was concerned that the patient may have a small bowel obstruction. Her family member states " they believe she was down playing her symptoms because she did not want to go to the ER". Patient has been having abdominal pains, diarrhea, and vomiting for the last two days. Patient has had 10 episodes of vomiting yesterday, none today, and 10 episodes of diarrhea work but nonbloody and not black. She states yesterday her abdominal area was bloated yesterday but is not bloated today. No other exacerbating or alleviating factors. No other associated symptoms.  S urinary symptoms.    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     Past Surgical History:   Procedure Laterality Date    APPENDECTOMY      CHOLECYSTECTOMY      ENDOSCOPIC ULTRASOUND OF UPPER GASTROINTESTINAL TRACT N/A 11/26/2019    Procedure: ULTRASOUND, UPPER GI TRACT, ENDOSCOPIC;  Surgeon: Britton Martinez MD;  Location: [facility];  Service: " Endoscopy;  Laterality: N/A;    ENDOSCOPIC ULTRASOUND OF UPPER GASTROINTESTINAL TRACT N/A 6/24/2020    Procedure: ULTRASOUND, UPPER GI TRACT, ENDOSCOPIC;  Surgeon: Delfino Jasso MD;  Location: [facility] ENDO (2ND FLR);  Service: Endoscopy;  Laterality: N/A;  EUS (linear) for abnormal CT scan. Urgent. Can be done by any [facility] physician.  Britton Martinez MD  Covid-19 test 6/22/20 at [facility] - pg  Speaks Latvian;  offered and declined; daughter will accompany patient as interp    ENDOSCOPIC ULTRASOUND OF UPPER GASTROINTESTINAL TRACT N/A 5/26/2021    Procedure: ULTRASOUND, UPPER GI TRACT, ENDOSCOPIC;  Surgeon: Britton Martinez MD;  Location: [facility] ENDO (2ND FLR);  Service: Endoscopy;  Laterality: N/A;  Need EUS (linear) for dilated bile duct on MRI. [facility] or [place]. 45 minutes.   Britton Martinez MD   Fully vaccinated - sending in copy of card and bringing it on day of procedure. ttr  *NEEDS Samoan *    ESOPHAGOGASTRODUODENOSCOPY N/A 9/9/2020    Procedure: EGD (ESOPHAGOGASTRODUODENOSCOPY);  Surgeon: Devyn Miles MD;  Location: [facility] OR [address] [place] [address];  Service: General;  Laterality: N/A;    HYSTERECTOMY  2012    elective    LAPAROSCOPIC LYSIS OF ADHESIONS  9/9/2020    Procedure: LYSIS, ADHESIONS, LAPAROSCOPIC;  Surgeon: Devyn Miles MD;  Location: [facility] OR [address] [place] [address];  Service: General;;    ROBOT-ASSISTED SURGICAL REMOVAL OF STOMACH USING DA RACHEL XI N/A 9/9/2020    Procedure: XI ROBOTIC GASTRECTOMY/GIST PROXIMAL STOMACH;  Surgeon: Devyn Miles MD;  [facility];  Service: General;  Laterality: N/A;     No family history on file.  Social History     Tobacco Use    Smoking status: Never Smoker    Smokeless tobacco: Never Used   Substance Use Topics    Alcohol use: No    Drug use: No     Review of Systems   Constitutional:  No Fever, No Chills,   ENT/Mouth: No sore throat, No rhinorrhea  Cardiovascular:  No Chest Pain, No Palpitations  Respiratory:  No  Cough, No SOB  Gastrointestinal:  No Nausea, POS Vomiting, POS Diarrhea, POS abdo pain.  Genitourinary:  No  pain, No dysuria   Musculoskeletal:  No Arthralgias, No Back Pain, No Neck Pain, No recent trauma.  Skin:  No skin Lesions  Neuro:  No Weakness, No Numbness, No Paresthesias, No Dizziness, No Headache        Physical Exam     Initial Vitals [01/11/22 1517]   BP Pulse Resp Temp SpO2   (!) 113/57 84 18 98.5 °F (36.9 °C) 98 %      MAP       --         Physical Exam    Physical Exam:  CONSTITUTIONAL: Well developed, well nourished, in no acute distress.  HENT: Normocephalic, atraumatic    EYES: Sclerae anicteric   NECK: Supple, no thyroid enlargement  CARDIOVASCULAR: Regular rate and rhythm without any murmurs, gallops, rubs.  RESPIRATORY: Speaking in full sentences. Breathing comfortably. Auscultation of the lungs revealed normal breath sounds b/l, no wheezing, no rales, no rhonchi.  ABDOMEN: Soft, mild lower abdominal tenderness to palpation with no guarding, analyzing signs, no masses, no rebound.  NEUROLOGIC: Alert, interacting normally. No facial droop.   MSK: Moving all four extremities.  Skin: Warm and dry. No visible rash on exposed areas of skin.    Psych: Mood and affect normal.       ED Course   Procedures  Labs Reviewed   CBC W/ AUTO DIFFERENTIAL - Abnormal; Notable for the following components:       Result Value    MCV 76 (*)     MCH 24.6 (*)     RDW 15.1 (*)     Lymph # 0.8 (*)     Lymph % 13.3 (*)     All other components within normal limits   COMPREHENSIVE METABOLIC PANEL - Abnormal; Notable for the following components:    Sodium 135 (*)     Total Bilirubin 1.2 (*)     All other components within normal limits   URINALYSIS, REFLEX TO URINE CULTURE - Abnormal; Notable for the following components:    Appearance, UA Hazy (*)     Ketones, UA 1+ (*)     Occult Blood UA 1+ (*)     All other components within normal limits    Narrative:     Specimen Source->Urine   URINALYSIS MICROSCOPIC -  Abnormal; Notable for the following components:    Bacteria Moderate (*)     All other components within normal limits    Narrative:     Specimen Source->Urine   LIPASE   LACTIC ACID, PLASMA   SARS-COV-2 RDRP GENE   ISTAT CHEM8          Imaging Results          CT Abdomen Pelvis With Contrast (In process)                  Medications   sodium chloride 0.9% bolus 1,000 mL (0 mLs Intravenous Stopped 1/11/22 1803)   morphine injection 4 mg (4 mg Intravenous Given 1/11/22 1711)   iohexoL (OMNIPAQUE 350) injection 75 mL (75 mLs Intravenous Given 1/11/22 1751)     Medical Decision Making:   History:   I obtained history from: someone other than patient.       <> Summary of History: Large part of history obtained from daughter.  Old Medical Records: I decided to obtain old medical records.  Old Records Summarized: records from clinic visits.       <> Summary of Records: Patient had abdominal x-ray from outpatient Urgent Care with CD unable to open images.  Clinical Tests:   Lab Tests: Ordered and Reviewed  Radiological Study: Ordered and Reviewed    58-year-old female with past medical history as noted coming in with 2 days of abdominal pain, multiple episodes of diarrhea yesterday, now resolved, today she had multiple episodes of diarrhea.  Yesterday her abdomen was quite bloated she feels like it is improved today.  On exam no acute distress but she does appear somewhat uncomfortable, she has some minor lower abdominal tenderness with no guarding, no lateralizing signs.    Differential includes viral illness causing diarrhea/ vomiting, SBO is a possibility, colitis, low suspicion for upper abdominal pathology given her symptoms are mainly lower but pancreatitis, liver abnormalities, gastritis are still possible.  Denies urinary symptoms but UTIs possible.  Complain of some dark stools but she is not pale appearing, not black stools, low suspicion for GI bleed at this time.    Will start with labs, including lipase, LFT,  lactate to risk stratify for above etiologies.  Urine studies.  CT abdomen pelvis to look for signs of small-bowel obstruction.    Will give for morphine for pain control as well as IV fluids given her loss of fluids through vomiting and diarrhea.    Disposition based on ED workup and patient's symptomatology.     Update:  CBC is unremarkable.  Urine with no leuks, no nitrites, some bacteria but no WBCs, low suspicion for UTI.  Signed out to Dr. Reyes pending rest of labs, CT scan, and disposition.            Scribe Attestation:   Scribe #1: I performed the above scribed service and the documentation accurately describes the services I performed. I attest to the accuracy of the note.                Physician Attestation Statement for Scribe #1: I,Ramiro Crawford, reviewed documentation, as scribed by Rona Argueta in my presence, and it is both accurate and complete.      Clinical Impression:   Final diagnoses:  [R10.30] Lower abdominal pain (Primary)                 Monster Crawford MD  01/11/22 5001

## 2022-01-12 PROBLEM — K56.600 PARTIAL SMALL BOWEL OBSTRUCTION: Status: ACTIVE | Noted: 2022-01-12

## 2022-01-12 LAB
ALBUMIN SERPL BCP-MCNC: 3.1 G/DL (ref 3.5–5.2)
ALP SERPL-CCNC: 256 U/L (ref 55–135)
ALT SERPL W/O P-5'-P-CCNC: 693 U/L (ref 10–44)
ANION GAP SERPL CALC-SCNC: 7 MMOL/L (ref 8–16)
AST SERPL-CCNC: 779 U/L (ref 10–40)
BASOPHILS # BLD AUTO: 0.02 K/UL (ref 0–0.2)
BASOPHILS NFR BLD: 0.5 % (ref 0–1.9)
BILIRUB SERPL-MCNC: 1.3 MG/DL (ref 0.1–1)
BUN SERPL-MCNC: 15 MG/DL (ref 8–23)
BUN SERPL-MCNC: 31 MG/DL (ref 6–30)
CALCIUM SERPL-MCNC: 8.6 MG/DL (ref 8.7–10.5)
CHLORIDE SERPL-SCNC: 104 MMOL/L (ref 95–110)
CHLORIDE SERPL-SCNC: 110 MMOL/L (ref 95–110)
CO2 SERPL-SCNC: 22 MMOL/L (ref 23–29)
CREAT SERPL-MCNC: 0.6 MG/DL (ref 0.5–1.4)
CREAT SERPL-MCNC: 0.6 MG/DL (ref 0.5–1.4)
DIFFERENTIAL METHOD: ABNORMAL
EOSINOPHIL # BLD AUTO: 0.1 K/UL (ref 0–0.5)
EOSINOPHIL NFR BLD: 2.5 % (ref 0–8)
ERYTHROCYTE [DISTWIDTH] IN BLOOD BY AUTOMATED COUNT: 15.2 % (ref 11.5–14.5)
EST. GFR  (AFRICAN AMERICAN): >60 ML/MIN/1.73 M^2
EST. GFR  (NON AFRICAN AMERICAN): >60 ML/MIN/1.73 M^2
GLUCOSE SERPL-MCNC: 107 MG/DL (ref 70–110)
GLUCOSE SERPL-MCNC: 67 MG/DL (ref 70–110)
HCT VFR BLD AUTO: 34.8 % (ref 37–48.5)
HCT VFR BLD CALC: 38 %PCV (ref 36–54)
HGB BLD-MCNC: 10.8 G/DL (ref 12–16)
IMM GRANULOCYTES # BLD AUTO: 0.01 K/UL (ref 0–0.04)
IMM GRANULOCYTES NFR BLD AUTO: 0.3 % (ref 0–0.5)
LYMPHOCYTES # BLD AUTO: 1.1 K/UL (ref 1–4.8)
LYMPHOCYTES NFR BLD: 28.1 % (ref 18–48)
MAGNESIUM SERPL-MCNC: 2.1 MG/DL (ref 1.6–2.6)
MCH RBC QN AUTO: 24.5 PG (ref 27–31)
MCHC RBC AUTO-ENTMCNC: 31 G/DL (ref 32–36)
MCV RBC AUTO: 79 FL (ref 82–98)
MONOCYTES # BLD AUTO: 0.5 K/UL (ref 0.3–1)
MONOCYTES NFR BLD: 12.3 % (ref 4–15)
NEUTROPHILS # BLD AUTO: 2.3 K/UL (ref 1.8–7.7)
NEUTROPHILS NFR BLD: 56.3 % (ref 38–73)
NRBC BLD-RTO: 0 /100 WBC
PHOSPHATE SERPL-MCNC: 3 MG/DL (ref 2.7–4.5)
PLATELET # BLD AUTO: 204 K/UL (ref 150–450)
PMV BLD AUTO: 10 FL (ref 9.2–12.9)
POC IONIZED CALCIUM: 1.2 MMOL/L (ref 1.06–1.42)
POC TCO2 (MEASURED): 28 MMOL/L (ref 23–29)
POTASSIUM BLD-SCNC: 5 MMOL/L (ref 3.5–5.1)
POTASSIUM SERPL-SCNC: 4.6 MMOL/L (ref 3.5–5.1)
PROT SERPL-MCNC: 6 G/DL (ref 6–8.4)
RBC # BLD AUTO: 4.41 M/UL (ref 4–5.4)
SAMPLE: ABNORMAL
SODIUM BLD-SCNC: 138 MMOL/L (ref 136–145)
SODIUM SERPL-SCNC: 139 MMOL/L (ref 136–145)
WBC # BLD AUTO: 3.99 K/UL (ref 3.9–12.7)

## 2022-01-12 PROCEDURE — 63600175 PHARM REV CODE 636 W HCPCS: Performed by: STUDENT IN AN ORGANIZED HEALTH CARE EDUCATION/TRAINING PROGRAM

## 2022-01-12 PROCEDURE — 25000003 PHARM REV CODE 250: Performed by: STUDENT IN AN ORGANIZED HEALTH CARE EDUCATION/TRAINING PROGRAM

## 2022-01-12 PROCEDURE — 25500020 PHARM REV CODE 255: Performed by: STUDENT IN AN ORGANIZED HEALTH CARE EDUCATION/TRAINING PROGRAM

## 2022-01-12 PROCEDURE — 83735 ASSAY OF MAGNESIUM: CPT | Performed by: STUDENT IN AN ORGANIZED HEALTH CARE EDUCATION/TRAINING PROGRAM

## 2022-01-12 PROCEDURE — 84100 ASSAY OF PHOSPHORUS: CPT | Performed by: STUDENT IN AN ORGANIZED HEALTH CARE EDUCATION/TRAINING PROGRAM

## 2022-01-12 PROCEDURE — 80053 COMPREHEN METABOLIC PANEL: CPT | Performed by: STUDENT IN AN ORGANIZED HEALTH CARE EDUCATION/TRAINING PROGRAM

## 2022-01-12 PROCEDURE — 85025 COMPLETE CBC W/AUTO DIFF WBC: CPT | Performed by: STUDENT IN AN ORGANIZED HEALTH CARE EDUCATION/TRAINING PROGRAM

## 2022-01-12 PROCEDURE — 63600175 PHARM REV CODE 636 W HCPCS: Performed by: SURGERY

## 2022-01-12 PROCEDURE — 25000003 PHARM REV CODE 250: Performed by: SURGERY

## 2022-01-12 PROCEDURE — 36415 COLL VENOUS BLD VENIPUNCTURE: CPT | Performed by: STUDENT IN AN ORGANIZED HEALTH CARE EDUCATION/TRAINING PROGRAM

## 2022-01-12 PROCEDURE — 20600001 HC STEP DOWN PRIVATE ROOM

## 2022-01-12 RX ORDER — FAMOTIDINE 10 MG/ML
20 INJECTION INTRAVENOUS 2 TIMES DAILY
Status: DISCONTINUED | OUTPATIENT
Start: 2022-01-12 | End: 2022-01-13 | Stop reason: HOSPADM

## 2022-01-12 RX ORDER — ENOXAPARIN SODIUM 100 MG/ML
40 INJECTION SUBCUTANEOUS EVERY 24 HOURS
Status: DISCONTINUED | OUTPATIENT
Start: 2022-01-12 | End: 2022-01-13 | Stop reason: HOSPADM

## 2022-01-12 RX ADMIN — FAMOTIDINE 20 MG: 10 INJECTION INTRAVENOUS at 09:01

## 2022-01-12 RX ADMIN — ENOXAPARIN SODIUM 40 MG: 100 INJECTION SUBCUTANEOUS at 05:01

## 2022-01-12 RX ADMIN — SODIUM CHLORIDE: 0.9 INJECTION, SOLUTION INTRAVENOUS at 11:01

## 2022-01-12 RX ADMIN — DIATRIZOATE MEGLUMINE AND DIATRIZOATE SODIUM 100 ML: 660; 100 LIQUID ORAL; RECTAL at 11:01

## 2022-01-12 RX ADMIN — ONDANSETRON 8 MG: 2 INJECTION INTRAMUSCULAR; INTRAVENOUS at 01:01

## 2022-01-12 RX ADMIN — FAMOTIDINE 20 MG: 10 INJECTION INTRAVENOUS at 12:01

## 2022-01-12 NOTE — SUBJECTIVE & OBJECTIVE
Interval History: NAEON. No further emesis, NGT to LIWS with thin output. She reports feeling better since NGT was placed. No BM, flatus. Daughter at bedside translating.     Medications:  Continuous Infusions:   sodium chloride 0.9% 100 mL/hr at 01/11/22 2136     Scheduled Meds:   LIDOcaine (PF) 10 mg/ml (1%)  1 mL Other Once     PRN Meds:acetaminophen, ondansetron, promethazine (PHENERGAN) IVPB, sodium chloride 0.9%     Review of patient's allergies indicates:   Allergen Reactions    Amlodipine      Objective:     Vital Signs (Most Recent):  Temp: 97.7 °F (36.5 °C) (01/12/22 1143)  Pulse: 77 (01/12/22 1143)  Resp: 20 (01/12/22 1143)  BP: (!) 154/73 (01/12/22 1143)  SpO2: (!) 92 % (01/12/22 1143) Vital Signs (24h Range):  Temp:  [97.6 °F (36.4 °C)-99 °F (37.2 °C)] 97.7 °F (36.5 °C)  Pulse:  [70-84] 77  Resp:  [16-20] 20  SpO2:  [92 %-100 %] 92 %  BP: (113-154)/(57-73) 154/73     Weight: 50 kg (110 lb 3.7 oz)  Body mass index is 20.16 kg/m².    Intake/Output - Last 3 Shifts       01/10 0700  01/11 0659 01/11 0700  01/12 0659 01/12 0700 01/13 0659    I.V. (mL/kg)  600 (12)     IV Piggyback  1000     Total Intake(mL/kg)  1600 (32)     Urine (mL/kg/hr)  400     Drains  50     Stool  0     Total Output  450     Net  +1150            Stool Occurrence  0 x           Physical Exam  Vitals and nursing note reviewed.   Constitutional:       Appearance: Normal appearance.      Comments: Citizen of [place] speaking only    HENT:      Head: Normocephalic and atraumatic.   Cardiovascular:      Rate and Rhythm: Normal rate and regular rhythm.   Pulmonary:      Effort: Pulmonary effort is normal.   Abdominal:      General: Abdomen is flat.      Palpations: Abdomen is soft.      Comments: Midline incision scar well healed, below umbilicus to pubis   Lap incision scars    Skin:     General: Skin is warm and dry.   Neurological:      General: No focal deficit present.      Mental Status: She is alert and oriented to person, place, and  time.   Psychiatric:         Mood and Affect: Mood normal.         Behavior: Behavior normal.         Significant Labs:  CBC:   Recent Labs   Lab 01/12/22  0454   WBC 3.99   RBC 4.41   HGB 10.8*   HCT 34.8*      MCV 79*   MCH 24.5*   MCHC 31.0*     CMP:   Recent Labs   Lab 01/12/22 0454   GLU 67*   CALCIUM 8.6*   ALBUMIN 3.1*   PROT 6.0      K 4.6   CO2 22*      BUN 15   CREATININE 0.6   ALKPHOS 256*   *   *   BILITOT 1.3*       Significant Diagnostics:  I have reviewed all pertinent imaging results/findings within the past 24 hours.

## 2022-01-12 NOTE — PROGRESS NOTES
[facility] - [facility]  General Surgery  Progress Note    Subjective:     History of Present Illness:  No notes on file    Post-Op Info:  * No surgery found *         Interval History: NAEON. No further emesis, NGT to LIWS with thin output. She reports feeling better since NGT was placed. No BM, flatus. Daughter at bedside translating.     Medications:  Continuous Infusions:   sodium chloride 0.9% 100 mL/hr at 01/11/22 2136     Scheduled Meds:   LIDOcaine (PF) 10 mg/ml (1%)  1 mL Other Once     PRN Meds:acetaminophen, ondansetron, promethazine (PHENERGAN) IVPB, sodium chloride 0.9%     Review of patient's allergies indicates:   Allergen Reactions    Amlodipine      Objective:     Vital Signs (Most Recent):  Temp: 97.7 °F (36.5 °C) (01/12/22 1143)  Pulse: 77 (01/12/22 1143)  Resp: 20 (01/12/22 1143)  BP: (!) 154/73 (01/12/22 1143)  SpO2: (!) 92 % (01/12/22 1143) Vital Signs (24h Range):  Temp:  [97.6 °F (36.4 °C)-99 °F (37.2 °C)] 97.7 °F (36.5 °C)  Pulse:  [70-84] 77  Resp:  [16-20] 20  SpO2:  [92 %-100 %] 92 %  BP: (113-154)/(57-73) 154/73     Weight: 50 kg (110 lb 3.7 oz)  Body mass index is 20.16 kg/m².    Intake/Output - Last 3 Shifts       01/10 0700  01/11 0659 01/11 0700  01/12 0659 01/12 0700  01/13 0659    I.V. (mL/kg)  600 (12)     IV Piggyback  1000     Total Intake(mL/kg)  1600 (32)     Urine (mL/kg/hr)  400     Drains  50     Stool  0     Total Output  450     Net  +1150            Stool Occurrence  0 x           Physical Exam  Vitals and nursing note reviewed.   Constitutional:       Appearance: Normal appearance.      Comments: Sinhala speaking only    HENT:      Head: Normocephalic and atraumatic.   Cardiovascular:      Rate and Rhythm: Normal rate and regular rhythm.   Pulmonary:      Effort: Pulmonary effort is normal.   Abdominal:      General: Abdomen is flat.      Palpations: Abdomen is soft.      Comments: Midline incision scar well healed, below umbilicus to pubis   Lap incision scars     Skin:     General: Skin is warm and dry.   Neurological:      General: No focal deficit present.      Mental Status: She is alert and oriented to person, place, and time.   Psychiatric:         Mood and Affect: Mood normal.         Behavior: Behavior normal.         Significant Labs:  CBC:   Recent Labs   Lab 01/12/22  0454   WBC 3.99   RBC 4.41   HGB 10.8*   HCT 34.8*      MCV 79*   MCH 24.5*   MCHC 31.0*     CMP:   Recent Labs   Lab 01/12/22 0454   GLU 67*   CALCIUM 8.6*   ALBUMIN 3.1*   PROT 6.0      K 4.6   CO2 22*      BUN 15   CREATININE 0.6   ALKPHOS 256*   *   *   BILITOT 1.3*       Significant Diagnostics:  I have reviewed all pertinent imaging results/findings within the past 24 hours.    Assessment/Plan:     Partial small bowel obstruction  66 y.o. female with abdominal pain, nausea/vomiting that began on Sunday.  She states that this continued, but also was having some loose stools today.  ED obtained CT scan called as at least pSBO, so we were consulted.  Obtained history from patient and patient's daughter    - gastrograffin challenge today via NGT; KUB 4 and 8 hours after contrast admin  - NPO, IVF  - PRN pain, nausea medications   - OOBTC   - DVT ppx        Emilee Borrego MD  General Surgery  [facility]   Acitretin Pregnancy And Lactation Text: This medication is Pregnancy Category X and should not be given to women who are pregnant or may become pregnant in the future. This medication is excreted in breast milk.

## 2022-01-12 NOTE — PLAN OF CARE
Plan of care reviewed with pt:  -AAOx4, on RA, VS stable. Can understand a little bit of English, but mostly Czech speaking  -complained of ABD pain because she feels very hungry  -NPO ex ice chips sparingly. Pt asking for gum/hard candy, per , pt cannot have either of that  -No nausea, no emesis. NG tube to LIWS, clamped for 4 hours after Gastrografin given per order, 2 KUB done per order, NG tube has very small output  -Voided with adequate amount la color urine  -Had 1 BM with very small amount of stool.   -Niece at bedside. Call light in reach. WCTM

## 2022-01-12 NOTE — PLAN OF CARE
[facility]  Initial Discharge Assessment       Primary Care Provider: Bebo Cuellar MD    Admission Diagnosis: SBO (small bowel obstruction) [K56.609]  Lower abdominal pain [R10.30]  Encounter for imaging study to confirm nasogastric (NG) tube placement [Z01.89]  Chest pain [R07.9]    Admission Date: 1/11/2022  Expected Discharge Date: 1/14/2022    Payor: [employer] MEDICARE / Plan: [employer] MEDICARE HMO / Product Type: Capitation /     Extended Emergency Contact Information  Primary Emergency Contact: CuellarWinifred romeo  Address: [address] [place] [address] [facility]  Home Phone: [phone]  Mobile Phone: [phone]  Relation: Daughter  Secondary Emergency Contact: Coello Mariola  Mobile Phone: [phone]  Relation: Daughter  Preferred language: English   needed? No    Discharge Plan A: Home with family  Discharge Plan B: Home Health      [id number] [place] - [address]  [address]  [facility] [place] [address]  Phone: [phone] Fax: [phone]    [id number] - [place], [place] - [address]  [address]  [facility] [address]  Phone: [phone] Fax: [phone]        Patient is a 66 year old female admitted from home through the ER with Abdominal Pain.  Patient is NPO with IV fluids and NG tube to [facility] for conservative management of Small Bowel Obstructions.  Patient to have gastrografin challenge today with KUB later.  Patient is expected to discharge home with return of bowel function with no needs.    Patient lives in a two story home with her daughter.  Patient's family will drive her home and obtain anything she needs after discharge.  Patient was independent in ADLs without use of DME prior to admit.  [facility] given to patient and/or family with understanding verbalized.  CM name and number written on white board in patient's room with request to call for any questions and concerns.  Will continue to  follow for needs.    Maribel Webster RN, [facility] (ext: [phone])        Initial Assessment (most recent)     Adult Discharge Assessment - 01/12/22 1345        Discharge Assessment    Assessment Type Discharge Planning Assessment     Confirmed/corrected address, phone number and insurance Yes     Confirmed Demographics Correct on Facesheet     Source of Information family     Communicated GRODON with patient/caregiver Yes     Reason For Admission abdominal pain     Lives With child(so), adult     Do you expect to return to your current living situation? Yes     Do you have help at home or someone to help you manage your care at home? Yes     Who are your caregiver(s) and their phone number(s)? daughter     Prior to hospitilization cognitive status: Alert/Oriented     Current cognitive status: Alert/Oriented     Walking or Climbing Stairs Difficulty none     Dressing/Bathing Difficulty none     Home Layout Able to live on 1st floor     Equipment Currently Used at Home grab bar     Do you take prescription medications? Yes     Do you have prescription coverage? Yes     Do you have any problems affording any of your prescribed medications? No     Is the patient taking medications as prescribed? yes     Who is going to help you get home at discharge? Daughter     How do you get to doctors appointments? car, drives self     Discharge Plan A Home with family     Discharge Plan B Home Health     Discharge Plan discussed with: Adult children

## 2022-01-12 NOTE — ASSESSMENT & PLAN NOTE
66 y.o. female with abdominal pain, nausea/vomiting that began on Sunday.  She states that this continued, but also was having some loose stools today.  ED obtained CT scan called as at least pSBO, so we were consulted.  Obtained history from patient and patient's daughter    - gastrograffin challenge today via NGT; KUB 4 and 8 hours after contrast admin  - NPO, IVF  - PRN pain, nausea medications   - OOBTC   - DVT ppx

## 2022-01-12 NOTE — CONSULTS
General Surgery    Chief Complaint: Nausea/vomiting    History of Present Illness:  66 y.o. female with abdominal pain, nausea/vomiting that began on Sunday.  She states that this continued, but also was having some loose stools today.  ED obtained CT scan called as at least pSBO, so we were consulted.  Obtained history from patient and patient's daughter.    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   HENT: Negative for ear discharge, ear pain and sinus pain.    Eyes: Negative for blurred vision, double vision, pain, discharge and redness.   Respiratory: Negative for cough, hemoptysis, sputum production, shortness of breath and stridor.    Cardiovascular: Negative for chest pain, palpitations, orthopnea and claudication.   Gastrointestinal: Positive for abdominal pain, diarrhea, heartburn, nausea and vomiting. Negative for blood in stool, constipation and melena.   Genitourinary: Negative for dysuria.   Musculoskeletal: Negative for neck pain.   Neurological: Negative for dizziness, focal weakness and weakness.   Endo/Heme/Allergies: Does not bruise/bleed easily.   Psychiatric/Behavioral: Negative for depression, hallucinations and substance abuse.        Vital Signs (Most Recent)  Temp: 99 °F (37.2 °C) (01/11/22 2122)  Pulse: 70 (01/11/22 2122)  Resp: 16 (01/11/22 2122)  BP: 129/60 (01/11/22 2122)  SpO2: 95 % (01/11/22 2122)    Physical Exam   Constitutional: She is oriented to person, place, and time. She appears well-developed and well-nourished.   HENT:   Head: Normocephalic and atraumatic.   Eyes: Pupils are equal, round, and reactive to light.   Cardiovascular: Normal rate, regular rhythm and intact distal pulses.   Pulmonary/Chest: Effort normal. No respiratory distress.   Abdominal:    Moderately distended  Non-tender to palpation  Soft   Musculoskeletal:         General: No deformity or edema. Normal range of motion.      Cervical back: Normal range of motion and neck supple.   Neurological: She is alert and oriented to person, place, and time.   Skin: Skin is warm and dry. Capillary refill takes less than 2 seconds. No erythema.   Psychiatric: She has a normal mood and affect. Her behavior is normal.   Vitals reviewed.       Labs- Na 135, otherwise normal  Imaging- CT reviewed.  Developing SBO noted.  Looks like there is an ileocolic anastomosis down in the right pelvis and decompression at this point.  No swirl sign noted.  Stomach relatively decompressed    Assessment and Plan:  66 y.o. female w/ pSBO from adhesive disease vs anastomotic stricture    -IVF, NPO  -ED placed an NG tube, will put this to LIWS and see how it does overnight, maybe GG challenge in AM  -Protonix ordered, Lidia PRN, can hold remainder of home meds for now    Felton Wong MD  General Surgery, PGY-4  [phone]

## 2022-01-13 VITALS
RESPIRATION RATE: 18 BRPM | BODY MASS INDEX: 20.29 KG/M2 | SYSTOLIC BLOOD PRESSURE: 135 MMHG | HEIGHT: 62 IN | HEART RATE: 76 BPM | WEIGHT: 110.25 LBS | DIASTOLIC BLOOD PRESSURE: 84 MMHG | OXYGEN SATURATION: 97 % | TEMPERATURE: 98 F

## 2022-01-13 LAB
ALBUMIN SERPL BCP-MCNC: 3.3 G/DL (ref 3.5–5.2)
ALP SERPL-CCNC: 234 U/L (ref 55–135)
ALT SERPL W/O P-5'-P-CCNC: 430 U/L (ref 10–44)
ANION GAP SERPL CALC-SCNC: 11 MMOL/L (ref 8–16)
AST SERPL-CCNC: 209 U/L (ref 10–40)
BASOPHILS # BLD AUTO: 0.03 K/UL (ref 0–0.2)
BASOPHILS NFR BLD: 0.7 % (ref 0–1.9)
BILIRUB SERPL-MCNC: 0.8 MG/DL (ref 0.1–1)
BUN SERPL-MCNC: 9 MG/DL (ref 8–23)
CALCIUM SERPL-MCNC: 8.5 MG/DL (ref 8.7–10.5)
CHLORIDE SERPL-SCNC: 109 MMOL/L (ref 95–110)
CO2 SERPL-SCNC: 15 MMOL/L (ref 23–29)
CREAT SERPL-MCNC: 0.5 MG/DL (ref 0.5–1.4)
DIFFERENTIAL METHOD: ABNORMAL
EOSINOPHIL # BLD AUTO: 0.1 K/UL (ref 0–0.5)
EOSINOPHIL NFR BLD: 2.1 % (ref 0–8)
ERYTHROCYTE [DISTWIDTH] IN BLOOD BY AUTOMATED COUNT: 14.7 % (ref 11.5–14.5)
EST. GFR  (AFRICAN AMERICAN): >60 ML/MIN/1.73 M^2
EST. GFR  (NON AFRICAN AMERICAN): >60 ML/MIN/1.73 M^2
GLUCOSE SERPL-MCNC: 47 MG/DL (ref 70–110)
HCT VFR BLD AUTO: 37.7 % (ref 37–48.5)
HGB BLD-MCNC: 11.6 G/DL (ref 12–16)
IMM GRANULOCYTES # BLD AUTO: 0.02 K/UL (ref 0–0.04)
IMM GRANULOCYTES NFR BLD AUTO: 0.5 % (ref 0–0.5)
LYMPHOCYTES # BLD AUTO: 0.7 K/UL (ref 1–4.8)
LYMPHOCYTES NFR BLD: 17.2 % (ref 18–48)
MAGNESIUM SERPL-MCNC: 1.8 MG/DL (ref 1.6–2.6)
MCH RBC QN AUTO: 23.9 PG (ref 27–31)
MCHC RBC AUTO-ENTMCNC: 30.8 G/DL (ref 32–36)
MCV RBC AUTO: 78 FL (ref 82–98)
MONOCYTES # BLD AUTO: 0.3 K/UL (ref 0.3–1)
MONOCYTES NFR BLD: 7.7 % (ref 4–15)
NEUTROPHILS # BLD AUTO: 3.1 K/UL (ref 1.8–7.7)
NEUTROPHILS NFR BLD: 71.8 % (ref 38–73)
NRBC BLD-RTO: 0 /100 WBC
PHOSPHATE SERPL-MCNC: 2.7 MG/DL (ref 2.7–4.5)
PLATELET # BLD AUTO: 239 K/UL (ref 150–450)
PMV BLD AUTO: 10.1 FL (ref 9.2–12.9)
POCT GLUCOSE: 147 MG/DL (ref 70–110)
POCT GLUCOSE: 58 MG/DL (ref 70–110)
POTASSIUM SERPL-SCNC: 4 MMOL/L (ref 3.5–5.1)
PROT SERPL-MCNC: 5.9 G/DL (ref 6–8.4)
RBC # BLD AUTO: 4.86 M/UL (ref 4–5.4)
SODIUM SERPL-SCNC: 135 MMOL/L (ref 136–145)
WBC # BLD AUTO: 4.31 K/UL (ref 3.9–12.7)

## 2022-01-13 PROCEDURE — 85025 COMPLETE CBC W/AUTO DIFF WBC: CPT | Performed by: STUDENT IN AN ORGANIZED HEALTH CARE EDUCATION/TRAINING PROGRAM

## 2022-01-13 PROCEDURE — 36415 COLL VENOUS BLD VENIPUNCTURE: CPT | Performed by: STUDENT IN AN ORGANIZED HEALTH CARE EDUCATION/TRAINING PROGRAM

## 2022-01-13 PROCEDURE — 80053 COMPREHEN METABOLIC PANEL: CPT | Performed by: STUDENT IN AN ORGANIZED HEALTH CARE EDUCATION/TRAINING PROGRAM

## 2022-01-13 PROCEDURE — 83735 ASSAY OF MAGNESIUM: CPT | Performed by: STUDENT IN AN ORGANIZED HEALTH CARE EDUCATION/TRAINING PROGRAM

## 2022-01-13 PROCEDURE — 25000003 PHARM REV CODE 250: Performed by: SURGERY

## 2022-01-13 PROCEDURE — 84100 ASSAY OF PHOSPHORUS: CPT | Performed by: STUDENT IN AN ORGANIZED HEALTH CARE EDUCATION/TRAINING PROGRAM

## 2022-01-13 RX ORDER — ATORVASTATIN CALCIUM 20 MG/1
40 TABLET, FILM COATED ORAL DAILY
Status: DISCONTINUED | OUTPATIENT
Start: 2022-01-13 | End: 2022-01-13 | Stop reason: HOSPADM

## 2022-01-13 RX ORDER — ALBUTEROL SULFATE 90 UG/1
2 AEROSOL, METERED RESPIRATORY (INHALATION) EVERY 6 HOURS PRN
Status: DISCONTINUED | OUTPATIENT
Start: 2022-01-13 | End: 2022-01-13 | Stop reason: HOSPADM

## 2022-01-13 RX ORDER — POLYETHYLENE GLYCOL 3350 17 G/17G
17 POWDER, FOR SOLUTION ORAL DAILY
Qty: 510 G | Refills: 0 | Status: SHIPPED | OUTPATIENT
Start: 2022-01-13 | End: 2024-03-08

## 2022-01-13 RX ADMIN — ATORVASTATIN CALCIUM 40 MG: 20 TABLET, FILM COATED ORAL at 08:01

## 2022-01-13 RX ADMIN — FAMOTIDINE 20 MG: 10 INJECTION INTRAVENOUS at 08:01

## 2022-01-13 NOTE — ASSESSMENT & PLAN NOTE
66 y.o. female with abdominal pain, nausea/vomiting that began on Sunday.  She states that this continued, but also was having some loose stools today.  ED obtained CT scan called as at least pSBO, so we were consulted.  Obtained history from patient and patient's daughter    - Passed gastrograffin challenge  - Clear liquid diet this morning, advance this evening if tolerating  - PRN pain, nausea medications   - OOBTC   - DVT ppx  - Possible discharge this evening if tolerating diet without issues vs. Tomorrow morning

## 2022-01-13 NOTE — SUBJECTIVE & OBJECTIVE
Interval History: NAEON. GGC with contrast reaching rectum.  Had a bowel movement and passing gas.  No nausea or vomiting.  Feeling better overall.      Medications:  Continuous Infusions:   sodium chloride 0.9% 100 mL/hr at 01/12/22 1100     Scheduled Meds:   atorvastatin  40 mg Oral Daily    enoxaparin  40 mg Subcutaneous Daily    famotidine (PF)  20 mg Intravenous BID    LIDOcaine (PF) 10 mg/ml (1%)  1 mL Other Once     PRN Meds:acetaminophen, albuterol, ondansetron, promethazine (PHENERGAN) IVPB, sodium chloride 0.9%     Review of patient's allergies indicates:   Allergen Reactions    Amlodipine      Objective:     Vital Signs (Most Recent):  Temp: 98.7 °F (37.1 °C) (01/13/22 0730)  Pulse: 79 (01/13/22 0730)  Resp: 16 (01/13/22 0730)  BP: (!) 160/74 (01/13/22 0730)  SpO2: 96 % (01/13/22 0730) Vital Signs (24h Range):  Temp:  [97.1 °F (36.2 °C)-98.7 °F (37.1 °C)] 98.7 °F (37.1 °C)  Pulse:  [72-87] 79  Resp:  [16-21] 16  SpO2:  [92 %-96 %] 96 %  BP: (148-183)/(68-79) 160/74     Weight: 50 kg (110 lb 3.7 oz)  Body mass index is 20.16 kg/m².    Intake/Output - Last 3 Shifts       01/11 0700  01/12 0659 01/12 0700 01/13 0659 01/13 0700 01/14 0659    P.O.  0     I.V. (mL/kg) 600 (12) 2376.7 (47.5)     NG/GT  150     IV Piggyback 1000      Total Intake(mL/kg) 1600 (32) 2526.7 (50.5)     Urine (mL/kg/hr) 400 700 (0.6)     Emesis/NG output  0     Drains 50 200     Other  0     Stool 0 0     Total Output 450 900     Net +1150 +1626.7            Urine Occurrence  4 x     Stool Occurrence 0 x 2 x     Emesis Occurrence  0 x           Physical Exam  Vitals and nursing note reviewed.   Constitutional:       Appearance: Normal appearance.      Comments: Indonesian speaking only    HENT:      Head: Normocephalic and atraumatic.   Cardiovascular:      Rate and Rhythm: Normal rate and regular rhythm.   Pulmonary:      Effort: Pulmonary effort is normal.   Abdominal:      General: Abdomen is flat.      Palpations: Abdomen  is soft.      Comments: Midline incision scar well healed, below umbilicus to pubis   Lap incision scars    Skin:     General: Skin is warm and dry.   Neurological:      General: No focal deficit present.      Mental Status: She is alert and oriented to person, place, and time.   Psychiatric:         Mood and Affect: Mood normal.         Behavior: Behavior normal.         Significant Labs:  CBC:   Recent Labs   Lab 01/13/22  0600   WBC 4.31   RBC 4.86   HGB 11.6*   HCT 37.7      MCV 78*   MCH 23.9*   MCHC 30.8*     CMP:   Recent Labs   Lab 01/13/22  0600   GLU 47*   CALCIUM 8.5*   ALBUMIN 3.3*   PROT 5.9*   *   K 4.0   CO2 15*      BUN 9   CREATININE 0.5   ALKPHOS 234*   *   *   BILITOT 0.8       Significant Diagnostics:  I have reviewed all pertinent imaging results/findings within the past 24 hours.

## 2022-01-13 NOTE — PROGRESS NOTES
[facility] - [facility]  General Surgery  Progress Note    Subjective:     History of Present Illness:  No notes on file    Post-Op Info:  * No surgery found *         Interval History: NAEON. GGC with contrast reaching rectum.  Had a bowel movement and passing gas.  No nausea or vomiting.  Feeling better overall.      Medications:  Continuous Infusions:   sodium chloride 0.9% 100 mL/hr at 01/12/22 1100     Scheduled Meds:   atorvastatin  40 mg Oral Daily    enoxaparin  40 mg Subcutaneous Daily    famotidine (PF)  20 mg Intravenous BID    LIDOcaine (PF) 10 mg/ml (1%)  1 mL Other Once     PRN Meds:acetaminophen, albuterol, ondansetron, promethazine (PHENERGAN) IVPB, sodium chloride 0.9%     Review of patient's allergies indicates:   Allergen Reactions    Amlodipine      Objective:     Vital Signs (Most Recent):  Temp: 98.7 °F (37.1 °C) (01/13/22 0730)  Pulse: 79 (01/13/22 0730)  Resp: 16 (01/13/22 0730)  BP: (!) 160/74 (01/13/22 0730)  SpO2: 96 % (01/13/22 0730) Vital Signs (24h Range):  Temp:  [97.1 °F (36.2 °C)-98.7 °F (37.1 °C)] 98.7 °F (37.1 °C)  Pulse:  [72-87] 79  Resp:  [16-21] 16  SpO2:  [92 %-96 %] 96 %  BP: (148-183)/(68-79) 160/74     Weight: 50 kg (110 lb 3.7 oz)  Body mass index is 20.16 kg/m².    Intake/Output - Last 3 Shifts       01/11 0700  01/12 0659 01/12 0700 01/13 0659 01/13 0700 01/14 0659    P.O.  0     I.V. (mL/kg) 600 (12) 2376.7 (47.5)     NG/GT  150     IV Piggyback 1000      Total Intake(mL/kg) 1600 (32) 2526.7 (50.5)     Urine (mL/kg/hr) 400 700 (0.6)     Emesis/NG output  0     Drains 50 200     Other  0     Stool 0 0     Total Output 450 900     Net +1150 +1626.7            Urine Occurrence  4 x     Stool Occurrence 0 x 2 x     Emesis Occurrence  0 x           Physical Exam  Vitals and nursing note reviewed.   Constitutional:       Appearance: Normal appearance.      Comments: Luxembourger speaking only    HENT:      Head: Normocephalic and atraumatic.   Cardiovascular:      Rate and  Rhythm: Normal rate and regular rhythm.   Pulmonary:      Effort: Pulmonary effort is normal.   Abdominal:      General: Abdomen is flat.      Palpations: Abdomen is soft.      Comments: Midline incision scar well healed, below umbilicus to pubis   Lap incision scars    Skin:     General: Skin is warm and dry.   Neurological:      General: No focal deficit present.      Mental Status: She is alert and oriented to person, place, and time.   Psychiatric:         Mood and Affect: Mood normal.         Behavior: Behavior normal.         Significant Labs:  CBC:   Recent Labs   Lab 01/13/22  0600   WBC 4.31   RBC 4.86   HGB 11.6*   HCT 37.7      MCV 78*   MCH 23.9*   MCHC 30.8*     CMP:   Recent Labs   Lab 01/13/22  0600   GLU 47*   CALCIUM 8.5*   ALBUMIN 3.3*   PROT 5.9*   *   K 4.0   CO2 15*      BUN 9   CREATININE 0.5   ALKPHOS 234*   *   *   BILITOT 0.8       Significant Diagnostics:  I have reviewed all pertinent imaging results/findings within the past 24 hours.    Assessment/Plan:     Partial small bowel obstruction  66 y.o. female with abdominal pain, nausea/vomiting that began on Sunday.  She states that this continued, but also was having some loose stools today.  ED obtained CT scan called as at least pSBO, so we were consulted.  Obtained history from patient and patient's daughter    - Passed gastrograffin challenge  - Clear liquid diet this morning, advance this evening if tolerating  - PRN pain, nausea medications   - OOBTC   - DVT ppx  - Possible discharge this evening if tolerating diet without issues vs. Tomorrow morning        Jesus Maldonado MD  General Surgery  [facility]

## 2022-01-13 NOTE — NURSING
Pt rested throughout the shift with no nausea or abdominal pain reported.  NGT to LIWS, minimal outpt.  NPO x ice chips.  Large BM reported by niece.  Voiding without difficulty.  BP elevated, no home medications for BP continued.  Will notify team.  No needs voiced at this time.  Call light within reach.

## 2022-01-19 NOTE — DISCHARGE SUMMARY
Jd randy - [facility]  General Surgery  Discharge Summary      Patient Name: Meredith Keen  MRN: [id number]  Admission Date: 1/11/2022  Hospital Length of Stay: 2 days  Discharge Date and Time: 1/13/2022  6:17 PM  Attending Physician: Jennifer att. providers found   Discharging Provider: Emilee Borrego MD  Primary Care Provider: Bebo Cuellar MD    HPI:   66 y.o. female with abdominal pain, nausea/vomiting that began on Sunday.  She states that this continued, but also was having some loose stools today.  ED obtained CT scan called as at least pSBO, so we were consulted.  Obtained history from patient and patient's daughter.     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 An NGT was placed and her stomach was decompressed. HD 1 she was administered a gastrograffin challenge which she passed - KUB demonstrated contrast reaching rectum and she began to have bowel movements.  She was given a clear liquid diet which she tolerated well. HD 2 margarito was advanced to regular diet which she also tolerated well.   On discharge she was tolerated regular diet, without nausea, with adequate bowel function. Her only pain complaint was that of a hunger pain. She will follow up outpatient with her PCP for post hospitalization check. She was counseled on signs/symptoms of small bowel obstructions and when to return to ED.     Goals of Care Treatment Preferences:  Code Status: Full Code      Consults:   Consults (From admission, onward)        Status Ordering Provider     Inpatient consult to General Surgery  Once        Provider:  (Not yet assigned)    Completed ROSA KOEHLER           Significant Diagnostic Studies: Please see CT abd/pelvis and serial KUB for relevant diagnostics.     Pending Diagnostic Studies:     None        Final Active Diagnoses:    Diagnosis Date Noted POA    PRINCIPAL PROBLEM:  Partial small bowel obstruction [K56.600] 01/12/2022 Yes      Problems Resolved During this Admission:      Discharged Condition: good    Disposition: Home or Self Care    Follow Up:    Patient Instructions:      Diet Adult Regular     Notify your health care provider if you experience any of the following:  temperature >100.4     Notify your health care provider if you experience any of the following:  persistent nausea and vomiting or diarrhea     Notify your health care provider if you experience any of the following:  severe uncontrolled pain     Notify your health care provider if you experience any of the following:  redness, tenderness, or signs of infection (pain, swelling, redness, odor or green/yellow discharge around incision site)     Activity as tolerated     Medications:  Reconciled Home Medications:      Medication List      START taking these medications    polyethylene glycol 17 gram/dose powder  Commonly known as: GLYCOLAX  Dissolve one capful (17g) into liquid and take by mouth once daily.        CHANGE how you take these medications    losartan 25 MG tablet  Commonly known as: COZAAR  Take 1 tablet (25 mg total) by mouth once daily.  What changed: how much to take        CONTINUE taking these medications    aluminum-magnesium hydroxide-simethicone 200-200-20 mg/5 mL Susp  Commonly known as: MAALOX ADVANCED  Take 30 mLs by mouth every 6 (six) hours as needed (nausea, abdominal pain, chest pain).     atorvastatin 40 MG tablet  Commonly known as: LIPITOR  Take 40 mg by mouth once daily.     cyclobenzaprine 10 MG tablet  Commonly known as: FLEXERIL  Take 10 mg by mouth nightly as needed.     DEXILANT 60 mg capsule  Generic drug: dexlansoprazole  Dexilant 60 mg capsule, delayed  release   Take 1 capsule every day by oral route for 56 days.     ergocalciferol 50,000 unit Cap  Commonly known as: ERGOCALCIFEROL     hydroCHLOROthiazide 25 MG tablet  Commonly known as: HYDRODIURIL  Take 1 tablet (25 mg total) by mouth once daily.     * HYDROcodone-acetaminophen 5-325 mg per tablet  Commonly known as: NORCO  Take 1 tablet by mouth every 6 (six) hours as needed for Pain.     * hydrocodone-apap 7.5-325 MG/15 ML oral solution  Commonly known as: HYCET  Take 10 mLs by mouth every 6 (six) hours as needed.     lactulose 10 gram/15 mL solution  Commonly known as: CHRONULAC  TAKE BY MOUTH 15 MILLILITERS 2 TIMES A DAY AS NEEDED     omeprazole 20 MG capsule  Commonly known as: PRILOSEC  Take 1 capsule (20 mg total) by mouth 2 (two) times daily before meals.     ondansetron 4 MG Tbdl  Commonly known as: ZOFRAN-ODT  Take 1 tablet (4 mg total) by mouth every 6 (six) hours as needed (nausea).     pantoprazole 40 MG tablet  Commonly known as: PROTONIX  Take 40 mg by mouth once daily.     PROAIR HFA 90 mcg/actuation inhaler  Generic drug: albuterol  ProAir HFA 90 mcg/actuation aerosol inhaler   Inhale 2 puffs every 4 hours by inhalation route as needed.         * This list has 2 medication(s) that are the same as other medications prescribed for you. Read the directions carefully, and ask your doctor or other care provider to review them with you.              Time spent on the discharge of patient: 30 minutes    Emilee Borrego MD  General Surgery  [facility]  [facility]

## 2022-05-18 ENCOUNTER — OFFICE VISIT (OUTPATIENT)
Dept: FAMILY MEDICINE | Facility: CLINIC | Age: 67
End: 2022-05-18
Payer: MEDICARE

## 2022-05-18 ENCOUNTER — TELEPHONE (OUTPATIENT)
Dept: FAMILY MEDICINE | Facility: CLINIC | Age: 67
End: 2022-05-18

## 2022-05-18 VITALS
OXYGEN SATURATION: 98 % | WEIGHT: 111.13 LBS | DIASTOLIC BLOOD PRESSURE: 66 MMHG | TEMPERATURE: 99 F | BODY MASS INDEX: 20.45 KG/M2 | HEART RATE: 78 BPM | SYSTOLIC BLOOD PRESSURE: 118 MMHG | HEIGHT: 62 IN

## 2022-05-18 DIAGNOSIS — Z12.31 ENCOUNTER FOR SCREENING MAMMOGRAM FOR MALIGNANT NEOPLASM OF BREAST: ICD-10-CM

## 2022-05-18 DIAGNOSIS — K86.1 CHRONIC PANCREATITIS, UNSPECIFIED PANCREATITIS TYPE: ICD-10-CM

## 2022-05-18 DIAGNOSIS — Z23 NEED FOR TD VACCINE: Primary | ICD-10-CM

## 2022-05-18 DIAGNOSIS — R73.03 PREDIABETES: ICD-10-CM

## 2022-05-18 DIAGNOSIS — F41.8 DEPRESSION WITH ANXIETY: ICD-10-CM

## 2022-05-18 DIAGNOSIS — E78.5 HYPERLIPIDEMIA, UNSPECIFIED HYPERLIPIDEMIA TYPE: ICD-10-CM

## 2022-05-18 DIAGNOSIS — Z78.0 POSTMENOPAUSAL: ICD-10-CM

## 2022-05-18 PROCEDURE — 3008F PR BODY MASS INDEX (BMI) DOCUMENTED: ICD-10-PCS | Mod: CPTII,S$GLB,, | Performed by: STUDENT IN AN ORGANIZED HEALTH CARE EDUCATION/TRAINING PROGRAM

## 2022-05-18 PROCEDURE — 3078F PR MOST RECENT DIASTOLIC BLOOD PRESSURE < 80 MM HG: ICD-10-PCS | Mod: CPTII,S$GLB,, | Performed by: STUDENT IN AN ORGANIZED HEALTH CARE EDUCATION/TRAINING PROGRAM

## 2022-05-18 PROCEDURE — 99203 PR OFFICE/OUTPT VISIT, NEW, LEVL III, 30-44 MIN: ICD-10-PCS | Mod: S$GLB,,, | Performed by: STUDENT IN AN ORGANIZED HEALTH CARE EDUCATION/TRAINING PROGRAM

## 2022-05-18 PROCEDURE — 3288F FALL RISK ASSESSMENT DOCD: CPT | Mod: CPTII,S$GLB,, | Performed by: STUDENT IN AN ORGANIZED HEALTH CARE EDUCATION/TRAINING PROGRAM

## 2022-05-18 PROCEDURE — 1101F PT FALLS ASSESS-DOCD LE1/YR: CPT | Mod: CPTII,S$GLB,, | Performed by: STUDENT IN AN ORGANIZED HEALTH CARE EDUCATION/TRAINING PROGRAM

## 2022-05-18 PROCEDURE — 99999 PR PBB SHADOW E&M-EST. PATIENT-LVL IV: CPT | Mod: PBBFAC,,, | Performed by: STUDENT IN AN ORGANIZED HEALTH CARE EDUCATION/TRAINING PROGRAM

## 2022-05-18 PROCEDURE — 1126F AMNT PAIN NOTED NONE PRSNT: CPT | Mod: CPTII,S$GLB,, | Performed by: STUDENT IN AN ORGANIZED HEALTH CARE EDUCATION/TRAINING PROGRAM

## 2022-05-18 PROCEDURE — 1126F PR PAIN SEVERITY QUANTIFIED, NO PAIN PRESENT: ICD-10-PCS | Mod: CPTII,S$GLB,, | Performed by: STUDENT IN AN ORGANIZED HEALTH CARE EDUCATION/TRAINING PROGRAM

## 2022-05-18 PROCEDURE — 99203 OFFICE O/P NEW LOW 30 MIN: CPT | Mod: S$GLB,,, | Performed by: STUDENT IN AN ORGANIZED HEALTH CARE EDUCATION/TRAINING PROGRAM

## 2022-05-18 PROCEDURE — 3074F PR MOST RECENT SYSTOLIC BLOOD PRESSURE < 130 MM HG: ICD-10-PCS | Mod: CPTII,S$GLB,, | Performed by: STUDENT IN AN ORGANIZED HEALTH CARE EDUCATION/TRAINING PROGRAM

## 2022-05-18 PROCEDURE — 1159F PR MEDICATION LIST DOCUMENTED IN MEDICAL RECORD: ICD-10-PCS | Mod: CPTII,S$GLB,, | Performed by: STUDENT IN AN ORGANIZED HEALTH CARE EDUCATION/TRAINING PROGRAM

## 2022-05-18 PROCEDURE — 3288F PR FALLS RISK ASSESSMENT DOCUMENTED: ICD-10-PCS | Mod: CPTII,S$GLB,, | Performed by: STUDENT IN AN ORGANIZED HEALTH CARE EDUCATION/TRAINING PROGRAM

## 2022-05-18 PROCEDURE — 1101F PR PT FALLS ASSESS DOC 0-1 FALLS W/OUT INJ PAST YR: ICD-10-PCS | Mod: CPTII,S$GLB,, | Performed by: STUDENT IN AN ORGANIZED HEALTH CARE EDUCATION/TRAINING PROGRAM

## 2022-05-18 PROCEDURE — 3074F SYST BP LT 130 MM HG: CPT | Mod: CPTII,S$GLB,, | Performed by: STUDENT IN AN ORGANIZED HEALTH CARE EDUCATION/TRAINING PROGRAM

## 2022-05-18 PROCEDURE — 1159F MED LIST DOCD IN RCRD: CPT | Mod: CPTII,S$GLB,, | Performed by: STUDENT IN AN ORGANIZED HEALTH CARE EDUCATION/TRAINING PROGRAM

## 2022-05-18 PROCEDURE — 3078F DIAST BP <80 MM HG: CPT | Mod: CPTII,S$GLB,, | Performed by: STUDENT IN AN ORGANIZED HEALTH CARE EDUCATION/TRAINING PROGRAM

## 2022-05-18 PROCEDURE — 99999 PR PBB SHADOW E&M-EST. PATIENT-LVL IV: ICD-10-PCS | Mod: PBBFAC,,, | Performed by: STUDENT IN AN ORGANIZED HEALTH CARE EDUCATION/TRAINING PROGRAM

## 2022-05-18 PROCEDURE — 3008F BODY MASS INDEX DOCD: CPT | Mod: CPTII,S$GLB,, | Performed by: STUDENT IN AN ORGANIZED HEALTH CARE EDUCATION/TRAINING PROGRAM

## 2022-05-18 RX ORDER — ESCITALOPRAM OXALATE 10 MG/1
TABLET ORAL
Qty: 30 TABLET | Refills: 1 | Status: SHIPPED | OUTPATIENT
Start: 2022-05-18 | End: 2022-06-13

## 2022-05-18 RX ORDER — ATORVASTATIN CALCIUM 20 MG/1
TABLET, FILM COATED ORAL
COMMUNITY
End: 2022-05-18 | Stop reason: SDUPTHER

## 2022-05-18 RX ORDER — OMEPRAZOLE 40 MG/1
40 CAPSULE, DELAYED RELEASE ORAL DAILY
COMMUNITY
Start: 2022-01-04 | End: 2023-01-18 | Stop reason: SDUPTHER

## 2022-05-18 RX ORDER — LOSARTAN POTASSIUM 50 MG/1
50 TABLET ORAL
COMMUNITY
End: 2022-05-18 | Stop reason: DRUGHIGH

## 2022-05-18 RX ORDER — AMLODIPINE BESYLATE 2.5 MG/1
2.5 TABLET ORAL DAILY
COMMUNITY
Start: 2022-01-04 | End: 2023-06-06 | Stop reason: SDUPTHER

## 2022-05-18 RX ORDER — SUCRALFATE 1 G/10ML
1 SUSPENSION ORAL 2 TIMES DAILY
COMMUNITY
Start: 2022-03-14 | End: 2022-05-18

## 2022-05-18 RX ORDER — OMEPRAZOLE 40 MG/1
CAPSULE, DELAYED RELEASE ORAL
COMMUNITY
End: 2022-05-18 | Stop reason: SDUPTHER

## 2022-05-18 RX ORDER — DOCUSATE SODIUM 100 MG/1
100 CAPSULE, LIQUID FILLED ORAL
COMMUNITY
End: 2022-05-18

## 2022-05-18 RX ORDER — MELOXICAM 15 MG/1
TABLET ORAL
COMMUNITY
End: 2022-05-18

## 2022-05-18 RX ORDER — ATORVASTATIN CALCIUM 20 MG/1
20 TABLET, FILM COATED ORAL DAILY
COMMUNITY
Start: 2022-01-10 | End: 2023-06-06 | Stop reason: SDUPTHER

## 2022-05-18 RX ORDER — FAMOTIDINE 20 MG/1
20 TABLET, FILM COATED ORAL 2 TIMES DAILY
COMMUNITY
Start: 2022-01-04 | End: 2023-06-06 | Stop reason: ALTCHOICE

## 2022-05-18 NOTE — PROGRESS NOTES
05/19/2022    Meredith Keen  [id number]    Chief Complaint   Patient presents with    [facility] Care       HPI    This patient is new to me. She presents with her daughter who provides history with help of a virtual interpretor. Below are chronic medical conditions.    Paroxetine started 2 weeks ago for depression/axiety with panic attacks. Last panic attack was 2-3 weeks ago   Recent hx of mirtazapine but also made too sleepy   Would like to try something else b/c paroxetine is also causing lots of sleepiness, but it has helped with panic attacks    Chronic Pancreatitis and hx of SBO  Would like a referrqal to an [facility] gastroenterologist  Diet: eats very small amounts b/c she is afraid of having pain  Colonoscopy last month and wnl    Negative 10 ROS outside of HPI    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       Past Surgical History:   Procedure Laterality Date    APPENDECTOMY      CHOLECYSTECTOMY      ENDOSCOPIC ULTRASOUND OF UPPER GASTROINTESTINAL TRACT N/A 11/26/2019    Procedure: ULTRASOUND, UPPER GI TRACT, ENDOSCOPIC;  Surgeon: Britton Martinez MD;  Location: [facility];  Service: Endoscopy;  Laterality: N/A;    ENDOSCOPIC ULTRASOUND OF UPPER GASTROINTESTINAL TRACT N/A 6/24/2020    Procedure: ULTRASOUND, UPPER GI TRACT, ENDOSCOPIC;  Surgeon: Delfino Jasso MD;  Location: [facility] ([address] [place] [address]);  Service: Endoscopy;  Laterality: N/A;  EUS (linear) for abnormal CT scan. Urgent. Can be done by any [facility] physician.  Britton Martinez MD  Covid-19 test 6/22/20 at [facility] - pg  Speaks Algerian;  offered and declined; daughter will accompany patient as interp    ENDOSCOPIC ULTRASOUND OF UPPER GASTROINTESTINAL TRACT N/A 5/26/2021    Procedure: ULTRASOUND, UPPER GI TRACT, ENDOSCOPIC;  Surgeon:  Britton Martinez MD;  Location: [facility] ENDO (2ND FLR);  Service: Endoscopy;  Laterality: N/A;  Need EUS (linear) for dilated bile duct on MRI. [place] or [place]. 45 minutes.   Britton Martinez MD   Fully vaccinated - sending in copy of card and bringing it on day of procedure. ttr  *NEEDS [place] *    ESOPHAGOGASTRODUODENOSCOPY N/A 9/9/2020    Procedure: EGD (ESOPHAGOGASTRODUODENOSCOPY);  Surgeon: Devyn Miles MD;  Location: [facility] OR 2ND FLR;  Service: General;  Laterality: N/A;    HYSTERECTOMY  2012    elective    LAPAROSCOPIC LYSIS OF ADHESIONS  9/9/2020    Procedure: LYSIS, ADHESIONS, LAPAROSCOPIC;  Surgeon: Devyn Miles MD;  [facility];  Service: General;;    ROBOT-ASSISTED SURGICAL REMOVAL OF STOMACH USING DA RACHEL XI N/A 9/9/2020    Procedure: XI ROBOTIC GASTRECTOMY/GIST PROXIMAL STOMACH;  Surgeon: Devyn Miles MD;  [facility];  Service: General;  Laterality: N/A;       History reviewed. No pertinent family history.    Social History     Socioeconomic History    Marital status:    Tobacco Use    Smoking status: Never Smoker    Smokeless tobacco: Never Used   Substance and Sexual Activity    Alcohol use: No    Drug use: No    Sexual activity: Yes     Partners: Male         Current Outpatient Medications:     aluminum-magnesium hydroxide-simethicone (MAALOX ADVANCED) 200-200-20 mg/5 mL Susp, Take 30 mLs by mouth every 6 (six) hours as needed (nausea, abdominal pain, chest pain)., Disp: 354 mL, Rfl: 0    amLODIPine (NORVASC) 2.5 MG tablet, Take 2.5 mg by mouth once daily., Disp: , Rfl:     atorvastatin (LIPITOR) 20 MG tablet, Take 20 mg by mouth once daily., Disp: , Rfl:     famotidine (PEPCID) 20 MG tablet, Take 20 mg by mouth 2 (two) times daily., Disp: , Rfl:     omeprazole (PRILOSEC) 40 MG capsule, Take 40 mg by mouth once daily., Disp: , Rfl:     polyethylene glycol (GLYCOLAX) 17 gram/dose powder, Dissolve one capful (17g) into  liquid and take by mouth once daily., Disp: 510 g, Rfl: 0    EScitalopram oxalate (LEXAPRO) 10 MG tablet, Take 0.5 tablets (5 mg total) by mouth once daily for 7 days, THEN 1 tablet (10 mg total) once daily., Disp: 30 tablet, Rfl: 1        Vitals:    05/18/22 1358   BP: 118/66   Pulse: 78   Temp: 98.8 °F (37.1 °C)       PHYSICAL EXAM    GEN: NAD, AAox3, well nourished  HEENT: NCAT, EOMI, PEERL, no scleral injection, TM normal, moist mucous membranes, oropharynx clear, no erythema, no exudates  NECK: full rom, no cervical lymphadenopathy, no thyroidmegally  CV: RRR, no m/r/g, trace LE edema  LUNGS: CTAB, non-labored breathing, no wheezes, no crackles  ABD: soft, non-distended, no rebound/guarding, no organomegaly  EXT: n c/c/e, warm, 5/5 UE and LE strength  NEURO: CNII-XII intact no focal deficit  PSYCH: nl affect, no hallucinations, nl speech  Skin: intact, no rashes/lesions/erythema    1. Need for Td vaccine    2. Chronic pancreatitis, unspecified pancreatitis type  - Comprehensive Metabolic Panel; Future  - Ambulatory referral/consult to Gastroenterology; Future    3. Prediabetes  - Hemoglobin A1C; Future    4. Hyperlipidemia, unspecified hyperlipidemia type  - Lipid Panel; Future    5. Postmenopausal  - DXA Bone Density Spine And Hip; Future    6. Encounter for screening mammogram for malignant neoplasm of breast  - Mammo Digital Screening Bilat; Future    7. Depression with anxiety  - EScitalopram oxalate (LEXAPRO) 10 MG tablet; Take 0.5 tablets (5 mg total) by mouth once daily for 7 days, THEN 1 tablet (10 mg total) once daily.  Dispense: 30 tablet; Refill: 1    RTC  In 6-8 weeks for medication check    Alla Jade MD  Family Medicine

## 2022-05-19 ENCOUNTER — HOSPITAL ENCOUNTER (OUTPATIENT)
Dept: RADIOLOGY | Facility: OTHER | Age: 67
Discharge: HOME | End: 2022-05-19
Attending: STUDENT IN AN ORGANIZED HEALTH CARE EDUCATION/TRAINING PROGRAM
Payer: MEDICARE

## 2022-05-19 DIAGNOSIS — Z78.0 POSTMENOPAUSAL: ICD-10-CM

## 2022-05-19 PROCEDURE — 77080 DXA BONE DENSITY AXIAL: CPT | Mod: TC

## 2022-05-19 PROCEDURE — 77080 DEXA BONE DENSITY SPINE HIP: ICD-10-PCS | Mod: 26,,, | Performed by: RADIOLOGY

## 2022-05-19 PROCEDURE — 77080 DXA BONE DENSITY AXIAL: CPT | Mod: 26,,, | Performed by: RADIOLOGY

## 2022-05-23 ENCOUNTER — TELEPHONE (OUTPATIENT)
Dept: FAMILY MEDICINE | Facility: CLINIC | Age: 67
End: 2022-05-23
Payer: MEDICARE

## 2022-05-23 DIAGNOSIS — M81.0 AGE RELATED OSTEOPOROSIS, UNSPECIFIED PATHOLOGICAL FRACTURE PRESENCE: Primary | ICD-10-CM

## 2022-05-23 NOTE — TELEPHONE ENCOUNTER
----- Message from Alla Jade MD sent at 5/23/2022  7:42 AM CDT -----  ##You will have to call the language line to deliver this message.    Please let patient know that she has osteoporosis based on her bone density scan. I have placed a referral to a specialist to start medication to treat this.

## 2022-05-23 NOTE — PROGRESS NOTES
**You will have to call the language line to deliver this message.    Please let patient know that she has osteoporosis based on her bone density scan. I have placed a referral to a specialist to start medication to treat this.

## 2022-05-24 ENCOUNTER — TELEPHONE (OUTPATIENT)
Dept: FAMILY MEDICINE | Facility: CLINIC | Age: 67
End: 2022-05-24
Payer: MEDICARE

## 2022-05-25 ENCOUNTER — TELEPHONE (OUTPATIENT)
Dept: FAMILY MEDICINE | Facility: CLINIC | Age: 67
End: 2022-05-25
Payer: MEDICARE

## 2022-05-25 ENCOUNTER — LAB VISIT (OUTPATIENT)
Dept: LAB | Facility: HOSPITAL | Age: 67
End: 2022-05-25
Attending: STUDENT IN AN ORGANIZED HEALTH CARE EDUCATION/TRAINING PROGRAM
Payer: MEDICARE

## 2022-05-25 DIAGNOSIS — E78.5 HYPERLIPIDEMIA, UNSPECIFIED HYPERLIPIDEMIA TYPE: ICD-10-CM

## 2022-05-25 DIAGNOSIS — R73.03 PREDIABETES: ICD-10-CM

## 2022-05-25 DIAGNOSIS — K86.1 CHRONIC PANCREATITIS, UNSPECIFIED PANCREATITIS TYPE: ICD-10-CM

## 2022-05-25 LAB
ALBUMIN SERPL BCP-MCNC: 3.6 G/DL (ref 3.5–5.2)
ALP SERPL-CCNC: 102 U/L (ref 55–135)
ALT SERPL W/O P-5'-P-CCNC: 14 U/L (ref 10–44)
ANION GAP SERPL CALC-SCNC: 9 MMOL/L (ref 8–16)
AST SERPL-CCNC: 17 U/L (ref 10–40)
BILIRUB SERPL-MCNC: 0.6 MG/DL (ref 0.1–1)
BUN SERPL-MCNC: 16 MG/DL (ref 8–23)
CALCIUM SERPL-MCNC: 9.3 MG/DL (ref 8.7–10.5)
CHLORIDE SERPL-SCNC: 106 MMOL/L (ref 95–110)
CHOLEST SERPL-MCNC: 213 MG/DL (ref 120–199)
CHOLEST/HDLC SERPL: 3.3 {RATIO} (ref 2–5)
CO2 SERPL-SCNC: 25 MMOL/L (ref 23–29)
CREAT SERPL-MCNC: 0.7 MG/DL (ref 0.5–1.4)
EST. GFR  (AFRICAN AMERICAN): >60 ML/MIN/1.73 M^2
EST. GFR  (NON AFRICAN AMERICAN): >60 ML/MIN/1.73 M^2
ESTIMATED AVG GLUCOSE: 123 MG/DL (ref 68–131)
GLUCOSE SERPL-MCNC: 98 MG/DL (ref 70–110)
HBA1C MFR BLD: 5.9 % (ref 4–5.6)
HDLC SERPL-MCNC: 64 MG/DL (ref 40–75)
HDLC SERPL: 30 % (ref 20–50)
LDLC SERPL CALC-MCNC: 132.8 MG/DL (ref 63–159)
NONHDLC SERPL-MCNC: 149 MG/DL
POTASSIUM SERPL-SCNC: 4.1 MMOL/L (ref 3.5–5.1)
PROT SERPL-MCNC: 6.8 G/DL (ref 6–8.4)
SODIUM SERPL-SCNC: 140 MMOL/L (ref 136–145)
TRIGL SERPL-MCNC: 81 MG/DL (ref 30–150)

## 2022-05-25 PROCEDURE — 36415 COLL VENOUS BLD VENIPUNCTURE: CPT | Performed by: STUDENT IN AN ORGANIZED HEALTH CARE EDUCATION/TRAINING PROGRAM

## 2022-05-25 PROCEDURE — 83036 HEMOGLOBIN GLYCOSYLATED A1C: CPT | Performed by: STUDENT IN AN ORGANIZED HEALTH CARE EDUCATION/TRAINING PROGRAM

## 2022-05-25 PROCEDURE — 80061 LIPID PANEL: CPT | Performed by: STUDENT IN AN ORGANIZED HEALTH CARE EDUCATION/TRAINING PROGRAM

## 2022-05-25 PROCEDURE — 80053 COMPREHEN METABOLIC PANEL: CPT | Performed by: STUDENT IN AN ORGANIZED HEALTH CARE EDUCATION/TRAINING PROGRAM

## 2022-05-26 ENCOUNTER — TELEPHONE (OUTPATIENT)
Dept: FAMILY MEDICINE | Facility: CLINIC | Age: 67
End: 2022-05-26
Payer: MEDICARE

## 2022-05-26 NOTE — TELEPHONE ENCOUNTER
----- Message from Alla Jade MD sent at 5/26/2022  8:56 AM CDT -----  PLEASE USE LANGUAGE LINE: Tajik     Your HgbA1c is 5.9 % which is in the pre-diabetes, which is slightly up from last time. Please continue to work hard with the diet and exercise. If you have any questions don't hestitate to ask.    Best wishes,      Alla Jade MD  Family Medicine

## 2022-05-26 NOTE — PROGRESS NOTES
PLEASE USE LANGUAGE LINE: Setswana     Your HgbA1c is 5.9 % which is in the pre-diabetes, which is slightly up from last time. Please continue to work hard with the diet and exercise. If you have any questions don't hestitate to ask.    Best wishes,      Alla Jade MD  Family Medicine

## 2022-06-13 ENCOUNTER — HOSPITAL ENCOUNTER (INPATIENT)
Facility: HOSPITAL | Age: 67
LOS: 5 days | Discharge: HOME | DRG: 336 | End: 2022-06-19
Attending: EMERGENCY MEDICINE | Admitting: SURGERY
Payer: MEDICARE

## 2022-06-13 DIAGNOSIS — K56.600 PARTIAL SMALL BOWEL OBSTRUCTION: ICD-10-CM

## 2022-06-13 DIAGNOSIS — R10.30 LOWER ABDOMINAL PAIN: Primary | ICD-10-CM

## 2022-06-13 DIAGNOSIS — K56.609 SBO (SMALL BOWEL OBSTRUCTION): ICD-10-CM

## 2022-06-13 LAB
ALBUMIN SERPL BCP-MCNC: 4.2 G/DL (ref 3.5–5.2)
ALP SERPL-CCNC: 111 U/L (ref 55–135)
ALT SERPL W/O P-5'-P-CCNC: 16 U/L (ref 10–44)
ANION GAP SERPL CALC-SCNC: 15 MMOL/L (ref 8–16)
ANISOCYTOSIS BLD QL SMEAR: SLIGHT
AST SERPL-CCNC: 34 U/L (ref 10–40)
BASO STIPL BLD QL SMEAR: ABNORMAL
BASOPHILS # BLD AUTO: 0.16 K/UL (ref 0–0.2)
BASOPHILS NFR BLD: 0.6 % (ref 0–1.9)
BILIRUB SERPL-MCNC: 0.6 MG/DL (ref 0.1–1)
BUN SERPL-MCNC: 21 MG/DL (ref 8–23)
BURR CELLS BLD QL SMEAR: ABNORMAL
CALCIUM SERPL-MCNC: 10.4 MG/DL (ref 8.7–10.5)
CHLORIDE SERPL-SCNC: 99 MMOL/L (ref 95–110)
CO2 SERPL-SCNC: 20 MMOL/L (ref 23–29)
CREAT SERPL-MCNC: 1.2 MG/DL (ref 0.5–1.4)
DIFFERENTIAL METHOD: ABNORMAL
EOSINOPHIL # BLD AUTO: 0.2 K/UL (ref 0–0.5)
EOSINOPHIL NFR BLD: 0.7 % (ref 0–8)
ERYTHROCYTE [DISTWIDTH] IN BLOOD BY AUTOMATED COUNT: 14.2 % (ref 11.5–14.5)
EST. GFR  (AFRICAN AMERICAN): 53.7 ML/MIN/1.73 M^2
EST. GFR  (NON AFRICAN AMERICAN): 46.5 ML/MIN/1.73 M^2
GLUCOSE SERPL-MCNC: 192 MG/DL (ref 70–110)
HCT VFR BLD AUTO: 40.4 % (ref 37–48.5)
HGB BLD-MCNC: 13.3 G/DL (ref 12–16)
IMM GRANULOCYTES # BLD AUTO: 0.19 K/UL (ref 0–0.04)
IMM GRANULOCYTES NFR BLD AUTO: 0.8 % (ref 0–0.5)
LIPASE SERPL-CCNC: 40 U/L (ref 4–60)
LYMPHOCYTES # BLD AUTO: 1.2 K/UL (ref 1–4.8)
LYMPHOCYTES NFR BLD: 4.8 % (ref 18–48)
MCH RBC QN AUTO: 24.8 PG (ref 27–31)
MCHC RBC AUTO-ENTMCNC: 32.9 G/DL (ref 32–36)
MCV RBC AUTO: 75 FL (ref 82–98)
MONOCYTES # BLD AUTO: 0.8 K/UL (ref 0.3–1)
MONOCYTES NFR BLD: 3.1 % (ref 4–15)
NEUTROPHILS # BLD AUTO: 22.3 K/UL (ref 1.8–7.7)
NEUTROPHILS NFR BLD: 90 % (ref 38–73)
NRBC BLD-RTO: 0 /100 WBC
PLATELET # BLD AUTO: 340 K/UL (ref 150–450)
PLATELET BLD QL SMEAR: ABNORMAL
PMV BLD AUTO: 9.4 FL (ref 9.2–12.9)
POCT GLUCOSE: 125 MG/DL (ref 70–110)
POIKILOCYTOSIS BLD QL SMEAR: SLIGHT
POLYCHROMASIA BLD QL SMEAR: ABNORMAL
POTASSIUM SERPL-SCNC: 4.6 MMOL/L (ref 3.5–5.1)
PROT SERPL-MCNC: 8.6 G/DL (ref 6–8.4)
RBC # BLD AUTO: 5.37 M/UL (ref 4–5.4)
SODIUM SERPL-SCNC: 134 MMOL/L (ref 136–145)
WBC # BLD AUTO: 24.79 K/UL (ref 3.9–12.7)

## 2022-06-13 PROCEDURE — 83690 ASSAY OF LIPASE: CPT | Performed by: EMERGENCY MEDICINE

## 2022-06-13 PROCEDURE — 80053 COMPREHEN METABOLIC PANEL: CPT | Performed by: EMERGENCY MEDICINE

## 2022-06-13 PROCEDURE — 25000003 PHARM REV CODE 250: Performed by: EMERGENCY MEDICINE

## 2022-06-13 PROCEDURE — 99285 PR EMERGENCY DEPT VISIT,LEVEL V: ICD-10-PCS | Mod: GC,CS,, | Performed by: EMERGENCY MEDICINE

## 2022-06-13 PROCEDURE — 85025 COMPLETE CBC W/AUTO DIFF WBC: CPT | Performed by: EMERGENCY MEDICINE

## 2022-06-13 PROCEDURE — 82962 GLUCOSE BLOOD TEST: CPT

## 2022-06-13 PROCEDURE — 96361 HYDRATE IV INFUSION ADD-ON: CPT

## 2022-06-13 PROCEDURE — 99285 EMERGENCY DEPT VISIT HI MDM: CPT | Mod: GC,CS,, | Performed by: EMERGENCY MEDICINE

## 2022-06-13 PROCEDURE — 99285 EMERGENCY DEPT VISIT HI MDM: CPT | Mod: 25

## 2022-06-13 PROCEDURE — 83605 ASSAY OF LACTIC ACID: CPT | Performed by: EMERGENCY MEDICINE

## 2022-06-13 RX ORDER — MORPHINE SULFATE 2 MG/ML
2 INJECTION, SOLUTION INTRAMUSCULAR; INTRAVENOUS
Status: COMPLETED | OUTPATIENT
Start: 2022-06-13 | End: 2022-06-14

## 2022-06-13 RX ADMIN — SODIUM CHLORIDE 1000 ML: 0.9 INJECTION, SOLUTION INTRAVENOUS at 11:06

## 2022-06-14 LAB
BACTERIA #/AREA URNS AUTO: ABNORMAL /HPF
BILIRUB UR QL STRIP: NEGATIVE
CLARITY UR REFRACT.AUTO: ABNORMAL
COLOR UR AUTO: YELLOW
CTP QC/QA: YES
GLUCOSE UR QL STRIP: NEGATIVE
HGB UR QL STRIP: NEGATIVE
HYALINE CASTS UR QL AUTO: 96 /LPF
KETONES UR QL STRIP: NEGATIVE
LACTATE SERPL-SCNC: 2.8 MMOL/L (ref 0.5–2.2)
LEUKOCYTE ESTERASE UR QL STRIP: NEGATIVE
MICROSCOPIC COMMENT: ABNORMAL
NITRITE UR QL STRIP: NEGATIVE
PH UR STRIP: 6 [PH] (ref 5–8)
PROT UR QL STRIP: ABNORMAL
RBC #/AREA URNS AUTO: 2 /HPF (ref 0–4)
SARS-COV-2 RDRP RESP QL NAA+PROBE: NEGATIVE
SP GR UR STRIP: 1.02 (ref 1–1.03)
SQUAMOUS #/AREA URNS AUTO: 0 /HPF
URN SPEC COLLECT METH UR: ABNORMAL
WBC #/AREA URNS AUTO: 3 /HPF (ref 0–5)

## 2022-06-14 PROCEDURE — 96365 THER/PROPH/DIAG IV INF INIT: CPT

## 2022-06-14 PROCEDURE — 25500020 PHARM REV CODE 255

## 2022-06-14 PROCEDURE — 81001 URINALYSIS AUTO W/SCOPE: CPT | Performed by: EMERGENCY MEDICINE

## 2022-06-14 PROCEDURE — 11000001 HC ACUTE MED/SURG PRIVATE ROOM

## 2022-06-14 PROCEDURE — 63600175 PHARM REV CODE 636 W HCPCS: Performed by: EMERGENCY MEDICINE

## 2022-06-14 PROCEDURE — 96367 TX/PROPH/DG ADDL SEQ IV INF: CPT

## 2022-06-14 PROCEDURE — 63600175 PHARM REV CODE 636 W HCPCS

## 2022-06-14 PROCEDURE — U0002 COVID-19 LAB TEST NON-CDC: HCPCS | Performed by: STUDENT IN AN ORGANIZED HEALTH CARE EDUCATION/TRAINING PROGRAM

## 2022-06-14 PROCEDURE — 63600175 PHARM REV CODE 636 W HCPCS: Performed by: STUDENT IN AN ORGANIZED HEALTH CARE EDUCATION/TRAINING PROGRAM

## 2022-06-14 PROCEDURE — 96368 THER/DIAG CONCURRENT INF: CPT

## 2022-06-14 PROCEDURE — 87040 BLOOD CULTURE FOR BACTERIA: CPT

## 2022-06-14 RX ORDER — ONDANSETRON 8 MG/1
8 TABLET, ORALLY DISINTEGRATING ORAL EVERY 8 HOURS PRN
Status: DISCONTINUED | OUTPATIENT
Start: 2022-06-14 | End: 2022-06-19 | Stop reason: HOSPADM

## 2022-06-14 RX ORDER — HYDROMORPHONE HYDROCHLORIDE 1 MG/ML
0.5 INJECTION, SOLUTION INTRAMUSCULAR; INTRAVENOUS; SUBCUTANEOUS EVERY 4 HOURS PRN
Status: DISCONTINUED | OUTPATIENT
Start: 2022-06-14 | End: 2022-06-16

## 2022-06-14 RX ORDER — MORPHINE SULFATE 2 MG/ML
2 INJECTION, SOLUTION INTRAMUSCULAR; INTRAVENOUS
Status: COMPLETED | OUTPATIENT
Start: 2022-06-14 | End: 2022-06-14

## 2022-06-14 RX ORDER — ONDANSETRON 2 MG/ML
4 INJECTION INTRAMUSCULAR; INTRAVENOUS
Status: COMPLETED | OUTPATIENT
Start: 2022-06-14 | End: 2022-06-14

## 2022-06-14 RX ORDER — SODIUM CHLORIDE, SODIUM LACTATE, POTASSIUM CHLORIDE, CALCIUM CHLORIDE 600; 310; 30; 20 MG/100ML; MG/100ML; MG/100ML; MG/100ML
INJECTION, SOLUTION INTRAVENOUS CONTINUOUS
Status: DISCONTINUED | OUTPATIENT
Start: 2022-06-14 | End: 2022-06-17

## 2022-06-14 RX ORDER — SODIUM CHLORIDE 0.9 % (FLUSH) 0.9 %
10 SYRINGE (ML) INJECTION
Status: DISCONTINUED | OUTPATIENT
Start: 2022-06-14 | End: 2022-06-19 | Stop reason: HOSPADM

## 2022-06-14 RX ORDER — ENOXAPARIN SODIUM 100 MG/ML
40 INJECTION SUBCUTANEOUS EVERY 24 HOURS
Status: DISCONTINUED | OUTPATIENT
Start: 2022-06-14 | End: 2022-06-19 | Stop reason: HOSPADM

## 2022-06-14 RX ADMIN — SODIUM CHLORIDE, SODIUM LACTATE, POTASSIUM CHLORIDE, AND CALCIUM CHLORIDE 1000 ML: .6; .31; .03; .02 INJECTION, SOLUTION INTRAVENOUS at 09:06

## 2022-06-14 RX ADMIN — MORPHINE SULFATE 2 MG: 2 INJECTION, SOLUTION INTRAMUSCULAR; INTRAVENOUS at 02:06

## 2022-06-14 RX ADMIN — MORPHINE SULFATE 2 MG: 2 INJECTION, SOLUTION INTRAMUSCULAR; INTRAVENOUS at 12:06

## 2022-06-14 RX ADMIN — MORPHINE SULFATE 2 MG: 2 INJECTION, SOLUTION INTRAMUSCULAR; INTRAVENOUS at 01:06

## 2022-06-14 RX ADMIN — ONDANSETRON 4 MG: 2 INJECTION INTRAMUSCULAR; INTRAVENOUS at 02:06

## 2022-06-14 RX ADMIN — ENOXAPARIN SODIUM 40 MG: 100 INJECTION SUBCUTANEOUS at 05:06

## 2022-06-14 RX ADMIN — DIATRIZOATE MEGLUMINE AND DIATRIZOATE SODIUM 50 ML: 660; 100 LIQUID ORAL; RECTAL at 09:06

## 2022-06-14 NOTE — ED PROVIDER NOTES
Encounter Date: 6/13/2022       History     Chief Complaint   Patient presents with    Abdominal Pain     Pt reports severe 10/10 abdominal, hx pancreatitis and sbo. Took 600mg ibuoprofen at 4 pm. No n/v/d or constipation.     Ms. 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 Per daughter this morning she ate a po-boy ~10am and shortly after developed sharp 10/10 periumbilical pain. The pain initially was periumbilical/epigastric and has since moved down to the lower abdomen. It has been constant but fluctuating in intensity, currently subsiding. She has had 5 non-bloody soft bowel movements since this morning. No n/v. Shortly prior to my exam she became diffusely weak and tired and feels like her limbs are heavy. Denies fevers, chills, dysuria, frequency, blood in stool. Says this pain feels different than she ahs experienced before.        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     Past Surgical History:   Procedure Laterality Date    APPENDECTOMY      CHOLECYSTECTOMY      ENDOSCOPIC ULTRASOUND OF UPPER GASTROINTESTINAL TRACT N/A 11/26/2019    Procedure: ULTRASOUND, UPPER GI TRACT, ENDOSCOPIC;  Surgeon: Britton Martinez MD;  Location: [facility];  Service: Endoscopy;  Laterality: N/A;    ENDOSCOPIC ULTRASOUND OF UPPER GASTROINTESTINAL TRACT N/A 6/24/2020    Procedure: ULTRASOUND, UPPER GI TRACT, ENDOSCOPIC;  Surgeon: Delfino Jasso MD;  Location: [facility] ([address] [place] [address]);  Service: Endoscopy;  Laterality: N/A;  EUS (linear) for abnormal CT scan. Urgent. Can be done by any [facility]  physician.  Britton Martinez MD  Covid-19 test 6/22/20 at [facility] - [facility]  Speaks Greenlandic;  offered and declined; daughter will accompany patient as interp    ENDOSCOPIC ULTRASOUND OF UPPER GASTROINTESTINAL TRACT N/A 5/26/2021    Procedure: ULTRASOUND, UPPER GI TRACT, ENDOSCOPIC;  Surgeon: Britton Martinez MD;  Location: [facility] (2ND FLR);  Service: Endoscopy;  Laterality: N/A;  Need EUS (linear) for dilated bile duct on MRI. Main or [place]. 45 minutes.   Britton Martinez MD   Fully vaccinated - sending in copy of card and bringing it on day of procedure. ttr  *NEEDS [place] citizen *    ESOPHAGOGASTRODUODENOSCOPY N/A 9/9/2020    Procedure: EGD (ESOPHAGOGASTRODUODENOSCOPY);  Surgeon: Devyn Miles MD;  [facility];  Service: General;  Laterality: N/A;    HYSTERECTOMY  2012    elective    LAPAROSCOPIC LYSIS OF ADHESIONS  9/9/2020    Procedure: LYSIS, ADHESIONS, LAPAROSCOPIC;  Surgeon: Devyn Miles MD;  [facility];  Service: General;;    ROBOT-ASSISTED SURGICAL REMOVAL OF STOMACH USING DA RACHEL XI N/A 9/9/2020    Procedure: XI ROBOTIC GASTRECTOMY/GIST PROXIMAL STOMACH;  Surgeon: Devyn Miles MD;  [facility];  Service: General;  Laterality: N/A;     History reviewed. No pertinent family history.  Social History     Tobacco Use    Smoking status: Never Smoker    Smokeless tobacco: Never Used   Substance Use Topics    Alcohol use: No    Drug use: No     Review of Systems   Constitutional: Positive for fatigue. Negative for chills and fever.   HENT: Negative for congestion and sore throat.    Eyes: Negative for visual disturbance.   Respiratory: Negative for cough and shortness of breath.    Cardiovascular: Negative for chest pain and palpitations.   Gastrointestinal: Positive for abdominal distention and abdominal pain. Negative for blood in stool, constipation, diarrhea, nausea and vomiting.   Genitourinary: Negative  for dysuria and frequency.   Musculoskeletal: Negative for back pain and myalgias.   Skin: Negative for rash.   Neurological: Positive for weakness and light-headedness. Negative for syncope, numbness and headaches.   Psychiatric/Behavioral: Negative for confusion.       Physical Exam     Initial Vitals [06/13/22 2116]   BP Pulse Resp Temp SpO2   125/60 78 20 97.4 °F (36.3 °C) 99 %      MAP       --         Physical Exam    Constitutional: She appears well-developed. She is not diaphoretic.   HENT:   Head: Normocephalic and atraumatic.   Eyes: Conjunctivae and EOM are normal. Pupils are equal, round, and reactive to light.   Neck:   Normal range of motion.  Cardiovascular: Normal rate, regular rhythm and normal heart sounds.   No murmur heard.  Pulmonary/Chest: Breath sounds normal. No respiratory distress.   Abdominal: Abdomen is soft. Bowel sounds are normal. She exhibits no distension. There is abdominal tenderness.   Tenderness to palpation through lower quadrants, L>>R There is no rebound and no guarding.   Musculoskeletal:         General: No edema.      Cervical back: Normal range of motion.     Neurological: She is alert and oriented to person, place, and time.   Skin: Skin is warm. Capillary refill takes less than 2 seconds.   Psychiatric: She has a normal mood and affect.         ED Course   Procedures  Labs Reviewed   CBC W/ AUTO DIFFERENTIAL - Abnormal; Notable for the following components:       Result Value    WBC 24.79 (*)     MCV 75 (*)     MCH 24.8 (*)     Immature Granulocytes 0.8 (*)     Gran # (ANC) 22.3 (*)     Immature Grans (Abs) 0.19 (*)     Gran % 90.0 (*)     Lymph % 4.8 (*)     Mono % 3.1 (*)     All other components within normal limits   COMPREHENSIVE METABOLIC PANEL - Abnormal; Notable for the following components:    Sodium 134 (*)     CO2 20 (*)     Glucose 192 (*)     Total Protein 8.6 (*)     eGFR if  53.7 (*)     eGFR if non  46.5 (*)     All other  components within normal limits   URINALYSIS, REFLEX TO URINE CULTURE - Abnormal; Notable for the following components:    Appearance, UA Cloudy (*)     Protein, UA 1+ (*)     All other components within normal limits    Narrative:     Specimen Source->Urine   LACTIC ACID, PLASMA - Abnormal; Notable for the following components:    Lactate (Lactic Acid) 2.8 (*)     All other components within normal limits   URINALYSIS MICROSCOPIC - Abnormal; Notable for the following components:    Hyaline Casts, UA 96 (*)     All other components within normal limits    Narrative:     Specimen Source->Urine   POCT GLUCOSE - Abnormal; Notable for the following components:    POCT Glucose 125 (*)     All other components within normal limits   LIPASE   SARS-COV-2 RDRP GENE    Narrative:     This test utilizes isothermal nucleic acid amplification   technology to detect the SARS-CoV-2 RdRp nucleic acid segment.   The analytical sensitivity (limit of detection) is 125 genome   equivalents/mL.   A POSITIVE result implies infection with the SARS-CoV-2 virus;   the patient is presumed to be contagious.     A NEGATIVE result means that SARS-CoV-2 nucleic acids are not   present above the limit of detection. A NEGATIVE result should be   treated as presumptive. It does not rule out the possibility of   COVID-19 and should not be the sole basis for treatment decisions.   If COVID-19 is strongly suspected based on clinical and exposure   history, re-testing using an alternate molecular assay should be   considered.   This test is only for use under the Food and Drug   Administration s Emergency Use Authorization (EUA).   Commercial kits are provided by [employer].   Performance characteristics of the EUA have been independently   verified by [facility] Department of   Pathology and Laboratory Medicine.   _________________________________________________________________   The authorized Fact Sheet for Healthcare Providers  and the authorized Fact   Sheet for Patients of the ID NOW COVID-19 are available on the FDA   website:     https://www.fda.gov/media/775751/download  https://www.fda.gov/media/075070/download          ISTAT CHEM8          Imaging Results          X-Ray Abdomen AP 1 View (Final result)  Result time 06/15/22 08:49:56    Final result by Tamir Larsen III, MD (06/15/22 08:49:56)                 Narrative:    EXAMINATION:  XR ABDOMEN AP 1 VIEW    CLINICAL HISTORY:  ggc;    FINDINGS:  Abdomen one view: NG tip fundus.  No obstruction, ileus, or perforation seen.      Electronically signed by: Tamir Larsen MD  Date:    06/15/2022  Time:    08:49                             X-Ray Abdomen AP 1 View (Final result)  Result time 06/15/22 08:49:38    Final result by Tamir Larsen III, MD (06/15/22 08:49:38)                 Impression:    Impression: Mild ileus.      Electronically signed by: Tamir Larsen MD  Date:    06/15/2022  Time:    08:49             Narrative:    EXAMINATION:  XR ABDOMEN AP 1 VIEW    CLINICAL HISTORY:  ggc;    FINDINGS:  Abdomen one view: NG tip fundus.  Lung bases are clear.  There is mild ileus.                               X-ray Abdomen for NG Tube Placement (Nursing should notify Radiology after placement) (Final result)  Result time 06/14/22 06:04:26    Final result by Pratik Singletary MD (06/14/22 06:04:26)                 Impression:      Enteric tube in appropriate position.    Electronically signed by resident: Sai Coreas  Date:    06/14/2022  Time:    05:38    Electronically signed by: Pratik Singletary  Date:    06/14/2022  Time:    06:04             Narrative:    EXAMINATION:  XR NON-RADIOLOGIST PERFORMED NG/GASTRIC TUBE CHECK    CLINICAL HISTORY:  NGT placement;    TECHNIQUE:  Single AP view of the lower chest/upper abdomen performed to evaluate for enteric tube placement    COMPARISON:  Abdominal radiograph January 12, 2022    FINDINGS:  Enteric tube in appropriate position with  tip and side port below the diaphragm within the stomach.    Mild atelectatic change at the left lung base, mild blunting of the left costophrenic angle..    Gas and stool noted throughout the visualized colon.    No significant osseous abnormality.                               CT Abdomen Pelvis  Without Contrast (Final result)  Result time 06/14/22 02:07:41    Final result by Mihir Weinstein MD (06/14/22 02:07:41)                 Impression:      Slight increase in fluid distended loops of small bowel proximal to the anastomosis in the right lower quadrant.  Partial small bowel obstruction is again considered.  Consider adhesions versus anastomotic stricture.      Electronically signed by: Mihir Weinstein  Date:    06/14/2022  Time:    02:07             Narrative:    EXAMINATION:  CT ABDOMEN PELVIS WITHOUT CONTRAST    CLINICAL HISTORY:  Bowel obstruction suspected;    TECHNIQUE:  Low dose axial images, sagittal and coronal reformations were obtained from the lung bases to the pubic symphysis.  Oral contrast was not administered.    COMPARISON:  CT, 11/11/2022    FINDINGS:  Heart: Normal in size. No pericardial effusion.    Lung Bases: mild fibrotic stranding in the lung bases are suggested left greater than right. No consolidation or nodule is evident..    Liver: Normal in size and attenuation, with stable right lobe hepatic low density compatible with cyst..    Gallbladder: Resected    Bile Ducts: Stable mild intrahepatic biliary ductal dilatation    Pancreas: No mass or peripancreatic fat stranding.    Spleen: Unremarkable.    Adrenals: Unremarkable.    Kidneys/ Ureters: Unremarkable.    Bladder: No evidence of wall thickening.    Reproductive organs: Unremarkable.    GI Tract/Mesentery: Persistent fluid-filled loops of small intestines proximal to anastomosis in the right lower quadrant.  The degree of fluid-filled small bowel loops dilatation may be slightly increased since the prior exam.  The more distal  ileum and colon maintain normal appearance stable to the prior exam.    Peritoneal Space: No ascites. No free air.    Retroperitoneum: No significant adenopathy.    Abdominal wall: Stable.  Lipoma adjacent to the iliopsoas tendon anterior to the left hip is again noted..    Vasculature: No significant atherosclerosis or aneurysm.    Bones: No acute fracture.  Sclerotic focus in L5 unchanged                                 Medications   sodium chloride 0.9% flush 10 mL (has no administration in time range)   lactated ringers bolus 1,000 mL (has no administration in time range)   lactated ringers infusion ( Intravenous New Bag 6/16/22 2109)   enoxaparin injection 40 mg (40 mg Subcutaneous Given 6/16/22 1843)   ondansetron disintegrating tablet 8 mg (has no administration in time range)   methocarbamoL (ROBAXIN) 500 mg in dextrose 5 % 100 mL IVPB (500 mg Intravenous New Bag 6/16/22 2116)   simethicone chewable tablet 80 mg (80 mg Oral Given 6/16/22 2027)   magnesium oxide tablet 400 mg (400 mg Oral Given 6/16/22 2027)   hydrALAZINE injection 10 mg (10 mg Intravenous Given 6/16/22 2021)   pantoprazole injection 40 mg (40 mg Intravenous Given 6/16/22 0925)   HYDROmorphone in 0.9 % NaCl 6 mg/30 mL (0.2 mg/mL) ( Intravenous Handoff 6/16/22 1932)   sodium chloride 0.9% bolus 1,000 mL (0 mLs Intravenous Stopped 6/14/22 0107)   morphine injection 2 mg (2 mg Intravenous Given 6/14/22 0017)   morphine injection 2 mg (2 mg Intravenous Given 6/14/22 0106)   morphine injection 2 mg (2 mg Intravenous Given 6/14/22 0239)   ondansetron injection 4 mg (4 mg Intravenous Given 6/14/22 0239)   diatrizoate meglumineand-diatrizoate sodium (GASTROGRAFFIN) solution 50 mL (50 mLs Oral Given 6/14/22 2105)   lactated ringers bolus 1,000 mL (0 mLs Intravenous Stopped 6/14/22 2202)   labetalol 20 mg/4 mL (5 mg/mL) IV syring (10 mg Intravenous Given 6/15/22 0603)   sodium phosphate 20.01 mmol in dextrose 5 % 250 mL IVPB (0 mmol Intravenous Stopped  6/15/22 5033)     Medical Decision Making:   Initial Assessment:   67 yo F with extensive history of abdominal issues presenting for abdominal pain this morning. She has had several episodes of non-bloody stools. She was hypotensive briefly in the ED. Due to her extensive surgical history of the abdomen, will obtain CTAP. Checking lactate for concern of bowel ischemia.     Dr. Green will continue to provide care.  Differential Diagnosis:   Acute on Chronic Pancreatitis  Mesenteric ischemia  Gastroenteritis  Bacterial infection   Diverticulitis               Attending Attestation:   Physician Attestation Statement for Resident:  As the supervising MD   Physician Attestation Statement: I have personally seen and examined this patient.   I agree with the above history. -:   As the supervising MD I agree with the above PE.    As the supervising MD I agree with the above treatment, course, plan, and disposition.   -: Admitted to general surgery for CT showing partial SBO.  I have reviewed and agree with the residents interpretation of the following: lab data and CT scans.  I have reviewed the following: old records at this facility.                         Clinical Impression:   Final diagnoses:  [R10.30] Lower abdominal pain (Primary)  [K56.600] Partial small bowel obstruction  [K56.609] SBO (small bowel obstruction)          ED Disposition Condition    Admit               Leonard Galan MD  Resident  06/14/22 0100       Jenn Green MD  06/16/22 6522

## 2022-06-14 NOTE — SUBJECTIVE & OBJECTIVE
No current facility-administered medications on file prior to encounter.     Current Outpatient Medications on File Prior to Encounter   Medication Sig    aluminum-magnesium hydroxide-simethicone (MAALOX ADVANCED) 200-200-20 mg/5 mL Susp Take 30 mLs by mouth every 6 (six) hours as needed (nausea, abdominal pain, chest pain).    amLODIPine (NORVASC) 2.5 MG tablet Take 2.5 mg by mouth once daily.    atorvastatin (LIPITOR) 20 MG tablet Take 20 mg by mouth once daily.    EScitalopram oxalate (LEXAPRO) 10 MG tablet TAKE 1/2 TABLET BY MOUTH DAILY FOR 7 DAYS, THEN TAKE 1 TABLET BY MOUTH DAILY    famotidine (PEPCID) 20 MG tablet Take 20 mg by mouth 2 (two) times daily.    omeprazole (PRILOSEC) 40 MG capsule Take 40 mg by mouth once daily.    polyethylene glycol (GLYCOLAX) 17 gram/dose powder Dissolve one capful (17g) into liquid and take by mouth once daily.       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     Past Surgical History:   Procedure Laterality Date    APPENDECTOMY      CHOLECYSTECTOMY      ENDOSCOPIC ULTRASOUND OF UPPER GASTROINTESTINAL TRACT N/A 11/26/2019    Procedure: ULTRASOUND, UPPER GI TRACT, ENDOSCOPIC;  Surgeon: Britton Martinez MD;  [facility];  Service: Endoscopy;  Laterality: N/A;    ENDOSCOPIC ULTRASOUND OF UPPER GASTROINTESTINAL TRACT N/A 6/24/2020    Procedure: ULTRASOUND, UPPER GI TRACT, ENDOSCOPIC;  Surgeon: Delfino Jasso MD;  Location: [facility] ([address] [place] [address]);  Service: Endoscopy;  Laterality: N/A;  EUS (linear) for abnormal CT scan. Urgent. Can be done by any [facility] physician.  Britton Matrinez MD  Covid-19 test 6/22/20 at [facility] - [facility]  Speaks Kyrgyz;  offered and declined; daughter will accompany patient as interp     ENDOSCOPIC ULTRASOUND OF UPPER GASTROINTESTINAL TRACT N/A 5/26/2021    Procedure: ULTRASOUND, UPPER GI TRACT, ENDOSCOPIC;  Surgeon: Britton Martinez MD;  Location: [facility] (2ND FLR);  Service: Endoscopy;  Laterality: N/A;  Need EUS (linear) for dilated bile duct on MRI. Main or [place]. 45 minutes.   Britton Martinez MD   Fully vaccinated - sending in copy of card and bringing it on day of procedure. ttr  *NEEDS Chinese *    ESOPHAGOGASTRODUODENOSCOPY N/A 9/9/2020    Procedure: EGD (ESOPHAGOGASTRODUODENOSCOPY);  Surgeon: Devyn Miles MD;  [facility];  Service: General;  Laterality: N/A;    HYSTERECTOMY  2012    elective    LAPAROSCOPIC LYSIS OF ADHESIONS  9/9/2020    Procedure: LYSIS, ADHESIONS, LAPAROSCOPIC;  Surgeon: Devyn Miles MD;  [facility];  Service: General;;    ROBOT-ASSISTED SURGICAL REMOVAL OF STOMACH USING DA RACHEL XI N/A 9/9/2020    Procedure: XI ROBOTIC GASTRECTOMY/GIST PROXIMAL STOMACH;  Surgeon: Devyn Miles MD;  [facility];  Service: General;  Laterality: N/A;     Family History    None       Tobacco Use    Smoking status: Never Smoker    Smokeless tobacco: Never Used   Substance and Sexual Activity    Alcohol use: No    Drug use: No    Sexual activity: Yes     Partners: Male     Review of Systems   Constitutional:  Negative for chills and fever.   HENT:  Negative for sore throat and trouble swallowing.    Eyes:  Negative for discharge and redness.   Respiratory:  Negative for chest tightness and shortness of breath.    Cardiovascular:  Negative for chest pain.   Gastrointestinal:  Positive for abdominal distention, nausea and vomiting. Negative for abdominal pain, constipation and diarrhea.   Genitourinary:  Negative for dysuria.   Musculoskeletal:  Negative for back pain and neck pain.   Skin:  Negative for color change.   Allergic/Immunologic: Negative for immunocompromised state.   Neurological:  Negative for  dizziness and headaches.   Hematological:  Negative for adenopathy.   Objective:     Vital Signs (Most Recent):  Temp: 97.4 °F (36.3 °C) (06/13/22 2226)  Pulse: 64 (06/14/22 0402)  Resp: 18 (06/14/22 0402)  BP: 125/60 (06/14/22 0402)  SpO2: 97 % (06/14/22 0402) Vital Signs (24h Range):  Temp:  [97.4 °F (36.3 °C)] 97.4 °F (36.3 °C)  Pulse:  [64-81] 64  Resp:  [16-24] 18  SpO2:  [95 %-100 %] 97 %  BP: ()/(46-72) 125/60     Weight: 50.3 kg (111 lb)  Body mass index is 20.3 kg/m².    Physical Exam  Vitals and nursing note reviewed.   Constitutional:       General: She is not in acute distress.     Appearance: She is well-developed. She is not diaphoretic.   HENT:      Head: Normocephalic and atraumatic.   Eyes:      Pupils: Pupils are equal, round, and reactive to light.   Cardiovascular:      Rate and Rhythm: Normal rate and regular rhythm.   Pulmonary:      Effort: Pulmonary effort is normal. No respiratory distress.   Abdominal:      General: There is distension.      Palpations: Abdomen is soft.      Tenderness: There is no abdominal tenderness. There is no guarding.      Comments: Abd soft, moderately distended, non tender   Musculoskeletal:         General: Normal range of motion.      Cervical back: Normal range of motion and neck supple.   Skin:     General: Skin is warm and dry.   Neurological:      General: No focal deficit present.      Mental Status: She is alert and oriented to person, place, and time.   Psychiatric:         Mood and Affect: Mood normal.         Behavior: Behavior normal.         Thought Content: Thought content normal.         Judgment: Judgment normal.       Significant Labs:  CBC:   Recent Labs   Lab 06/13/22  2313   WBC 24.79*   RBC 5.37   HGB 13.3   HCT 40.4      MCV 75*   MCH 24.8*   MCHC 32.9     CMP:   Recent Labs   Lab 06/13/22  2313   *   CALCIUM 10.4   ALBUMIN 4.2   PROT 8.6*   *   K 4.6   CO2 20*   CL 99   BUN 21   CREATININE 1.2   ALKPHOS 111   ALT 16    AST 34   BILITOT 0.6       Significant Diagnostics:  I have reviewed all pertinent imaging results/findings within the past 24 hours.  CT with dilated proximal loops of small bowel with a possible pelvic transition point consistent with SBO

## 2022-06-14 NOTE — ED NOTES
"Patient reporting 8/10 back pain "that feels like pancreatitis pain". Pt requesting pain medication, MD notified.   "

## 2022-06-14 NOTE — PLAN OF CARE
[facility] - Surgery  Initial Discharge Assessment       Primary Care Provider: Alla Jade MD    Admission Diagnosis: SBO (small bowel obstruction) [K56.609]  Lower abdominal pain [R10.30]  Partial small bowel obstruction [K56.600]    Admission Date: 6/13/2022  Expected Discharge Date: 6/16/2022    Discharge Barriers Identified: None    Payor: HU[employer] MANAGED MEDICARE / Plan: HU[employer] MEDICARE HMO / Product Type: Capitation /     Extended Emergency Contact Information  Primary Emergency Contact: Winifred Cuellar  Address: [address] [place] [address] [facility]  Home Phone: [phone]  Mobile Phone: [phone]  Relation: Daughter  Secondary Emergency Contact: Mariola Coelol  Mobile Phone: [phone]  Relation: Daughter  Preferred language: English   needed? No    Discharge Plan A: Home with family  Discharge Plan B: Home Health, Home with family      [id number] [place] [address]  [address]  [facility] [place] [address]  Phone: [phone] Fax: [phone]    [id number] - [place], [place] - [address]  [address]  [place] [address]  Phone: [phone] Fax: [phone]      Initial Assessment (most recent)     Adult Discharge Assessment - 06/14/22 1345        Discharge Assessment    Assessment Type Discharge Planning Assessment     Confirmed/corrected address, phone number and insurance Yes     Confirmed Demographics Correct on Facesheet     Source of Information patient;family   Anna Coello - daughter    Communicated GORDON with patient/caregiver Yes     Lives With child(so), adult     Do you expect to return to your current living situation? Yes     Do you have help at home or someone to help you manage your care at home? Yes     Who are your caregiver(s) and their phone number(s)? Anna Coello - daughter     Prior to hospitilization cognitive status: Alert/Oriented     Current cognitive status: Alert/Oriented     Equipment  Currently Used at Home none     Do you currently have service(s) that help you manage your care at home? No     Do you take prescription medications? Yes     Do you have prescription coverage? Yes     Do you have any problems affording any of your prescribed medications? No     Is the patient taking medications as prescribed? yes     How do you get to doctors appointments? car, drives self     Are you on dialysis? No     Do you take coumadin? No     Discharge Plan A Home with family     Discharge Plan B Home Health;Home with family     DME Needed Upon Discharge  none     Discharge Plan discussed with: Patient;Adult children     Discharge Barriers Identified None               Patient lives with her daughter in a two story home with her bed/bath on first floor and one entry step into home. Patient /daughter do not feel she will need any post acute services upon hospital discharge. Her daughter is primary CG and will care/help once home. Family will provide transportation home.

## 2022-06-14 NOTE — HPI
Patient is a 67 yo F with hx of HTN, HLD, and admission for SBO in 1/2022 which was manage conservatively who presents with abd distension and emesis concerning for recurrence. She notes her symptoms started to show up AM of 6/13. She began to feel more bloated, and stated developing nausea. She denies any significant pain, fevers, chills. Her last BM was evening of 6/13. She started throwing up in the ED.    Abd surg of hystecromy

## 2022-06-14 NOTE — ASSESSMENT & PLAN NOTE
Patient is 67 yo F with SBO, second on this calendar year  Will plan for another trial of conservative management, but if she continues to have frequent episodes will need surgery at some point    Admit to general surgery  NGT placement, to LIWS  NPO  IVF - Extra bolus of LR ordered for her mild SALO  OOBTC, Encourage ambulation  DVT ppx    Will plan for decompression with NGT today, and then GGFN challenge starting tomorrow

## 2022-06-14 NOTE — ED NOTES
"Meredith Keen, a 66 y.o. female presents to the ED w/ complaint of abdominal pain which began earlier today. Per family pt stating she thinks she ate a bad "poboy". Upon arrival to er pt began leaking feces which is not normal for her. PT also began having decreased LOC which a marked decrease in bp following onset of bowel movement.  Pt refused intertreter and wants daughter to interpret    Triage note:  Chief Complaint   Patient presents with    Abdominal Pain     Pt reports severe 10/10 abdominal, hx pancreatitis and sbo. Took 600mg ibuoprofen at 4 pm. No n/v/d or constipation.     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

## 2022-06-14 NOTE — CONSULTS
[facility] - Emergency Dept  General Surgery  Consult Note    Patient Name: Meredith Keen  MRN: [id number]  Code Status: Full Code  Admission Date: 6/13/2022  Hospital Length of Stay: 0 days  Attending Physician: Jenn Green MD  Primary Care Provider: Alla Jade MD    Patient information was obtained from patient, past medical records and ER records.     Inpatient consult to General surgery  Consult performed by: Bernardo Banuelos MD  Consult ordered by: Bernardo Banuelos MD  Reason for consult: SBO        Subjective:     Principal Problem: Partial small bowel obstruction    History of Present Illness: Patient is a 65 yo F with hx of HTN, HLD, and admission for SBO in 1/2022 which was manage conservatively who presents with abd distension and emesis concerning for recurrence. She notes her symptoms started to show up AM of 6/13. She began to feel more bloated, and stated developing nausea. She denies any significant pain, fevers, chills. Her last BM was evening of 6/13. She started throwing up in the ED.    Abd surg of hystecromy      No current facility-administered medications on file prior to encounter.     Current Outpatient Medications on File Prior to Encounter   Medication Sig    aluminum-magnesium hydroxide-simethicone (MAALOX ADVANCED) 200-200-20 mg/5 mL Susp Take 30 mLs by mouth every 6 (six) hours as needed (nausea, abdominal pain, chest pain).    amLODIPine (NORVASC) 2.5 MG tablet Take 2.5 mg by mouth once daily.    atorvastatin (LIPITOR) 20 MG tablet Take 20 mg by mouth once daily.    EScitalopram oxalate (LEXAPRO) 10 MG tablet TAKE 1/2 TABLET BY MOUTH DAILY FOR 7 DAYS, THEN TAKE 1 TABLET BY MOUTH DAILY    famotidine (PEPCID) 20 MG tablet Take 20 mg by mouth 2 (two) times daily.    omeprazole (PRILOSEC) 40 MG capsule Take 40 mg by mouth once daily.    polyethylene glycol (GLYCOLAX) 17 gram/dose powder Dissolve one capful (17g) into liquid and take by mouth once daily.       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     Past Surgical History:   Procedure Laterality Date    APPENDECTOMY      CHOLECYSTECTOMY      ENDOSCOPIC ULTRASOUND OF UPPER GASTROINTESTINAL TRACT N/A 11/26/2019    Procedure: ULTRASOUND, UPPER GI TRACT, ENDOSCOPIC;  Surgeon: Birtton Martinez MD;  Location: [facility] ENDO;  Service: Endoscopy;  Laterality: N/A;    ENDOSCOPIC ULTRASOUND OF UPPER GASTROINTESTINAL TRACT N/A 6/24/2020    Procedure: ULTRASOUND, UPPER GI TRACT, ENDOSCOPIC;  Surgeon: Delfino Jasso MD;  Location: [facility] ENDO (2ND FLR);  Service: Endoscopy;  Laterality: N/A;  EUS (linear) for abnormal CT scan. Urgent. Can be done by any [facility] physician.  Britton Martinez MD  Covid-19 test 6/22/20 at [facility] - pg  Speaks Turkmen;  offered and declined; daughter will accompany patient as interp    ENDOSCOPIC ULTRASOUND OF UPPER GASTROINTESTINAL TRACT N/A 5/26/2021    Procedure: ULTRASOUND, UPPER GI TRACT, ENDOSCOPIC;  Surgeon: Britton Martinez MD;  Location: [facility] (2ND FLR);  Service: Endoscopy;  Laterality: N/A;  Need EUS (linear) for dilated bile duct on MRI. Main or [place]. 45 minutes.   Britton Martinez MD   Fully vaccinated - sending in copy of card and bringing it on day of procedure. ttr  *NEEDS Telugu *    ESOPHAGOGASTRODUODENOSCOPY N/A 9/9/2020    Procedure: EGD (ESOPHAGOGASTRODUODENOSCOPY);  Surgeon: Devyn Miles MD;  Location: [facility] OR [address] [place] [address];  Service: General;  Laterality: N/A;    HYSTERECTOMY  2012    elective    LAPAROSCOPIC LYSIS OF ADHESIONS  9/9/2020    Procedure: LYSIS, ADHESIONS, LAPAROSCOPIC;  Surgeon: Devyn Miles MD;  Location: [facility] OR [address] [place] [address];  Service: General;;    ROBOT-ASSISTED  SURGICAL REMOVAL OF STOMACH USING DA RACHEL XI N/A 9/9/2020    Procedure: XI ROBOTIC GASTRECTOMY/GIST PROXIMAL STOMACH;  Surgeon: Devyn Miles MD;  Location: [facility] OR [address] [place] [address];  Service: General;  Laterality: N/A;     Family History    None       Tobacco Use    Smoking status: Never Smoker    Smokeless tobacco: Never Used   Substance and Sexual Activity    Alcohol use: No    Drug use: No    Sexual activity: Yes     Partners: Male     Review of Systems   Constitutional:  Negative for chills and fever.   HENT:  Negative for sore throat and trouble swallowing.    Eyes:  Negative for discharge and redness.   Respiratory:  Negative for chest tightness and shortness of breath.    Cardiovascular:  Negative for chest pain.   Gastrointestinal:  Positive for abdominal distention, nausea and vomiting. Negative for abdominal pain, constipation and diarrhea.   Genitourinary:  Negative for dysuria.   Musculoskeletal:  Negative for back pain and neck pain.   Skin:  Negative for color change.   Allergic/Immunologic: Negative for immunocompromised state.   Neurological:  Negative for dizziness and headaches.   Hematological:  Negative for adenopathy.   Objective:     Vital Signs (Most Recent):  Temp: 97.4 °F (36.3 °C) (06/13/22 2226)  Pulse: 64 (06/14/22 0402)  Resp: 18 (06/14/22 0402)  BP: 125/60 (06/14/22 0402)  SpO2: 97 % (06/14/22 0402) Vital Signs (24h Range):  Temp:  [97.4 °F (36.3 °C)] 97.4 °F (36.3 °C)  Pulse:  [64-81] 64  Resp:  [16-24] 18  SpO2:  [95 %-100 %] 97 %  BP: ()/(46-72) 125/60     Weight: 50.3 kg (111 lb)  Body mass index is 20.3 kg/m².    Physical Exam  Vitals and nursing note reviewed.   Constitutional:       General: She is not in acute distress.     Appearance: She is well-developed. She is not diaphoretic.   HENT:      Head: Normocephalic and atraumatic.   Eyes:      Pupils: Pupils are equal, round, and reactive to light.   Cardiovascular:      Rate and Rhythm: Normal rate and regular rhythm.    Pulmonary:      Effort: Pulmonary effort is normal. No respiratory distress.   Abdominal:      General: There is distension.      Palpations: Abdomen is soft.      Tenderness: There is no abdominal tenderness. There is no guarding.      Comments: Abd soft, moderately distended, non tender   Musculoskeletal:         General: Normal range of motion.      Cervical back: Normal range of motion and neck supple.   Skin:     General: Skin is warm and dry.   Neurological:      General: No focal deficit present.      Mental Status: She is alert and oriented to person, place, and time.   Psychiatric:         Mood and Affect: Mood normal.         Behavior: Behavior normal.         Thought Content: Thought content normal.         Judgment: Judgment normal.       Significant Labs:  CBC:   Recent Labs   Lab 06/13/22  2313   WBC 24.79*   RBC 5.37   HGB 13.3   HCT 40.4      MCV 75*   MCH 24.8*   MCHC 32.9     CMP:   Recent Labs   Lab 06/13/22  2313   *   CALCIUM 10.4   ALBUMIN 4.2   PROT 8.6*   *   K 4.6   CO2 20*   CL 99   BUN 21   CREATININE 1.2   ALKPHOS 111   ALT 16   AST 34   BILITOT 0.6       Significant Diagnostics:  I have reviewed all pertinent imaging results/findings within the past 24 hours.  CT with dilated proximal loops of small bowel with a possible pelvic transition point consistent with SBO      Assessment/Plan:     * Partial small bowel obstruction  Patient is 65 yo F with SBO, second on this calendar year  Will plan for another trial of conservative management, but if she continues to have frequent episodes will need surgery at some point    Admit to general surgery  NGT placement, to LIWS  NPO  IVF - Extra bolus of LR ordered for her mild SALO  OOBTC, Encourage ambulation  DVT ppx    Will plan for decompression with NGT today, and then GGFN challenge starting tomorrow      VTE Risk Mitigation (From admission, onward)         Ordered     enoxaparin injection 40 mg  Daily         06/14/22  0507     Place sequential compression device  Until discontinued         06/14/22 0507     Place EZIO hose  Until discontinued         06/14/22 0507     IP VTE LOW RISK PATIENT  Once         06/14/22 0507                Thank you for your consult. I will follow-up with patient. Please contact us if you have any additional questions.    Bernardo Banuelos MD  General Surgery  [facility] - Emergency Dept

## 2022-06-15 ENCOUNTER — ANESTHESIA EVENT (OUTPATIENT)
Dept: SURGERY | Facility: HOSPITAL | Age: 67
DRG: 336 | End: 2022-06-15
Payer: MEDICARE

## 2022-06-15 LAB
ANION GAP SERPL CALC-SCNC: 11 MMOL/L (ref 8–16)
BASOPHILS # BLD AUTO: 0.03 K/UL (ref 0–0.2)
BASOPHILS NFR BLD: 0.4 % (ref 0–1.9)
BUN SERPL-MCNC: 11 MG/DL (ref 8–23)
CALCIUM SERPL-MCNC: 8.6 MG/DL (ref 8.7–10.5)
CHLORIDE SERPL-SCNC: 107 MMOL/L (ref 95–110)
CO2 SERPL-SCNC: 21 MMOL/L (ref 23–29)
CREAT SERPL-MCNC: 0.6 MG/DL (ref 0.5–1.4)
DIFFERENTIAL METHOD: ABNORMAL
EOSINOPHIL # BLD AUTO: 0.1 K/UL (ref 0–0.5)
EOSINOPHIL NFR BLD: 1.9 % (ref 0–8)
ERYTHROCYTE [DISTWIDTH] IN BLOOD BY AUTOMATED COUNT: 14.1 % (ref 11.5–14.5)
EST. GFR  (AFRICAN AMERICAN): >60 ML/MIN/1.73 M^2
EST. GFR  (NON AFRICAN AMERICAN): >60 ML/MIN/1.73 M^2
GLUCOSE SERPL-MCNC: 77 MG/DL (ref 70–110)
HCT VFR BLD AUTO: 32.8 % (ref 37–48.5)
HGB BLD-MCNC: 10.6 G/DL (ref 12–16)
IMM GRANULOCYTES # BLD AUTO: 0.01 K/UL (ref 0–0.04)
IMM GRANULOCYTES NFR BLD AUTO: 0.1 % (ref 0–0.5)
LYMPHOCYTES # BLD AUTO: 1 K/UL (ref 1–4.8)
LYMPHOCYTES NFR BLD: 13.2 % (ref 18–48)
MAGNESIUM SERPL-MCNC: 1.8 MG/DL (ref 1.6–2.6)
MCH RBC QN AUTO: 24.9 PG (ref 27–31)
MCHC RBC AUTO-ENTMCNC: 32.3 G/DL (ref 32–36)
MCV RBC AUTO: 77 FL (ref 82–98)
MONOCYTES # BLD AUTO: 0.7 K/UL (ref 0.3–1)
MONOCYTES NFR BLD: 8.8 % (ref 4–15)
NEUTROPHILS # BLD AUTO: 5.6 K/UL (ref 1.8–7.7)
NEUTROPHILS NFR BLD: 75.6 % (ref 38–73)
NRBC BLD-RTO: 0 /100 WBC
PHOSPHATE SERPL-MCNC: 2.1 MG/DL (ref 2.7–4.5)
PLATELET # BLD AUTO: 267 K/UL (ref 150–450)
PMV BLD AUTO: 9.8 FL (ref 9.2–12.9)
POTASSIUM SERPL-SCNC: 3.5 MMOL/L (ref 3.5–5.1)
RBC # BLD AUTO: 4.25 M/UL (ref 4–5.4)
SODIUM SERPL-SCNC: 139 MMOL/L (ref 136–145)
WBC # BLD AUTO: 7.35 K/UL (ref 3.9–12.7)

## 2022-06-15 PROCEDURE — 25000003 PHARM REV CODE 250: Performed by: STUDENT IN AN ORGANIZED HEALTH CARE EDUCATION/TRAINING PROGRAM

## 2022-06-15 PROCEDURE — 63600175 PHARM REV CODE 636 W HCPCS: Performed by: STUDENT IN AN ORGANIZED HEALTH CARE EDUCATION/TRAINING PROGRAM

## 2022-06-15 PROCEDURE — 25000003 PHARM REV CODE 250

## 2022-06-15 PROCEDURE — 99233 SBSQ HOSP IP/OBS HIGH 50: CPT | Mod: GC,,, | Performed by: STUDENT IN AN ORGANIZED HEALTH CARE EDUCATION/TRAINING PROGRAM

## 2022-06-15 PROCEDURE — 83735 ASSAY OF MAGNESIUM: CPT | Performed by: STUDENT IN AN ORGANIZED HEALTH CARE EDUCATION/TRAINING PROGRAM

## 2022-06-15 PROCEDURE — 84100 ASSAY OF PHOSPHORUS: CPT | Performed by: STUDENT IN AN ORGANIZED HEALTH CARE EDUCATION/TRAINING PROGRAM

## 2022-06-15 PROCEDURE — 85025 COMPLETE CBC W/AUTO DIFF WBC: CPT | Performed by: STUDENT IN AN ORGANIZED HEALTH CARE EDUCATION/TRAINING PROGRAM

## 2022-06-15 PROCEDURE — 11000001 HC ACUTE MED/SURG PRIVATE ROOM

## 2022-06-15 PROCEDURE — 63600175 PHARM REV CODE 636 W HCPCS

## 2022-06-15 PROCEDURE — 36415 COLL VENOUS BLD VENIPUNCTURE: CPT | Performed by: STUDENT IN AN ORGANIZED HEALTH CARE EDUCATION/TRAINING PROGRAM

## 2022-06-15 PROCEDURE — 80048 BASIC METABOLIC PNL TOTAL CA: CPT | Performed by: STUDENT IN AN ORGANIZED HEALTH CARE EDUCATION/TRAINING PROGRAM

## 2022-06-15 PROCEDURE — 99233 PR SUBSEQUENT HOSPITAL CARE,LEVL III: ICD-10-PCS | Mod: GC,,, | Performed by: STUDENT IN AN ORGANIZED HEALTH CARE EDUCATION/TRAINING PROGRAM

## 2022-06-15 RX ORDER — LABETALOL HCL 20 MG/4 ML
10 SYRINGE (ML) INTRAVENOUS ONCE
Status: COMPLETED | OUTPATIENT
Start: 2022-06-15 | End: 2022-06-15

## 2022-06-15 RX ORDER — SIMETHICONE 80 MG
1 TABLET,CHEWABLE ORAL 3 TIMES DAILY
Status: DISCONTINUED | OUTPATIENT
Start: 2022-06-15 | End: 2022-06-19 | Stop reason: HOSPADM

## 2022-06-15 RX ORDER — LANOLIN ALCOHOL/MO/W.PET/CERES
400 CREAM (GRAM) TOPICAL 2 TIMES DAILY
Status: DISCONTINUED | OUTPATIENT
Start: 2022-06-15 | End: 2022-06-19 | Stop reason: HOSPADM

## 2022-06-15 RX ORDER — KETOROLAC TROMETHAMINE 15 MG/ML
15 INJECTION, SOLUTION INTRAMUSCULAR; INTRAVENOUS EVERY 6 HOURS
Status: DISCONTINUED | OUTPATIENT
Start: 2022-06-15 | End: 2022-06-16

## 2022-06-15 RX ADMIN — ENOXAPARIN SODIUM 40 MG: 100 INJECTION SUBCUTANEOUS at 06:06

## 2022-06-15 RX ADMIN — KETOROLAC TROMETHAMINE 15 MG: 15 INJECTION, SOLUTION INTRAMUSCULAR; INTRAVENOUS at 06:06

## 2022-06-15 RX ADMIN — METHOCARBAMOL 500 MG: 100 INJECTION, SOLUTION INTRAMUSCULAR; INTRAVENOUS at 10:06

## 2022-06-15 RX ADMIN — Medication 400 MG: at 09:06

## 2022-06-15 RX ADMIN — SIMETHICONE 80 MG: 80 TABLET, CHEWABLE ORAL at 09:06

## 2022-06-15 RX ADMIN — KETOROLAC TROMETHAMINE 15 MG: 15 INJECTION, SOLUTION INTRAMUSCULAR; INTRAVENOUS at 12:06

## 2022-06-15 RX ADMIN — Medication 10 MG: at 06:06

## 2022-06-15 RX ADMIN — METHOCARBAMOL 500 MG: 100 INJECTION, SOLUTION INTRAMUSCULAR; INTRAVENOUS at 03:06

## 2022-06-15 RX ADMIN — SODIUM PHOSPHATE, MONOBASIC, MONOHYDRATE 20.01 MMOL: 276; 142 INJECTION, SOLUTION INTRAVENOUS at 06:06

## 2022-06-15 NOTE — PLAN OF CARE
Pt AAOx4. Vital signs as charted. Safety measures in place. NGT in place to L nare, set to low intermittent suction. Gastrograffin administered during shift, X-rays completed. Ambulated to bathroom. No c/o pain during night. PIV intact, fluids administered. Pt resting throughout night. No falls or injuries at this time.

## 2022-06-15 NOTE — ANESTHESIA PREPROCEDURE EVALUATION
[facility]  Anesthesia Pre-Operative Evaluation         Patient Name: Meredith Keen  YOB: 1955  MRN: [id number]    SUBJECTIVE:     Pre-operative evaluation for Procedure(s) (LRB):  LAPAROTOMY, EXPLORATORY (N/A)     06/15/2022    Meredith Keen is a 66 y.o. 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 Pt wants to trial conservative management again, but may need ex lap.    Patient now presents for the above procedure(s).    Daughter translated consent with permission from patient.    TTE: 8/2020  · Mild left atrial enlargement.  · Normal central venous pressure (3 mmHg).  · The estimated PA systolic pressure is 28 mmHg.  · Normal left ventricular systolic function. The estimated ejection fraction is 72%.  · Indeterminate left ventricular diastolic function.  · No wall motion abnormalities.  · Normal right ventricular systolic function.    LDA:        Peripheral IV - Single Lumen 06/13/22 2244 24 G Right Hand (Active)   Site Assessment Clean;Dry;Intact;No swelling;No redness 06/14/22 2000   Extremity Assessment Distal to IV No warmth;No swelling;No redness;No abnormal discoloration 06/14/22 2000   Line Status Blood return noted;Infusing 06/14/22 2000   Dressing Status Clean;Dry;Intact 06/14/22 2000   Dressing Intervention Integrity maintained 06/14/22 2000   Number of days: 1            Peripheral IV - Single Lumen 06/13/22 2328 20 G Right Antecubital (Active)   Site Assessment Clean;Intact;Dry 06/14/22 2000   Extremity Assessment Distal to IV No warmth;No swelling;No abnormal discoloration;No redness 06/14/22 2000   Line Status Saline locked 06/14/22 2000   Dressing Status Clean;Dry;Intact 06/14/22 2000   Dressing Intervention Integrity maintained 06/14/22 2000   Number of days: 1            Peripheral IV - Single  Lumen 06/14/22 2144 22 G Left;Posterior Forearm (Active)   Site Assessment Clean;Dry;Intact;No swelling;No redness 06/14/22 2200   Extremity Assessment Distal to IV No warmth;No swelling;No redness;No abnormal discoloration 06/14/22 2200   Line Status Infusing 06/14/22 2200   Dressing Status Clean;Dry;Intact 06/14/22 2200   Dressing Intervention Integrity maintained 06/14/22 2200   Number of days: 0            NG/OG Tube 06/14/22 0512 Rhea sump 14 Fr. Left nostril (Active)   Placement Check placement verified by x-ray 06/14/22 2000   Securement secured to nostril center w/ adhesive device 06/14/22 2000   Suction Setting/Drainage Method intermittent setting;low;suction at 06/14/22 2000   Tube Output(mL)(Include Discarded Residual) 0 mL 06/15/22 0639   Number of days: 1       Prev airway:     Placement Date: 09/09/20; Placement Time: 1248 (created via procedure documentation); Method of Intubation: Direct laryngoscopy; Mask Ventilation: Easy; Intubated: Postinduction; Blade: Fountain #2; Airway Device Size: 7.0; Placement Verified By: Capnometry; Complicating Factors: Anterior larynx; Intubation Findings: Bilateral breath sounds; Securment: Lips; Complications: Esophageal intubation; Removal Date: 09/09/20;  Removal Time: 1559    Drips:    lactated ringers         Patient Active Problem List   Diagnosis    Headache(784.0)    Pancreatitis    Essential hypertension    Gastroesophageal reflux disease    Leukocytosis    Gastrointestinal stromal tumor (GIST)    Hyperlipidemia    Benign gastrointestinal stromal tumor (GIST)    Dilated bile duct    Partial small bowel obstruction       Review of patient's allergies indicates:  No Active Allergies    Current Inpatient Medications:   enoxaparin  40 mg Subcutaneous Daily    ketorolac  15 mg Intravenous Q6H    lactated ringers  1,000 mL Intravenous Once    methocarbamol (ROBAXIN) IVPB  500 mg Intravenous Q8H    sodium phosphate IVPB  20.01 mmol Intravenous Once        No current facility-administered medications on file prior to encounter.     Current Outpatient Medications on File Prior to Encounter   Medication Sig Dispense Refill    aluminum-magnesium hydroxide-simethicone (MAALOX ADVANCED) 200-200-20 mg/5 mL Susp Take 30 mLs by mouth every 6 (six) hours as needed (nausea, abdominal pain, chest pain). 354 mL 0    amLODIPine (NORVASC) 2.5 MG tablet Take 2.5 mg by mouth once daily.      atorvastatin (LIPITOR) 20 MG tablet Take 20 mg by mouth once daily.      EScitalopram oxalate (LEXAPRO) 10 MG tablet TAKE 1/2 TABLET BY MOUTH DAILY FOR 7 DAYS, THEN TAKE 1 TABLET BY MOUTH DAILY 90 tablet 1    famotidine (PEPCID) 20 MG tablet Take 20 mg by mouth 2 (two) times daily.      omeprazole (PRILOSEC) 40 MG capsule Take 40 mg by mouth once daily.      polyethylene glycol (GLYCOLAX) 17 gram/dose powder Dissolve one capful (17g) into liquid and take by mouth once daily. 510 g 0       Past Surgical History:   Procedure Laterality Date    APPENDECTOMY      CHOLECYSTECTOMY      ENDOSCOPIC ULTRASOUND OF UPPER GASTROINTESTINAL TRACT N/A 11/26/2019    Procedure: ULTRASOUND, UPPER GI TRACT, ENDOSCOPIC;  Surgeon: Britton Martinez MD;  Location: [facility];  Service: Endoscopy;  Laterality: N/A;    ENDOSCOPIC ULTRASOUND OF UPPER GASTROINTESTINAL TRACT N/A 6/24/2020    Procedure: ULTRASOUND, UPPER GI TRACT, ENDOSCOPIC;  Surgeon: Delfino Jasso MD;  Location: [facility] ([address] [place] [address]);  Service: Endoscopy;  Laterality: N/A;  EUS (linear) for abnormal CT scan. Urgent. Can be done by any [facility] physician.  Britton Martinez MD  Covid-19 test 6/22/20 at [facility] - pg  Speaks Japanese;  offered and declined; daughter will accompany patient as interp    ENDOSCOPIC ULTRASOUND OF UPPER GASTROINTESTINAL TRACT N/A 5/26/2021    Procedure: ULTRASOUND, UPPER GI TRACT, ENDOSCOPIC;  Surgeon: Britton Martinez MD;  Location: [address]);  Service: Endoscopy;  Laterality:  N/A;  Need EUS (linear) for dilated bile duct on MRI. Main or [place]. 45 minutes.   Britton Martinez MD   Fully vaccinated - sending in copy of card and bringing it on day of procedure. ttr  *NEEDS Lao *    ESOPHAGOGASTRODUODENOSCOPY N/A 9/9/2020    Procedure: EGD (ESOPHAGOGASTRODUODENOSCOPY);  Surgeon: Devyn Miles MD;  [facility];  Service: General;  Laterality: N/A;    HYSTERECTOMY  2012    elective    LAPAROSCOPIC LYSIS OF ADHESIONS  9/9/2020    Procedure: LYSIS, ADHESIONS, LAPAROSCOPIC;  Surgeon: Devyn Miles MD;  [facility];  Service: General;;    ROBOT-ASSISTED SURGICAL REMOVAL OF STOMACH USING DA RACHEL XI N/A 9/9/2020    Procedure: XI ROBOTIC GASTRECTOMY/GIST PROXIMAL STOMACH;  Surgeon: Devyn Miles MD;  [facility];  Service: General;  Laterality: N/A;       OBJECTIVE:     Vital Signs Range (Last 24H):  Temp:  [4 °C (39.2 °F)-37.1 °C (98.7 °F)]   Pulse:  [69-88]   Resp:  [16-18]   BP: (124-182)/(61-86)   SpO2:  [94 %-98 %]       Significant Labs:  Lab Results   Component Value Date    WBC 7.35 06/15/2022    HGB 10.6 (L) 06/15/2022    HCT 32.8 (L) 06/15/2022     06/15/2022    CHOL 213 (H) 05/25/2022    TRIG 81 05/25/2022    HDL 64 05/25/2022    ALT 16 06/13/2022    AST 34 06/13/2022     06/15/2022    K 3.5 06/15/2022     06/15/2022    CREATININE 0.6 06/15/2022    BUN 11 06/15/2022    CO2 21 (L) 06/15/2022    INR 0.9 10/19/2015    HGBA1C 5.9 (H) 05/25/2022       Diagnostic Studies: No relevant studies.    EKG:   Results for orders placed or performed in visit on 06/13/22   EKG 12-lead    Collection Time: 06/13/22 11:00 PM    Narrative    Test Reason :     Vent. Rate : 065 BPM     Atrial Rate : 065 BPM     P-R Int : 144 ms          QRS Dur : 092 ms      QT Int : 416 ms       P-R-T Axes : 003 090 067 degrees     QTc Int : 432 ms    Normal sinus rhythm  Rightward axis  Borderline Abnormal ECG  When compared with  ECG of 11-JAN-2022 18:35,  Nonspecific T wave abnormality no longer evident in Inferior leads  Confirmed by RITU CUMMINGS MD ([id number]) on 6/14/2022 9:09:26 AM    Referred By: [email or web address]   SELF           Confirmed By:RITU CUMMINGS MD       ASSESSMENT/PLAN:                                                                                                                06/15/2022  Meredith Keen is a 66 y.o., female.      Pre-op Assessment    I have reviewed the Patient Summary Reports.     I have reviewed the Nursing Notes.    I have reviewed the Medications.     Review of Systems  Anesthesia Hx:  History of prior surgery of interest to airway management or planning: Previous anesthesia: General Denies Family Hx of Anesthesia complications.   Denies Personal Hx of Anesthesia complications.   Social:  Non-Smoker, Social Alcohol Use    Cardiovascular:   Hypertension    Pulmonary:  Pulmonary Normal    Hepatic/GI:   GERD N/v, SBO   Neurological:   Headaches    Endocrine:  Endocrine Normal        Physical Exam  General: Well nourished, Cooperative, Alert and Oriented  NGT in place  Airway:  Mallampati: III   Mouth Opening: Normal  TM Distance: Normal  Tongue: Normal  Neck ROM: Normal ROM    Dental:  Dentures  Upper dentures      Anesthesia Plan  Type of Anesthesia, risks & benefits discussed:    Anesthesia Type: Gen ETT  Intra-op Monitoring Plan: Standard ASA Monitors  Post Op Pain Control Plan: multimodal analgesia and IV/PO Opioids PRN  Induction:  rapid sequence  Airway Plan: Direct, Post-Induction  Informed Consent: Informed consent signed with the Patient and all parties understand the risks and agree with anesthesia plan.  All questions answered.   ASA Score: 3  Day of Surgery Review of History & Physical: H&P Update referred to the surgeon/provider.    Ready For Surgery From Anesthesia Perspective.     .

## 2022-06-15 NOTE — ASSESSMENT & PLAN NOTE
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 Clinically stable with contrast in colon on GGC but still with pain  Will plan for another trial of conservative management, but if she continues to have frequent episodes will need surgery at some point    - NPO  - NGT to LIWS  - Gastrograffin challenge 6/14 with contrast in colon but slow transit   - IVF  - Multimodal pain control   - PRN pain and nausea medications  - Daily labs  - Replete electrolytes PRN  - Holding home meds in setting of NPO  - DVT prophylaxis (SCDs and lovenox)  - OOB, ambulate  - IS    Dispo: not yet medically stable for dc

## 2022-06-15 NOTE — PROGRESS NOTES
[facility]  General Surgery  Progress Note    Subjective:     History of Present Illness:  Patient is a 65 yo F with hx of HTN, HLD, and admission for SBO in 1/2022 which was manage conservatively who presents with abd distension and emesis concerning for recurrence. She notes her symptoms started to show up AM of 6/13. She began to feel more bloated, and stated developing nausea. She denies any significant pain, fevers, chills. Her last BM was evening of 6/13. She started throwing up in the ED.    Abd surg of hystecromy      Post-Op Info:  Procedure(s) (LRB):  LAPAROTOMY, EXPLORATORY (N/A)         Interval History: NAEON. Afebrile. HDS. Gastrograffin challenge yesterday with contrast in the colon and had multiple BM but still reports pain this morning. NGT with minimal output. Adequate UOP. No new symptoms or concerns this morning. Daughter at bedside. All questions answered.       Medications:  Continuous Infusions:   lactated ringers       Scheduled Meds:   enoxaparin  40 mg Subcutaneous Daily    ketorolac  15 mg Intravenous Q6H    lactated ringers  1,000 mL Intravenous Once    methocarbamol (ROBAXIN) IVPB  500 mg Intravenous Q8H     PRN Meds:HYDROmorphone, ondansetron, sodium chloride 0.9%     Review of patient's allergies indicates:  No Active Allergies  Objective:     Vital Signs (Most Recent):  Temp: 97.2 °F (36.2 °C) (06/15/22 1305)  Pulse: 87 (06/15/22 1305)  Resp: 17 (06/15/22 1305)  BP: (!) 166/78 (06/15/22 1305)  SpO2: 95 % (06/15/22 1305)   Vital Signs (24h Range):  Temp:  [39.2 °F (4 °C)-98.7 °F (37.1 °C)] 97.2 °F (36.2 °C)  Pulse:  [69-88] 87  Resp:  [16-18] 17  SpO2:  [94 %-98 %] 95 %  BP: (124-182)/(61-86) 166/78     Weight: 50.3 kg (111 lb)  Body mass index is 20.3 kg/m².    Intake/Output - Last 3 Shifts         06/13 0700  06/14 0659 06/14 0700  06/15 0659 06/15 0700  06/16 0659    IV Piggyback 1000      Total Intake(mL/kg) 1000 (19.9)      Urine (mL/kg/hr)  0 (0)     Drains  0      Total Output  0     Net +1000 0            Urine Occurrence  5 x     Stool Occurrence 3 x              Physical Exam  Vitals and nursing note reviewed.   Constitutional:       General: She is not in acute distress.     Appearance: She is not diaphoretic.      Comments: Room air  NGT to LIWS with minimal bilious output   HENT:      Head: Normocephalic and atraumatic.      Mouth/Throat:      Mouth: Mucous membranes are moist.      Pharynx: Oropharynx is clear.   Eyes:      Extraocular Movements: Extraocular movements intact.      Conjunctiva/sclera: Conjunctivae normal.   Cardiovascular:      Rate and Rhythm: Normal rate.   Pulmonary:      Effort: Pulmonary effort is normal. No respiratory distress.   Abdominal:      General: There is distension.      Palpations: Abdomen is soft.      Tenderness: There is abdominal tenderness. There is no guarding or rebound.      Comments: Mildly distended. TTP in lower abdomen. No peritonitic signs   Musculoskeletal:         General: No deformity.      Cervical back: Normal range of motion.   Skin:     General: Skin is warm and dry.   Neurological:      Mental Status: She is alert and oriented to person, place, and time.       Significant Labs:  I have reviewed all pertinent lab results within the past 24 hours.  CBC:   Recent Labs   Lab 06/15/22  0436   WBC 7.35   RBC 4.25   HGB 10.6*   HCT 32.8*      MCV 77*   MCH 24.9*   MCHC 32.3     CMP:   Recent Labs   Lab 06/13/22  2313 06/15/22  0436   * 77   CALCIUM 10.4 8.6*   ALBUMIN 4.2  --    PROT 8.6*  --    * 139   K 4.6 3.5   CO2 20* 21*   CL 99 107   BUN 21 11   CREATININE 1.2 0.6   ALKPHOS 111  --    ALT 16  --    AST 34  --    BILITOT 0.6  --        Significant Diagnostics:  I have reviewed all pertinent imaging results/findings within the past 24 hours.    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 Clinically stable with contrast in colon on GGC but still with pain  Will plan for another trial of conservative management, but if she continues to have frequent episodes will need surgery at some point    - NPO  - NGT to LIWS  - Gastrograffin challenge 6/14 with contrast in colon but slow transit   - IVF  - Multimodal pain control   - PRN pain and nausea medications  - Daily labs  - Replete electrolytes PRN  - Holding home meds in setting of NPO  - DVT prophylaxis (SCDs and lovenox)  - OOB, ambulate  - IS    Dispo: not yet medically stable for dc      Case discussed with Dr. Greco.       Fede Mullins, PAJasonC  General Surgery  [facility]

## 2022-06-15 NOTE — SUBJECTIVE & OBJECTIVE
Interval History: NAEON. Afebrile. HDS. Gastrograffin challenge yesterday with contrast in the colon and had multiple BM but still reports pain this morning. NGT with minimal output. Adequate UOP. No new symptoms or concerns this morning. Daughter at bedside. All questions answered.       Medications:  Continuous Infusions:   lactated ringers       Scheduled Meds:   enoxaparin  40 mg Subcutaneous Daily    ketorolac  15 mg Intravenous Q6H    lactated ringers  1,000 mL Intravenous Once    methocarbamol (ROBAXIN) IVPB  500 mg Intravenous Q8H     PRN Meds:HYDROmorphone, ondansetron, sodium chloride 0.9%     Review of patient's allergies indicates:  No Active Allergies  Objective:     Vital Signs (Most Recent):  Temp: 97.2 °F (36.2 °C) (06/15/22 1305)  Pulse: 87 (06/15/22 1305)  Resp: 17 (06/15/22 1305)  BP: (!) 166/78 (06/15/22 1305)  SpO2: 95 % (06/15/22 1305)   Vital Signs (24h Range):  Temp:  [39.2 °F (4 °C)-98.7 °F (37.1 °C)] 97.2 °F (36.2 °C)  Pulse:  [69-88] 87  Resp:  [16-18] 17  SpO2:  [94 %-98 %] 95 %  BP: (124-182)/(61-86) 166/78     Weight: 50.3 kg (111 lb)  Body mass index is 20.3 kg/m².    Intake/Output - Last 3 Shifts         06/13 0700 06/14 0659 06/14 0700  06/15 0659 06/15 0700  06/16 0659    IV Piggyback 1000      Total Intake(mL/kg) 1000 (19.9)      Urine (mL/kg/hr)  0 (0)     Drains  0     Total Output  0     Net +1000 0            Urine Occurrence  5 x     Stool Occurrence 3 x              Physical Exam  Vitals and nursing note reviewed.   Constitutional:       General: She is not in acute distress.     Appearance: She is not diaphoretic.      Comments: Room air  NGT to LIWS with minimal bilious output   HENT:      Head: Normocephalic and atraumatic.      Mouth/Throat:      Mouth: Mucous membranes are moist.      Pharynx: Oropharynx is clear.   Eyes:      Extraocular Movements: Extraocular movements intact.      Conjunctiva/sclera: Conjunctivae normal.   Cardiovascular:      Rate and Rhythm: Normal  rate.   Pulmonary:      Effort: Pulmonary effort is normal. No respiratory distress.   Abdominal:      General: There is distension.      Palpations: Abdomen is soft.      Tenderness: There is abdominal tenderness. There is no guarding or rebound.      Comments: Mildly distended. TTP in lower abdomen. No peritonitic signs   Musculoskeletal:         General: No deformity.      Cervical back: Normal range of motion.   Skin:     General: Skin is warm and dry.   Neurological:      Mental Status: She is alert and oriented to person, place, and time.       Significant Labs:  I have reviewed all pertinent lab results within the past 24 hours.  CBC:   Recent Labs   Lab 06/15/22  0436   WBC 7.35   RBC 4.25   HGB 10.6*   HCT 32.8*      MCV 77*   MCH 24.9*   MCHC 32.3     CMP:   Recent Labs   Lab 06/13/22  2313 06/15/22  0436   * 77   CALCIUM 10.4 8.6*   ALBUMIN 4.2  --    PROT 8.6*  --    * 139   K 4.6 3.5   CO2 20* 21*   CL 99 107   BUN 21 11   CREATININE 1.2 0.6   ALKPHOS 111  --    ALT 16  --    AST 34  --    BILITOT 0.6  --        Significant Diagnostics:  I have reviewed all pertinent imaging results/findings within the past 24 hours.

## 2022-06-16 ENCOUNTER — ANESTHESIA (OUTPATIENT)
Dept: SURGERY | Facility: HOSPITAL | Age: 67
DRG: 336 | End: 2022-06-16
Payer: MEDICARE

## 2022-06-16 LAB
ANION GAP SERPL CALC-SCNC: 9 MMOL/L (ref 8–16)
BASOPHILS # BLD AUTO: 0.04 K/UL (ref 0–0.2)
BASOPHILS NFR BLD: 0.6 % (ref 0–1.9)
BUN SERPL-MCNC: 8 MG/DL (ref 8–23)
CALCIUM SERPL-MCNC: 8.9 MG/DL (ref 8.7–10.5)
CHLORIDE SERPL-SCNC: 107 MMOL/L (ref 95–110)
CO2 SERPL-SCNC: 26 MMOL/L (ref 23–29)
CREAT SERPL-MCNC: 0.5 MG/DL (ref 0.5–1.4)
DIFFERENTIAL METHOD: ABNORMAL
EOSINOPHIL # BLD AUTO: 0.3 K/UL (ref 0–0.5)
EOSINOPHIL NFR BLD: 4 % (ref 0–8)
ERYTHROCYTE [DISTWIDTH] IN BLOOD BY AUTOMATED COUNT: 13.8 % (ref 11.5–14.5)
EST. GFR  (AFRICAN AMERICAN): >60 ML/MIN/1.73 M^2
EST. GFR  (NON AFRICAN AMERICAN): >60 ML/MIN/1.73 M^2
GLUCOSE SERPL-MCNC: 73 MG/DL (ref 70–110)
HCT VFR BLD AUTO: 31.5 % (ref 37–48.5)
HGB BLD-MCNC: 10.2 G/DL (ref 12–16)
IMM GRANULOCYTES # BLD AUTO: 0.02 K/UL (ref 0–0.04)
IMM GRANULOCYTES NFR BLD AUTO: 0.3 % (ref 0–0.5)
LYMPHOCYTES # BLD AUTO: 1.1 K/UL (ref 1–4.8)
LYMPHOCYTES NFR BLD: 17.6 % (ref 18–48)
MAGNESIUM SERPL-MCNC: 2.1 MG/DL (ref 1.6–2.6)
MCH RBC QN AUTO: 25.4 PG (ref 27–31)
MCHC RBC AUTO-ENTMCNC: 32.4 G/DL (ref 32–36)
MCV RBC AUTO: 79 FL (ref 82–98)
MONOCYTES # BLD AUTO: 0.7 K/UL (ref 0.3–1)
MONOCYTES NFR BLD: 10.1 % (ref 4–15)
NEUTROPHILS # BLD AUTO: 4.3 K/UL (ref 1.8–7.7)
NEUTROPHILS NFR BLD: 67.4 % (ref 38–73)
NRBC BLD-RTO: 0 /100 WBC
PHOSPHATE SERPL-MCNC: 3.5 MG/DL (ref 2.7–4.5)
PLATELET # BLD AUTO: 255 K/UL (ref 150–450)
PMV BLD AUTO: 10.1 FL (ref 9.2–12.9)
POCT GLUCOSE: 73 MG/DL (ref 70–110)
POTASSIUM SERPL-SCNC: 4 MMOL/L (ref 3.5–5.1)
RBC # BLD AUTO: 4.01 M/UL (ref 4–5.4)
SODIUM SERPL-SCNC: 142 MMOL/L (ref 136–145)
WBC # BLD AUTO: 6.42 K/UL (ref 3.9–12.7)

## 2022-06-16 PROCEDURE — 82962 GLUCOSE BLOOD TEST: CPT | Performed by: SURGERY

## 2022-06-16 PROCEDURE — 63600175 PHARM REV CODE 636 W HCPCS: Performed by: STUDENT IN AN ORGANIZED HEALTH CARE EDUCATION/TRAINING PROGRAM

## 2022-06-16 PROCEDURE — 25000003 PHARM REV CODE 250: Performed by: STUDENT IN AN ORGANIZED HEALTH CARE EDUCATION/TRAINING PROGRAM

## 2022-06-16 PROCEDURE — 37000008 HC ANESTHESIA 1ST 15 MINUTES: Performed by: SURGERY

## 2022-06-16 PROCEDURE — 44005 PR FREEING BOWEL ADHESION,ENTEROLYSIS: ICD-10-PCS | Mod: ,,, | Performed by: SURGERY

## 2022-06-16 PROCEDURE — D9220A PRA ANESTHESIA: ICD-10-PCS | Mod: ANES,,, | Performed by: ANESTHESIOLOGY

## 2022-06-16 PROCEDURE — 25000003 PHARM REV CODE 250: Performed by: NURSE ANESTHETIST, CERTIFIED REGISTERED

## 2022-06-16 PROCEDURE — 36000707: Performed by: SURGERY

## 2022-06-16 PROCEDURE — 37000009 HC ANESTHESIA EA ADD 15 MINS: Performed by: SURGERY

## 2022-06-16 PROCEDURE — 63600175 PHARM REV CODE 636 W HCPCS

## 2022-06-16 PROCEDURE — 71000015 HC POSTOP RECOV 1ST HR: Performed by: SURGERY

## 2022-06-16 PROCEDURE — 84100 ASSAY OF PHOSPHORUS: CPT | Performed by: STUDENT IN AN ORGANIZED HEALTH CARE EDUCATION/TRAINING PROGRAM

## 2022-06-16 PROCEDURE — 36000706: Performed by: SURGERY

## 2022-06-16 PROCEDURE — 80048 BASIC METABOLIC PNL TOTAL CA: CPT | Performed by: STUDENT IN AN ORGANIZED HEALTH CARE EDUCATION/TRAINING PROGRAM

## 2022-06-16 PROCEDURE — D9220A PRA ANESTHESIA: ICD-10-PCS | Mod: CRNA,,, | Performed by: NURSE ANESTHETIST, CERTIFIED REGISTERED

## 2022-06-16 PROCEDURE — C9113 INJ PANTOPRAZOLE SODIUM, VIA: HCPCS | Performed by: STUDENT IN AN ORGANIZED HEALTH CARE EDUCATION/TRAINING PROGRAM

## 2022-06-16 PROCEDURE — 44005 FREEING OF BOWEL ADHESION: CPT | Mod: ,,, | Performed by: SURGERY

## 2022-06-16 PROCEDURE — 85025 COMPLETE CBC W/AUTO DIFF WBC: CPT | Performed by: STUDENT IN AN ORGANIZED HEALTH CARE EDUCATION/TRAINING PROGRAM

## 2022-06-16 PROCEDURE — 83735 ASSAY OF MAGNESIUM: CPT | Performed by: STUDENT IN AN ORGANIZED HEALTH CARE EDUCATION/TRAINING PROGRAM

## 2022-06-16 PROCEDURE — 25000003 PHARM REV CODE 250

## 2022-06-16 PROCEDURE — 63600175 PHARM REV CODE 636 W HCPCS: Performed by: NURSE ANESTHETIST, CERTIFIED REGISTERED

## 2022-06-16 PROCEDURE — D9220A PRA ANESTHESIA: Mod: ANES,,, | Performed by: ANESTHESIOLOGY

## 2022-06-16 PROCEDURE — 94761 N-INVAS EAR/PLS OXIMETRY MLT: CPT

## 2022-06-16 PROCEDURE — 71000033 HC RECOVERY, INTIAL HOUR: Performed by: SURGERY

## 2022-06-16 PROCEDURE — 11000001 HC ACUTE MED/SURG PRIVATE ROOM

## 2022-06-16 PROCEDURE — D9220A PRA ANESTHESIA: Mod: CRNA,,, | Performed by: NURSE ANESTHETIST, CERTIFIED REGISTERED

## 2022-06-16 PROCEDURE — 36415 COLL VENOUS BLD VENIPUNCTURE: CPT | Performed by: STUDENT IN AN ORGANIZED HEALTH CARE EDUCATION/TRAINING PROGRAM

## 2022-06-16 RX ORDER — PROPOFOL 10 MG/ML
VIAL (ML) INTRAVENOUS
Status: DISCONTINUED | OUTPATIENT
Start: 2022-06-16 | End: 2022-06-16

## 2022-06-16 RX ORDER — DEXAMETHASONE SODIUM PHOSPHATE 4 MG/ML
INJECTION, SOLUTION INTRA-ARTICULAR; INTRALESIONAL; INTRAMUSCULAR; INTRAVENOUS; SOFT TISSUE
Status: DISCONTINUED | OUTPATIENT
Start: 2022-06-16 | End: 2022-06-16

## 2022-06-16 RX ORDER — HYDRALAZINE HYDROCHLORIDE 20 MG/ML
10 INJECTION INTRAMUSCULAR; INTRAVENOUS EVERY 6 HOURS PRN
Status: DISCONTINUED | OUTPATIENT
Start: 2022-06-16 | End: 2022-06-19 | Stop reason: HOSPADM

## 2022-06-16 RX ORDER — MIDAZOLAM HYDROCHLORIDE 1 MG/ML
INJECTION, SOLUTION INTRAMUSCULAR; INTRAVENOUS
Status: DISCONTINUED | OUTPATIENT
Start: 2022-06-16 | End: 2022-06-16

## 2022-06-16 RX ORDER — KETAMINE HCL IN 0.9 % NACL 50 MG/5 ML
SYRINGE (ML) INTRAVENOUS
Status: DISCONTINUED | OUTPATIENT
Start: 2022-06-16 | End: 2022-06-16

## 2022-06-16 RX ORDER — PROCHLORPERAZINE EDISYLATE 5 MG/ML
5 INJECTION INTRAMUSCULAR; INTRAVENOUS EVERY 30 MIN PRN
Status: DISCONTINUED | OUTPATIENT
Start: 2022-06-16 | End: 2022-06-16 | Stop reason: HOSPADM

## 2022-06-16 RX ORDER — NALOXONE HCL 0.4 MG/ML
0.02 VIAL (ML) INJECTION
Status: DISCONTINUED | OUTPATIENT
Start: 2022-06-16 | End: 2022-06-16

## 2022-06-16 RX ORDER — HALOPERIDOL 5 MG/ML
0.5 INJECTION INTRAMUSCULAR EVERY 10 MIN PRN
Status: DISCONTINUED | OUTPATIENT
Start: 2022-06-16 | End: 2022-06-16 | Stop reason: HOSPADM

## 2022-06-16 RX ORDER — SUCCINYLCHOLINE CHLORIDE 20 MG/ML
INJECTION INTRAMUSCULAR; INTRAVENOUS
Status: DISCONTINUED | OUTPATIENT
Start: 2022-06-16 | End: 2022-06-16

## 2022-06-16 RX ORDER — CEFAZOLIN SODIUM/WATER 2 G/20 ML
SYRINGE (ML) INTRAVENOUS
Status: DISCONTINUED | OUTPATIENT
Start: 2022-06-16 | End: 2022-06-16

## 2022-06-16 RX ORDER — ONDANSETRON 2 MG/ML
INJECTION INTRAMUSCULAR; INTRAVENOUS
Status: DISCONTINUED | OUTPATIENT
Start: 2022-06-16 | End: 2022-06-16

## 2022-06-16 RX ORDER — ROCURONIUM BROMIDE 10 MG/ML
INJECTION, SOLUTION INTRAVENOUS
Status: DISCONTINUED | OUTPATIENT
Start: 2022-06-16 | End: 2022-06-16

## 2022-06-16 RX ORDER — FENTANYL CITRATE 50 UG/ML
INJECTION, SOLUTION INTRAMUSCULAR; INTRAVENOUS
Status: DISCONTINUED | OUTPATIENT
Start: 2022-06-16 | End: 2022-06-16

## 2022-06-16 RX ORDER — LIDOCAINE HYDROCHLORIDE 20 MG/ML
INJECTION, SOLUTION EPIDURAL; INFILTRATION; INTRACAUDAL; PERINEURAL
Status: DISCONTINUED | OUTPATIENT
Start: 2022-06-16 | End: 2022-06-16

## 2022-06-16 RX ORDER — HYDROMORPHONE HYDROCHLORIDE 1 MG/ML
0.2 INJECTION, SOLUTION INTRAMUSCULAR; INTRAVENOUS; SUBCUTANEOUS EVERY 5 MIN PRN
Status: DISCONTINUED | OUTPATIENT
Start: 2022-06-16 | End: 2022-06-16 | Stop reason: HOSPADM

## 2022-06-16 RX ORDER — ACETAMINOPHEN 10 MG/ML
INJECTION, SOLUTION INTRAVENOUS
Status: DISCONTINUED | OUTPATIENT
Start: 2022-06-16 | End: 2022-06-16

## 2022-06-16 RX ORDER — PANTOPRAZOLE SODIUM 40 MG/10ML
40 INJECTION, POWDER, LYOPHILIZED, FOR SOLUTION INTRAVENOUS DAILY
Status: DISCONTINUED | OUTPATIENT
Start: 2022-06-16 | End: 2022-06-19 | Stop reason: HOSPADM

## 2022-06-16 RX ORDER — HYDROMORPHONE HCL IN 0.9% NACL 6 MG/30 ML
PATIENT CONTROLLED ANALGESIA SYRINGE INTRAVENOUS
Status: COMPLETED
Start: 2022-06-16 | End: 2022-06-16

## 2022-06-16 RX ORDER — SODIUM CHLORIDE 0.9 % (FLUSH) 0.9 %
3 SYRINGE (ML) INJECTION
Status: DISCONTINUED | OUTPATIENT
Start: 2022-06-16 | End: 2022-06-16 | Stop reason: HOSPADM

## 2022-06-16 RX ORDER — HYDROMORPHONE HCL IN 0.9% NACL 6 MG/30 ML
PATIENT CONTROLLED ANALGESIA SYRINGE INTRAVENOUS CONTINUOUS
Status: DISCONTINUED | OUTPATIENT
Start: 2022-06-16 | End: 2022-06-17

## 2022-06-16 RX ORDER — PHENYLEPHRINE HCL IN 0.9% NACL 1 MG/10 ML
SYRINGE (ML) INTRAVENOUS
Status: DISCONTINUED | OUTPATIENT
Start: 2022-06-16 | End: 2022-06-16

## 2022-06-16 RX ADMIN — ACETAMINOPHEN 1000 MG: 10 INJECTION INTRAVENOUS at 12:06

## 2022-06-16 RX ADMIN — Medication: at 01:06

## 2022-06-16 RX ADMIN — Medication 200 MCG: at 12:06

## 2022-06-16 RX ADMIN — ROCURONIUM BROMIDE 20 MG: 10 INJECTION, SOLUTION INTRAVENOUS at 12:06

## 2022-06-16 RX ADMIN — SIMETHICONE 80 MG: 80 TABLET, CHEWABLE ORAL at 08:06

## 2022-06-16 RX ADMIN — ENOXAPARIN SODIUM 40 MG: 100 INJECTION SUBCUTANEOUS at 06:06

## 2022-06-16 RX ADMIN — MIDAZOLAM 2 MG: 1 INJECTION INTRAMUSCULAR; INTRAVENOUS at 11:06

## 2022-06-16 RX ADMIN — SODIUM CHLORIDE, SODIUM LACTATE, POTASSIUM CHLORIDE, AND CALCIUM CHLORIDE: .6; .31; .03; .02 INJECTION, SOLUTION INTRAVENOUS at 01:06

## 2022-06-16 RX ADMIN — HYDRALAZINE HYDROCHLORIDE 10 MG: 20 INJECTION, SOLUTION INTRAMUSCULAR; INTRAVENOUS at 08:06

## 2022-06-16 RX ADMIN — METHOCARBAMOL 500 MG: 100 INJECTION, SOLUTION INTRAMUSCULAR; INTRAVENOUS at 09:06

## 2022-06-16 RX ADMIN — PROPOFOL 100 MG: 10 INJECTION, EMULSION INTRAVENOUS at 12:06

## 2022-06-16 RX ADMIN — HYDRALAZINE HYDROCHLORIDE 10 MG: 20 INJECTION, SOLUTION INTRAMUSCULAR; INTRAVENOUS at 09:06

## 2022-06-16 RX ADMIN — SUCCINYLCHOLINE CHLORIDE 110 MG: 20 INJECTION, SOLUTION INTRAMUSCULAR; INTRAVENOUS at 12:06

## 2022-06-16 RX ADMIN — ONDANSETRON 4 MG: 2 INJECTION INTRAMUSCULAR; INTRAVENOUS at 01:06

## 2022-06-16 RX ADMIN — DEXAMETHASONE SODIUM PHOSPHATE 8 MG: 4 INJECTION INTRA-ARTICULAR; INTRALESIONAL; INTRAMUSCULAR; INTRAVENOUS; SOFT TISSUE at 12:06

## 2022-06-16 RX ADMIN — SUGAMMADEX 200 MG: 100 INJECTION, SOLUTION INTRAVENOUS at 01:06

## 2022-06-16 RX ADMIN — PANTOPRAZOLE SODIUM 40 MG: 40 INJECTION, POWDER, FOR SOLUTION INTRAVENOUS at 09:06

## 2022-06-16 RX ADMIN — Medication 400 MG: at 09:06

## 2022-06-16 RX ADMIN — SUGAMMADEX 100 MG: 100 INJECTION, SOLUTION INTRAVENOUS at 01:06

## 2022-06-16 RX ADMIN — SIMETHICONE 80 MG: 80 TABLET, CHEWABLE ORAL at 09:06

## 2022-06-16 RX ADMIN — SODIUM CHLORIDE: 0.9 INJECTION, SOLUTION INTRAVENOUS at 10:06

## 2022-06-16 RX ADMIN — FENTANYL CITRATE 50 MCG: 50 INJECTION, SOLUTION INTRAMUSCULAR; INTRAVENOUS at 12:06

## 2022-06-16 RX ADMIN — ROCURONIUM BROMIDE 5 MG: 10 INJECTION, SOLUTION INTRAVENOUS at 12:06

## 2022-06-16 RX ADMIN — KETOROLAC TROMETHAMINE 15 MG: 15 INJECTION, SOLUTION INTRAMUSCULAR; INTRAVENOUS at 05:06

## 2022-06-16 RX ADMIN — KETOROLAC TROMETHAMINE 15 MG: 15 INJECTION, SOLUTION INTRAMUSCULAR; INTRAVENOUS at 12:06

## 2022-06-16 RX ADMIN — Medication 400 MCG: at 12:06

## 2022-06-16 RX ADMIN — Medication 400 MG: at 08:06

## 2022-06-16 RX ADMIN — LIDOCAINE HYDROCHLORIDE 70 MG: 20 INJECTION, SOLUTION EPIDURAL; INFILTRATION; INTRACAUDAL; PERINEURAL at 12:06

## 2022-06-16 RX ADMIN — Medication 2 G: at 12:06

## 2022-06-16 RX ADMIN — METHOCARBAMOL 500 MG: 100 INJECTION, SOLUTION INTRAMUSCULAR; INTRAVENOUS at 06:06

## 2022-06-16 RX ADMIN — Medication 10 MG: at 12:06

## 2022-06-16 RX ADMIN — SODIUM CHLORIDE, SODIUM LACTATE, POTASSIUM CHLORIDE, AND CALCIUM CHLORIDE: .6; .31; .03; .02 INJECTION, SOLUTION INTRAVENOUS at 09:06

## 2022-06-16 RX ADMIN — METHOCARBAMOL 500 MG: 100 INJECTION, SOLUTION INTRAMUSCULAR; INTRAVENOUS at 02:06

## 2022-06-16 NOTE — ANESTHESIA PROCEDURE NOTES
Intubation    Date/Time: 6/16/2022 12:15 PM  Performed by: Cher Espinosa  Authorized by: Kenny Clayton MD     Intubation:     Induction:  Rapid sequence induction    Intubated:  Postinduction    Mask Ventilation:  N/a    Attempts:  1    Attempted By:  Student    Method of Intubation:  Video laryngoscopy    Blade:  Randall 3    Laryngeal View Grade: Grade I - full view of cords      Difficult Airway Encountered?: No      Complications:  None    Airway Device:  Oral endotracheal tube    Airway Device Size:  7.0    Style/Cuff Inflation:  Cuffed    Inflation Amount (mL):  7    Tube secured:  21    Secured at:  The lips    Placement Verified By:  Capnometry    Complicating Factors:  None    Findings Post-Intubation:  BS equal bilateral and atraumatic/condition of teeth unchanged

## 2022-06-16 NOTE — SUBJECTIVE & OBJECTIVE
Interval History: NAEON. Afebrile. HDS. Continues to having abdominal pain, controlled with current regimen. NGT with minimal output. Adequate UOP. No new symptoms or concerns this morning. Daughter at bedside. All questions answered.       Medications:  Continuous Infusions:   lactated ringers       Scheduled Meds:   enoxaparin  40 mg Subcutaneous Daily    ketorolac  15 mg Intravenous Q6H    lactated ringers  1,000 mL Intravenous Once    magnesium oxide  400 mg Oral BID    methocarbamol (ROBAXIN) IVPB  500 mg Intravenous Q8H    simethicone  1 tablet Oral TID     PRN Meds:HYDROmorphone, ondansetron, sodium chloride 0.9%     Review of patient's allergies indicates:  No Active Allergies  Objective:     Vital Signs (Most Recent):  Temp: 96.5 °F (35.8 °C) (06/16/22 0517)  Pulse: 73 (06/16/22 0517)  Resp: 18 (06/16/22 0517)  BP: (!) 167/80 (06/16/22 0517)  SpO2: 95 % (06/16/22 0517)   Vital Signs (24h Range):  Temp:  [96.5 °F (35.8 °C)-98.3 °F (36.8 °C)] 96.5 °F (35.8 °C)  Pulse:  [67-87] 73  Resp:  [16-18] 18  SpO2:  [94 %-96 %] 95 %  BP: (145-167)/(67-80) 167/80     Weight: 50.3 kg (111 lb)  Body mass index is 20.3 kg/m².    Intake/Output - Last 3 Shifts         06/14 0700  06/15 0659 06/15 0700  06/16 0659 06/16 0700 06/17 0659    P.O.  0     IV Piggyback  378.4     Total Intake(mL/kg)  378.4 (7.5)     Urine (mL/kg/hr) 0 (0) 0 (0)     Drains 0 50     Total Output 0 50     Net 0 +328.4            Urine Occurrence 5 x 5 x             Physical Exam  Vitals and nursing note reviewed.   Constitutional:       General: She is not in acute distress.     Appearance: She is not diaphoretic.      Comments: Room air  NGT to LIWS with minimal bilious output   HENT:      Head: Normocephalic and atraumatic.      Mouth/Throat:      Mouth: Mucous membranes are moist.      Pharynx: Oropharynx is clear.   Eyes:      Extraocular Movements: Extraocular movements intact.      Conjunctiva/sclera: Conjunctivae normal.   Cardiovascular:       Rate and Rhythm: Normal rate.   Pulmonary:      Effort: Pulmonary effort is normal. No respiratory distress.   Abdominal:      General: There is distension.      Palpations: Abdomen is soft.      Tenderness: There is abdominal tenderness. There is no guarding or rebound.      Comments: Mildly distended. TTP in lower abdomen. No peritonitic signs   Musculoskeletal:         General: No deformity.      Cervical back: Normal range of motion.   Skin:     General: Skin is warm and dry.   Neurological:      Mental Status: She is alert and oriented to person, place, and time.       Significant Labs:  I have reviewed all pertinent lab results within the past 24 hours.  CBC:   Recent Labs   Lab 06/16/22  0328   WBC 6.42   RBC 4.01   HGB 10.2*   HCT 31.5*      MCV 79*   MCH 25.4*   MCHC 32.4       CMP:   Recent Labs   Lab 06/13/22  2313 06/15/22  0436 06/16/22  0328   *   < > 73   CALCIUM 10.4   < > 8.9   ALBUMIN 4.2  --   --    PROT 8.6*  --   --    *   < > 142   K 4.6   < > 4.0   CO2 20*   < > 26   CL 99   < > 107   BUN 21   < > 8   CREATININE 1.2   < > 0.5   ALKPHOS 111  --   --    ALT 16  --   --    AST 34  --   --    BILITOT 0.6  --   --     < > = values in this interval not displayed.         Significant Diagnostics:  I have reviewed all pertinent imaging results/findings within the past 24 hours.

## 2022-06-16 NOTE — ASSESSMENT & PLAN NOTE
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 Clinically stable with contrast in colon on GGC but still with pain    - NPO  - NGT to LIWS  - To OR today for ex lap   - Gastrograffin challenge 6/14 with contrast in colon but slow transit   - IVF  - Multimodal pain control   - PRN pain and nausea medications  - Daily labs  - Replete electrolytes PRN  - Holding home meds in setting of NPO  - DVT prophylaxis (SCDs and lovenox)  - Gi ppx (PPI)  - OOB, ambulate  - IS    Dispo: not yet medically stable for dc

## 2022-06-16 NOTE — PLAN OF CARE
Pt IVs intact and infusing, PCA pump connected, pt aware of PCA button and self-administering medication, incision dry and intact, pt does not c/o N/V

## 2022-06-16 NOTE — TRANSFER OF CARE
"Anesthesia Transfer of Care Note    Patient: Meredith Keen    Procedure(s) Performed: Procedure(s) (LRB):  LAPAROTOMY, EXPLORATORY (N/A)  LYSIS, ADHESIONS (N/A)    Patient location: PACU    Anesthesia Type: general    Transport from OR: Transported from OR on 6-10 L/min O2 by face mask with adequate spontaneous ventilation    Post pain: adequate analgesia    Post assessment: no apparent anesthetic complications    Post vital signs: stable    Level of consciousness: awake, alert and oriented    Nausea/Vomiting: no nausea/vomiting    Complications: none    Transfer of care protocol was followed      Last vitals:   Visit Vitals  /61 (BP Location: Right arm, Patient Position: Lying)   Pulse 101   Temp 36.6 °C (97.8 °F) (Oral)   Resp 16   Ht 5' 2" (1.575 m)   Wt 50.3 kg (111 lb)   SpO2 100%   Breastfeeding No   BMI 20.30 kg/m²     "

## 2022-06-16 NOTE — ASSESSMENT & PLAN NOTE
- Hypertensive  - Holding home meds in setting of NPO  - PRN hydralazine on board  - Continue to monitor with q4 vitals and adjust regimen PRN

## 2022-06-16 NOTE — PLAN OF CARE
Problem: Adult Inpatient Plan of Care  Goal: Plan of Care Review  Outcome: Ongoing, Progressing  Goal: Patient-Specific Goal (Individualized)  Outcome: Ongoing, Progressing  Goal: Absence of Hospital-Acquired Illness or Injury  Outcome: Ongoing, Progressing  Goal: Optimal Comfort and Wellbeing  Outcome: Ongoing, Progressing  Goal: Readiness for Transition of Care  Outcome: Ongoing, Progressing   Pt is A+Ox4. Frequent checks q 2hrs. Daughter at the bedside.

## 2022-06-16 NOTE — NURSING TRANSFER
Nursing Transfer Note      6/16/2022     Reason patient is being transferred: Meets criteria for transfer    Transfer To: 541    Transfer via bed    Transfer with IV pump    Transported by transport    Medicines sent: PCA dilauded    Any special needs or follow-up needed: None    Chart send with patient: Yes    Notified: daughter    Patient reassessed at: 6/16

## 2022-06-16 NOTE — PROGRESS NOTES
[facility]  General Surgery  Progress Note    Subjective:     History of Present Illness:  Patient is a 67 yo F with hx of HTN, HLD, and admission for SBO in 1/2022 which was manage conservatively who presents with abd distension and emesis concerning for recurrence. She notes her symptoms started to show up AM of 6/13. She began to feel more bloated, and stated developing nausea. She denies any significant pain, fevers, chills. Her last BM was evening of 6/13. She started throwing up in the ED.    Abd surg of hystecromy      Post-Op Info:  Procedure(s) (LRB):  LAPAROTOMY, EXPLORATORY (N/A)         Interval History: NAEON. Afebrile. HDS. Continues to having abdominal pain, controlled with current regimen. NGT with minimal output. Adequate UOP. No new symptoms or concerns this morning. Daughter at bedside. All questions answered.       Medications:  Continuous Infusions:   lactated ringers       Scheduled Meds:   enoxaparin  40 mg Subcutaneous Daily    ketorolac  15 mg Intravenous Q6H    lactated ringers  1,000 mL Intravenous Once    magnesium oxide  400 mg Oral BID    methocarbamol (ROBAXIN) IVPB  500 mg Intravenous Q8H    simethicone  1 tablet Oral TID     PRN Meds:HYDROmorphone, ondansetron, sodium chloride 0.9%     Review of patient's allergies indicates:  No Active Allergies  Objective:     Vital Signs (Most Recent):  Temp: 96.5 °F (35.8 °C) (06/16/22 0517)  Pulse: 73 (06/16/22 0517)  Resp: 18 (06/16/22 0517)  BP: (!) 167/80 (06/16/22 0517)  SpO2: 95 % (06/16/22 0517)   Vital Signs (24h Range):  Temp:  [96.5 °F (35.8 °C)-98.3 °F (36.8 °C)] 96.5 °F (35.8 °C)  Pulse:  [67-87] 73  Resp:  [16-18] 18  SpO2:  [94 %-96 %] 95 %  BP: (145-167)/(67-80) 167/80     Weight: 50.3 kg (111 lb)  Body mass index is 20.3 kg/m².    Intake/Output - Last 3 Shifts         06/14 0700  06/15 0659 06/15 0700  06/16 0659 06/16 0700  06/17 0659    P.O.  0     IV Piggyback  378.4     Total Intake(mL/kg)  378.4 (7.5)     Urine  (mL/kg/hr) 0 (0) 0 (0)     Drains 0 50     Total Output 0 50     Net 0 +328.4            Urine Occurrence 5 x 5 x             Physical Exam  Vitals and nursing note reviewed.   Constitutional:       General: She is not in acute distress.     Appearance: She is not diaphoretic.      Comments: Room air  NGT to LIWS with minimal bilious output   HENT:      Head: Normocephalic and atraumatic.      Mouth/Throat:      Mouth: Mucous membranes are moist.      Pharynx: Oropharynx is clear.   Eyes:      Extraocular Movements: Extraocular movements intact.      Conjunctiva/sclera: Conjunctivae normal.   Cardiovascular:      Rate and Rhythm: Normal rate.   Pulmonary:      Effort: Pulmonary effort is normal. No respiratory distress.   Abdominal:      General: There is distension.      Palpations: Abdomen is soft.      Tenderness: There is abdominal tenderness. There is no guarding or rebound.      Comments: Mildly distended. TTP in lower abdomen. No peritonitic signs   Musculoskeletal:         General: No deformity.      Cervical back: Normal range of motion.   Skin:     General: Skin is warm and dry.   Neurological:      Mental Status: She is alert and oriented to person, place, and time.       Significant Labs:  I have reviewed all pertinent lab results within the past 24 hours.  CBC:   Recent Labs   Lab 06/16/22  0328   WBC 6.42   RBC 4.01   HGB 10.2*   HCT 31.5*      MCV 79*   MCH 25.4*   MCHC 32.4       CMP:   Recent Labs   Lab 06/13/22  2313 06/15/22  0436 06/16/22  0328   *   < > 73   CALCIUM 10.4   < > 8.9   ALBUMIN 4.2  --   --    PROT 8.6*  --   --    *   < > 142   K 4.6   < > 4.0   CO2 20*   < > 26   CL 99   < > 107   BUN 21   < > 8   CREATININE 1.2   < > 0.5   ALKPHOS 111  --   --    ALT 16  --   --    AST 34  --   --    BILITOT 0.6  --   --     < > = values in this interval not displayed.         Significant Diagnostics:  I have reviewed all pertinent imaging results/findings within the past 24  hours.    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 Clinically stable with contrast in colon on GGC but still with pain    - NPO  - NGT to LIWS  - To OR today for ex lap   - Gastrograffin challenge 6/14 with contrast in colon but slow transit   - IVF  - Multimodal pain control   - PRN pain and nausea medications  - Daily labs  - Replete electrolytes PRN  - Holding home meds in setting of NPO  - DVT prophylaxis (SCDs and lovenox)  - Gi ppx (PPI)  - OOB, ambulate  - IS    Dispo: not yet medically stable for dc      Essential hypertension  - Hypertensive  - Holding home meds in setting of NPO  - PRN hydralazine on board  - Continue to monitor with q4 vitals and adjust regimen PRN     Case discussed with Dr. Greco.       Fede Mullins PAJasonC  General Surgery  [facility]

## 2022-06-16 NOTE — OP NOTE
Date of procedure - 06/16/2022  Preoperative diagnosis - small-bowel obstruction  Postoperative diagnosis - same  Procedure - exploratory laparotomy lysis of adhesions  Surgeon - Angus  Assist - Vin  Anesthesia - general  Blood loss - minimal  Indications - this 66-year-old patient was admitted 72 hours prior with a small-bowel obstruction patient demonstrated some initial resolution but stopped passing gas or putting anything out for 48 hours and this being her 4th episode of bowel obstruction decision was made to proceed with surgery  Operative report in detail - patient was brought to the operating room placed in supine position prepped and draped in sterile fashion after satisfactory general anesthesia was induced  A midline incision was made from midway between the xiphoid and umbilicus to midway between the umbilicus and pubis  Peritoneal cavity was uneventfully entered  Sharp dissection and cautery were utilized to dissect the omentum from all of its multiple adherent attachments to the bowel and abdominal wall  The omentum was then retracted cephalad  The bowel was run from ligament of Treitz to ileocecal valve  All adhesions were lysed there was a dense adhesion approximately 4 ft from the ligament of Treitz that seemed to be the most concerning in terms of noticing a difference in bowel diameter  The remainder of the bowel was run without any significant change in caliber  The colon was evaluated right transverse left and sigmoid and no abnormalities were detected there was no other abnormalities noted within the peritoneal cavity  The bowel was returned to its normal anatomic position  The midline fascia was closed with a running  1. Looped PDS skin was reapproximated with a running subcuticular 4-0 Monocryl  Needle sponge instrument counts were correct

## 2022-06-17 LAB
ANION GAP SERPL CALC-SCNC: 13 MMOL/L (ref 8–16)
BASOPHILS # BLD AUTO: 0.01 K/UL (ref 0–0.2)
BASOPHILS NFR BLD: 0.1 % (ref 0–1.9)
BUN SERPL-MCNC: 13 MG/DL (ref 8–23)
CALCIUM SERPL-MCNC: 8.6 MG/DL (ref 8.7–10.5)
CHLORIDE SERPL-SCNC: 107 MMOL/L (ref 95–110)
CO2 SERPL-SCNC: 18 MMOL/L (ref 23–29)
CREAT SERPL-MCNC: 0.6 MG/DL (ref 0.5–1.4)
DIFFERENTIAL METHOD: ABNORMAL
EOSINOPHIL # BLD AUTO: 0 K/UL (ref 0–0.5)
EOSINOPHIL NFR BLD: 0 % (ref 0–8)
ERYTHROCYTE [DISTWIDTH] IN BLOOD BY AUTOMATED COUNT: 13.9 % (ref 11.5–14.5)
EST. GFR  (AFRICAN AMERICAN): >60 ML/MIN/1.73 M^2
EST. GFR  (NON AFRICAN AMERICAN): >60 ML/MIN/1.73 M^2
GLUCOSE SERPL-MCNC: 101 MG/DL (ref 70–110)
HCT VFR BLD AUTO: 32.2 % (ref 37–48.5)
HGB BLD-MCNC: 10.2 G/DL (ref 12–16)
IMM GRANULOCYTES # BLD AUTO: 0.03 K/UL (ref 0–0.04)
IMM GRANULOCYTES NFR BLD AUTO: 0.3 % (ref 0–0.5)
LYMPHOCYTES # BLD AUTO: 0.8 K/UL (ref 1–4.8)
LYMPHOCYTES NFR BLD: 7.5 % (ref 18–48)
MAGNESIUM SERPL-MCNC: 2.1 MG/DL (ref 1.6–2.6)
MCH RBC QN AUTO: 24.5 PG (ref 27–31)
MCHC RBC AUTO-ENTMCNC: 31.7 G/DL (ref 32–36)
MCV RBC AUTO: 77 FL (ref 82–98)
MONOCYTES # BLD AUTO: 1.1 K/UL (ref 0.3–1)
MONOCYTES NFR BLD: 10.1 % (ref 4–15)
NEUTROPHILS # BLD AUTO: 8.7 K/UL (ref 1.8–7.7)
NEUTROPHILS NFR BLD: 82 % (ref 38–73)
NRBC BLD-RTO: 0 /100 WBC
PHOSPHATE SERPL-MCNC: 3.9 MG/DL (ref 2.7–4.5)
PLATELET # BLD AUTO: 290 K/UL (ref 150–450)
PMV BLD AUTO: 9.8 FL (ref 9.2–12.9)
POTASSIUM SERPL-SCNC: 5 MMOL/L (ref 3.5–5.1)
RBC # BLD AUTO: 4.17 M/UL (ref 4–5.4)
SODIUM SERPL-SCNC: 138 MMOL/L (ref 136–145)
WBC # BLD AUTO: 10.63 K/UL (ref 3.9–12.7)

## 2022-06-17 PROCEDURE — 25000003 PHARM REV CODE 250: Performed by: STUDENT IN AN ORGANIZED HEALTH CARE EDUCATION/TRAINING PROGRAM

## 2022-06-17 PROCEDURE — 11000001 HC ACUTE MED/SURG PRIVATE ROOM

## 2022-06-17 PROCEDURE — 63600175 PHARM REV CODE 636 W HCPCS

## 2022-06-17 PROCEDURE — 80048 BASIC METABOLIC PNL TOTAL CA: CPT | Performed by: STUDENT IN AN ORGANIZED HEALTH CARE EDUCATION/TRAINING PROGRAM

## 2022-06-17 PROCEDURE — 63600175 PHARM REV CODE 636 W HCPCS: Performed by: STUDENT IN AN ORGANIZED HEALTH CARE EDUCATION/TRAINING PROGRAM

## 2022-06-17 PROCEDURE — 36415 COLL VENOUS BLD VENIPUNCTURE: CPT | Performed by: STUDENT IN AN ORGANIZED HEALTH CARE EDUCATION/TRAINING PROGRAM

## 2022-06-17 PROCEDURE — 83735 ASSAY OF MAGNESIUM: CPT | Performed by: STUDENT IN AN ORGANIZED HEALTH CARE EDUCATION/TRAINING PROGRAM

## 2022-06-17 PROCEDURE — 84100 ASSAY OF PHOSPHORUS: CPT | Performed by: STUDENT IN AN ORGANIZED HEALTH CARE EDUCATION/TRAINING PROGRAM

## 2022-06-17 PROCEDURE — 85025 COMPLETE CBC W/AUTO DIFF WBC: CPT | Performed by: STUDENT IN AN ORGANIZED HEALTH CARE EDUCATION/TRAINING PROGRAM

## 2022-06-17 PROCEDURE — C9113 INJ PANTOPRAZOLE SODIUM, VIA: HCPCS | Performed by: STUDENT IN AN ORGANIZED HEALTH CARE EDUCATION/TRAINING PROGRAM

## 2022-06-17 RX ORDER — ACETAMINOPHEN 325 MG/1
650 TABLET ORAL EVERY 6 HOURS
Status: DISCONTINUED | OUTPATIENT
Start: 2022-06-17 | End: 2022-06-19 | Stop reason: HOSPADM

## 2022-06-17 RX ORDER — DEXTROSE MONOHYDRATE, SODIUM CHLORIDE, AND POTASSIUM CHLORIDE 50; 1.49; 4.5 G/1000ML; G/1000ML; G/1000ML
INJECTION, SOLUTION INTRAVENOUS CONTINUOUS
Status: DISCONTINUED | OUTPATIENT
Start: 2022-06-17 | End: 2022-06-18

## 2022-06-17 RX ORDER — OXYCODONE HYDROCHLORIDE 10 MG/1
10 TABLET ORAL EVERY 4 HOURS PRN
Status: DISCONTINUED | OUTPATIENT
Start: 2022-06-17 | End: 2022-06-19 | Stop reason: HOSPADM

## 2022-06-17 RX ORDER — HYDROMORPHONE HYDROCHLORIDE 1 MG/ML
0.5 INJECTION, SOLUTION INTRAMUSCULAR; INTRAVENOUS; SUBCUTANEOUS EVERY 4 HOURS PRN
Status: DISCONTINUED | OUTPATIENT
Start: 2022-06-17 | End: 2022-06-19 | Stop reason: HOSPADM

## 2022-06-17 RX ORDER — OXYCODONE HYDROCHLORIDE 5 MG/1
5 TABLET ORAL EVERY 4 HOURS PRN
Status: DISCONTINUED | OUTPATIENT
Start: 2022-06-17 | End: 2022-06-19 | Stop reason: HOSPADM

## 2022-06-17 RX ORDER — KETOROLAC TROMETHAMINE 15 MG/ML
15 INJECTION, SOLUTION INTRAMUSCULAR; INTRAVENOUS EVERY 6 HOURS
Status: DISCONTINUED | OUTPATIENT
Start: 2022-06-17 | End: 2022-06-19 | Stop reason: HOSPADM

## 2022-06-17 RX ADMIN — SIMETHICONE 80 MG: 80 TABLET, CHEWABLE ORAL at 09:06

## 2022-06-17 RX ADMIN — KETOROLAC TROMETHAMINE 15 MG: 15 INJECTION, SOLUTION INTRAMUSCULAR; INTRAVENOUS at 11:06

## 2022-06-17 RX ADMIN — ONDANSETRON 8 MG: 8 TABLET, ORALLY DISINTEGRATING ORAL at 03:06

## 2022-06-17 RX ADMIN — ONDANSETRON 8 MG: 8 TABLET, ORALLY DISINTEGRATING ORAL at 11:06

## 2022-06-17 RX ADMIN — ACETAMINOPHEN 650 MG: 325 TABLET ORAL at 11:06

## 2022-06-17 RX ADMIN — KETOROLAC TROMETHAMINE 15 MG: 15 INJECTION, SOLUTION INTRAMUSCULAR; INTRAVENOUS at 05:06

## 2022-06-17 RX ADMIN — HYDRALAZINE HYDROCHLORIDE 10 MG: 20 INJECTION, SOLUTION INTRAMUSCULAR; INTRAVENOUS at 05:06

## 2022-06-17 RX ADMIN — OXYCODONE HYDROCHLORIDE 10 MG: 10 TABLET ORAL at 10:06

## 2022-06-17 RX ADMIN — Medication 400 MG: at 09:06

## 2022-06-17 RX ADMIN — METHOCARBAMOL 500 MG: 100 INJECTION, SOLUTION INTRAMUSCULAR; INTRAVENOUS at 05:06

## 2022-06-17 RX ADMIN — DEXTROSE, SODIUM CHLORIDE, AND POTASSIUM CHLORIDE: 5; .45; .15 INJECTION INTRAVENOUS at 06:06

## 2022-06-17 RX ADMIN — PANTOPRAZOLE SODIUM 40 MG: 40 INJECTION, POWDER, FOR SOLUTION INTRAVENOUS at 09:06

## 2022-06-17 RX ADMIN — ENOXAPARIN SODIUM 40 MG: 100 INJECTION SUBCUTANEOUS at 05:06

## 2022-06-17 NOTE — ASSESSMENT & PLAN NOTE
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 Clinically stable with contrast in colon on GGC but still with pain  6/16 - Ex lap, HERNANDEZ, no obvious signs of obstruction, bowel was non-dilated, some dense adhesions were taken down throughout the abd    - Clears  - IVF  - Multimodal pain control   - PRN pain and nausea medications  - Daily labs  - Replete electrolytes PRN  - Home meds  - DVT prophylaxis (SCDs and lovenox)  - Gi ppx (PPI)  - OOB, ambulate  - IS    Dispo: not yet medically stable for dc

## 2022-06-17 NOTE — ANESTHESIA POSTPROCEDURE EVALUATION
Anesthesia Post Evaluation    Patient: Meredith Keen    Procedure(s) Performed: Procedure(s) (LRB):  LAPAROTOMY, EXPLORATORY (N/A)  LYSIS, ADHESIONS (N/A)    Final Anesthesia Type: general      Patient location during evaluation: PACU  Patient participation: Yes- Able to Participate  Level of consciousness: awake and alert and oriented  Post-procedure vital signs: reviewed and stable  Pain management: adequate  Airway patency: patent  ELIAS mitigation strategies: Intraoperative administration of CPAP, nasopharyngeal airway, or oral appliance during sedation, Multimodal analgesia, Extubation while patient is awake, Verification of full reversal of neuromuscular block and Extubation and recovery carried out in lateral, semiupright, or other nonsupine position  PONV status at discharge: No PONV  Anesthetic complications: no      Cardiovascular status: hemodynamically stable  Respiratory status: unassisted  Hydration status: euvolemic  Follow-up not needed.          Vitals Value Taken Time   /81 06/17/22 1131   Temp 36.6 °C (97.9 °F) 06/17/22 1131   Pulse 65 06/17/22 1131   Resp 18 06/17/22 1131   SpO2 97 % 06/17/22 1131         Event Time   Out of Recovery 06/16/2022 13:49:00         Pain/Carlos Manuel Score: Pain Rating Prior to Med Admin: 2 (6/17/2022 11:59 AM)  Pain Rating Post Med Admin: 3 (6/16/2022 12:24 AM)  Carlos Manuel Score: 10 (6/16/2022  1:49 PM)

## 2022-06-17 NOTE — PROGRESS NOTES
[facility]  General Surgery  Progress Note    Subjective:     History of Present Illness:  Patient is a 67 yo F with hx of HTN, HLD, and admission for SBO in 1/2022 which was manage conservatively who presents with abd distension and emesis concerning for recurrence. She notes her symptoms started to show up AM of 6/13. She began to feel more bloated, and stated developing nausea. She denies any significant pain, fevers, chills. Her last BM was evening of 6/13. She started throwing up in the ED.    Abd surg of hystecromy      Post-Op Info:  Procedure(s) (LRB):  LAPAROTOMY, EXPLORATORY (N/A)  LYSIS, ADHESIONS (N/A)   1 Day Post-Op     Interval History: NAEON, pain well controlled, had one episode of small volume emesis early this morning, denies F/BM, AF, HDS    Medications:  Continuous Infusions:   hydromorphone in 0.9 % NaCl 6 mg/30 ml      lactated ringers 125 mL/hr at 06/16/22 2109     Scheduled Meds:   enoxaparin  40 mg Subcutaneous Daily    lactated ringers  1,000 mL Intravenous Once    magnesium oxide  400 mg Oral BID    methocarbamol (ROBAXIN) IVPB  500 mg Intravenous Q8H    pantoprazole  40 mg Intravenous Daily    simethicone  1 tablet Oral TID     PRN Meds:hydrALAZINE, ondansetron, sodium chloride 0.9%     Review of patient's allergies indicates:  No Known Allergies  Objective:     Vital Signs (Most Recent):  Temp: 97.8 °F (36.6 °C) (06/17/22 0807)  Pulse: 76 (06/17/22 0807)  Resp: 17 (06/17/22 0807)  BP: 126/60 (06/17/22 0807)  SpO2: 97 % (06/17/22 0807) Vital Signs (24h Range):  Temp:  [97.2 °F (36.2 °C)-98.1 °F (36.7 °C)] 97.8 °F (36.6 °C)  Pulse:  [] 76  Resp:  [13-30] 17  SpO2:  [95 %-100 %] 97 %  BP: (126-188)/(59-88) 126/60     Weight: 50.3 kg (111 lb)  Body mass index is 20.3 kg/m².    Intake/Output - Last 3 Shifts         06/15 0700  06/16 0659 06/16 0700 06/17 0659 06/17 0700 06/18 0659    P.O. 0 0     I.V. (mL/kg)  124.7 (2.5)     IV Piggyback 378.4 112.3     Total  Intake(mL/kg) 378.4 (7.5) 237 (4.7)     Urine (mL/kg/hr) 0 (0) 0 (0)     Drains 50      Total Output 50 0     Net +328.4 +237            Urine Occurrence 5 x 3 x             Physical Exam  Vitals and nursing note reviewed.   Constitutional:       General: She is not in acute distress.     Appearance: She is not diaphoretic.      Comments: Room air   HENT:      Head: Normocephalic and atraumatic.      Mouth/Throat:      Mouth: Mucous membranes are moist.      Pharynx: Oropharynx is clear.   Eyes:      Extraocular Movements: Extraocular movements intact.      Conjunctiva/sclera: Conjunctivae normal.   Cardiovascular:      Rate and Rhythm: Normal rate.   Pulmonary:      Effort: Pulmonary effort is normal. No respiratory distress.   Abdominal:      General: There is no distension.      Palpations: Abdomen is soft.      Tenderness: There is abdominal tenderness. There is no guarding or rebound.      Comments: Abd soft, appropriately tender, incision CDI, dressing in place   Musculoskeletal:         General: No deformity.      Cervical back: Normal range of motion.   Skin:     General: Skin is warm and dry.   Neurological:      Mental Status: She is alert and oriented to person, place, and time.       Significant Labs:  CBC:   Recent Labs   Lab 06/17/22  0321   WBC 10.63   RBC 4.17   HGB 10.2*   HCT 32.2*      MCV 77*   MCH 24.5*   MCHC 31.7*     CMP:   Recent Labs   Lab 06/13/22  2313 06/15/22  0436 06/17/22  0321   *   < > 101   CALCIUM 10.4   < > 8.6*   ALBUMIN 4.2  --   --    PROT 8.6*  --   --    *   < > 138   K 4.6   < > 5.0   CO2 20*   < > 18*   CL 99   < > 107   BUN 21   < > 13   CREATININE 1.2   < > 0.6   ALKPHOS 111  --   --    ALT 16  --   --    AST 34  --   --    BILITOT 0.6  --   --     < > = values in this interval not displayed.       Significant Diagnostics:  I have reviewed all pertinent imaging results/findings within the past 24 hours.    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 Clinically stable with contrast in colon on GGC but still with pain  6/16 - Ex lap, HERNANDEZ, no obvious signs of obstruction, bowel was non-dilated, some dense adhesions were taken down throughout the abd    - Clears  - IVF  - Multimodal pain control   - PRN pain and nausea medications  - Daily labs  - Replete electrolytes PRN  - Home meds  - DVT prophylaxis (SCDs and lovenox)  - Gi ppx (PPI)  - OOB, ambulate  - IS    Dispo: not yet medically stable for dc      Essential hypertension  - Hypertensive  - Holding home meds in setting of NPO  - PRN hydralazine on board  - Continue to monitor with q4 vitals and adjust regimen PRN         Bernardo Banuelos MD  General Surgery  [facility]

## 2022-06-17 NOTE — SUBJECTIVE & OBJECTIVE
Interval History: ELLE, pain well controlled, had one episode of small volume emesis early this morning, denies F/BM, AF, HDS    Medications:  Continuous Infusions:   hydromorphone in 0.9 % NaCl 6 mg/30 ml      lactated ringers 125 mL/hr at 06/16/22 2109     Scheduled Meds:   enoxaparin  40 mg Subcutaneous Daily    lactated ringers  1,000 mL Intravenous Once    magnesium oxide  400 mg Oral BID    methocarbamol (ROBAXIN) IVPB  500 mg Intravenous Q8H    pantoprazole  40 mg Intravenous Daily    simethicone  1 tablet Oral TID     PRN Meds:hydrALAZINE, ondansetron, sodium chloride 0.9%     Review of patient's allergies indicates:  No Known Allergies  Objective:     Vital Signs (Most Recent):  Temp: 97.8 °F (36.6 °C) (06/17/22 0807)  Pulse: 76 (06/17/22 0807)  Resp: 17 (06/17/22 0807)  BP: 126/60 (06/17/22 0807)  SpO2: 97 % (06/17/22 0807) Vital Signs (24h Range):  Temp:  [97.2 °F (36.2 °C)-98.1 °F (36.7 °C)] 97.8 °F (36.6 °C)  Pulse:  [] 76  Resp:  [13-30] 17  SpO2:  [95 %-100 %] 97 %  BP: (126-188)/(59-88) 126/60     Weight: 50.3 kg (111 lb)  Body mass index is 20.3 kg/m².    Intake/Output - Last 3 Shifts         06/15 0700 06/16 0659 06/16 0700 06/17 0659 06/17 0700 06/18 0659    P.O. 0 0     I.V. (mL/kg)  124.7 (2.5)     IV Piggyback 378.4 112.3     Total Intake(mL/kg) 378.4 (7.5) 237 (4.7)     Urine (mL/kg/hr) 0 (0) 0 (0)     Drains 50      Total Output 50 0     Net +328.4 +237            Urine Occurrence 5 x 3 x             Physical Exam  Vitals and nursing note reviewed.   Constitutional:       General: She is not in acute distress.     Appearance: She is not diaphoretic.      Comments: Room air   HENT:      Head: Normocephalic and atraumatic.      Mouth/Throat:      Mouth: Mucous membranes are moist.      Pharynx: Oropharynx is clear.   Eyes:      Extraocular Movements: Extraocular movements intact.      Conjunctiva/sclera: Conjunctivae normal.   Cardiovascular:      Rate and Rhythm: Normal rate.    Pulmonary:      Effort: Pulmonary effort is normal. No respiratory distress.   Abdominal:      General: There is no distension.      Palpations: Abdomen is soft.      Tenderness: There is abdominal tenderness. There is no guarding or rebound.      Comments: Abd soft, appropriately tender, incision CDI, dressing in place   Musculoskeletal:         General: No deformity.      Cervical back: Normal range of motion.   Skin:     General: Skin is warm and dry.   Neurological:      Mental Status: She is alert and oriented to person, place, and time.       Significant Labs:  CBC:   Recent Labs   Lab 06/17/22  0321   WBC 10.63   RBC 4.17   HGB 10.2*   HCT 32.2*      MCV 77*   MCH 24.5*   MCHC 31.7*     CMP:   Recent Labs   Lab 06/13/22  2313 06/15/22  0436 06/17/22  0321   *   < > 101   CALCIUM 10.4   < > 8.6*   ALBUMIN 4.2  --   --    PROT 8.6*  --   --    *   < > 138   K 4.6   < > 5.0   CO2 20*   < > 18*   CL 99   < > 107   BUN 21   < > 13   CREATININE 1.2   < > 0.6   ALKPHOS 111  --   --    ALT 16  --   --    AST 34  --   --    BILITOT 0.6  --   --     < > = values in this interval not displayed.       Significant Diagnostics:  I have reviewed all pertinent imaging results/findings within the past 24 hours.

## 2022-06-17 NOTE — PLAN OF CARE
Problem: Adult Inpatient Plan of Care  Goal: Plan of Care Review  Outcome: Ongoing, Progressing  Goal: Patient-Specific Goal (Individualized)  Outcome: Ongoing, Progressing  Goal: Absence of Hospital-Acquired Illness or Injury  Outcome: Ongoing, Progressing  Goal: Optimal Comfort and Wellbeing  Outcome: Ongoing, Progressing  Goal: Readiness for Transition of Care  Outcome: Ongoing, Progressing   Pt is A+Ox4. Frequent checks q 2hrs. Pt had exploratory laparotomy lysis of adhesions. On PCA pump.

## 2022-06-18 LAB
ANION GAP SERPL CALC-SCNC: 6 MMOL/L (ref 8–16)
BASOPHILS # BLD AUTO: 0.04 K/UL (ref 0–0.2)
BASOPHILS NFR BLD: 0.6 % (ref 0–1.9)
BUN SERPL-MCNC: 9 MG/DL (ref 8–23)
CALCIUM SERPL-MCNC: 8.1 MG/DL (ref 8.7–10.5)
CHLORIDE SERPL-SCNC: 109 MMOL/L (ref 95–110)
CO2 SERPL-SCNC: 23 MMOL/L (ref 23–29)
CREAT SERPL-MCNC: 0.6 MG/DL (ref 0.5–1.4)
DIFFERENTIAL METHOD: ABNORMAL
EOSINOPHIL # BLD AUTO: 0.2 K/UL (ref 0–0.5)
EOSINOPHIL NFR BLD: 2.9 % (ref 0–8)
ERYTHROCYTE [DISTWIDTH] IN BLOOD BY AUTOMATED COUNT: 14.1 % (ref 11.5–14.5)
EST. GFR  (AFRICAN AMERICAN): >60 ML/MIN/1.73 M^2
EST. GFR  (NON AFRICAN AMERICAN): >60 ML/MIN/1.73 M^2
GLUCOSE SERPL-MCNC: 119 MG/DL (ref 70–110)
HCT VFR BLD AUTO: 26.3 % (ref 37–48.5)
HGB BLD-MCNC: 8.8 G/DL (ref 12–16)
IMM GRANULOCYTES # BLD AUTO: 0.02 K/UL (ref 0–0.04)
IMM GRANULOCYTES NFR BLD AUTO: 0.3 % (ref 0–0.5)
LYMPHOCYTES # BLD AUTO: 1.1 K/UL (ref 1–4.8)
LYMPHOCYTES NFR BLD: 16.2 % (ref 18–48)
MAGNESIUM SERPL-MCNC: 2.1 MG/DL (ref 1.6–2.6)
MCH RBC QN AUTO: 25.1 PG (ref 27–31)
MCHC RBC AUTO-ENTMCNC: 33.5 G/DL (ref 32–36)
MCV RBC AUTO: 75 FL (ref 82–98)
MONOCYTES # BLD AUTO: 0.7 K/UL (ref 0.3–1)
MONOCYTES NFR BLD: 10.1 % (ref 4–15)
NEUTROPHILS # BLD AUTO: 4.8 K/UL (ref 1.8–7.7)
NEUTROPHILS NFR BLD: 69.9 % (ref 38–73)
NRBC BLD-RTO: 0 /100 WBC
PHOSPHATE SERPL-MCNC: 1.9 MG/DL (ref 2.7–4.5)
PLATELET # BLD AUTO: 278 K/UL (ref 150–450)
PMV BLD AUTO: 9.5 FL (ref 9.2–12.9)
POTASSIUM SERPL-SCNC: 4.5 MMOL/L (ref 3.5–5.1)
RBC # BLD AUTO: 3.51 M/UL (ref 4–5.4)
SODIUM SERPL-SCNC: 138 MMOL/L (ref 136–145)
WBC # BLD AUTO: 6.81 K/UL (ref 3.9–12.7)

## 2022-06-18 PROCEDURE — C9113 INJ PANTOPRAZOLE SODIUM, VIA: HCPCS | Performed by: STUDENT IN AN ORGANIZED HEALTH CARE EDUCATION/TRAINING PROGRAM

## 2022-06-18 PROCEDURE — 85025 COMPLETE CBC W/AUTO DIFF WBC: CPT | Performed by: STUDENT IN AN ORGANIZED HEALTH CARE EDUCATION/TRAINING PROGRAM

## 2022-06-18 PROCEDURE — 25000003 PHARM REV CODE 250: Performed by: SURGERY

## 2022-06-18 PROCEDURE — 63600175 PHARM REV CODE 636 W HCPCS: Performed by: STUDENT IN AN ORGANIZED HEALTH CARE EDUCATION/TRAINING PROGRAM

## 2022-06-18 PROCEDURE — 11000001 HC ACUTE MED/SURG PRIVATE ROOM

## 2022-06-18 PROCEDURE — 36415 COLL VENOUS BLD VENIPUNCTURE: CPT | Performed by: STUDENT IN AN ORGANIZED HEALTH CARE EDUCATION/TRAINING PROGRAM

## 2022-06-18 PROCEDURE — 83735 ASSAY OF MAGNESIUM: CPT | Performed by: STUDENT IN AN ORGANIZED HEALTH CARE EDUCATION/TRAINING PROGRAM

## 2022-06-18 PROCEDURE — 25000003 PHARM REV CODE 250: Performed by: STUDENT IN AN ORGANIZED HEALTH CARE EDUCATION/TRAINING PROGRAM

## 2022-06-18 PROCEDURE — 84100 ASSAY OF PHOSPHORUS: CPT | Performed by: STUDENT IN AN ORGANIZED HEALTH CARE EDUCATION/TRAINING PROGRAM

## 2022-06-18 PROCEDURE — 80048 BASIC METABOLIC PNL TOTAL CA: CPT | Performed by: STUDENT IN AN ORGANIZED HEALTH CARE EDUCATION/TRAINING PROGRAM

## 2022-06-18 RX ORDER — METHOCARBAMOL 500 MG/1
500 TABLET, FILM COATED ORAL 3 TIMES DAILY
Status: DISCONTINUED | OUTPATIENT
Start: 2022-06-18 | End: 2022-06-19 | Stop reason: HOSPADM

## 2022-06-18 RX ADMIN — POTASSIUM PHOSPHATE, MONOBASIC AND POTASSIUM PHOSPHATE, DIBASIC 20 MMOL: 224; 236 INJECTION, SOLUTION, CONCENTRATE INTRAVENOUS at 12:06

## 2022-06-18 RX ADMIN — METHOCARBAMOL 500 MG: 500 TABLET ORAL at 08:06

## 2022-06-18 RX ADMIN — ENOXAPARIN SODIUM 40 MG: 100 INJECTION SUBCUTANEOUS at 04:06

## 2022-06-18 RX ADMIN — Medication 400 MG: at 12:06

## 2022-06-18 RX ADMIN — KETOROLAC TROMETHAMINE 15 MG: 15 INJECTION, SOLUTION INTRAMUSCULAR; INTRAVENOUS at 01:06

## 2022-06-18 RX ADMIN — Medication 400 MG: at 08:06

## 2022-06-18 RX ADMIN — ONDANSETRON 8 MG: 8 TABLET, ORALLY DISINTEGRATING ORAL at 09:06

## 2022-06-18 RX ADMIN — HYDRALAZINE HYDROCHLORIDE 10 MG: 20 INJECTION, SOLUTION INTRAMUSCULAR; INTRAVENOUS at 09:06

## 2022-06-18 RX ADMIN — METHOCARBAMOL 500 MG: 100 INJECTION, SOLUTION INTRAMUSCULAR; INTRAVENOUS at 05:06

## 2022-06-18 RX ADMIN — PANTOPRAZOLE SODIUM 40 MG: 40 INJECTION, POWDER, FOR SOLUTION INTRAVENOUS at 09:06

## 2022-06-18 RX ADMIN — KETOROLAC TROMETHAMINE 15 MG: 15 INJECTION, SOLUTION INTRAMUSCULAR; INTRAVENOUS at 12:06

## 2022-06-18 RX ADMIN — KETOROLAC TROMETHAMINE 15 MG: 15 INJECTION, SOLUTION INTRAMUSCULAR; INTRAVENOUS at 05:06

## 2022-06-18 RX ADMIN — SIMETHICONE 80 MG: 80 TABLET, CHEWABLE ORAL at 08:06

## 2022-06-18 RX ADMIN — ONDANSETRON 8 MG: 8 TABLET, ORALLY DISINTEGRATING ORAL at 04:06

## 2022-06-18 RX ADMIN — ACETAMINOPHEN 650 MG: 325 TABLET ORAL at 12:06

## 2022-06-18 RX ADMIN — OXYCODONE HYDROCHLORIDE 10 MG: 10 TABLET ORAL at 08:06

## 2022-06-18 NOTE — SUBJECTIVE & OBJECTIVE
Interval History:   NAEON  No abd pain, no nausea   Tolerated clear liquids   + bowel movement       Medications:  Continuous Infusions:      Scheduled Meds:   acetaminophen  650 mg Oral Q6H    enoxaparin  40 mg Subcutaneous Daily    ketorolac  15 mg Intravenous Q6H    magnesium oxide  400 mg Oral BID    methocarbamoL  500 mg Oral TID    pantoprazole  40 mg Intravenous Daily    simethicone  1 tablet Oral TID     PRN Meds:hydrALAZINE, HYDROmorphone, ondansetron, oxyCODONE, oxyCODONE, sodium chloride 0.9%     Review of patient's allergies indicates:  No Known Allergies  Objective:     Vital Signs (Most Recent):  Temp: 98.5 °F (36.9 °C) (06/18/22 0830)  Pulse: 86 (06/18/22 0830)  Resp: 19 (06/18/22 0830)  BP: (!) 173/80 (06/18/22 0830)  SpO2: 96 % (06/18/22 0830) Vital Signs (24h Range):  Temp:  [97.6 °F (36.4 °C)-98.7 °F (37.1 °C)] 98.5 °F (36.9 °C)  Pulse:  [65-93] 86  Resp:  [16-19] 19  SpO2:  [96 %-98 %] 96 %  BP: (138-182)/(63-81) 173/80     Weight: 50.3 kg (111 lb)  Body mass index is 20.3 kg/m².    Intake/Output - Last 3 Shifts         06/16 0700  06/17 0659 06/17 0700  06/18 0659 06/18 0700  06/19 0659    P.O. 0      I.V. (mL/kg) 124.7 (2.5)      IV Piggyback 112.3      Total Intake(mL/kg) 237 (4.7)      Urine (mL/kg/hr) 0 (0)      Drains       Total Output 0      Net +237             Urine Occurrence 3 x 5 x     Stool Occurrence  1 x             Physical Exam  Vitals and nursing note reviewed.   Constitutional:       General: She is not in acute distress.     Appearance: She is not diaphoretic.      Comments: Room air   HENT:      Head: Normocephalic and atraumatic.      Mouth/Throat:      Mouth: Mucous membranes are moist.      Pharynx: Oropharynx is clear.   Eyes:      Extraocular Movements: Extraocular movements intact.      Conjunctiva/sclera: Conjunctivae normal.   Cardiovascular:      Rate and Rhythm: Normal rate.   Pulmonary:      Effort: Pulmonary effort is normal. No respiratory distress.   Abdominal:       General: There is no distension.      Palpations: Abdomen is soft.      Tenderness: There is no guarding or rebound.      Comments: Abd soft, appropriately tender, incision CDI  Bruising around incision    Musculoskeletal:         General: No deformity.      Cervical back: Normal range of motion.   Skin:     General: Skin is warm and dry.   Neurological:      Mental Status: She is alert and oriented to person, place, and time.       Significant Labs:  CBC:   Recent Labs   Lab 06/18/22  0458   WBC 6.81   RBC 3.51*   HGB 8.8*   HCT 26.3*      MCV 75*   MCH 25.1*   MCHC 33.5       CMP:   Recent Labs   Lab 06/13/22  2313 06/15/22  0436 06/18/22  0458   *   < > 119*   CALCIUM 10.4   < > 8.1*   ALBUMIN 4.2  --   --    PROT 8.6*  --   --    *   < > 138   K 4.6   < > 4.5   CO2 20*   < > 23   CL 99   < > 109   BUN 21   < > 9   CREATININE 1.2   < > 0.6   ALKPHOS 111  --   --    ALT 16  --   --    AST 34  --   --    BILITOT 0.6  --   --     < > = values in this interval not displayed.         Significant Diagnostics:  I have reviewed all pertinent imaging results/findings within the past 24 hours.

## 2022-06-18 NOTE — PROGRESS NOTES
[facility]  General Surgery  Progress Note    Subjective:     History of Present Illness:  Patient is a 67 yo F with hx of HTN, HLD, and admission for SBO in 1/2022 which was manage conservatively who presents with abd distension and emesis concerning for recurrence. She notes her symptoms started to show up AM of 6/13. She began to feel more bloated, and stated developing nausea. She denies any significant pain, fevers, chills. Her last BM was evening of 6/13. She started throwing up in the ED.    Abd surg of hystecromy      Post-Op Info:  Procedure(s) (LRB):  LAPAROTOMY, EXPLORATORY (N/A)  LYSIS, ADHESIONS (N/A)   2 Days Post-Op     Interval History:   NAEON  No abd pain, no nausea   Tolerated clear liquids   + bowel movement       Medications:  Continuous Infusions:      Scheduled Meds:   acetaminophen  650 mg Oral Q6H    enoxaparin  40 mg Subcutaneous Daily    ketorolac  15 mg Intravenous Q6H    magnesium oxide  400 mg Oral BID    methocarbamoL  500 mg Oral TID    pantoprazole  40 mg Intravenous Daily    simethicone  1 tablet Oral TID     PRN Meds:hydrALAZINE, HYDROmorphone, ondansetron, oxyCODONE, oxyCODONE, sodium chloride 0.9%     Review of patient's allergies indicates:  No Known Allergies  Objective:     Vital Signs (Most Recent):  Temp: 98.5 °F (36.9 °C) (06/18/22 0830)  Pulse: 86 (06/18/22 0830)  Resp: 19 (06/18/22 0830)  BP: (!) 173/80 (06/18/22 0830)  SpO2: 96 % (06/18/22 0830) Vital Signs (24h Range):  Temp:  [97.6 °F (36.4 °C)-98.7 °F (37.1 °C)] 98.5 °F (36.9 °C)  Pulse:  [65-93] 86  Resp:  [16-19] 19  SpO2:  [96 %-98 %] 96 %  BP: (138-182)/(63-81) 173/80     Weight: 50.3 kg (111 lb)  Body mass index is 20.3 kg/m².    Intake/Output - Last 3 Shifts         06/16 0700  06/17 0659 06/17 0700 06/18 0659 06/18 0700 06/19 0659    P.O. 0      I.V. (mL/kg) 124.7 (2.5)      IV Piggyback 112.3      Total Intake(mL/kg) 237 (4.7)      Urine (mL/kg/hr) 0 (0)      Drains       Total Output 0       Net +237             Urine Occurrence 3 x 5 x     Stool Occurrence  1 x             Physical Exam  Vitals and nursing note reviewed.   Constitutional:       General: She is not in acute distress.     Appearance: She is not diaphoretic.      Comments: Room air   HENT:      Head: Normocephalic and atraumatic.      Mouth/Throat:      Mouth: Mucous membranes are moist.      Pharynx: Oropharynx is clear.   Eyes:      Extraocular Movements: Extraocular movements intact.      Conjunctiva/sclera: Conjunctivae normal.   Cardiovascular:      Rate and Rhythm: Normal rate.   Pulmonary:      Effort: Pulmonary effort is normal. No respiratory distress.   Abdominal:      General: There is no distension.      Palpations: Abdomen is soft.      Tenderness: There is no guarding or rebound.      Comments: Abd soft, appropriately tender, incision CDI  Bruising around incision    Musculoskeletal:         General: No deformity.      Cervical back: Normal range of motion.   Skin:     General: Skin is warm and dry.   Neurological:      Mental Status: She is alert and oriented to person, place, and time.       Significant Labs:  CBC:   Recent Labs   Lab 06/18/22  0458   WBC 6.81   RBC 3.51*   HGB 8.8*   HCT 26.3*      MCV 75*   MCH 25.1*   MCHC 33.5       CMP:   Recent Labs   Lab 06/13/22  2313 06/15/22  0436 06/18/22  0458   *   < > 119*   CALCIUM 10.4   < > 8.1*   ALBUMIN 4.2  --   --    PROT 8.6*  --   --    *   < > 138   K 4.6   < > 4.5   CO2 20*   < > 23   CL 99   < > 109   BUN 21   < > 9   CREATININE 1.2   < > 0.6   ALKPHOS 111  --   --    ALT 16  --   --    AST 34  --   --    BILITOT 0.6  --   --     < > = values in this interval not displayed.         Significant Diagnostics:  I have reviewed all pertinent imaging results/findings within the past 24 hours.    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 Clinically stable with contrast in colon on GGC but still with pain  6/16 - Ex lap, HERNANDEZ, no obvious signs of obstruction, bowel was non-dilated, some dense adhesions were taken down throughout the abd    - Advance to regular diet   - DC IVF   - Multimodal pain control   - PRN pain and nausea medications  - Daily labs  - Replete electrolytes PRN  - Home meds  - DVT prophylaxis (SCDs and lovenox)  - Gi ppx (PPI)  - OOB, ambulate  - IS    Dispo: not yet medically stable for dc      Essential hypertension  - Hypertensive  - Holding home meds in setting of NPO  - PRN hydralazine on board  - Continue to monitor with q4 vitals and adjust regimen PRN         Emilee Borrego MD  General Surgery  [facility]

## 2022-06-18 NOTE — ASSESSMENT & PLAN NOTE
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 Clinically stable with contrast in colon on GGC but still with pain  6/16 - Ex lap, HERNANDEZ, no obvious signs of obstruction, bowel was non-dilated, some dense adhesions were taken down throughout the abd    - Advance to regular diet   - DC IVF   - Multimodal pain control   - PRN pain and nausea medications  - Daily labs  - Replete electrolytes PRN  - Home meds  - DVT prophylaxis (SCDs and lovenox)  - Gi ppx (PPI)  - OOB, ambulate  - IS    Dispo: not yet medically stable for dc

## 2022-06-19 VITALS
BODY MASS INDEX: 20.43 KG/M2 | HEART RATE: 75 BPM | OXYGEN SATURATION: 96 % | HEIGHT: 62 IN | TEMPERATURE: 98 F | RESPIRATION RATE: 17 BRPM | SYSTOLIC BLOOD PRESSURE: 141 MMHG | WEIGHT: 111 LBS | DIASTOLIC BLOOD PRESSURE: 65 MMHG

## 2022-06-19 LAB
ANION GAP SERPL CALC-SCNC: 7 MMOL/L (ref 8–16)
BACTERIA BLD CULT: NORMAL
BACTERIA BLD CULT: NORMAL
BASOPHILS # BLD AUTO: 0.06 K/UL (ref 0–0.2)
BASOPHILS NFR BLD: 0.9 % (ref 0–1.9)
BUN SERPL-MCNC: 7 MG/DL (ref 8–23)
CALCIUM SERPL-MCNC: 8.2 MG/DL (ref 8.7–10.5)
CHLORIDE SERPL-SCNC: 108 MMOL/L (ref 95–110)
CO2 SERPL-SCNC: 23 MMOL/L (ref 23–29)
CREAT SERPL-MCNC: 0.6 MG/DL (ref 0.5–1.4)
DIFFERENTIAL METHOD: ABNORMAL
EOSINOPHIL # BLD AUTO: 0.4 K/UL (ref 0–0.5)
EOSINOPHIL NFR BLD: 5.5 % (ref 0–8)
ERYTHROCYTE [DISTWIDTH] IN BLOOD BY AUTOMATED COUNT: 14.1 % (ref 11.5–14.5)
EST. GFR  (AFRICAN AMERICAN): >60 ML/MIN/1.73 M^2
EST. GFR  (NON AFRICAN AMERICAN): >60 ML/MIN/1.73 M^2
GLUCOSE SERPL-MCNC: 87 MG/DL (ref 70–110)
HCT VFR BLD AUTO: 25.7 % (ref 37–48.5)
HGB BLD-MCNC: 8.5 G/DL (ref 12–16)
IMM GRANULOCYTES # BLD AUTO: 0.02 K/UL (ref 0–0.04)
IMM GRANULOCYTES NFR BLD AUTO: 0.3 % (ref 0–0.5)
LYMPHOCYTES # BLD AUTO: 1.1 K/UL (ref 1–4.8)
LYMPHOCYTES NFR BLD: 17.5 % (ref 18–48)
MAGNESIUM SERPL-MCNC: 2.2 MG/DL (ref 1.6–2.6)
MCH RBC QN AUTO: 24.9 PG (ref 27–31)
MCHC RBC AUTO-ENTMCNC: 33.1 G/DL (ref 32–36)
MCV RBC AUTO: 75 FL (ref 82–98)
MONOCYTES # BLD AUTO: 0.7 K/UL (ref 0.3–1)
MONOCYTES NFR BLD: 10.8 % (ref 4–15)
NEUTROPHILS # BLD AUTO: 4.2 K/UL (ref 1.8–7.7)
NEUTROPHILS NFR BLD: 65 % (ref 38–73)
NRBC BLD-RTO: 0 /100 WBC
PHOSPHATE SERPL-MCNC: 2.5 MG/DL (ref 2.7–4.5)
PLATELET # BLD AUTO: 249 K/UL (ref 150–450)
PMV BLD AUTO: 9.3 FL (ref 9.2–12.9)
POTASSIUM SERPL-SCNC: 4.2 MMOL/L (ref 3.5–5.1)
RBC # BLD AUTO: 3.42 M/UL (ref 4–5.4)
SODIUM SERPL-SCNC: 138 MMOL/L (ref 136–145)
WBC # BLD AUTO: 6.51 K/UL (ref 3.9–12.7)

## 2022-06-19 PROCEDURE — 63600175 PHARM REV CODE 636 W HCPCS: Performed by: STUDENT IN AN ORGANIZED HEALTH CARE EDUCATION/TRAINING PROGRAM

## 2022-06-19 PROCEDURE — 25000003 PHARM REV CODE 250: Performed by: SURGERY

## 2022-06-19 PROCEDURE — 83735 ASSAY OF MAGNESIUM: CPT | Performed by: STUDENT IN AN ORGANIZED HEALTH CARE EDUCATION/TRAINING PROGRAM

## 2022-06-19 PROCEDURE — C9113 INJ PANTOPRAZOLE SODIUM, VIA: HCPCS | Performed by: STUDENT IN AN ORGANIZED HEALTH CARE EDUCATION/TRAINING PROGRAM

## 2022-06-19 PROCEDURE — 85025 COMPLETE CBC W/AUTO DIFF WBC: CPT | Performed by: STUDENT IN AN ORGANIZED HEALTH CARE EDUCATION/TRAINING PROGRAM

## 2022-06-19 PROCEDURE — 36415 COLL VENOUS BLD VENIPUNCTURE: CPT | Performed by: STUDENT IN AN ORGANIZED HEALTH CARE EDUCATION/TRAINING PROGRAM

## 2022-06-19 PROCEDURE — 80048 BASIC METABOLIC PNL TOTAL CA: CPT | Performed by: STUDENT IN AN ORGANIZED HEALTH CARE EDUCATION/TRAINING PROGRAM

## 2022-06-19 PROCEDURE — 84100 ASSAY OF PHOSPHORUS: CPT | Performed by: STUDENT IN AN ORGANIZED HEALTH CARE EDUCATION/TRAINING PROGRAM

## 2022-06-19 PROCEDURE — 25000003 PHARM REV CODE 250: Performed by: STUDENT IN AN ORGANIZED HEALTH CARE EDUCATION/TRAINING PROGRAM

## 2022-06-19 RX ORDER — HYDROCODONE BITARTRATE AND ACETAMINOPHEN 5; 325 MG/1; MG/1
1 TABLET ORAL EVERY 6 HOURS PRN
Qty: 12 TABLET | Refills: 0 | Status: SHIPPED | OUTPATIENT
Start: 2022-06-19 | End: 2023-06-06 | Stop reason: ALTCHOICE

## 2022-06-19 RX ADMIN — METHOCARBAMOL 500 MG: 500 TABLET ORAL at 08:06

## 2022-06-19 RX ADMIN — PANTOPRAZOLE SODIUM 40 MG: 40 INJECTION, POWDER, FOR SOLUTION INTRAVENOUS at 08:06

## 2022-06-19 RX ADMIN — Medication 400 MG: at 08:06

## 2022-06-19 RX ADMIN — KETOROLAC TROMETHAMINE 15 MG: 15 INJECTION, SOLUTION INTRAMUSCULAR; INTRAVENOUS at 05:06

## 2022-06-19 RX ADMIN — KETOROLAC TROMETHAMINE 15 MG: 15 INJECTION, SOLUTION INTRAMUSCULAR; INTRAVENOUS at 12:06

## 2022-06-19 RX ADMIN — SIMETHICONE 80 MG: 80 TABLET, CHEWABLE ORAL at 08:06

## 2022-06-19 NOTE — ASSESSMENT & PLAN NOTE
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 Clinically stable with contrast in colon on GGC but still with pain  6/16 - Ex lap, HERNANDEZ, no obvious signs of obstruction, bowel was non-dilated, some dense adhesions were taken down throughout the abd    - Reg diet   - Multimodal pain control   - PRN pain and nausea medications  - Home meds  - DVT/GI ppx  - OOB, ambulate  - IS    Dispo: Plan to discharge today. Will discuss with daughter who was not at bedside this AM

## 2022-06-19 NOTE — PROGRESS NOTES
[facility]  General Surgery  Progress Note    Subjective:     History of Present Illness:  Patient is a 67 yo F with hx of HTN, HLD, and admission for SBO in 1/2022 which was manage conservatively who presents with abd distension and emesis concerning for recurrence. She notes her symptoms started to show up AM of 6/13. She began to feel more bloated, and stated developing nausea. She denies any significant pain, fevers, chills. Her last BM was evening of 6/13. She started throwing up in the ED.    Abd surg of hystecromy      Post-Op Info:  Procedure(s) (LRB):  LAPAROTOMY, EXPLORATORY (N/A)  LYSIS, ADHESIONS (N/A)   3 Days Post-Op     Interval History:   NAEON  No abd pain, no nausea   Tolerated regular diet.  + BM, ambulating   Mild throat pain       Medications:  Continuous Infusions:      Scheduled Meds:   acetaminophen  650 mg Oral Q6H    enoxaparin  40 mg Subcutaneous Daily    ketorolac  15 mg Intravenous Q6H    magnesium oxide  400 mg Oral BID    methocarbamoL  500 mg Oral TID    pantoprazole  40 mg Intravenous Daily    simethicone  1 tablet Oral TID     PRN Meds:hydrALAZINE, HYDROmorphone, ondansetron, oxyCODONE, oxyCODONE, sodium chloride 0.9%     Review of patient's allergies indicates:  No Known Allergies  Objective:     Vital Signs (Most Recent):  Temp: 97.6 °F (36.4 °C) (06/19/22 0706)  Pulse: 75 (06/19/22 0706)  Resp: 17 (06/19/22 0706)  BP: (!) 141/65 (06/19/22 0706)  SpO2: 96 % (06/19/22 0706) Vital Signs (24h Range):  Temp:  [96.3 °F (35.7 °C)-98.5 °F (36.9 °C)] 97.6 °F (36.4 °C)  Pulse:  [75-95] 75  Resp:  [16-20] 17  SpO2:  [94 %-99 %] 96 %  BP: (133-175)/(59-81) 141/65     Weight: 50.3 kg (111 lb)  Body mass index is 20.3 kg/m².    Intake/Output - Last 3 Shifts         06/17 0700 06/18 0659 06/18 0700 06/19 0659 06/19 0700 06/20 0659    P.O.       I.V. (mL/kg)       IV Piggyback       Total Intake(mL/kg)       Urine (mL/kg/hr)       Total Output       Net              Urine  Occurrence 5 x 4 x     Stool Occurrence 1 x              Physical Exam  Vitals and nursing note reviewed.   Constitutional:       General: She is not in acute distress.     Appearance: She is not diaphoretic.      Comments: Room air   HENT:      Head: Normocephalic and atraumatic.      Mouth/Throat:      Mouth: Mucous membranes are moist.      Pharynx: Oropharynx is clear.   Eyes:      Extraocular Movements: Extraocular movements intact.      Conjunctiva/sclera: Conjunctivae normal.   Cardiovascular:      Rate and Rhythm: Normal rate.   Pulmonary:      Effort: Pulmonary effort is normal. No respiratory distress.   Abdominal:      General: There is no distension.      Palpations: Abdomen is soft.      Tenderness: There is no guarding or rebound.      Comments: Abd soft, appropriately tender, incision CDI  Bruising around incision    Musculoskeletal:         General: No deformity.      Cervical back: Normal range of motion.   Skin:     General: Skin is warm and dry.   Neurological:      Mental Status: She is alert and oriented to person, place, and time.       Significant Labs:  CBC:   Recent Labs   Lab 06/19/22  0419   WBC 6.51   RBC 3.42*   HGB 8.5*   HCT 25.7*      MCV 75*   MCH 24.9*   MCHC 33.1       CMP:   Recent Labs   Lab 06/13/22  2313 06/15/22  0436 06/19/22  0419   *   < > 87   CALCIUM 10.4   < > 8.2*   ALBUMIN 4.2  --   --    PROT 8.6*  --   --    *   < > 138   K 4.6   < > 4.2   CO2 20*   < > 23   CL 99   < > 108   BUN 21   < > 7*   CREATININE 1.2   < > 0.6   ALKPHOS 111  --   --    ALT 16  --   --    AST 34  --   --    BILITOT 0.6  --   --     < > = values in this interval not displayed.         Significant Diagnostics:  I have reviewed all pertinent imaging results/findings within the past 24 hours.    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 Clinically stable with contrast in colon on GGC but still with pain  6/16 - Ex lap, HERNANDEZ, no obvious signs of obstruction, bowel was non-dilated, some dense adhesions were taken down throughout the abd    - Reg diet   - Multimodal pain control   - PRN pain and nausea medications  - Home meds  - DVT/GI ppx  - OOB, ambulate  - IS    Dispo: Plan to discharge today. Will discuss with daughter who was not at bedside this AM      Essential hypertension  - Hypertensive  - Holding home meds in setting of NPO  - PRN hydralazine on board  - Continue to monitor with q4 vitals and adjust regimen PRN         Kendra Montoya MD  General Surgery  [facility]

## 2022-06-19 NOTE — SUBJECTIVE & OBJECTIVE
Interval History:   NAEON  No abd pain, no nausea   Tolerated regular diet.  + BM, ambulating   Mild throat pain       Medications:  Continuous Infusions:      Scheduled Meds:   acetaminophen  650 mg Oral Q6H    enoxaparin  40 mg Subcutaneous Daily    ketorolac  15 mg Intravenous Q6H    magnesium oxide  400 mg Oral BID    methocarbamoL  500 mg Oral TID    pantoprazole  40 mg Intravenous Daily    simethicone  1 tablet Oral TID     PRN Meds:hydrALAZINE, HYDROmorphone, ondansetron, oxyCODONE, oxyCODONE, sodium chloride 0.9%     Review of patient's allergies indicates:  No Known Allergies  Objective:     Vital Signs (Most Recent):  Temp: 97.6 °F (36.4 °C) (06/19/22 0706)  Pulse: 75 (06/19/22 0706)  Resp: 17 (06/19/22 0706)  BP: (!) 141/65 (06/19/22 0706)  SpO2: 96 % (06/19/22 0706) Vital Signs (24h Range):  Temp:  [96.3 °F (35.7 °C)-98.5 °F (36.9 °C)] 97.6 °F (36.4 °C)  Pulse:  [75-95] 75  Resp:  [16-20] 17  SpO2:  [94 %-99 %] 96 %  BP: (133-175)/(59-81) 141/65     Weight: 50.3 kg (111 lb)  Body mass index is 20.3 kg/m².    Intake/Output - Last 3 Shifts         06/17 0700 06/18 0659 06/18 0700 06/19 0659 06/19 0700 06/20 0659    P.O.       I.V. (mL/kg)       IV Piggyback       Total Intake(mL/kg)       Urine (mL/kg/hr)       Total Output       Net              Urine Occurrence 5 x 4 x     Stool Occurrence 1 x              Physical Exam  Vitals and nursing note reviewed.   Constitutional:       General: She is not in acute distress.     Appearance: She is not diaphoretic.      Comments: Room air   HENT:      Head: Normocephalic and atraumatic.      Mouth/Throat:      Mouth: Mucous membranes are moist.      Pharynx: Oropharynx is clear.   Eyes:      Extraocular Movements: Extraocular movements intact.      Conjunctiva/sclera: Conjunctivae normal.   Cardiovascular:      Rate and Rhythm: Normal rate.   Pulmonary:      Effort: Pulmonary effort is normal. No respiratory distress.   Abdominal:      General: There is no  distension.      Palpations: Abdomen is soft.      Tenderness: There is no guarding or rebound.      Comments: Abd soft, appropriately tender, incision CDI  Bruising around incision    Musculoskeletal:         General: No deformity.      Cervical back: Normal range of motion.   Skin:     General: Skin is warm and dry.   Neurological:      Mental Status: She is alert and oriented to person, place, and time.       Significant Labs:  CBC:   Recent Labs   Lab 06/19/22  0419   WBC 6.51   RBC 3.42*   HGB 8.5*   HCT 25.7*      MCV 75*   MCH 24.9*   MCHC 33.1       CMP:   Recent Labs   Lab 06/13/22  2313 06/15/22  0436 06/19/22  0419   *   < > 87   CALCIUM 10.4   < > 8.2*   ALBUMIN 4.2  --   --    PROT 8.6*  --   --    *   < > 138   K 4.6   < > 4.2   CO2 20*   < > 23   CL 99   < > 108   BUN 21   < > 7*   CREATININE 1.2   < > 0.6   ALKPHOS 111  --   --    ALT 16  --   --    AST 34  --   --    BILITOT 0.6  --   --     < > = values in this interval not displayed.         Significant Diagnostics:  I have reviewed all pertinent imaging results/findings within the past 24 hours.

## 2022-06-20 NOTE — HOSPITAL COURSE
Meredith Keen was admitted on 6/13/2022 with small bowel obstruction. She was initially managed conservatively and although she passed the gastrografin challenge she was still having significant abdominal pain. She underwent and exploratory laparotomy with lysis of adhesions. She tolerated the procedure well and returned to the floor. Her clinical condition continue to improve. She had return of bowel function and was tolerated a regular diet. She was deemed suitable for discharged on hospital day 6. She will follow up in the general surgery clinic in 2 weeks.

## 2022-06-20 NOTE — DISCHARGE SUMMARY
[facility]  General Surgery  Discharge Summary      Patient Name: Meredith Keen  MRN: [id number]  Admission Date: 6/13/2022  Hospital Length of Stay: 5 days  Discharge Date and Time: 6/19/2022 11:55 AM  Attending Physician: No att. providers found   Discharging Provider: Kendra Montoya MD  Primary Care Provider: Alla Jade MD    HPI:   Patient is a 67 yo F with hx of HTN, HLD, and admission for SBO in 1/2022 which was manage conservatively who presents with abd distension and emesis concerning for recurrence. She notes her symptoms started to show up AM of 6/13. She began to feel more bloated, and stated developing nausea. She denies any significant pain, fevers, chills. Her last BM was evening of 6/13. She started throwing up in the ED.    Abd surg of hystecromy      Procedure(s) (LRB):  LAPAROTOMY, EXPLORATORY (N/A)  LYSIS, ADHESIONS (N/A)      Indwelling Lines/Drains at time of discharge:   Lines/Drains/Airways     None               Hospital Course: Meredith Keen was admitted on 6/13/2022 with small bowel obstruction. She was initially managed conservatively and although she passed the gastrografin challenge she was still having significant abdominal pain. She underwent and exploratory laparotomy with lysis of adhesions. She tolerated the procedure well and returned to the floor. Her clinical condition continue to improve. She had return of bowel function and was tolerated a regular diet. She was deemed suitable for discharged on hospital day 6. She will follow up in the general surgery clinic in 2 weeks.       Goals of Care Treatment Preferences:  Code Status: Full Code      Consults:   Consults (From admission, onward)        Status Ordering Provider     Inpatient consult to Midline team  Once        Provider:  (Not yet assigned)    Completed JONATHAN NEWMAN     Inpatient consult to General surgery  Once        Provider:  (Not yet assigned)    Completed ELENITA ENAMORADO  Diagnostic Studies: see HPI and hospital course    Pending Diagnostic Studies:     None        Final Active Diagnoses:    Diagnosis Date Noted POA    PRINCIPAL PROBLEM:  Partial small bowel obstruction [K56.600] 01/12/2022 Yes    Essential hypertension [I10] 05/28/2017 Yes      Problems Resolved During this Admission:      Discharged Condition: good    Disposition: Home or Self Care    Follow Up:   Follow-up Information     Jacob Greco MD Follow up in 2 day(s).    Specialties: General Surgery, Surgery  Why: Post op follow up  Contact information:  Anita GANDHI  [facility] [id number] [phone]                       Patient Instructions:      Other restrictions (specify):   Order Comments: WOUND CARE  -- You have skin glue over your incision(s); this will slowly flake away over the next few weeks.  -- Ok to shower; however, no baths or submerging in water (I.e. swimming, submerging in water) for at least two weeks.  -- Please keep the incision clean with soap and water, pat your incision dry, do not scrub hard over your incisions.    MEDICATIONS AND PAIN CONTROL  -- Please resume all home medications as instructed and take any newly prescribed medications.  -- Most people find that over-the-counter pain medications (Tylenol combined with Ibuprofen) will be sufficient for most pain control.  -- If you're taking prescription narcotics, do not drive or operate heavy machinery. Do not drive if your pain is not controlled enough for you to react quickly safely.  -- Take a stool softener with narcotics medications to prevent constipation.    OTHER INSTRUCTIONS  -- Monitor for temp > 101 F, bleeding, redness, purulent drainage, or any sudden, new extreme pain. If any occur, please call our clinic or go to the emergency department if after normal business hours.  -- You may resume your regular diet as tolerated.   -- No heavy lifting (anything >10 lbs or = to a gallon of milk) or strenuous exercise until  cleared by a physician.  -- Follow up with Dr. Greco in 2 weeks in clinic for a post-op check. If no appointment is made within the week, please call the clinic to schedule.     Notify your health care provider if you experience any of the following:  increased confusion or weakness     Notify your health care provider if you experience any of the following:  persistent dizziness, light-headedness, or visual disturbances     Notify your health care provider if you experience any of the following:  worsening rash     Notify your health care provider if you experience any of the following:  severe persistent headache     Notify your health care provider if you experience any of the following:  difficulty breathing or increased cough     Notify your health care provider if you experience any of the following:  redness, tenderness, or signs of infection (pain, swelling, redness, odor or green/yellow discharge around incision site)     Notify your health care provider if you experience any of the following:  severe uncontrolled pain     Notify your health care provider if you experience any of the following:  persistent nausea and vomiting or diarrhea     Notify your health care provider if you experience any of the following:  temperature >100.4     Activity as tolerated     Medications:  Reconciled Home Medications:      Medication List      START taking these medications    HYDROcodone-acetaminophen 5-325 mg per tablet  Commonly known as: NORCO  Take 1 tablet by mouth every 6 (six) hours as needed for Pain.        CONTINUE taking these medications    aluminum-magnesium hydroxide-simethicone 200-200-20 mg/5 mL Susp  Commonly known as: MAALOX ADVANCED  Take 30 mLs by mouth every 6 (six) hours as needed (nausea, abdominal pain, chest pain).     amLODIPine 2.5 MG tablet  Commonly known as: NORVASC  Take 2.5 mg by mouth once daily.     atorvastatin 20 MG tablet  Commonly known as: LIPITOR  Take 20 mg by mouth once  daily.     EScitalopram oxalate 10 MG tablet  Commonly known as: LEXAPRO  TAKE 1/2 TABLET BY MOUTH DAILY FOR 7 DAYS, THEN TAKE 1 TABLET BY MOUTH DAILY     famotidine 20 MG tablet  Commonly known as: PEPCID  Take 20 mg by mouth 2 (two) times daily.     omeprazole 40 MG capsule  Commonly known as: PRILOSEC  Take 40 mg by mouth once daily.     polyethylene glycol 17 gram/dose powder  Commonly known as: GLYCOLAX  Dissolve one capful (17g) into liquid and take by mouth once daily.          Time spent on the discharge of patient: 15 minutes    Kendra Montoya MD  General Surgery  [facility] - Surgery

## 2022-06-30 ENCOUNTER — OFFICE VISIT (OUTPATIENT)
Dept: FAMILY MEDICINE | Facility: CLINIC | Age: 67
End: 2022-06-30
Payer: MEDICARE

## 2022-06-30 VITALS
BODY MASS INDEX: 19.39 KG/M2 | WEIGHT: 105.38 LBS | SYSTOLIC BLOOD PRESSURE: 118 MMHG | HEART RATE: 68 BPM | HEIGHT: 62 IN | DIASTOLIC BLOOD PRESSURE: 76 MMHG | OXYGEN SATURATION: 99 % | TEMPERATURE: 98 F

## 2022-06-30 DIAGNOSIS — F41.8 DEPRESSION WITH ANXIETY: ICD-10-CM

## 2022-06-30 DIAGNOSIS — R63.4 UNINTENTIONAL WEIGHT LOSS: Primary | ICD-10-CM

## 2022-06-30 DIAGNOSIS — Z98.890 STATUS POST LAPAROTOMY: ICD-10-CM

## 2022-06-30 PROCEDURE — 1111F DSCHRG MED/CURRENT MED MERGE: CPT | Mod: CPTII,S$GLB,, | Performed by: STUDENT IN AN ORGANIZED HEALTH CARE EDUCATION/TRAINING PROGRAM

## 2022-06-30 PROCEDURE — 1111F PR DISCHARGE MEDS RECONCILED W/ CURRENT OUTPATIENT MED LIST: ICD-10-PCS | Mod: CPTII,S$GLB,, | Performed by: STUDENT IN AN ORGANIZED HEALTH CARE EDUCATION/TRAINING PROGRAM

## 2022-06-30 PROCEDURE — 3288F FALL RISK ASSESSMENT DOCD: CPT | Mod: CPTII,S$GLB,, | Performed by: STUDENT IN AN ORGANIZED HEALTH CARE EDUCATION/TRAINING PROGRAM

## 2022-06-30 PROCEDURE — 1101F PR PT FALLS ASSESS DOC 0-1 FALLS W/OUT INJ PAST YR: ICD-10-PCS | Mod: CPTII,S$GLB,, | Performed by: STUDENT IN AN ORGANIZED HEALTH CARE EDUCATION/TRAINING PROGRAM

## 2022-06-30 PROCEDURE — 99999 PR PBB SHADOW E&M-EST. PATIENT-LVL III: CPT | Mod: PBBFAC,,, | Performed by: STUDENT IN AN ORGANIZED HEALTH CARE EDUCATION/TRAINING PROGRAM

## 2022-06-30 PROCEDURE — 99214 PR OFFICE/OUTPT VISIT, EST, LEVL IV, 30-39 MIN: ICD-10-PCS | Mod: S$GLB,,, | Performed by: STUDENT IN AN ORGANIZED HEALTH CARE EDUCATION/TRAINING PROGRAM

## 2022-06-30 PROCEDURE — 3044F HG A1C LEVEL LT 7.0%: CPT | Mod: CPTII,S$GLB,, | Performed by: STUDENT IN AN ORGANIZED HEALTH CARE EDUCATION/TRAINING PROGRAM

## 2022-06-30 PROCEDURE — 3078F PR MOST RECENT DIASTOLIC BLOOD PRESSURE < 80 MM HG: ICD-10-PCS | Mod: CPTII,S$GLB,, | Performed by: STUDENT IN AN ORGANIZED HEALTH CARE EDUCATION/TRAINING PROGRAM

## 2022-06-30 PROCEDURE — 1159F PR MEDICATION LIST DOCUMENTED IN MEDICAL RECORD: ICD-10-PCS | Mod: CPTII,S$GLB,, | Performed by: STUDENT IN AN ORGANIZED HEALTH CARE EDUCATION/TRAINING PROGRAM

## 2022-06-30 PROCEDURE — 3288F PR FALLS RISK ASSESSMENT DOCUMENTED: ICD-10-PCS | Mod: CPTII,S$GLB,, | Performed by: STUDENT IN AN ORGANIZED HEALTH CARE EDUCATION/TRAINING PROGRAM

## 2022-06-30 PROCEDURE — 3008F PR BODY MASS INDEX (BMI) DOCUMENTED: ICD-10-PCS | Mod: CPTII,S$GLB,, | Performed by: STUDENT IN AN ORGANIZED HEALTH CARE EDUCATION/TRAINING PROGRAM

## 2022-06-30 PROCEDURE — 4010F PR ACE/ARB THEARPY RXD/TAKEN: ICD-10-PCS | Mod: CPTII,S$GLB,, | Performed by: STUDENT IN AN ORGANIZED HEALTH CARE EDUCATION/TRAINING PROGRAM

## 2022-06-30 PROCEDURE — 4010F ACE/ARB THERAPY RXD/TAKEN: CPT | Mod: CPTII,S$GLB,, | Performed by: STUDENT IN AN ORGANIZED HEALTH CARE EDUCATION/TRAINING PROGRAM

## 2022-06-30 PROCEDURE — 3074F PR MOST RECENT SYSTOLIC BLOOD PRESSURE < 130 MM HG: ICD-10-PCS | Mod: CPTII,S$GLB,, | Performed by: STUDENT IN AN ORGANIZED HEALTH CARE EDUCATION/TRAINING PROGRAM

## 2022-06-30 PROCEDURE — 1126F PR PAIN SEVERITY QUANTIFIED, NO PAIN PRESENT: ICD-10-PCS | Mod: CPTII,S$GLB,, | Performed by: STUDENT IN AN ORGANIZED HEALTH CARE EDUCATION/TRAINING PROGRAM

## 2022-06-30 PROCEDURE — 3008F BODY MASS INDEX DOCD: CPT | Mod: CPTII,S$GLB,, | Performed by: STUDENT IN AN ORGANIZED HEALTH CARE EDUCATION/TRAINING PROGRAM

## 2022-06-30 PROCEDURE — 99214 OFFICE O/P EST MOD 30 MIN: CPT | Mod: S$GLB,,, | Performed by: STUDENT IN AN ORGANIZED HEALTH CARE EDUCATION/TRAINING PROGRAM

## 2022-06-30 PROCEDURE — 1159F MED LIST DOCD IN RCRD: CPT | Mod: CPTII,S$GLB,, | Performed by: STUDENT IN AN ORGANIZED HEALTH CARE EDUCATION/TRAINING PROGRAM

## 2022-06-30 PROCEDURE — 99999 PR PBB SHADOW E&M-EST. PATIENT-LVL III: ICD-10-PCS | Mod: PBBFAC,,, | Performed by: STUDENT IN AN ORGANIZED HEALTH CARE EDUCATION/TRAINING PROGRAM

## 2022-06-30 PROCEDURE — 1126F AMNT PAIN NOTED NONE PRSNT: CPT | Mod: CPTII,S$GLB,, | Performed by: STUDENT IN AN ORGANIZED HEALTH CARE EDUCATION/TRAINING PROGRAM

## 2022-06-30 PROCEDURE — 3044F PR MOST RECENT HEMOGLOBIN A1C LEVEL <7.0%: ICD-10-PCS | Mod: CPTII,S$GLB,, | Performed by: STUDENT IN AN ORGANIZED HEALTH CARE EDUCATION/TRAINING PROGRAM

## 2022-06-30 PROCEDURE — 1101F PT FALLS ASSESS-DOCD LE1/YR: CPT | Mod: CPTII,S$GLB,, | Performed by: STUDENT IN AN ORGANIZED HEALTH CARE EDUCATION/TRAINING PROGRAM

## 2022-06-30 PROCEDURE — 3078F DIAST BP <80 MM HG: CPT | Mod: CPTII,S$GLB,, | Performed by: STUDENT IN AN ORGANIZED HEALTH CARE EDUCATION/TRAINING PROGRAM

## 2022-06-30 PROCEDURE — 3074F SYST BP LT 130 MM HG: CPT | Mod: CPTII,S$GLB,, | Performed by: STUDENT IN AN ORGANIZED HEALTH CARE EDUCATION/TRAINING PROGRAM

## 2022-06-30 RX ORDER — CETIRIZINE HYDROCHLORIDE 10 MG/1
TABLET ORAL
COMMUNITY
Start: 2022-04-27 | End: 2022-10-21

## 2022-06-30 RX ORDER — PAROXETINE 10 MG/1
TABLET, FILM COATED ORAL
COMMUNITY
Start: 2022-05-04 | End: 2022-06-30 | Stop reason: ALTCHOICE

## 2022-06-30 RX ORDER — HYDROXYZINE HYDROCHLORIDE 25 MG/1
TABLET, FILM COATED ORAL
COMMUNITY
Start: 2022-04-27 | End: 2022-10-21

## 2022-06-30 RX ORDER — MIRTAZAPINE 15 MG/1
TABLET, FILM COATED ORAL
COMMUNITY
Start: 2022-05-17 | End: 2022-10-21

## 2022-06-30 RX ORDER — LOSARTAN POTASSIUM 100 MG/1
100 TABLET ORAL DAILY
COMMUNITY
Start: 2022-05-04 | End: 2022-08-25 | Stop reason: SDUPTHER

## 2022-06-30 RX ORDER — OLOPATADINE HYDROCHLORIDE 1 MG/ML
SOLUTION/ DROPS OPHTHALMIC
COMMUNITY
Start: 2022-04-27 | End: 2022-10-21

## 2022-06-30 RX ORDER — PANTOPRAZOLE SODIUM 40 MG/1
TABLET, DELAYED RELEASE ORAL
COMMUNITY
Start: 2022-05-22 | End: 2022-10-21

## 2022-06-30 NOTE — PROGRESS NOTES
06/30/2022    Meredith Keen  [id number]    CC: Hospital follow up    HPI    Hospital Course: Admitted 6/13-6/19  Meredith Keen was admitted on 6/13/2022 with small bowel obstruction. She was initially managed conservatively and although she passed the gastrografin challenge she was still having significant abdominal pain. She underwent and exploratory laparotomy with lysis of adhesions. She tolerated the procedure well and returned to the floor. Her clinical condition continue to improve. She had return of bowel function and was tolerated a regular diet. She was deemed suitable for discharged on hospital day 6. She will follow up in the general surgery clinic in 2 weeks.     SINCE DISCHARGE   Patient presents with daughter in law  Is in pain and is still not eating   Is currently only eating white rice and porridge and very small amounts    Pt is concerned that she caused her obstruction  Pt is having soft stools but for the last 2 years has been disimpacting herself. States that she feels the urge to go but it doesn't come out unless she takes it out. Of note daughter was unaware of this. Is up to date on colonoscopy and was normal  Depression/Anxiety: Lexapro 10 mg is working. The whole family notices the difference     Negative 10 point ROS outside of HPI    Social History     Socioeconomic History    Marital status:    Tobacco Use    Smoking status: Never Smoker    Smokeless tobacco: Never Used   Substance and Sexual Activity    Alcohol use: No    Drug use: No    Sexual activity: Yes     Partners: Male           Current Outpatient Medications:     aluminum-magnesium hydroxide-simethicone (MAALOX ADVANCED) 200-200-20 mg/5 mL Susp, Take 30 mLs by mouth every 6 (six) hours as needed (nausea, abdominal pain, chest pain)., Disp: 354 mL, Rfl: 0    amLODIPine (NORVASC) 2.5 MG tablet, Take 2.5 mg by mouth once daily., Disp: , Rfl:     atorvastatin (LIPITOR) 20 MG tablet, Take 20 mg by mouth once daily.,  Disp: , Rfl:     cetirizine (ZYRTEC) 10 MG tablet, TAKE BY MOUTH 1 TABLET EVERYDAY AS NEEDED FOR ALLERGIES, Disp: , Rfl:     EScitalopram oxalate (LEXAPRO) 10 MG tablet, TAKE 1/2 TABLET BY MOUTH DAILY FOR 7 DAYS, THEN TAKE 1 TABLET BY MOUTH DAILY, Disp: 90 tablet, Rfl: 1    famotidine (PEPCID) 20 MG tablet, Take 20 mg by mouth 2 (two) times daily., Disp: , Rfl:     HYDROcodone-acetaminophen (NORCO) 5-325 mg per tablet, Take 1 tablet by mouth every 6 (six) hours as needed for Pain., Disp: 12 tablet, Rfl: 0    hydrOXYzine HCL (ATARAX) 25 MG tablet, TAKE BY MOUTH 1/2 TO 1 TABLET NIGHTLY AT BEDTIME AS NEEDED FOR ITCHINESS, Disp: , Rfl:     losartan (COZAAR) 100 MG tablet, Take 100 mg by mouth once daily., Disp: , Rfl:     mirtazapine (REMERON) 15 MG tablet, , Disp: , Rfl:     olopatadine (PATANOL) 0.1 % ophthalmic solution, INSTILL 1 DROP IN AFFECTED EYE 2 TIMES A DAY AS NEEDED FOR ALLERGIES, Disp: , Rfl:     omeprazole (PRILOSEC) 40 MG capsule, Take 40 mg by mouth once daily., Disp: , Rfl:     pantoprazole (PROTONIX) 40 MG tablet, , Disp: , Rfl:     paroxetine (PAXIL) 10 MG tablet, TAKE 1 TABLET BY MOUTH EVERY DAY IN THE EVENING. STOP MIRTAZIPINE, Disp: , Rfl:     polyethylene glycol (GLYCOLAX) 17 gram/dose powder, Dissolve one capful (17g) into liquid and take by mouth once daily., Disp: 510 g, Rfl: 0      Physical Exam  Vitals:    06/30/22 1033   BP: 118/76   Pulse: 68   Temp: 97.9 °F (36.6 °C)       Gen: well appearing, NAD  Resp: non labored breathing, no crackles, no wheezes, CTAB  CV: RRR no murmur, gallops, rubs, no LE edema  Abd: incision: c/d/i, soft, appropriately tender, quiet BS  anus: external hemorrhoids present  BLANE: wnl    1. Unintentional weight loss: documented loss since last visit  Discussed continuing to try to eat frequent small meals every 2-3 hours. And to increase protein  Will refer to nutrition    2. Depression with anxiety  Continue lexapro    3. Status post laparotomy  Healing  well, management per general surgery    RTC in 6-8 weeks for weight check    Alla Jade MD  Family Medicine

## 2022-07-05 ENCOUNTER — OFFICE VISIT (OUTPATIENT)
Dept: ENDOCRINOLOGY | Facility: CLINIC | Age: 67
End: 2022-07-05
Payer: MEDICARE

## 2022-07-05 ENCOUNTER — LAB VISIT (OUTPATIENT)
Dept: LAB | Facility: HOSPITAL | Age: 67
End: 2022-07-05
Attending: HOSPITALIST
Payer: MEDICARE

## 2022-07-05 VITALS
HEIGHT: 63 IN | BODY MASS INDEX: 18.96 KG/M2 | WEIGHT: 107 LBS | TEMPERATURE: 98 F | HEART RATE: 76 BPM | SYSTOLIC BLOOD PRESSURE: 138 MMHG | DIASTOLIC BLOOD PRESSURE: 74 MMHG

## 2022-07-05 DIAGNOSIS — R93.89 ABNORMAL FINDINGS ON DIAGNOSTIC IMAGING OF OTHER SPECIFIED BODY STRUCTURES: ICD-10-CM

## 2022-07-05 DIAGNOSIS — D64.9 ANEMIA, UNSPECIFIED TYPE: Primary | ICD-10-CM

## 2022-07-05 DIAGNOSIS — M81.0 AGE RELATED OSTEOPOROSIS, UNSPECIFIED PATHOLOGICAL FRACTURE PRESENCE: ICD-10-CM

## 2022-07-05 DIAGNOSIS — K56.600 PARTIAL SMALL BOWEL OBSTRUCTION: ICD-10-CM

## 2022-07-05 DIAGNOSIS — D64.9 ANEMIA, UNSPECIFIED TYPE: ICD-10-CM

## 2022-07-05 LAB
25(OH)D3+25(OH)D2 SERPL-MCNC: 29 NG/ML (ref 30–96)
ALBUMIN SERPL BCP-MCNC: 3.9 G/DL (ref 3.5–5.2)
ALP SERPL-CCNC: 117 U/L (ref 55–135)
ALT SERPL W/O P-5'-P-CCNC: 16 U/L (ref 10–44)
ANION GAP SERPL CALC-SCNC: 6 MMOL/L (ref 8–16)
AST SERPL-CCNC: 19 U/L (ref 10–40)
BASOPHILS # BLD AUTO: 0.08 K/UL (ref 0–0.2)
BASOPHILS NFR BLD: 0.6 % (ref 0–1.9)
BILIRUB SERPL-MCNC: 0.6 MG/DL (ref 0.1–1)
BUN SERPL-MCNC: 13 MG/DL (ref 8–23)
CALCIUM SERPL-MCNC: 9.9 MG/DL (ref 8.7–10.5)
CHLORIDE SERPL-SCNC: 106 MMOL/L (ref 95–110)
CO2 SERPL-SCNC: 27 MMOL/L (ref 23–29)
CREAT SERPL-MCNC: 0.6 MG/DL (ref 0.5–1.4)
DIFFERENTIAL METHOD: ABNORMAL
EOSINOPHIL # BLD AUTO: 0.3 K/UL (ref 0–0.5)
EOSINOPHIL NFR BLD: 2.5 % (ref 0–8)
ERYTHROCYTE [DISTWIDTH] IN BLOOD BY AUTOMATED COUNT: 15.3 % (ref 11.5–14.5)
EST. GFR  (AFRICAN AMERICAN): >60 ML/MIN/1.73 M^2
EST. GFR  (NON AFRICAN AMERICAN): >60 ML/MIN/1.73 M^2
GLUCOSE SERPL-MCNC: 103 MG/DL (ref 70–110)
HCT VFR BLD AUTO: 36.5 % (ref 37–48.5)
HGB BLD-MCNC: 11.8 G/DL (ref 12–16)
IMM GRANULOCYTES # BLD AUTO: 0.04 K/UL (ref 0–0.04)
IMM GRANULOCYTES NFR BLD AUTO: 0.3 % (ref 0–0.5)
LYMPHOCYTES # BLD AUTO: 1.2 K/UL (ref 1–4.8)
LYMPHOCYTES NFR BLD: 9.9 % (ref 18–48)
MAGNESIUM SERPL-MCNC: 2 MG/DL (ref 1.6–2.6)
MCH RBC QN AUTO: 25.3 PG (ref 27–31)
MCHC RBC AUTO-ENTMCNC: 32.3 G/DL (ref 32–36)
MCV RBC AUTO: 78 FL (ref 82–98)
MONOCYTES # BLD AUTO: 0.7 K/UL (ref 0.3–1)
MONOCYTES NFR BLD: 5.7 % (ref 4–15)
NEUTROPHILS # BLD AUTO: 10.2 K/UL (ref 1.8–7.7)
NEUTROPHILS NFR BLD: 81 % (ref 38–73)
NRBC BLD-RTO: 0 /100 WBC
PHOSPHATE SERPL-MCNC: 3.3 MG/DL (ref 2.7–4.5)
PLATELET # BLD AUTO: 379 K/UL (ref 150–450)
PMV BLD AUTO: 8.9 FL (ref 9.2–12.9)
POTASSIUM SERPL-SCNC: 4.8 MMOL/L (ref 3.5–5.1)
PROT SERPL-MCNC: 7.7 G/DL (ref 6–8.4)
RBC # BLD AUTO: 4.66 M/UL (ref 4–5.4)
SODIUM SERPL-SCNC: 139 MMOL/L (ref 136–145)
TSH SERPL DL<=0.005 MIU/L-ACNC: 1.17 UIU/ML (ref 0.4–4)
WBC # BLD AUTO: 12.56 K/UL (ref 3.9–12.7)

## 2022-07-05 PROCEDURE — 3075F PR MOST RECENT SYSTOLIC BLOOD PRESS GE 130-139MM HG: ICD-10-PCS | Mod: CPTII,S$GLB,, | Performed by: HOSPITALIST

## 2022-07-05 PROCEDURE — 36415 COLL VENOUS BLD VENIPUNCTURE: CPT | Performed by: HOSPITALIST

## 2022-07-05 PROCEDURE — 99999 PR PBB SHADOW E&M-EST. PATIENT-LVL IV: CPT | Mod: PBBFAC,,, | Performed by: HOSPITALIST

## 2022-07-05 PROCEDURE — 3078F PR MOST RECENT DIASTOLIC BLOOD PRESSURE < 80 MM HG: ICD-10-PCS | Mod: CPTII,S$GLB,, | Performed by: HOSPITALIST

## 2022-07-05 PROCEDURE — 4010F ACE/ARB THERAPY RXD/TAKEN: CPT | Mod: CPTII,S$GLB,, | Performed by: HOSPITALIST

## 2022-07-05 PROCEDURE — 83735 ASSAY OF MAGNESIUM: CPT | Performed by: HOSPITALIST

## 2022-07-05 PROCEDURE — 99499 RISK ADDL DX/OHS AUDIT: ICD-10-PCS | Mod: HCNC,S$GLB,, | Performed by: HOSPITALIST

## 2022-07-05 PROCEDURE — 1111F PR DISCHARGE MEDS RECONCILED W/ CURRENT OUTPATIENT MED LIST: ICD-10-PCS | Mod: CPTII,S$GLB,, | Performed by: HOSPITALIST

## 2022-07-05 PROCEDURE — 80053 COMPREHEN METABOLIC PANEL: CPT | Performed by: HOSPITALIST

## 2022-07-05 PROCEDURE — 85025 COMPLETE CBC W/AUTO DIFF WBC: CPT | Performed by: HOSPITALIST

## 2022-07-05 PROCEDURE — 99204 PR OFFICE/OUTPT VISIT, NEW, LEVL IV, 45-59 MIN: ICD-10-PCS | Mod: S$GLB,,, | Performed by: HOSPITALIST

## 2022-07-05 PROCEDURE — 1101F PR PT FALLS ASSESS DOC 0-1 FALLS W/OUT INJ PAST YR: ICD-10-PCS | Mod: CPTII,S$GLB,, | Performed by: HOSPITALIST

## 2022-07-05 PROCEDURE — 1126F PR PAIN SEVERITY QUANTIFIED, NO PAIN PRESENT: ICD-10-PCS | Mod: CPTII,S$GLB,, | Performed by: HOSPITALIST

## 2022-07-05 PROCEDURE — 83970 ASSAY OF PARATHORMONE: CPT | Performed by: HOSPITALIST

## 2022-07-05 PROCEDURE — 3044F PR MOST RECENT HEMOGLOBIN A1C LEVEL <7.0%: ICD-10-PCS | Mod: CPTII,S$GLB,, | Performed by: HOSPITALIST

## 2022-07-05 PROCEDURE — 84443 ASSAY THYROID STIM HORMONE: CPT | Performed by: HOSPITALIST

## 2022-07-05 PROCEDURE — 3075F SYST BP GE 130 - 139MM HG: CPT | Mod: CPTII,S$GLB,, | Performed by: HOSPITALIST

## 2022-07-05 PROCEDURE — 1159F PR MEDICATION LIST DOCUMENTED IN MEDICAL RECORD: ICD-10-PCS | Mod: CPTII,S$GLB,, | Performed by: HOSPITALIST

## 2022-07-05 PROCEDURE — 1111F DSCHRG MED/CURRENT MED MERGE: CPT | Mod: CPTII,S$GLB,, | Performed by: HOSPITALIST

## 2022-07-05 PROCEDURE — 1159F MED LIST DOCD IN RCRD: CPT | Mod: CPTII,S$GLB,, | Performed by: HOSPITALIST

## 2022-07-05 PROCEDURE — 4010F PR ACE/ARB THEARPY RXD/TAKEN: ICD-10-PCS | Mod: CPTII,S$GLB,, | Performed by: HOSPITALIST

## 2022-07-05 PROCEDURE — 1126F AMNT PAIN NOTED NONE PRSNT: CPT | Mod: CPTII,S$GLB,, | Performed by: HOSPITALIST

## 2022-07-05 PROCEDURE — 3078F DIAST BP <80 MM HG: CPT | Mod: CPTII,S$GLB,, | Performed by: HOSPITALIST

## 2022-07-05 PROCEDURE — 3288F FALL RISK ASSESSMENT DOCD: CPT | Mod: CPTII,S$GLB,, | Performed by: HOSPITALIST

## 2022-07-05 PROCEDURE — 99204 OFFICE O/P NEW MOD 45 MIN: CPT | Mod: S$GLB,,, | Performed by: HOSPITALIST

## 2022-07-05 PROCEDURE — 99499 UNLISTED E&M SERVICE: CPT | Mod: HCNC,S$GLB,, | Performed by: HOSPITALIST

## 2022-07-05 PROCEDURE — 84100 ASSAY OF PHOSPHORUS: CPT | Performed by: HOSPITALIST

## 2022-07-05 PROCEDURE — 3008F PR BODY MASS INDEX (BMI) DOCUMENTED: ICD-10-PCS | Mod: CPTII,S$GLB,, | Performed by: HOSPITALIST

## 2022-07-05 PROCEDURE — 3288F PR FALLS RISK ASSESSMENT DOCUMENTED: ICD-10-PCS | Mod: CPTII,S$GLB,, | Performed by: HOSPITALIST

## 2022-07-05 PROCEDURE — 99999 PR PBB SHADOW E&M-EST. PATIENT-LVL IV: ICD-10-PCS | Mod: PBBFAC,,, | Performed by: HOSPITALIST

## 2022-07-05 PROCEDURE — 82306 VITAMIN D 25 HYDROXY: CPT | Performed by: HOSPITALIST

## 2022-07-05 PROCEDURE — 3008F BODY MASS INDEX DOCD: CPT | Mod: CPTII,S$GLB,, | Performed by: HOSPITALIST

## 2022-07-05 PROCEDURE — 1101F PT FALLS ASSESS-DOCD LE1/YR: CPT | Mod: CPTII,S$GLB,, | Performed by: HOSPITALIST

## 2022-07-05 PROCEDURE — 3044F HG A1C LEVEL LT 7.0%: CPT | Mod: CPTII,S$GLB,, | Performed by: HOSPITALIST

## 2022-07-05 NOTE — ASSESSMENT & PLAN NOTE
- Patient with Osteoporosis diagnosed on DEXA scan 5/2022>> new diagnosis  - high T-score, >-3.0, severe osteoporosis  - Risk factors include:  Postmenopausal, small stature, malabsorption  - SBO/GI issue/GERD/acid reflux (not candidate for oral bisphosphonate)  - For treatment options discussed with patient in clinic today: Bisphosphonate, SC Prolia, Anabolic agents: Forteo   - we will perform bloodwork/secondary workup : check CMP, PTH, vitamin-D, TSH  - patient and family are interested in Forteo therapy vs Prolia, they will discuss an let me know  - Next DXA:  2 years from  most current  - advised patient to start multivitamin and vitamin-D supplement over-the-counter  - Fall precautions/Exercise regimen: advised

## 2022-07-05 NOTE — PROGRESS NOTES
Subjective:      Patient ID: Meredith Keen is a 66 y.o. female presented to [facility] on 7/5/2022.  Chief Complaint:  Osteoporosis      History of Present Illness: Meredith Keen is a 66 y.o. Upper sorbian speaking female here for osteoporosis  Other significant past medical history:  Hypertension, small bowel obstruction with recent ex lap due to adhesion  Daughter in law present for office visit, who is physician assistant at [employer]      In regards to Osteoporosis  - Diagnosis on DXA in 5/19/2022, denies prior diagnosis  - Currently taking- Centrum MVN daily  - Vit D> Not taking  - never been on anti resorptive medication  - recent:  SBO leading to ex lap 6/22, due to adhesions.  Now has resolved.  - recent lab work show mild anemia, hypocalcemia:  Post surgical    Osteoporosis Risk Factor Assessment:  History of falls over the last 2 years: yes, 3 weeks ago, fell trip, injured her knee  History of fractures to wrist, hip or spine:no  History of loss of height of more than 1 1/2 to 2 inches: no  Family history of osteoporosis: no  Family history of fractures of hip: no    Age of menopause: 2010, hysterectomy, no: use of HRT  Tobacco use, including use in the past:  no   EtOH use? No     no: history of CKD3   no: history of thyroid disease   no: history of kidney stones    Denies active malignancy, history of malignancy the involved the bone, prior radiation treatment, or unexplained elevations of alk phos on labs.  No current:  Gastrointestinal issues including:  Chronic diarrhea, celiac disease, ulcerative colitis/Crohn or h/o malabsorption or gastric surgery  Diet: Does eat regularly Cheese/Dairy/Milk/Greens    Weight bearing exercise?   no (no walking)  Dental work planned? no, dentures    Recent DXA: 5/19/2022  The L1 to L4 vertebral bone mineral density is equal to 0.907 g/cm squared with a T score of -2.3.  The left femoral neck bone mineral density is equal to 0.585 g/cm squared with a T  "score of -3.3.  The total hip bone mineral density is equal to 0.669 g/cm squared with a T score of -2.7.  There is a 10.9% risk of a major osteoporotic fracture and a 4.1% risk of hip fracture in the next 10 years (FRAX).     Impression:  Osteoporosis of the hips, osteopenia of the spine    Lab work reviewed  Lab Results   Component Value Date    CALCIUM 9.9 07/05/2022    CALCIUM 8.2 (L) 06/19/2022    CALCIUM 8.1 (L) 06/18/2022    PHOS 3.3 07/05/2022    PHOS 2.5 (L) 06/19/2022    PHOS 1.9 (L) 06/18/2022    ALKPHOS 117 07/05/2022    ALKPHOS 111 06/13/2022    ALKPHOS 102 05/25/2022    TSH 1.172 07/05/2022       Reviewed past surgical, medical, family, social history and updated as appropriate.    Review of Systems: see HPI above    Objective:   /74 (BP Location: Left arm)   Pulse 76   Temp 98.1 °F (36.7 °C) (Oral)   Ht 5' 2.6" (1.59 m)   Wt 48.5 kg (107 lb)   BMI 19.20 kg/m²     Body mass index is 19.2 kg/m².  Vital signs reviewed    Physical Exam  Vitals and nursing note reviewed.   Constitutional:       General: She is not in acute distress.     Appearance: Normal appearance. She is well-developed. She is not toxic-appearing.   Neck:      Thyroid: No thyromegaly.   Cardiovascular:      Heart sounds: Normal heart sounds.   Pulmonary:      Effort: Pulmonary effort is normal. No respiratory distress.   Abdominal:      Tenderness: There is no abdominal tenderness.      Comments: surgical scar noted   Musculoskeletal:         General: Normal range of motion.      Cervical back: Normal range of motion.   Skin:     Findings: Bruising present.   Neurological:      Mental Status: She is alert and oriented to person, place, and time.   Psychiatric:         Mood and Affect: Mood normal.         Lab Reviewed:   Lab Results   Component Value Date    HGBA1C 5.9 (H) 05/25/2022       Lab Results   Component Value Date    CHOL 213 (H) 05/25/2022    HDL 64 05/25/2022    LDLCALC 132.8 05/25/2022    TRIG 81 05/25/2022    " CHOLHDL 30.0 05/25/2022       Lab Results   Component Value Date     07/05/2022    K 4.8 07/05/2022     07/05/2022    CO2 27 07/05/2022     07/05/2022    BUN 13 07/05/2022    CREATININE 0.6 07/05/2022    CALCIUM 9.9 07/05/2022    PROT 7.7 07/05/2022    ALBUMIN 3.9 07/05/2022    BILITOT 0.6 07/05/2022    ALKPHOS 117 07/05/2022    AST 19 07/05/2022    ALT 16 07/05/2022    ANIONGAP 6 (L) 07/05/2022    ESTGFRAFRICA >60 07/05/2022    EGFRNONAA >60 07/05/2022    TSH 1.172 07/05/2022        Lab Results   Component Value Date    CALCIUM 9.9 07/05/2022    CALCIUM 8.2 (L) 06/19/2022    CALCIUM 8.1 (L) 06/18/2022    PHOS 3.3 07/05/2022    PHOS 2.5 (L) 06/19/2022    PHOS 1.9 (L) 06/18/2022    ALKPHOS 117 07/05/2022    ALKPHOS 111 06/13/2022    ALKPHOS 102 05/25/2022    TSH 1.172 07/05/2022       Assessment     1. Anemia, unspecified type  CBC Auto Differential   2. Age related osteoporosis, unspecified pathological fracture presence  Ambulatory referral/consult to Endocrinology    Vitamin D    TSH    PTH, Intact    Comprehensive Metabolic Panel    Magnesium    Phosphorus    CBC Auto Differential   3. Abnormal findings on diagnostic imaging of other specified body structures   TSH   4. Partial small bowel obstruction          Plan     Age related osteoporosis  - Patient with Osteoporosis diagnosed on DEXA scan 5/2022>> new diagnosis  - high T-score, >-3.0, severe osteoporosis  - Risk factors include:  Postmenopausal, small stature, malabsorption  - SBO/GI issue/GERD/acid reflux (not candidate for oral bisphosphonate)  - For treatment options discussed with patient in clinic today: Bisphosphonate, SC Prolia, Anabolic agents: Forteo   - we will perform bloodwork/secondary workup : check CMP, PTH, vitamin-D, TSH  - patient and family are interested in Forteo therapy vs Prolia, they will discuss an let me know  - Next DXA:  2 years from  most current  - advised patient to start multivitamin and vitamin-D  supplement over-the-counter  - Fall precautions/Exercise regimen: advised    Partial small bowel obstruction  - status post ex lap by General surgery  - continue remote routine monitoring with surgery         Advised patient to follow up with PCP for routine health maintenance care.   RTC in 6 months or sooner pending starting of type of therapy      Mikal Duarte M.D.  Endocrinology  [facility]  7/5/2022      Disclaimer: This note has been generated in part with the use of voice-recognition software. There may be typographical errors that have been missed during proof-reading.

## 2022-07-05 NOTE — ASSESSMENT & PLAN NOTE
- status post ex lap by General surgery  - continue remote routine monitoring with surgery   Error- call was related to pt's spouse.

## 2022-07-06 LAB — PTH-INTACT SERPL-MCNC: 63.2 PG/ML (ref 9–77)

## 2022-07-07 ENCOUNTER — PATIENT MESSAGE (OUTPATIENT)
Dept: ENDOCRINOLOGY | Facility: CLINIC | Age: 67
End: 2022-07-07
Payer: MEDICARE

## 2022-07-07 ENCOUNTER — OFFICE VISIT (OUTPATIENT)
Dept: SURGERY | Facility: CLINIC | Age: 67
End: 2022-07-07
Payer: MEDICARE

## 2022-07-07 VITALS
HEIGHT: 63 IN | TEMPERATURE: 98 F | SYSTOLIC BLOOD PRESSURE: 141 MMHG | WEIGHT: 107.25 LBS | DIASTOLIC BLOOD PRESSURE: 64 MMHG | BODY MASS INDEX: 19 KG/M2 | OXYGEN SATURATION: 98 % | HEART RATE: 78 BPM

## 2022-07-07 DIAGNOSIS — K56.50 SMALL BOWEL OBSTRUCTION DUE TO ADHESIONS: Primary | ICD-10-CM

## 2022-07-07 DIAGNOSIS — M81.0 AGE RELATED OSTEOPOROSIS, UNSPECIFIED PATHOLOGICAL FRACTURE PRESENCE: Primary | ICD-10-CM

## 2022-07-07 PROCEDURE — 4010F ACE/ARB THERAPY RXD/TAKEN: CPT | Mod: CPTII,S$GLB,, | Performed by: SURGERY

## 2022-07-07 PROCEDURE — 1159F MED LIST DOCD IN RCRD: CPT | Mod: CPTII,S$GLB,, | Performed by: SURGERY

## 2022-07-07 PROCEDURE — 1125F AMNT PAIN NOTED PAIN PRSNT: CPT | Mod: CPTII,S$GLB,, | Performed by: SURGERY

## 2022-07-07 PROCEDURE — 99999 PR PBB SHADOW E&M-EST. PATIENT-LVL IV: CPT | Mod: PBBFAC,,, | Performed by: SURGERY

## 2022-07-07 PROCEDURE — 3077F PR MOST RECENT SYSTOLIC BLOOD PRESSURE >= 140 MM HG: ICD-10-PCS | Mod: CPTII,S$GLB,, | Performed by: SURGERY

## 2022-07-07 PROCEDURE — 99999 PR PBB SHADOW E&M-EST. PATIENT-LVL IV: ICD-10-PCS | Mod: PBBFAC,,, | Performed by: SURGERY

## 2022-07-07 PROCEDURE — 1160F RVW MEDS BY RX/DR IN RCRD: CPT | Mod: CPTII,S$GLB,, | Performed by: SURGERY

## 2022-07-07 PROCEDURE — 1101F PT FALLS ASSESS-DOCD LE1/YR: CPT | Mod: CPTII,S$GLB,, | Performed by: SURGERY

## 2022-07-07 PROCEDURE — 3288F PR FALLS RISK ASSESSMENT DOCUMENTED: ICD-10-PCS | Mod: CPTII,S$GLB,, | Performed by: SURGERY

## 2022-07-07 PROCEDURE — 99024 PR POST-OP FOLLOW-UP VISIT: ICD-10-PCS | Mod: S$GLB,,, | Performed by: SURGERY

## 2022-07-07 PROCEDURE — 99024 POSTOP FOLLOW-UP VISIT: CPT | Mod: S$GLB,,, | Performed by: SURGERY

## 2022-07-07 PROCEDURE — 1160F PR REVIEW ALL MEDS BY PRESCRIBER/CLIN PHARMACIST DOCUMENTED: ICD-10-PCS | Mod: CPTII,S$GLB,, | Performed by: SURGERY

## 2022-07-07 PROCEDURE — 3078F PR MOST RECENT DIASTOLIC BLOOD PRESSURE < 80 MM HG: ICD-10-PCS | Mod: CPTII,S$GLB,, | Performed by: SURGERY

## 2022-07-07 PROCEDURE — 3078F DIAST BP <80 MM HG: CPT | Mod: CPTII,S$GLB,, | Performed by: SURGERY

## 2022-07-07 PROCEDURE — 3044F HG A1C LEVEL LT 7.0%: CPT | Mod: CPTII,S$GLB,, | Performed by: SURGERY

## 2022-07-07 PROCEDURE — 1159F PR MEDICATION LIST DOCUMENTED IN MEDICAL RECORD: ICD-10-PCS | Mod: CPTII,S$GLB,, | Performed by: SURGERY

## 2022-07-07 PROCEDURE — 3288F FALL RISK ASSESSMENT DOCD: CPT | Mod: CPTII,S$GLB,, | Performed by: SURGERY

## 2022-07-07 PROCEDURE — 3044F PR MOST RECENT HEMOGLOBIN A1C LEVEL <7.0%: ICD-10-PCS | Mod: CPTII,S$GLB,, | Performed by: SURGERY

## 2022-07-07 PROCEDURE — 4010F PR ACE/ARB THEARPY RXD/TAKEN: ICD-10-PCS | Mod: CPTII,S$GLB,, | Performed by: SURGERY

## 2022-07-07 PROCEDURE — 3077F SYST BP >= 140 MM HG: CPT | Mod: CPTII,S$GLB,, | Performed by: SURGERY

## 2022-07-07 PROCEDURE — 1125F PR PAIN SEVERITY QUANTIFIED, PAIN PRESENT: ICD-10-PCS | Mod: CPTII,S$GLB,, | Performed by: SURGERY

## 2022-07-07 PROCEDURE — 3008F PR BODY MASS INDEX (BMI) DOCUMENTED: ICD-10-PCS | Mod: CPTII,S$GLB,, | Performed by: SURGERY

## 2022-07-07 PROCEDURE — 3008F BODY MASS INDEX DOCD: CPT | Mod: CPTII,S$GLB,, | Performed by: SURGERY

## 2022-07-07 PROCEDURE — 1101F PR PT FALLS ASSESS DOC 0-1 FALLS W/OUT INJ PAST YR: ICD-10-PCS | Mod: CPTII,S$GLB,, | Performed by: SURGERY

## 2022-07-07 RX ORDER — TERIPARATIDE 250 UG/ML
20 INJECTION, SOLUTION SUBCUTANEOUS DAILY
Qty: 2.4 ML | Refills: 11 | Status: SHIPPED | OUTPATIENT
Start: 2022-07-07 | End: 2023-09-01

## 2022-07-07 NOTE — PROGRESS NOTES
Post-operative visit    Visit Info:     Procedure: exploratory laparotomy with lysis of adhesions    Date of Procedure: June 16, 2022    Indication: small bowel obstruction    Date of Discharge: June 16, 2022    Current Status: Doing well postoperatively. Is eating well and having daily BM. Denies pain. Denies any fevers, chills, nausea, or vomiting. No redness or drainage from midline incision.     Physical Exam:  Vitals:    07/07/22 1335   BP: (!) 141/64   Pulse: 78   Temp: 98.1 °F (36.7 °C)       General: NAD  Neuro: AAOx4  Cardio: S1 and S2, RRR  Resp: Moving air appropriately, breathing even and unlabored  Abd: Soft, NT, ND. Incision c/d/i  Ext: Warm and well perfused    Assessment and Plan:  There are no diagnoses linked to this encounter.  Patient is a 66 y.o. female with history of prior hysterectomy who was admitted recently for small bowel obstruction and is now s/p ex lap with HERNANDEZ performed on 6/16/22 who is recovering well from surgery.     Follow up in clinic as needed.     Sheba Lane MD  Surgery Resident, PGY-3  7/7/2022 1:43 PM        I have personally performed a detailed history and physical examination on this patient. My findings are summarized in the resident's note included in the record.

## 2022-07-12 ENCOUNTER — SPECIALTY PHARMACY (OUTPATIENT)
Dept: PHARMACY | Facility: CLINIC | Age: 67
End: 2022-07-12
Payer: MEDICARE

## 2022-07-12 RX ORDER — PEN NEEDLE, DIABETIC 29 G X1/2"
NEEDLE, DISPOSABLE MISCELLANEOUS
Qty: 150 EACH | Refills: 3 | Status: SHIPPED | OUTPATIENT
Start: 2022-07-12 | End: 2023-05-16

## 2022-07-12 NOTE — TELEPHONE ENCOUNTER
Outgoing call to pt regarding Forteo approval and high copay. Spoke to both daughters, provided HH size and income to apply for PAP.    Pt portion mailed to address in [facility]  MD portion faxed, staff message sent

## 2022-07-12 NOTE — TELEPHONE ENCOUNTER
PA approved. PA Case: [id number], Status: Approved, Coverage Starts on: 1/1/2022 12:00:00 AM, Coverage Ends on: 12/31/2022    Test claim: $[id number]    Forward to BI / FA

## 2022-07-12 NOTE — TELEPHONE ENCOUNTER
Benefit Investigation    Forteo [employer]  No LIS  Estimated copay: $1383.72   Pt is in initial phase  [facility] in Network    Forward to FA

## 2022-07-21 NOTE — TELEPHONE ENCOUNTER
Outgoing call to pt regarding pt's application. Daughter Keli stated she received, is currently filling out and will mail back once completed. [facility] will continue to follow up

## 2022-07-25 NOTE — TELEPHONE ENCOUNTER
Forteo Rx routed to [facility]  [place] routed to [id number]    Closing out referral at [facility]

## 2022-08-02 NOTE — TELEPHONE ENCOUNTER
Daughter calling to state pt has yet to receive Forteo medication.  Provided phone number to [facility] [phone] to follow up on status. Daughter verbalized understanding.

## 2022-08-03 ENCOUNTER — PATIENT MESSAGE (OUTPATIENT)
Dept: ENDOSCOPY | Facility: HOSPITAL | Age: 67
End: 2022-08-03
Payer: MEDICARE

## 2022-08-03 ENCOUNTER — TELEPHONE (OUTPATIENT)
Dept: ENDOSCOPY | Facility: HOSPITAL | Age: 67
End: 2022-08-03
Payer: MEDICARE

## 2022-08-03 NOTE — TELEPHONE ENCOUNTER
----- Message from Le Schilling MA sent at 8/3/2022 10:58 AM CDT -----  Regarding: FW: appt  Pls advise   ----- Message -----  From: Susana Serrano  Sent: 8/3/2022  10:49 AM CDT  To: [facility] Staff  Subject: appt                                             Pt daughter calling in regards to office rescheduling Pt with correct Dr , please call       Confirmed patient's contact info below:  Contact Name: Meredith Cuellar  Phone Number: [phone] Keli

## 2022-08-04 ENCOUNTER — TELEPHONE (OUTPATIENT)
Dept: ENDOCRINOLOGY | Facility: CLINIC | Age: 67
End: 2022-08-04
Payer: MEDICARE

## 2022-08-04 NOTE — TELEPHONE ENCOUNTER
----- Message from Codi Baca RN sent at 8/4/2022  8:38 AM CDT -----  Regarding: FW: appt  Contact: Keli (dtr)@ [phone]    ----- Message -----  From: Amelie Reyes  Sent: 8/4/2022   8:34 AM CDT  To: , #  Subject: appt                                             Caller returning missed call to schedule patient an appt with endo. Please call.

## 2022-08-04 NOTE — TELEPHONE ENCOUNTER
Patient daughter states that her mother is already see Dr. Albarran  So she didn't need anything on my behalf.

## 2022-08-11 ENCOUNTER — PATIENT MESSAGE (OUTPATIENT)
Dept: ENDOSCOPY | Facility: HOSPITAL | Age: 67
End: 2022-08-11
Payer: MEDICARE

## 2022-08-24 ENCOUNTER — OFFICE VISIT (OUTPATIENT)
Dept: GASTROENTEROLOGY | Facility: CLINIC | Age: 67
End: 2022-08-24
Payer: MEDICARE

## 2022-08-24 VITALS
HEIGHT: 62 IN | SYSTOLIC BLOOD PRESSURE: 143 MMHG | BODY MASS INDEX: 20.48 KG/M2 | HEART RATE: 74 BPM | DIASTOLIC BLOOD PRESSURE: 83 MMHG | WEIGHT: 111.31 LBS

## 2022-08-24 DIAGNOSIS — Z76.89 ESTABLISHING CARE WITH NEW DOCTOR, ENCOUNTER FOR: Primary | ICD-10-CM

## 2022-08-24 DIAGNOSIS — Z09 FOLLOW-UP EXAMINATION, FOLLOWING OTHER SURGERY: ICD-10-CM

## 2022-08-24 PROCEDURE — 99999 PR PBB SHADOW E&M-EST. PATIENT-LVL IV: CPT | Mod: PBBFAC,,,

## 2022-08-24 PROCEDURE — 1159F PR MEDICATION LIST DOCUMENTED IN MEDICAL RECORD: ICD-10-PCS | Mod: CPTII,S$GLB,,

## 2022-08-24 PROCEDURE — 3079F PR MOST RECENT DIASTOLIC BLOOD PRESSURE 80-89 MM HG: ICD-10-PCS | Mod: CPTII,S$GLB,,

## 2022-08-24 PROCEDURE — 3044F HG A1C LEVEL LT 7.0%: CPT | Mod: CPTII,S$GLB,,

## 2022-08-24 PROCEDURE — 1101F PT FALLS ASSESS-DOCD LE1/YR: CPT | Mod: CPTII,S$GLB,,

## 2022-08-24 PROCEDURE — 4010F ACE/ARB THERAPY RXD/TAKEN: CPT | Mod: CPTII,S$GLB,,

## 2022-08-24 PROCEDURE — 3288F FALL RISK ASSESSMENT DOCD: CPT | Mod: CPTII,S$GLB,,

## 2022-08-24 PROCEDURE — 4010F PR ACE/ARB THEARPY RXD/TAKEN: ICD-10-PCS | Mod: CPTII,S$GLB,,

## 2022-08-24 PROCEDURE — 3008F BODY MASS INDEX DOCD: CPT | Mod: CPTII,S$GLB,,

## 2022-08-24 PROCEDURE — 3008F PR BODY MASS INDEX (BMI) DOCUMENTED: ICD-10-PCS | Mod: CPTII,S$GLB,,

## 2022-08-24 PROCEDURE — 3079F DIAST BP 80-89 MM HG: CPT | Mod: CPTII,S$GLB,,

## 2022-08-24 PROCEDURE — 3077F PR MOST RECENT SYSTOLIC BLOOD PRESSURE >= 140 MM HG: ICD-10-PCS | Mod: CPTII,S$GLB,,

## 2022-08-24 PROCEDURE — 99999 PR PBB SHADOW E&M-EST. PATIENT-LVL IV: ICD-10-PCS | Mod: PBBFAC,,,

## 2022-08-24 PROCEDURE — 3288F PR FALLS RISK ASSESSMENT DOCUMENTED: ICD-10-PCS | Mod: CPTII,S$GLB,,

## 2022-08-24 PROCEDURE — 99215 OFFICE O/P EST HI 40 MIN: CPT | Mod: S$GLB,,,

## 2022-08-24 PROCEDURE — 3044F PR MOST RECENT HEMOGLOBIN A1C LEVEL <7.0%: ICD-10-PCS | Mod: CPTII,S$GLB,,

## 2022-08-24 PROCEDURE — 1101F PR PT FALLS ASSESS DOC 0-1 FALLS W/OUT INJ PAST YR: ICD-10-PCS | Mod: CPTII,S$GLB,,

## 2022-08-24 PROCEDURE — 1126F AMNT PAIN NOTED NONE PRSNT: CPT | Mod: CPTII,S$GLB,,

## 2022-08-24 PROCEDURE — 1126F PR PAIN SEVERITY QUANTIFIED, NO PAIN PRESENT: ICD-10-PCS | Mod: CPTII,S$GLB,,

## 2022-08-24 PROCEDURE — 3077F SYST BP >= 140 MM HG: CPT | Mod: CPTII,S$GLB,,

## 2022-08-24 PROCEDURE — 1159F MED LIST DOCD IN RCRD: CPT | Mod: CPTII,S$GLB,,

## 2022-08-24 PROCEDURE — 99215 PR OFFICE/OUTPT VISIT, EST, LEVL V, 40-54 MIN: ICD-10-PCS | Mod: S$GLB,,,

## 2022-08-24 NOTE — PATIENT INSTRUCTIONS
Recommendations:  Follow up with General GI for ongoing care  Return to [facility] clinic for pancreatitis as needed  Contact our office for any interval questions/concerns.

## 2022-08-24 NOTE — PROGRESS NOTES
[facility]    Reason for Visit:  The primary encounter diagnosis was Establishing care with new doctor, encounter for. A diagnosis of Follow-up examination, following other surgery was also pertinent to this visit.    PCP:   Alla Jade   [id number] / [place] [id number]    Initial HPI   This is a 67 y.o. female referred by Dr. Jade for pancreatitis. Per chart review, patient first diagnosed with pancreatitis at [facility] in 2019 in context of upper abdominal pain and ?elevated lipase 10,593UL on 11/24/19. Ancillary notes states that patient was transferred to [facility] for higher level of care 11/24/19. Upon transfer, patient had in-house Lipase 88U/L and underwent EUS exhibiting pancreatic lobularity. ED notes from [facility] not available for review; However labs available in Media Section of Epic. Ancillary information regarding patient's pancreatitis history and referral requested from patient's PCP, Dr. Jade, but unfortunately not received prior to encounter.      Extensive chart review reveals 1 isolated elevated lipase (during [facility] admission 11/19). All other lipase levels <88U/L dating back to 2013 and no pancreatic abnormalities noted on any CT or MRI studies dating back to 2011. 2 EUS procedures (see below) completed at [facility] exhibited pancreatic lobularity. However, case discussed with [facility] attending physician, Dr. Martinez, who confirmed that pancreatic lobularity on EUS is not indicative of pancreatitis and may be anatomic anomaly.     Patient has had multiple hospitalizations for SBO, with most recent 6/13/2022-6/19/2022. Underwent Ex Lap with lysis of abdominal adhesions 6/16/22. Patient doing well since Ex Lap with no complaint of abdominal pain, n/v/d, or hematochezia. She offers no complaints during this encounter and reports that she is just presenting to establish care with GI and follow up for recent SBO. Discussed with patient  and daughter that [facility] can treat pancreatitis if she requires care for it in the future, but that she should see General GI for other abdominal issues.      Tobacco use- n/a  ETOH intake- n/a  NSAID use- occasional  Blood thinners- n/a    Professional interpretation services offered by MA for this encounter, but refused by patient as she preferred her daughter to translate.    ROS:  Review of Systems   Constitutional: Negative for malaise/fatigue and weight loss.   Eyes: Negative for discharge and redness.   Gastrointestinal: Negative for abdominal pain, blood in stool, constipation, diarrhea, nausea and vomiting.   Skin: Negative for itching and rash.   Neurological: Negative for loss of consciousness.        Medical History:  has a past medical history of Abdominal pain, Cholelithiasis, Constipation, chronic, Dilated bile duct, Headache, Hypertension, Pancreatitis, and Subepithelial esophageal lesion.    Surgical History:  has a past surgical history that includes Cholecystectomy; Appendectomy; Hysterectomy (2012); Endoscopic ultrasound of upper gastrointestinal tract (N/A, 11/26/2019); Endoscopic ultrasound of upper gastrointestinal tract (N/A, 6/24/2020); Robot-assisted surgical removal of stomach using da Huyen Xi (N/A, 9/9/2020); Laparoscopic lysis of adhesions (9/9/2020); Esophagogastroduodenoscopy (N/A, 9/9/2020); Endoscopic ultrasound of upper gastrointestinal tract (N/A, 5/26/2021); and Lysis of adhesions (N/A, 6/16/2022).    Family History: family history is not on file..       Review of patient's allergies indicates:  No Known Allergies    Current Outpatient Medications on File Prior to Visit   Medication Sig Dispense Refill    aluminum-magnesium hydroxide-simethicone (MAALOX ADVANCED) 200-200-20 mg/5 mL Susp Take 30 mLs by mouth every 6 (six) hours as needed (nausea, abdominal pain, chest pain). 354 mL 0    amLODIPine (NORVASC) 2.5 MG tablet Take 2.5 mg by mouth once daily.      atorvastatin (LIPITOR)  "20 MG tablet Take 20 mg by mouth once daily.      cetirizine (ZYRTEC) 10 MG tablet TAKE BY MOUTH 1 TABLET EVERYDAY AS NEEDED FOR ALLERGIES      EScitalopram oxalate (LEXAPRO) 10 MG tablet TAKE 1/2 TABLET BY MOUTH DAILY FOR 7 DAYS, THEN TAKE 1 TABLET BY MOUTH DAILY 90 tablet 1    famotidine (PEPCID) 20 MG tablet Take 20 mg by mouth 2 (two) times daily.      HYDROcodone-acetaminophen (NORCO) 5-325 mg per tablet Take 1 tablet by mouth every 6 (six) hours as needed for Pain. 12 tablet 0    hydrOXYzine HCL (ATARAX) 25 MG tablet TAKE BY MOUTH 1/2 TO 1 TABLET NIGHTLY AT BEDTIME AS NEEDED FOR ITCHINESS      losartan (COZAAR) 100 MG tablet Take 100 mg by mouth once daily.      mirtazapine (REMERON) 15 MG tablet       olopatadine (PATANOL) 0.1 % ophthalmic solution INSTILL 1 DROP IN AFFECTED EYE 2 TIMES A DAY AS NEEDED FOR ALLERGIES      omeprazole (PRILOSEC) 40 MG capsule Take 40 mg by mouth once daily.      pantoprazole (PROTONIX) 40 MG tablet       pen needle, diabetic 31 gauge x 1/4" Ndle Use with Forteo injection daily 150 each 3    polyethylene glycol (GLYCOLAX) 17 gram/dose powder Dissolve one capful (17g) into liquid and take by mouth once daily. 510 g 0    teriparatide (FORTEO) 20 mcg/dose (600mcg/2.4mL) PnIj Inject 0.08 mLs (20 mcg total) into the skin once daily. 2.4 mL 11     No current facility-administered medications on file prior to visit.         Objective Findings:    Vital Signs:  BP (!) 143/83   Pulse 74   Ht 5' 2" (1.575 m)   Wt 50.5 kg (111 lb 5.3 oz)   BMI 20.36 kg/m²   Body mass index is 20.36 kg/m².    Physical Exam:  Physical Exam  Constitutional:       General: She is not in acute distress.     Appearance: She is normal weight. She is not toxic-appearing.   Eyes:      General: No scleral icterus.  Abdominal:      General: Abdomen is flat. Bowel sounds are normal. There is no distension.      Palpations: Abdomen is soft. There is no mass.      Tenderness: There is no abdominal " tenderness. There is no guarding or rebound.   Skin:     General: Skin is warm and dry.      Coloration: Skin is not jaundiced.   Neurological:      Mental Status: She is alert and oriented to person, place, and time.             Labs:  Lab Results   Component Value Date    WBC 12.56 07/05/2022    HGB 11.8 (L) 07/05/2022    HCT 36.5 (L) 07/05/2022     07/05/2022    CHOL 213 (H) 05/25/2022    TRIG 81 05/25/2022    HDL 64 05/25/2022    ALKPHOS 117 07/05/2022    LIPASE 40 06/13/2022    ALT 16 07/05/2022    AST 19 07/05/2022     07/05/2022    K 4.8 07/05/2022     07/05/2022    CREATININE 0.6 07/05/2022    BUN 13 07/05/2022    CO2 27 07/05/2022    TSH 1.172 07/05/2022    INR 0.9 10/19/2015    HGBA1C 5.9 (H) 05/25/2022      Latest Reference Range & Units 10/27/13 23:22 11/25/19 08:03 03/02/20 11:29 05/12/21 18:51 01/01/22 01:21 01/11/22 16:46 06/13/22 23:13   Lipase 4 - 60 U/L 41 88 (H) 77 (H) 65 (H) 40 23 40   (H): Data is abnormally high    Imaging reviewed:  CTAP 6/13/22  ...Gallbladder: Resected  Bile Ducts: Stable mild intrahepatic biliary ductal dilatation  Pancreas: No mass or peripancreatic fat stranding.  ...Impression:  Slight increase in fluid distended loops of small bowel proximal to the anastomosis in the right lower quadrant.  Partial small bowel obstruction is again considered.  Consider adhesions versus anastomotic stricture.     CTAP With Contrast 1/11/22  ...Gallbladder: Surgically absent.  Bile Ducts: Dilated CBD measuring up to 1.3 cm (coronal series 601:66) with associated intrahepatic biliary ductal dilatation, grossly similar to priors.  May relate to post cholecystectomy status.  Pancreas: Prominent non-enlarged pancreatic duct similar to prior.  No mass or peripancreatic fat stranding.  ...Impression:  At least partial small bowel obstruction measuring up to 3.5 cm.  Developing high-grade obstruction not excluded.  Nondilated loops of small bowel within the mid abdomen.   Decompressed colon.  Findings raise the question of underlying adhesions.  No free air or inflammatory changes.  Grossly similar postoperative changes of the GE junction for distal esophageal tumor resection.  No residual recurrent tumor seen.  Stable extrahepatic CBD dilatation, may relate to post cholecystectomy status.  Indeterminate right hepatic lobe hypodensity, grossly stable.  Fatty infiltration at the falciform ligament.    CTAP With Contrast 5/12/21  ...Gallbladder: Surgically absent.   Bile Ducts: Dilated common bile duct measuring approximately 1.1 cm in diameter similar to multiple prior exams, may be seen status post cholecystectomy.  Mild intrahepatic biliary ductal dilatation similar to multiple priors.  Pancreas: No pancreatic mass lesion or peripancreatic inflammatory change.  ...Impression:  No evidence of acute abdominopelvic abnormality.  Postoperative change about the GE junction for tumor removal.  No definite evidence of residual or recurrent tumor.    MRI Abdomen With & Without Contrast   ...Biliary: The gallbladder is surgically absent..  Dilated common bile duct with mild intrahepatic biliary ductal dilatation; the common bile duct measuring 1.1 cm, with abrupt narrowing at the level of ambulate of Vater.  Overall appears stable compared to prior exam from 2013, allowing for differences in imaging technique.  Pancreas: Unremarkable.  No pancreatic ductal dilatation.  ...Impression:  No evidence of pancreatitis as clinically questioned.  There is common bile duct and mild biliary ductal dilatation, similar to prior.    CTAP With Contrast 6/16/2020  Impression:  Abnormal soft tissue collection identified arising from or immediately adjacent to the stomach wall at the gastroesophageal junction.  Differential considerations include a nonspecific enlarged lymph node which versus gastric tumor such as a GIST.    CT Renal Stone Study 9/8/2015  Noncontrast scans of the upper abdomen show a stable  benign appearing low density 1 cm mass within the anterior segment right hepatic lobe.  The patient is post cholecystectomy there is no biliary dilatation.  The main, pancreas, and adrenal glands are   normal in size and shape.    CTAP With Contrast 1/28/2013  The gallbladder is surgically absent.  The common duct is prominent measuring up to 1.5 cm, similar to prior exam, without intraluminal filling defect.  The pancreatic duct is not dilated.  There is no ascites.  No significant abdominal lymphadenopathy is seen.     CTAP With Contrast 4/5/2011  GALLBLADDER IS SURGICALLY ABSENT.  THERE IS NO EXTRINSIC MASS SEEN WITHIN  THE PANCREATIC HEAD AND THE PANCREATIC DUCT AND PANCREAS APPEAR UNREMARKABLE.     Endoscopy reviewed:  EUS 5/26/2021   Impression:         - There was no evidence of significant pathology in the visualized portion of the liver.   - Pancreatic parenchymal abnormalities consisting of lobularity were noted in the entire pancreas.   - There was dilation in the common bile duct which measured up to 10.1 mm. Endosonographic imaging in   the common bile duct showed no stones, sludge or   stricture.     EUS 6/24/2020  Impression:     - Normal esophagus.    - Normal stomach.    - Normal examined duodenum.    - An intramural (subepithelial) lesion was found in the gastroesophageal junction. It appeared to originate from within the muscularis propria  performed.     EUS 11/26/2019  Impression:            - Pancreatic parenchymal abnormalities consisting of lobularity were noted in the entire pancreas which can be seen with pancreatitis.   - There was dilation in the common bile duct which  measured up to 10 mm. Endosonographic imaging in the common bile duct showed no stones or sludge.     Assessment:  1. Establishing care with new doctor, encounter for    2. Follow-up examination, following other surgery        Recommendations:  1. Follow up with General GI for ongoing care  2. Return to [facility] clinic for  pancreatitis as needed  3. Contact our office for any interval questions/concerns.     I spent a total of 45 minutes on the day of the visit.This includes face to face time and non-face to face time preparing to see the patient (eg, review of tests), obtaining and extensively reviewing separately obtained history, documenting clinical information in the electronic or other health record, independently interpreting results and communicating results to the patient/family/caregiver, or care coordinator.      Thank you so much for allowing me to participate in the care of Meredith Keen.        Any Ward, MSN, FNP-C  Advanced Endoscopy Nurse Practitioner  [facility]

## 2022-08-25 ENCOUNTER — PATIENT MESSAGE (OUTPATIENT)
Dept: FAMILY MEDICINE | Facility: CLINIC | Age: 67
End: 2022-08-25
Payer: MEDICARE

## 2022-08-25 RX ORDER — LOSARTAN POTASSIUM 100 MG/1
100 TABLET ORAL DAILY
Qty: 7 TABLET | Refills: 0 | Status: SHIPPED | OUTPATIENT
Start: 2022-08-25 | End: 2023-06-06 | Stop reason: SDUPTHER

## 2022-09-21 ENCOUNTER — HOSPITAL ENCOUNTER (OUTPATIENT)
Dept: RADIOLOGY | Facility: HOSPITAL | Age: 67
Discharge: HOME | End: 2022-09-21
Attending: STUDENT IN AN ORGANIZED HEALTH CARE EDUCATION/TRAINING PROGRAM
Payer: MEDICARE

## 2022-09-21 VITALS — HEIGHT: 62 IN | BODY MASS INDEX: 20.24 KG/M2 | WEIGHT: 110 LBS

## 2022-09-21 DIAGNOSIS — Z12.31 ENCOUNTER FOR SCREENING MAMMOGRAM FOR MALIGNANT NEOPLASM OF BREAST: ICD-10-CM

## 2022-09-21 PROCEDURE — 77067 SCR MAMMO BI INCL CAD: CPT | Mod: TC

## 2022-09-21 PROCEDURE — 77063 BREAST TOMOSYNTHESIS BI: CPT | Mod: 26,,, | Performed by: RADIOLOGY

## 2022-09-21 PROCEDURE — 77063 MAMMO DIGITAL SCREENING BILAT WITH TOMO: ICD-10-PCS | Mod: 26,,, | Performed by: RADIOLOGY

## 2022-09-21 PROCEDURE — 77067 SCR MAMMO BI INCL CAD: CPT | Mod: 26,,, | Performed by: RADIOLOGY

## 2022-09-21 PROCEDURE — 77063 BREAST TOMOSYNTHESIS BI: CPT | Mod: TC

## 2022-09-21 PROCEDURE — 77067 MAMMO DIGITAL SCREENING BILAT WITH TOMO: ICD-10-PCS | Mod: 26,,, | Performed by: RADIOLOGY

## 2022-10-20 ENCOUNTER — PATIENT MESSAGE (OUTPATIENT)
Dept: FAMILY MEDICINE | Facility: CLINIC | Age: 67
End: 2022-10-20
Payer: MEDICARE

## 2022-10-21 ENCOUNTER — OFFICE VISIT (OUTPATIENT)
Dept: FAMILY MEDICINE | Facility: CLINIC | Age: 67
End: 2022-10-21
Payer: MEDICARE

## 2022-10-21 VITALS
DIASTOLIC BLOOD PRESSURE: 70 MMHG | BODY MASS INDEX: 20.39 KG/M2 | OXYGEN SATURATION: 98 % | SYSTOLIC BLOOD PRESSURE: 138 MMHG | HEIGHT: 62 IN | TEMPERATURE: 98 F | WEIGHT: 110.81 LBS | HEART RATE: 75 BPM

## 2022-10-21 DIAGNOSIS — I10 ESSENTIAL HYPERTENSION: ICD-10-CM

## 2022-10-21 DIAGNOSIS — E78.5 HYPERLIPIDEMIA, UNSPECIFIED HYPERLIPIDEMIA TYPE: ICD-10-CM

## 2022-10-21 DIAGNOSIS — Z01.818 PRE-OPERATIVE EXAMINATION: Primary | ICD-10-CM

## 2022-10-21 PROCEDURE — G0008 FLU VACCINE - QUADRIVALENT - ADJUVANTED: ICD-10-PCS | Mod: S$GLB,,, | Performed by: NURSE PRACTITIONER

## 2022-10-21 PROCEDURE — 1160F PR REVIEW ALL MEDS BY PRESCRIBER/CLIN PHARMACIST DOCUMENTED: ICD-10-PCS | Mod: CPTII,S$GLB,, | Performed by: NURSE PRACTITIONER

## 2022-10-21 PROCEDURE — 3078F DIAST BP <80 MM HG: CPT | Mod: CPTII,S$GLB,, | Performed by: NURSE PRACTITIONER

## 2022-10-21 PROCEDURE — 1160F RVW MEDS BY RX/DR IN RCRD: CPT | Mod: CPTII,S$GLB,, | Performed by: NURSE PRACTITIONER

## 2022-10-21 PROCEDURE — 99214 PR OFFICE/OUTPT VISIT, EST, LEVL IV, 30-39 MIN: ICD-10-PCS | Mod: S$GLB,,, | Performed by: NURSE PRACTITIONER

## 2022-10-21 PROCEDURE — 90694 VACC AIIV4 NO PRSRV 0.5ML IM: CPT | Mod: S$GLB,,, | Performed by: NURSE PRACTITIONER

## 2022-10-21 PROCEDURE — 90694 FLU VACCINE - QUADRIVALENT - ADJUVANTED: ICD-10-PCS | Mod: S$GLB,,, | Performed by: NURSE PRACTITIONER

## 2022-10-21 PROCEDURE — 99214 OFFICE O/P EST MOD 30 MIN: CPT | Mod: S$GLB,,, | Performed by: NURSE PRACTITIONER

## 2022-10-21 PROCEDURE — 1126F PR PAIN SEVERITY QUANTIFIED, NO PAIN PRESENT: ICD-10-PCS | Mod: CPTII,S$GLB,, | Performed by: NURSE PRACTITIONER

## 2022-10-21 PROCEDURE — G0008 ADMIN INFLUENZA VIRUS VAC: HCPCS | Mod: S$GLB,,, | Performed by: NURSE PRACTITIONER

## 2022-10-21 PROCEDURE — 3044F HG A1C LEVEL LT 7.0%: CPT | Mod: CPTII,S$GLB,, | Performed by: NURSE PRACTITIONER

## 2022-10-21 PROCEDURE — 4010F PR ACE/ARB THEARPY RXD/TAKEN: ICD-10-PCS | Mod: CPTII,S$GLB,, | Performed by: NURSE PRACTITIONER

## 2022-10-21 PROCEDURE — 99999 PR PBB SHADOW E&M-EST. PATIENT-LVL IV: CPT | Mod: PBBFAC,,, | Performed by: NURSE PRACTITIONER

## 2022-10-21 PROCEDURE — 3078F PR MOST RECENT DIASTOLIC BLOOD PRESSURE < 80 MM HG: ICD-10-PCS | Mod: CPTII,S$GLB,, | Performed by: NURSE PRACTITIONER

## 2022-10-21 PROCEDURE — 1126F AMNT PAIN NOTED NONE PRSNT: CPT | Mod: CPTII,S$GLB,, | Performed by: NURSE PRACTITIONER

## 2022-10-21 PROCEDURE — 3044F PR MOST RECENT HEMOGLOBIN A1C LEVEL <7.0%: ICD-10-PCS | Mod: CPTII,S$GLB,, | Performed by: NURSE PRACTITIONER

## 2022-10-21 PROCEDURE — 4010F ACE/ARB THERAPY RXD/TAKEN: CPT | Mod: CPTII,S$GLB,, | Performed by: NURSE PRACTITIONER

## 2022-10-21 PROCEDURE — 3075F SYST BP GE 130 - 139MM HG: CPT | Mod: CPTII,S$GLB,, | Performed by: NURSE PRACTITIONER

## 2022-10-21 PROCEDURE — 1159F MED LIST DOCD IN RCRD: CPT | Mod: CPTII,S$GLB,, | Performed by: NURSE PRACTITIONER

## 2022-10-21 PROCEDURE — 3075F PR MOST RECENT SYSTOLIC BLOOD PRESS GE 130-139MM HG: ICD-10-PCS | Mod: CPTII,S$GLB,, | Performed by: NURSE PRACTITIONER

## 2022-10-21 PROCEDURE — 1159F PR MEDICATION LIST DOCUMENTED IN MEDICAL RECORD: ICD-10-PCS | Mod: CPTII,S$GLB,, | Performed by: NURSE PRACTITIONER

## 2022-10-21 PROCEDURE — 99999 PR PBB SHADOW E&M-EST. PATIENT-LVL IV: ICD-10-PCS | Mod: PBBFAC,,, | Performed by: NURSE PRACTITIONER

## 2022-10-21 NOTE — PROGRESS NOTES
" Subjective:      Meredith Keen is a 67 y.o. female who presents to the office today for a preoperative consultation at the request of surgeon Dr. Mueller who plans on performing cataract extraction on November 3. This consultation is requested for the specific conditions prompting preoperative evaluation (i.e. because of potential affect on operative risk): HTN. Planned anesthesia: local. The patient has the following known anesthesia issues:  no known complications related to anesthesia . Patients bleeding risk: no recent abnormal bleeding and no remote history of abnormal bleeding. Patient does not have objections to receiving blood products if needed.    NSAID/OAC use: none  Corticosteroid use: none  Recent fever/illness/antibiotics: none    The following portions of the patient's history were reviewed and updated as appropriate: allergies, current medications, past family history, past medical history, past social history, past surgical history, and problem list.    Review of Systems  A comprehensive review of systems was negative.   Negative for CP, palpitations, APPLE, orthopnea, or edema.     Objective:      /70   Pulse 75   Temp 98.3 °F (36.8 °C)   Ht 5' 2" (1.575 m)   Wt 50.3 kg (110 lb 12.5 oz)   SpO2 98%   BMI 20.26 kg/m²   General appearance: alert, appears stated age, cooperative, and no distress  Neck: no carotid bruit, no JVD, and supple, symmetrical, trachea midline  Lungs: clear to auscultation bilaterally  Heart: regular rate and rhythm, S1, S2 normal, no murmur, click, rub or gallop  Extremities: extremities normal, atraumatic, no cyanosis or edema  Pulses: 2+ and symmetric  Skin: Skin color, texture, turgor normal. No rashes or lesions  Neurologic: Grossly normal       Assessment:        67 y.o. female with planned surgery as above.    Known risk factors for perioperative complications: None      Cardiac Risk Estimation: low    Current medications which may produce withdrawal symptoms " if withheld perioperatively: none      Plan:     Pre-operative examination  Patient is medically maximized and cleared for upcoming procedure.    Essential hypertension  The current medical regimen is effective;  continue present plan and medications.    Hyperlipidemia, unspecified hyperlipidemia type  The current medical regimen is effective;  continue present plan and medications.    Other orders  Given today.  -     Influenza (FLUAD) - Quadrivalent (Adjuvanted) *Preferred* (65+) (PF)

## 2022-12-23 NOTE — PLAN OF CARE
(Physician in Lead of Transfers)   Outside Transfer Acceptance Note / Regional Referral Center      Transferring Physician: Dr. Billings    Accepting Physician: Lakia Love MD    Date of Acceptance: 11/24/2019    Transferring Facility: [facility]    Reason for Transfer: needs [facility] evaluation and possible need for ERCP    Report from Transferring Physician/Hospital course: 63 y/o female with PMH of SBO in September 2019 who presents with pancreatitis. Lipase noted to be 10 K and CT showed intra and extra hepatic ductal dilatation with CBD enlarged at 1.6 cm. AST and ALT of 60, Alk phos 160 and bili of 0.97. Leukocytosis of 14 K but afebrile. Getting zofran and dilaudid PRN. Will get a dose of ceftriaxone as well prior to transfer. Hemodynamically stable. [facility] aware of transfer.       Labs & Radiographs: see transfer notes        To Do List:   1) Vitals q15 mins during the 1st hr of arrival then q30 mins during the 2nd hr   2) Telemetry  3) Keep NPO  4) [facility] consult   5) Pain control and IVF         Lakia Love MD  Hospital Medicine Staff       Detail Level: Zone 36.4

## 2023-01-18 ENCOUNTER — LAB VISIT (OUTPATIENT)
Dept: PRIMARY CARE CLINIC | Facility: CLINIC | Age: 68
End: 2023-01-18
Payer: MEDICARE

## 2023-01-18 ENCOUNTER — OFFICE VISIT (OUTPATIENT)
Dept: PRIMARY CARE CLINIC | Facility: CLINIC | Age: 68
End: 2023-01-18
Payer: MEDICARE

## 2023-01-18 VITALS
HEART RATE: 71 BPM | DIASTOLIC BLOOD PRESSURE: 68 MMHG | HEIGHT: 62 IN | BODY MASS INDEX: 20.56 KG/M2 | OXYGEN SATURATION: 98 % | WEIGHT: 111.75 LBS | SYSTOLIC BLOOD PRESSURE: 155 MMHG

## 2023-01-18 DIAGNOSIS — J30.9 ALLERGIC RHINITIS, UNSPECIFIED SEASONALITY, UNSPECIFIED TRIGGER: ICD-10-CM

## 2023-01-18 DIAGNOSIS — I10 ESSENTIAL HYPERTENSION: ICD-10-CM

## 2023-01-18 DIAGNOSIS — D50.9 MICROCYTIC ANEMIA: ICD-10-CM

## 2023-01-18 DIAGNOSIS — K21.9 GASTROESOPHAGEAL REFLUX DISEASE, UNSPECIFIED WHETHER ESOPHAGITIS PRESENT: Primary | ICD-10-CM

## 2023-01-18 LAB
ALBUMIN SERPL BCP-MCNC: 3.7 G/DL (ref 3.5–5.2)
ALP SERPL-CCNC: 114 U/L (ref 55–135)
ALT SERPL W/O P-5'-P-CCNC: 12 U/L (ref 10–44)
ANION GAP SERPL CALC-SCNC: 8 MMOL/L (ref 8–16)
AST SERPL-CCNC: 16 U/L (ref 10–40)
BASOPHILS # BLD AUTO: 0.09 K/UL (ref 0–0.2)
BASOPHILS NFR BLD: 1.5 % (ref 0–1.9)
BILIRUB SERPL-MCNC: 0.6 MG/DL (ref 0.1–1)
BUN SERPL-MCNC: 14 MG/DL (ref 8–23)
CALCIUM SERPL-MCNC: 10.4 MG/DL (ref 8.7–10.5)
CHLORIDE SERPL-SCNC: 106 MMOL/L (ref 95–110)
CO2 SERPL-SCNC: 26 MMOL/L (ref 23–29)
CREAT SERPL-MCNC: 0.7 MG/DL (ref 0.5–1.4)
DIFFERENTIAL METHOD: ABNORMAL
EOSINOPHIL # BLD AUTO: 0.3 K/UL (ref 0–0.5)
EOSINOPHIL NFR BLD: 5.6 % (ref 0–8)
ERYTHROCYTE [DISTWIDTH] IN BLOOD BY AUTOMATED COUNT: 15.2 % (ref 11.5–14.5)
EST. GFR  (NO RACE VARIABLE): >60 ML/MIN/1.73 M^2
GLUCOSE SERPL-MCNC: 96 MG/DL (ref 70–110)
HCT VFR BLD AUTO: 37.2 % (ref 37–48.5)
HGB BLD-MCNC: 12 G/DL (ref 12–16)
IMM GRANULOCYTES # BLD AUTO: 0.04 K/UL (ref 0–0.04)
IMM GRANULOCYTES NFR BLD AUTO: 0.7 % (ref 0–0.5)
IRON SERPL-MCNC: 52 UG/DL (ref 30–160)
LYMPHOCYTES # BLD AUTO: 1.2 K/UL (ref 1–4.8)
LYMPHOCYTES NFR BLD: 19.8 % (ref 18–48)
MCH RBC QN AUTO: 25.1 PG (ref 27–31)
MCHC RBC AUTO-ENTMCNC: 32.3 G/DL (ref 32–36)
MCV RBC AUTO: 78 FL (ref 82–98)
MONOCYTES # BLD AUTO: 0.7 K/UL (ref 0.3–1)
MONOCYTES NFR BLD: 12.1 % (ref 4–15)
NEUTROPHILS # BLD AUTO: 3.7 K/UL (ref 1.8–7.7)
NEUTROPHILS NFR BLD: 60.3 % (ref 38–73)
NRBC BLD-RTO: 0 /100 WBC
PLATELET # BLD AUTO: 263 K/UL (ref 150–450)
PMV BLD AUTO: 9.8 FL (ref 9.2–12.9)
POTASSIUM SERPL-SCNC: 4.6 MMOL/L (ref 3.5–5.1)
PROT SERPL-MCNC: 7.1 G/DL (ref 6–8.4)
RBC # BLD AUTO: 4.78 M/UL (ref 4–5.4)
SATURATED IRON: 14 % (ref 20–50)
SODIUM SERPL-SCNC: 140 MMOL/L (ref 136–145)
TOTAL IRON BINDING CAPACITY: 363 UG/DL (ref 250–450)
TRANSFERRIN SERPL-MCNC: 245 MG/DL (ref 200–375)
WBC # BLD AUTO: 6.1 K/UL (ref 3.9–12.7)

## 2023-01-18 PROCEDURE — 85025 COMPLETE CBC W/AUTO DIFF WBC: CPT | Performed by: STUDENT IN AN ORGANIZED HEALTH CARE EDUCATION/TRAINING PROGRAM

## 2023-01-18 PROCEDURE — 84466 ASSAY OF TRANSFERRIN: CPT | Performed by: STUDENT IN AN ORGANIZED HEALTH CARE EDUCATION/TRAINING PROGRAM

## 2023-01-18 PROCEDURE — 99214 PR OFFICE/OUTPT VISIT, EST, LEVL IV, 30-39 MIN: ICD-10-PCS | Mod: S$GLB,,, | Performed by: STUDENT IN AN ORGANIZED HEALTH CARE EDUCATION/TRAINING PROGRAM

## 2023-01-18 PROCEDURE — 3077F PR MOST RECENT SYSTOLIC BLOOD PRESSURE >= 140 MM HG: ICD-10-PCS | Mod: CPTII,S$GLB,, | Performed by: STUDENT IN AN ORGANIZED HEALTH CARE EDUCATION/TRAINING PROGRAM

## 2023-01-18 PROCEDURE — 3078F DIAST BP <80 MM HG: CPT | Mod: CPTII,S$GLB,, | Performed by: STUDENT IN AN ORGANIZED HEALTH CARE EDUCATION/TRAINING PROGRAM

## 2023-01-18 PROCEDURE — 1159F MED LIST DOCD IN RCRD: CPT | Mod: CPTII,S$GLB,, | Performed by: STUDENT IN AN ORGANIZED HEALTH CARE EDUCATION/TRAINING PROGRAM

## 2023-01-18 PROCEDURE — 99999 PR PBB SHADOW E&M-EST. PATIENT-LVL III: CPT | Mod: PBBFAC,,, | Performed by: STUDENT IN AN ORGANIZED HEALTH CARE EDUCATION/TRAINING PROGRAM

## 2023-01-18 PROCEDURE — 3078F PR MOST RECENT DIASTOLIC BLOOD PRESSURE < 80 MM HG: ICD-10-PCS | Mod: CPTII,S$GLB,, | Performed by: STUDENT IN AN ORGANIZED HEALTH CARE EDUCATION/TRAINING PROGRAM

## 2023-01-18 PROCEDURE — 1126F PR PAIN SEVERITY QUANTIFIED, NO PAIN PRESENT: ICD-10-PCS | Mod: CPTII,S$GLB,, | Performed by: STUDENT IN AN ORGANIZED HEALTH CARE EDUCATION/TRAINING PROGRAM

## 2023-01-18 PROCEDURE — 3008F PR BODY MASS INDEX (BMI) DOCUMENTED: ICD-10-PCS | Mod: CPTII,S$GLB,, | Performed by: STUDENT IN AN ORGANIZED HEALTH CARE EDUCATION/TRAINING PROGRAM

## 2023-01-18 PROCEDURE — 99999 PR PBB SHADOW E&M-EST. PATIENT-LVL III: ICD-10-PCS | Mod: PBBFAC,,, | Performed by: STUDENT IN AN ORGANIZED HEALTH CARE EDUCATION/TRAINING PROGRAM

## 2023-01-18 PROCEDURE — 1159F PR MEDICATION LIST DOCUMENTED IN MEDICAL RECORD: ICD-10-PCS | Mod: CPTII,S$GLB,, | Performed by: STUDENT IN AN ORGANIZED HEALTH CARE EDUCATION/TRAINING PROGRAM

## 2023-01-18 PROCEDURE — 80053 COMPREHEN METABOLIC PANEL: CPT | Performed by: STUDENT IN AN ORGANIZED HEALTH CARE EDUCATION/TRAINING PROGRAM

## 2023-01-18 PROCEDURE — 3077F SYST BP >= 140 MM HG: CPT | Mod: CPTII,S$GLB,, | Performed by: STUDENT IN AN ORGANIZED HEALTH CARE EDUCATION/TRAINING PROGRAM

## 2023-01-18 PROCEDURE — 3008F BODY MASS INDEX DOCD: CPT | Mod: CPTII,S$GLB,, | Performed by: STUDENT IN AN ORGANIZED HEALTH CARE EDUCATION/TRAINING PROGRAM

## 2023-01-18 PROCEDURE — 1126F AMNT PAIN NOTED NONE PRSNT: CPT | Mod: CPTII,S$GLB,, | Performed by: STUDENT IN AN ORGANIZED HEALTH CARE EDUCATION/TRAINING PROGRAM

## 2023-01-18 PROCEDURE — 1101F PR PT FALLS ASSESS DOC 0-1 FALLS W/OUT INJ PAST YR: ICD-10-PCS | Mod: CPTII,S$GLB,, | Performed by: STUDENT IN AN ORGANIZED HEALTH CARE EDUCATION/TRAINING PROGRAM

## 2023-01-18 PROCEDURE — 3288F PR FALLS RISK ASSESSMENT DOCUMENTED: ICD-10-PCS | Mod: CPTII,S$GLB,, | Performed by: STUDENT IN AN ORGANIZED HEALTH CARE EDUCATION/TRAINING PROGRAM

## 2023-01-18 PROCEDURE — 1101F PT FALLS ASSESS-DOCD LE1/YR: CPT | Mod: CPTII,S$GLB,, | Performed by: STUDENT IN AN ORGANIZED HEALTH CARE EDUCATION/TRAINING PROGRAM

## 2023-01-18 PROCEDURE — 99214 OFFICE O/P EST MOD 30 MIN: CPT | Mod: S$GLB,,, | Performed by: STUDENT IN AN ORGANIZED HEALTH CARE EDUCATION/TRAINING PROGRAM

## 2023-01-18 PROCEDURE — 3288F FALL RISK ASSESSMENT DOCD: CPT | Mod: CPTII,S$GLB,, | Performed by: STUDENT IN AN ORGANIZED HEALTH CARE EDUCATION/TRAINING PROGRAM

## 2023-01-18 RX ORDER — OLOPATADINE HYDROCHLORIDE 1 MG/ML
1 SOLUTION/ DROPS OPHTHALMIC 2 TIMES DAILY
Qty: 5 ML | Refills: 1 | Status: SHIPPED | OUTPATIENT
Start: 2023-01-18 | End: 2024-03-08

## 2023-01-18 RX ORDER — LEVOCETIRIZINE DIHYDROCHLORIDE 5 MG/1
5 TABLET, FILM COATED ORAL NIGHTLY
Qty: 90 TABLET | Refills: 1 | Status: SHIPPED | OUTPATIENT
Start: 2023-01-18 | End: 2024-03-08

## 2023-01-18 RX ORDER — OMEPRAZOLE 40 MG/1
40 CAPSULE, DELAYED RELEASE ORAL EVERY MORNING
Qty: 30 CAPSULE | Refills: 1 | Status: SHIPPED | OUTPATIENT
Start: 2023-01-18 | End: 2024-03-08

## 2023-01-18 NOTE — PROGRESS NOTES
"01/18/2023    Meredith Keen  [id number]    Chief Complaint   Patient presents with    Cough    Gastroesophageal Reflux       [facility]  Audiovisual interpretor used for all communication.    This patient is known to me. She presents for below concerns:    For the 2 weeks with mild cough and itchy throat. Assoc with itchy eyes.no eye drainage/crusting     Htn  Has not taken medication today b/c assumed she was getting labs. Checks at home and is usually normotensive    Indigestion: feels like something is stuck right there and sometimes is swollen and difficult to breathe. Has been taking tums      Negative 10 point ROS outside of HPI    Social History     Socioeconomic History    Marital status:    Tobacco Use    Smoking status: Never    Smokeless tobacco: Never   Substance and Sexual Activity    Alcohol use: No    Drug use: No    Sexual activity: Yes     Partners: Male           Current Outpatient Medications:     aluminum-magnesium hydroxide-simethicone (MAALOX ADVANCED) 200-200-20 mg/5 mL Susp, Take 30 mLs by mouth every 6 (six) hours as needed (nausea, abdominal pain, chest pain)., Disp: 354 mL, Rfl: 0    amLODIPine (NORVASC) 2.5 MG tablet, Take 2.5 mg by mouth once daily., Disp: , Rfl:     atorvastatin (LIPITOR) 20 MG tablet, Take 20 mg by mouth once daily., Disp: , Rfl:     EScitalopram oxalate (LEXAPRO) 10 MG tablet, TAKE 1/2 TABLET BY MOUTH DAILY FOR 7 DAYS, THEN TAKE 1 TABLET BY MOUTH DAILY, Disp: 90 tablet, Rfl: 1    famotidine (PEPCID) 20 MG tablet, Take 20 mg by mouth 2 (two) times daily., Disp: , Rfl:     losartan (COZAAR) 100 MG tablet, Take 1 tablet (100 mg total) by mouth once daily., Disp: 7 tablet, Rfl: 0    omeprazole (PRILOSEC) 40 MG capsule, Take 40 mg by mouth once daily., Disp: , Rfl:     pen needle, diabetic 31 gauge x 1/4" Ndle, Use with Forteo injection daily, Disp: 150 each, Rfl: 3    polyethylene glycol (GLYCOLAX) 17 gram/dose powder, Dissolve one capful (17g) into liquid and take by mouth " once daily., Disp: 510 g, Rfl: 0    teriparatide (FORTEO) 20 mcg/dose (600mcg/2.4mL) PnIj, Inject 0.08 mLs (20 mcg total) into the skin once daily., Disp: 2.4 mL, Rfl: 11    HYDROcodone-acetaminophen (NORCO) 5-325 mg per tablet, Take 1 tablet by mouth every 6 (six) hours as needed for Pain. (Patient not taking: Reported on 10/21/2022), Disp: 12 tablet, Rfl: 0      Physical Exam  Vitals:    01/18/23 0806   BP: (!) 155/68   Pulse: 71     Gen: well appearing, NAD  Resp: non labored breathing, no crackles, no wheezes, CTAB  Nose: bilateral congestion of turbinates, sinuses non tender to touch,   Throat: no erythema, no exudates  CV: RRR no murmur, gallops, rubs, no LE edema  Abd: soft nontender BS present no organomegaly    1. Gastroesophageal reflux disease, unspecified whether esophagitis present  - omeprazole (PRILOSEC) 40 MG capsule; Take 1 capsule (40 mg total) by mouth every morning.  Dispense: 30 capsule; Refill: 1    2. Allergic rhinitis, unspecified seasonality, unspecified trigger  - olopatadine (PATANOL) 0.1 % ophthalmic solution; Place 1 drop into both eyes 2 (two) times daily.  Dispense: 5 mL; Refill: 1  - levocetirizine (XYZAL) 5 MG tablet; Take 1 tablet (5 mg total) by mouth every evening.  Dispense: 90 tablet; Refill: 1  - fluticasone (VERAMYST) 27.5 mcg/actuation nasal spray; 2 sprays by Nasal route once daily.  Dispense: 9.1 mL; Refill: 3    3. Essential hypertension  - Comprehensive Metabolic Panel; Future  Take home meds asap    4. Microcytic anemia  - CBC Auto Differential; Future  - Iron and TIBC; Future    Alla Jade MD  Family Medicine

## 2023-01-19 ENCOUNTER — TELEPHONE (OUTPATIENT)
Dept: FAMILY MEDICINE | Facility: CLINIC | Age: 68
End: 2023-01-19
Payer: MEDICARE

## 2023-01-19 NOTE — TELEPHONE ENCOUNTER
----- Message from Alla Jade MD sent at 1/19/2023  8:21 AM CST -----  Please let pt know that her labs show low iron count. She needs to start over the counter iron at least 4 times per week.

## 2023-01-19 NOTE — PROGRESS NOTES
Please let pt know that her labs show low iron count. She needs to start over the counter iron at least 4 times per week.

## 2023-01-24 ENCOUNTER — HOSPITAL ENCOUNTER (INPATIENT)
Facility: HOSPITAL | Age: 68
LOS: 2 days | Discharge: HOME | DRG: 390 | End: 2023-01-28
Attending: STUDENT IN AN ORGANIZED HEALTH CARE EDUCATION/TRAINING PROGRAM | Admitting: HOSPITALIST
Payer: MEDICARE

## 2023-01-24 DIAGNOSIS — K56.609 SBO (SMALL BOWEL OBSTRUCTION): Primary | ICD-10-CM

## 2023-01-24 DIAGNOSIS — K56.609 SMALL BOWEL OBSTRUCTION: ICD-10-CM

## 2023-01-24 DIAGNOSIS — I10 ESSENTIAL HYPERTENSION: ICD-10-CM

## 2023-01-24 DIAGNOSIS — Z46.59 ENCOUNTER FOR NASOGASTRIC (NG) TUBE PLACEMENT: ICD-10-CM

## 2023-01-24 DIAGNOSIS — R11.2 NAUSEA AND VOMITING, UNSPECIFIED VOMITING TYPE: ICD-10-CM

## 2023-01-24 DIAGNOSIS — Z01.89 ENCOUNTER FOR IMAGING STUDY TO CONFIRM NASOGASTRIC (NG) TUBE PLACEMENT: ICD-10-CM

## 2023-01-24 LAB
BASOPHILS # BLD AUTO: 0.09 K/UL (ref 0–0.2)
BASOPHILS NFR BLD: 0.5 % (ref 0–1.9)
DIFFERENTIAL METHOD: ABNORMAL
EOSINOPHIL # BLD AUTO: 0.2 K/UL (ref 0–0.5)
EOSINOPHIL NFR BLD: 1.1 % (ref 0–8)
ERYTHROCYTE [DISTWIDTH] IN BLOOD BY AUTOMATED COUNT: 14.7 % (ref 11.5–14.5)
HCT VFR BLD AUTO: 41.8 % (ref 37–48.5)
HGB BLD-MCNC: 14 G/DL (ref 12–16)
IMM GRANULOCYTES # BLD AUTO: 0.15 K/UL (ref 0–0.04)
IMM GRANULOCYTES NFR BLD AUTO: 0.9 % (ref 0–0.5)
LYMPHOCYTES # BLD AUTO: 1.5 K/UL (ref 1–4.8)
LYMPHOCYTES NFR BLD: 8.9 % (ref 18–48)
MCH RBC QN AUTO: 25.3 PG (ref 27–31)
MCHC RBC AUTO-ENTMCNC: 33.5 G/DL (ref 32–36)
MCV RBC AUTO: 76 FL (ref 82–98)
MONOCYTES # BLD AUTO: 1.4 K/UL (ref 0.3–1)
MONOCYTES NFR BLD: 8.1 % (ref 4–15)
NEUTROPHILS # BLD AUTO: 13.5 K/UL (ref 1.8–7.7)
NEUTROPHILS NFR BLD: 80.5 % (ref 38–73)
NRBC BLD-RTO: 0 /100 WBC
PLATELET # BLD AUTO: 281 K/UL (ref 150–450)
PMV BLD AUTO: 9.1 FL (ref 9.2–12.9)
RBC # BLD AUTO: 5.53 M/UL (ref 4–5.4)
WBC # BLD AUTO: 16.79 K/UL (ref 3.9–12.7)

## 2023-01-24 PROCEDURE — 83690 ASSAY OF LIPASE: CPT | Performed by: STUDENT IN AN ORGANIZED HEALTH CARE EDUCATION/TRAINING PROGRAM

## 2023-01-24 PROCEDURE — 25000003 PHARM REV CODE 250: Performed by: STUDENT IN AN ORGANIZED HEALTH CARE EDUCATION/TRAINING PROGRAM

## 2023-01-24 PROCEDURE — 96375 TX/PRO/DX INJ NEW DRUG ADDON: CPT

## 2023-01-24 PROCEDURE — 99285 EMERGENCY DEPT VISIT HI MDM: CPT | Mod: 25

## 2023-01-24 PROCEDURE — 80053 COMPREHEN METABOLIC PANEL: CPT | Performed by: STUDENT IN AN ORGANIZED HEALTH CARE EDUCATION/TRAINING PROGRAM

## 2023-01-24 PROCEDURE — 85025 COMPLETE CBC W/AUTO DIFF WBC: CPT | Performed by: STUDENT IN AN ORGANIZED HEALTH CARE EDUCATION/TRAINING PROGRAM

## 2023-01-24 PROCEDURE — 63600175 PHARM REV CODE 636 W HCPCS: Performed by: STUDENT IN AN ORGANIZED HEALTH CARE EDUCATION/TRAINING PROGRAM

## 2023-01-24 PROCEDURE — 96374 THER/PROPH/DIAG INJ IV PUSH: CPT

## 2023-01-24 PROCEDURE — 36415 COLL VENOUS BLD VENIPUNCTURE: CPT | Performed by: STUDENT IN AN ORGANIZED HEALTH CARE EDUCATION/TRAINING PROGRAM

## 2023-01-24 PROCEDURE — 96361 HYDRATE IV INFUSION ADD-ON: CPT

## 2023-01-24 RX ORDER — ONDANSETRON 2 MG/ML
4 INJECTION INTRAMUSCULAR; INTRAVENOUS
Status: COMPLETED | OUTPATIENT
Start: 2023-01-24 | End: 2023-01-24

## 2023-01-24 RX ORDER — ALBUTEROL SULFATE 90 UG/1
2 AEROSOL, METERED RESPIRATORY (INHALATION) EVERY 4 HOURS PRN
COMMUNITY
Start: 2022-12-20 | End: 2024-03-08

## 2023-01-24 RX ORDER — MORPHINE SULFATE 4 MG/ML
4 INJECTION, SOLUTION INTRAMUSCULAR; INTRAVENOUS
Status: COMPLETED | OUTPATIENT
Start: 2023-01-24 | End: 2023-01-24

## 2023-01-24 RX ADMIN — ONDANSETRON HYDROCHLORIDE 4 MG: 2 SOLUTION INTRAMUSCULAR; INTRAVENOUS at 11:01

## 2023-01-24 RX ADMIN — IOHEXOL 100 ML: 350 INJECTION, SOLUTION INTRAVENOUS at 11:01

## 2023-01-24 RX ADMIN — MORPHINE SULFATE 4 MG: 4 INJECTION INTRAVENOUS at 11:01

## 2023-01-24 RX ADMIN — SODIUM CHLORIDE 500 ML: 9 INJECTION, SOLUTION INTRAVENOUS at 11:01

## 2023-01-25 LAB
ALBUMIN SERPL BCP-MCNC: 3.8 G/DL (ref 3.5–5.2)
ALP SERPL-CCNC: 138 U/L (ref 55–135)
ALT SERPL W/O P-5'-P-CCNC: 43 U/L (ref 10–44)
ANION GAP SERPL CALC-SCNC: 9 MMOL/L (ref 8–16)
AST SERPL-CCNC: 50 U/L (ref 10–40)
BILIRUB SERPL-MCNC: 0.5 MG/DL (ref 0.1–1)
BILIRUB UR QL STRIP: NEGATIVE
BUN SERPL-MCNC: 23 MG/DL (ref 8–23)
CALCIUM SERPL-MCNC: 10 MG/DL (ref 8.7–10.5)
CHLORIDE SERPL-SCNC: 103 MMOL/L (ref 95–110)
CLARITY UR: CLEAR
CO2 SERPL-SCNC: 25 MMOL/L (ref 23–29)
COLOR UR: YELLOW
CREAT SERPL-MCNC: 1.2 MG/DL (ref 0.5–1.4)
EST. GFR  (NO RACE VARIABLE): 49 ML/MIN/1.73 M^2
GLUCOSE SERPL-MCNC: 180 MG/DL (ref 70–110)
GLUCOSE UR QL STRIP: NEGATIVE
HGB UR QL STRIP: NEGATIVE
KETONES UR QL STRIP: NEGATIVE
LEUKOCYTE ESTERASE UR QL STRIP: NEGATIVE
LIPASE SERPL-CCNC: 204 U/L (ref 23–300)
NITRITE UR QL STRIP: NEGATIVE
PH UR STRIP: 7 [PH] (ref 5–8)
POTASSIUM SERPL-SCNC: 3.9 MMOL/L (ref 3.5–5.1)
PROT SERPL-MCNC: 7.7 G/DL (ref 6–8.4)
PROT UR QL STRIP: ABNORMAL
SODIUM SERPL-SCNC: 137 MMOL/L (ref 136–145)
SP GR UR STRIP: >1.045 (ref 1–1.03)
URN SPEC COLLECT METH UR: ABNORMAL
UROBILINOGEN UR STRIP-ACNC: 1 EU/DL

## 2023-01-25 PROCEDURE — 25000003 PHARM REV CODE 250: Performed by: EMERGENCY MEDICINE

## 2023-01-25 PROCEDURE — 81003 URINALYSIS AUTO W/O SCOPE: CPT | Performed by: STUDENT IN AN ORGANIZED HEALTH CARE EDUCATION/TRAINING PROGRAM

## 2023-01-25 PROCEDURE — 96361 HYDRATE IV INFUSION ADD-ON: CPT

## 2023-01-25 PROCEDURE — 25500020 PHARM REV CODE 255: Performed by: STUDENT IN AN ORGANIZED HEALTH CARE EDUCATION/TRAINING PROGRAM

## 2023-01-25 PROCEDURE — 63600175 PHARM REV CODE 636 W HCPCS: Performed by: STUDENT IN AN ORGANIZED HEALTH CARE EDUCATION/TRAINING PROGRAM

## 2023-01-25 PROCEDURE — 63600175 PHARM REV CODE 636 W HCPCS: Performed by: EMERGENCY MEDICINE

## 2023-01-25 PROCEDURE — 96376 TX/PRO/DX INJ SAME DRUG ADON: CPT

## 2023-01-25 RX ORDER — MORPHINE SULFATE 4 MG/ML
4 INJECTION, SOLUTION INTRAMUSCULAR; INTRAVENOUS
Status: COMPLETED | OUTPATIENT
Start: 2023-01-25 | End: 2023-01-25

## 2023-01-25 RX ORDER — SODIUM CHLORIDE 9 MG/ML
1000 INJECTION, SOLUTION INTRAVENOUS
Status: COMPLETED | OUTPATIENT
Start: 2023-01-25 | End: 2023-01-25

## 2023-01-25 RX ORDER — ONDANSETRON 2 MG/ML
4 INJECTION INTRAMUSCULAR; INTRAVENOUS
Status: COMPLETED | OUTPATIENT
Start: 2023-01-25 | End: 2023-01-25

## 2023-01-25 RX ORDER — SODIUM CHLORIDE 9 MG/ML
1000 INJECTION, SOLUTION INTRAVENOUS CONTINUOUS
Status: ACTIVE | OUTPATIENT
Start: 2023-01-25 | End: 2023-01-26

## 2023-01-25 RX ADMIN — MORPHINE SULFATE 4 MG: 4 INJECTION INTRAVENOUS at 01:01

## 2023-01-25 RX ADMIN — SODIUM CHLORIDE 1000 ML: 9 INJECTION, SOLUTION INTRAVENOUS at 07:01

## 2023-01-25 RX ADMIN — SODIUM CHLORIDE 1000 ML: 9 INJECTION, SOLUTION INTRAVENOUS at 11:01

## 2023-01-25 RX ADMIN — ONDANSETRON HYDROCHLORIDE 4 MG: 2 SOLUTION INTRAMUSCULAR; INTRAVENOUS at 01:01

## 2023-01-25 RX ADMIN — MORPHINE SULFATE 4 MG: 4 INJECTION INTRAVENOUS at 06:01

## 2023-01-25 NOTE — ED PROVIDER NOTES
History  Chief Complaint   Patient presents with    Abdominal Pain     Pt presents to the ER w/ complaints of abdominal pain, vomiting, and diarrhea. Pt states that symptoms began at 0800 this morning, reports pain to her entire abdomen that worsens w/ palpation. Pt reports hx of bowel obstruction, states current pain feels similar.      67-year-old with history of pancreatitis, and hypertension who presents due to abdominal pain associated with nausea, vomiting and diarrhea.      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       Past Surgical History:   Procedure Laterality Date    APPENDECTOMY      CHOLECYSTECTOMY      ENDOSCOPIC ULTRASOUND OF UPPER GASTROINTESTINAL TRACT N/A 11/26/2019    Procedure: ULTRASOUND, UPPER GI TRACT, ENDOSCOPIC;  Surgeon: Britton Martinez MD;  Location: [facility];  Service: Endoscopy;  Laterality: N/A;    ENDOSCOPIC ULTRASOUND OF UPPER GASTROINTESTINAL TRACT N/A 6/24/2020    Procedure: ULTRASOUND, UPPER GI TRACT, ENDOSCOPIC;  Surgeon: Delfino Jasso MD;  Location: [facility] (2ND FLR);  Service: Endoscopy;  Laterality: N/A;  EUS (linear) for abnormal CT scan. Urgent. Can be done by any [facility] physician.  Britton Martinez MD  Covid-19 test 6/22/20 at [facility] - pg  Speaks Kenyan;  offered and declined; daughter will accompany patient as interp    ENDOSCOPIC ULTRASOUND OF UPPER GASTROINTESTINAL TRACT N/A 5/26/2021    Procedure: ULTRASOUND, UPPER GI TRACT, ENDOSCOPIC;  Surgeon: Britton Martinez MD;  [facility]  Service: Endoscopy;  Laterality: N/A;  Need EUS (linear) for dilated bile duct on MRI. Main or [facility]. 45 minutes.   Britton Martinez MD   Fully vaccinated - sending in copy of card and bringing it on day of procedure. ttr  *NEEDS Bermudian  "*    ESOPHAGOGASTRODUODENOSCOPY N/A 9/9/2020    Procedure: EGD (ESOPHAGOGASTRODUODENOSCOPY);  Surgeon: Devyn Miles MD;  [facility];  Service: General;  Laterality: N/A;    HYSTERECTOMY  2012    elective    LAPAROSCOPIC LYSIS OF ADHESIONS  9/9/2020    Procedure: LYSIS, ADHESIONS, LAPAROSCOPIC;  Surgeon: Devyn Miles MD;  [facility];  Service: General;;    LYSIS OF ADHESIONS N/A 6/16/2022    Procedure: LYSIS, ADHESIONS;  Surgeon: Jacob Greco MD;  [facility];  Service: General;  Laterality: N/A;    ROBOT-ASSISTED SURGICAL REMOVAL OF STOMACH USING DA RACHEL XI N/A 9/9/2020    Procedure: XI ROBOTIC GASTRECTOMY/GIST PROXIMAL STOMACH;  Surgeon: Devyn Miles MD;  [facility];  Service: General;  Laterality: N/A;       No family history on file.    Social History     Tobacco Use    Smoking status: Never    Smokeless tobacco: Never   Substance Use Topics    Alcohol use: No    Drug use: No       ROS  Review of Systems   Gastrointestinal:  Positive for abdominal pain, diarrhea, nausea and vomiting.     Physical Exam  BP (!) 113/52   Pulse 64   Temp 97.6 °F (36.4 °C)   Resp 16   Ht 5' 2" (1.575 m)   Wt 51.6 kg (113 lb 11.2 oz)   SpO2 96%   Breastfeeding No   BMI 20.80 kg/m²   Physical Exam    Constitutional: She appears well-developed and well-nourished. She is cooperative. She appears ill.   HENT:   Head: Normocephalic and atraumatic.   Eyes: Conjunctivae, EOM and lids are normal. Pupils are equal, round, and reactive to light.   Neck: Phonation normal.   Normal range of motion.  Cardiovascular:  Normal rate, regular rhythm and intact distal pulses.           Pulmonary/Chest: Breath sounds normal. No stridor. No respiratory distress.   Abdominal: Abdomen is soft. There is abdominal tenderness.   Musculoskeletal:      Cervical back: Normal range of motion.     Neurological: She is alert and oriented to person, place, and time. "   Skin: Skin is warm and dry.   Psychiatric: She has a normal mood and affect. Her speech is normal and behavior is normal.             Labs Reviewed   COMPREHENSIVE METABOLIC PANEL - Abnormal; Notable for the following components:       Result Value    Glucose 180 (*)     Alkaline Phosphatase 138 (*)     AST 50 (*)     eGFR 49.0 (*)     All other components within normal limits   CBC W/ AUTO DIFFERENTIAL - Abnormal; Notable for the following components:    WBC 16.79 (*)     RBC 5.53 (*)     MCV 76 (*)     MCH 25.3 (*)     RDW 14.7 (*)     MPV 9.1 (*)     Immature Granulocytes 0.9 (*)     Gran # (ANC) 13.5 (*)     Immature Grans (Abs) 0.15 (*)     Mono # 1.4 (*)     Gran % 80.5 (*)     Lymph % 8.9 (*)     All other components within normal limits   URINALYSIS, REFLEX TO URINE CULTURE - Abnormal; Notable for the following components:    Protein, UA Trace (*)     All other components within normal limits    Narrative:     Preferred Collection Type->Urine, Clean Catch  Specimen Source->Urine   LIPASE        Type of Interpretation: ED Physician (Independently Interpreted).  Radiology Procedure Done: Portable CXR.  Radiology Procedure Done: Abdomen X-Ray - Upright Only.  Interpretation: NG tube in satisfactory position                    Procedures             Medical Decision Making  Amount and/or Complexity of Data Reviewed  Independent Historian: friend     Details: Patient's son  Labs: ordered. Decision-making details documented in ED Course.  Radiology: ordered and independent interpretation performed. Decision-making details documented in ED Course.               ED Course as of 01/25/23 0600   Tue Jan 24, 2023   6313 67-year-old female with recent history of bowel obstruction presents due to abdominal pain associated with nausea and vomiting.  Of note patient did have surgery a few months ago to corrected prior obstruction.  Vomiting abdominal pain started today.  Other systems reviewed and noted to be negative.   History provided by patient son.  Will give medication for nausea and pain control.  Will get labs and obtain CT of the abdomen pelvis for further evaluation [NA]   Wed Jan 25, 2023   0004 CT per my review concerning for small-bowel obstruction [NA]   0025 WBC(!): 16.79 [NA]   0041 CT read notable for multiple dilated loops of small bowel with collapse of the distal small bowel concerning for small bowel obstruction.  Also diffuse bladder wall thickening which may represent infection versus cystitis.  Will obtain urinalysis further examination. [NA]   0109 Spoke with general surgery, Dr. Conklin, they will see the pt when transferred.  [NA]   0126 Spoke with Dr. Love, [facility] is accpeting [NA]   0159 NG TUBE IN SATISFACTORY POSITION.  [NA]      ED Course User Index  [NA] Sal Stafford MD       Clinical Impression  The primary encounter diagnosis was SBO (small bowel obstruction). Diagnoses of Encounter for nasogastric (NG) tube placement and Nausea and vomiting, unspecified vomiting type were also pertinent to this visit.       Sal Stafford MD  01/25/23 0600

## 2023-01-25 NOTE — PROVIDER TRANSFER
Outside Transfer Acceptance Note / Regional Referral Center    Referring facility: [facility]   Referring provider: CAPRICE CONNOLLY  Accepting facility: [facility]  Accepting provider: ELOISA CASTRO  Admitting provider: ROXY CUNNINGHAM  Reason for transfer:  GENERAL SURGERY  Transfer diagnosis: SBO  Transfer specialty requested:General Surgery  Transfer specialty notified: yes  Transfer level: NUMBER 1-5: 2  Bed type requested: TELEMETRY   Isolation status: No active isolations   Admission class or status: IP- Inpatient      Narrative     The patient is a 66 y/o female with PMH of pancreatitis, HTN, SBO, and cholelithiasis. She has had two prior episodes of SBO treated with conservative management and the second with lysis of adhesions. She presented with abdominal pain which started today along with nausea, vomiting, and diarrhea. Symptoms were reminiscent of her prior SBOs. Labs notable for WBC of 16K. CT showed multiple dilated bowel loops and distal portion of the bowels are collapsed. NGT placed. Pain controlled with morphine. Antiemetics given. Hemodynamically stable.       Objective     Vitals: Temp: 97.6 °F (36.4 °C) (01/24/23 2306)  Pulse: 61 (01/24/23 2306)  Resp: 18 (01/25/23 0111)  BP: (!) 114/57 (01/24/23 2306)  SpO2: 98 % (01/24/23 2306)  Recent Labs: All pertinent labs within the past 24 hours have been reviewed.  Recent imaging:    Airway:     Vent settings:     IV access:        Peripheral IV - Single Lumen 01/24/23 2331 20 G Left Wrist (Active)            Peripheral IV - Single Lumen 01/25/23 0111 20 G Right Wrist (Active)   Site Assessment Clean;Dry;Intact;No redness;No swelling 01/25/23 0112   Dressing Status Clean;Dry;Intact 01/25/23 0112     Infusions:   Allergies: Review of patient's allergies indicates:  No Known Allergies   NPO: No    Anticoagulation:   Anticoagulants       None             Instructions      [facility]  Admit to Hospital Medicine  Upon  patient arrival to floor, please contact Hospital Medicine on call.

## 2023-01-25 NOTE — ED NOTES
NG tube suction problematic. When checking placement via air in syringe patient experienced pain in chest

## 2023-01-25 NOTE — ED NOTES
Patient refused to allow staff to further insert existing NG tube, requesting to remove it and place a new one.

## 2023-01-26 PROBLEM — K56.609 SMALL BOWEL OBSTRUCTION: Status: ACTIVE | Noted: 2023-01-26

## 2023-01-26 LAB
ANION GAP SERPL CALC-SCNC: 5 MMOL/L (ref 8–16)
BASOPHILS # BLD AUTO: 0.04 K/UL (ref 0–0.2)
BASOPHILS NFR BLD: 0.9 % (ref 0–1.9)
BUN SERPL-MCNC: 13 MG/DL (ref 8–23)
CALCIUM SERPL-MCNC: 8.2 MG/DL (ref 8.7–10.5)
CHLORIDE SERPL-SCNC: 107 MMOL/L (ref 95–110)
CO2 SERPL-SCNC: 28 MMOL/L (ref 23–29)
CREAT SERPL-MCNC: 0.7 MG/DL (ref 0.5–1.4)
DIFFERENTIAL METHOD: ABNORMAL
EOSINOPHIL # BLD AUTO: 0.2 K/UL (ref 0–0.5)
EOSINOPHIL NFR BLD: 3.8 % (ref 0–8)
ERYTHROCYTE [DISTWIDTH] IN BLOOD BY AUTOMATED COUNT: 14.8 % (ref 11.5–14.5)
EST. GFR  (NO RACE VARIABLE): >60 ML/MIN/1.73 M^2
GLUCOSE SERPL-MCNC: 100 MG/DL (ref 70–110)
HCT VFR BLD AUTO: 34.3 % (ref 37–48.5)
HGB BLD-MCNC: 11.4 G/DL (ref 12–16)
IMM GRANULOCYTES # BLD AUTO: 0.01 K/UL (ref 0–0.04)
IMM GRANULOCYTES NFR BLD AUTO: 0.2 % (ref 0–0.5)
LYMPHOCYTES # BLD AUTO: 0.9 K/UL (ref 1–4.8)
LYMPHOCYTES NFR BLD: 22.1 % (ref 18–48)
MCH RBC QN AUTO: 25.3 PG (ref 27–31)
MCHC RBC AUTO-ENTMCNC: 33.2 G/DL (ref 32–36)
MCV RBC AUTO: 76 FL (ref 82–98)
MONOCYTES # BLD AUTO: 0.5 K/UL (ref 0.3–1)
MONOCYTES NFR BLD: 11.5 % (ref 4–15)
NEUTROPHILS # BLD AUTO: 2.6 K/UL (ref 1.8–7.7)
NEUTROPHILS NFR BLD: 61.5 % (ref 38–73)
NRBC BLD-RTO: 0 /100 WBC
PLATELET # BLD AUTO: 229 K/UL (ref 150–450)
PMV BLD AUTO: 9 FL (ref 9.2–12.9)
POTASSIUM SERPL-SCNC: 3.8 MMOL/L (ref 3.5–5.1)
RBC # BLD AUTO: 4.5 M/UL (ref 4–5.4)
SODIUM SERPL-SCNC: 140 MMOL/L (ref 136–145)
WBC # BLD AUTO: 4.26 K/UL (ref 3.9–12.7)

## 2023-01-26 PROCEDURE — 25000003 PHARM REV CODE 250: Performed by: EMERGENCY MEDICINE

## 2023-01-26 PROCEDURE — 99223 PR INITIAL HOSPITAL CARE,LEVL III: ICD-10-PCS | Mod: ,,, | Performed by: STUDENT IN AN ORGANIZED HEALTH CARE EDUCATION/TRAINING PROGRAM

## 2023-01-26 PROCEDURE — 80048 BASIC METABOLIC PNL TOTAL CA: CPT | Performed by: EMERGENCY MEDICINE

## 2023-01-26 PROCEDURE — 36415 COLL VENOUS BLD VENIPUNCTURE: CPT | Performed by: EMERGENCY MEDICINE

## 2023-01-26 PROCEDURE — A4216 STERILE WATER/SALINE, 10 ML: HCPCS | Performed by: EMERGENCY MEDICINE

## 2023-01-26 PROCEDURE — 99223 1ST HOSP IP/OBS HIGH 75: CPT | Mod: ,,, | Performed by: STUDENT IN AN ORGANIZED HEALTH CARE EDUCATION/TRAINING PROGRAM

## 2023-01-26 PROCEDURE — 85025 COMPLETE CBC W/AUTO DIFF WBC: CPT | Performed by: EMERGENCY MEDICINE

## 2023-01-26 PROCEDURE — 63600175 PHARM REV CODE 636 W HCPCS: Performed by: EMERGENCY MEDICINE

## 2023-01-26 PROCEDURE — 11000001 HC ACUTE MED/SURG PRIVATE ROOM

## 2023-01-26 PROCEDURE — 63600175 PHARM REV CODE 636 W HCPCS: Performed by: STUDENT IN AN ORGANIZED HEALTH CARE EDUCATION/TRAINING PROGRAM

## 2023-01-26 PROCEDURE — 25000003 PHARM REV CODE 250: Performed by: STUDENT IN AN ORGANIZED HEALTH CARE EDUCATION/TRAINING PROGRAM

## 2023-01-26 PROCEDURE — 96361 HYDRATE IV INFUSION ADD-ON: CPT

## 2023-01-26 RX ORDER — ONDANSETRON 2 MG/ML
4 INJECTION INTRAMUSCULAR; INTRAVENOUS EVERY 8 HOURS PRN
Status: DISCONTINUED | OUTPATIENT
Start: 2023-01-26 | End: 2023-01-28 | Stop reason: HOSPADM

## 2023-01-26 RX ORDER — SODIUM CHLORIDE 9 MG/ML
1000 INJECTION, SOLUTION INTRAVENOUS CONTINUOUS
Status: ACTIVE | OUTPATIENT
Start: 2023-01-26 | End: 2023-01-26

## 2023-01-26 RX ORDER — MORPHINE SULFATE 2 MG/ML
2 INJECTION, SOLUTION INTRAMUSCULAR; INTRAVENOUS EVERY 4 HOURS PRN
Status: DISCONTINUED | OUTPATIENT
Start: 2023-01-26 | End: 2023-01-28 | Stop reason: HOSPADM

## 2023-01-26 RX ORDER — SODIUM CHLORIDE 0.9 % (FLUSH) 0.9 %
10 SYRINGE (ML) INJECTION
Status: DISCONTINUED | OUTPATIENT
Start: 2023-01-26 | End: 2023-01-28 | Stop reason: HOSPADM

## 2023-01-26 RX ORDER — ENOXAPARIN SODIUM 100 MG/ML
40 INJECTION SUBCUTANEOUS EVERY 24 HOURS
Status: DISCONTINUED | OUTPATIENT
Start: 2023-01-26 | End: 2023-01-28 | Stop reason: HOSPADM

## 2023-01-26 RX ORDER — FAMOTIDINE 10 MG/ML
20 INJECTION INTRAVENOUS 2 TIMES DAILY
Status: DISCONTINUED | OUTPATIENT
Start: 2023-01-26 | End: 2023-01-27

## 2023-01-26 RX ORDER — MORPHINE SULFATE 4 MG/ML
4 INJECTION, SOLUTION INTRAMUSCULAR; INTRAVENOUS EVERY 4 HOURS PRN
Status: DISCONTINUED | OUTPATIENT
Start: 2023-01-26 | End: 2023-01-28 | Stop reason: HOSPADM

## 2023-01-26 RX ORDER — HYDRALAZINE HYDROCHLORIDE 20 MG/ML
10 INJECTION INTRAMUSCULAR; INTRAVENOUS EVERY 6 HOURS PRN
Status: DISCONTINUED | OUTPATIENT
Start: 2023-01-26 | End: 2023-01-28 | Stop reason: HOSPADM

## 2023-01-26 RX ADMIN — MORPHINE SULFATE 4 MG: 4 INJECTION INTRAVENOUS at 11:01

## 2023-01-26 RX ADMIN — SODIUM CHLORIDE 1000 ML: 9 INJECTION, SOLUTION INTRAVENOUS at 12:01

## 2023-01-26 RX ADMIN — Medication 10 ML: at 11:01

## 2023-01-26 RX ADMIN — HYDRALAZINE HYDROCHLORIDE 10 MG: 20 INJECTION, SOLUTION INTRAMUSCULAR; INTRAVENOUS at 11:01

## 2023-01-26 RX ADMIN — ENOXAPARIN SODIUM 40 MG: 40 INJECTION SUBCUTANEOUS at 08:01

## 2023-01-26 RX ADMIN — SODIUM CHLORIDE 1000 ML: 9 INJECTION, SOLUTION INTRAVENOUS at 04:01

## 2023-01-26 RX ADMIN — FAMOTIDINE 20 MG: 10 INJECTION, SOLUTION INTRAVENOUS at 08:01

## 2023-01-26 RX ADMIN — MORPHINE SULFATE 4 MG: 4 INJECTION INTRAVENOUS at 03:01

## 2023-01-26 NOTE — ED NOTES
Pt resting quietly in bed with minimal discomfort from NG tube.  Pt expresses concern that NG tube is not removing much fluid and pt is touching and pulling tube..  Pt reassured that NG tube out put is expected.  Pt encourage to not touch or pull at NG tube.

## 2023-01-26 NOTE — PLAN OF CARE
[facility]  Initial Discharge Assessment       Primary Care Provider: Alla Jade MD    Admission Diagnosis: SBO (small bowel obstruction) [K56.609]  Encounter for nasogastric (NG) tube placement [Z46.59]  Encounter for imaging study to confirm nasogastric (NG) tube placement [Z01.89]  Nausea and vomiting, unspecified vomiting type [R11.2]    Admission Date: 1/24/2023  Expected Discharge Date:     Discharge Barriers Identified: None    Payor: [employer] MEDICARE / Plan: [employer] MEDICARE HMO / Product Type: Capitation /     Extended Emergency Contact Information  Primary Emergency Contact: CuellarWinifred bowling  Address: [address] [place], [place] [id number] [place] [address]  Home Phone: [phone]  Mobile Phone: [phone]  Relation: Daughter  Secondary Emergency Contact: Mariola Coello  Mobile Phone: [phone]  Relation: Daughter  Preferred language: English   needed? No    Discharge Plan A: Home, Home with family  Discharge Plan B: Home, Home with family      [id number] [place] - [address]  [address] [place] [address]  Phone: [phone] Fax: [phone]    [id number] - [place], [place] - [address] [address]  [address]  [facility] [address]  Phone: [phone] Fax: [phone]      Initial Assessment (most recent)       Adult Discharge Assessment - 01/26/23 1444          Discharge Assessment    Assessment Type Discharge Planning Assessment     Confirmed/corrected address, phone number and insurance Yes     Confirmed Demographics Correct on Facesheet     Source of Information  utilized;patient   Long #    People in Home --   The patient stated that she has two homes, and when she is in [place], she stays with her son.    Do you expect to return to your current living situation? Yes     Prior to hospitilization cognitive status: Alert/Oriented     Current cognitive status: Alert/Oriented     Walking or Climbing Stairs  --   none    Dressing/Bathing --   none    Do you have any problems with: Errands/Grocery;Needs other help     Specify other help The patient stated that her daughter-in-law helps her.     Home Accessibility stairs to enter home;other (see comments);stairs within home   The patient lives in a two-story house with her family.    Number of Stairs, Main Entrance none     Home Layout Able to live on 1st floor     Equipment Currently Used at Home none     Readmission within 30 days? No     Patient currently being followed by outpatient case management? No     Do you currently have service(s) that help you manage your care at home? No     Do you take prescription medications? Yes     Do you have prescription coverage? Yes     Coverage HU[employer]NA MANAGED MEDICARE - [employer] MEDICARE HMO/self-pay     Do you have any problems affording any of your prescribed medications? No     Is the patient taking medications as prescribed? yes     Who is going to help you get home at discharge? Winifred (Daughter)   [phone]     How do you get to doctors appointments? family or friend will provide;car, drives self     Are you on dialysis? No     Do you take coumadin? --   aspirin    Discharge Plan A Home;Home with family     Discharge Plan B Home;Home with family     DME Needed Upon Discharge  other (see comments)     Discharge Plan discussed with: Patient;Adult children     Discharge Barriers Identified None                 Initial discharge assessment is completed. A professional  was utilize for this assessment (Long # [id number]). The patient is independent, and she stated that she still drives. She alternates between her daughter and son's home. She stated that she plans to return back to [place] with her son and daughter-in-law. The patient does not use and DME for her care. Contact information was left in the patient's room. CM will continue to monitor.

## 2023-01-26 NOTE — ED NOTES
Pt's daughter Anna updated on pt's admission status.  Pt's daughter voices agreement with new POC.

## 2023-01-26 NOTE — ED NOTES
Pt resting quietly in bed with no apparent distress.  Pt aroused easily to nurse's presence in room.  Pt updated on POC and voiced continued agreement.  Pt voiced no complaints or concerns at this time.

## 2023-01-26 NOTE — ED NOTES
Updated pt's daughter on POC and transfer options.  Pt's daughter voices agreement with current POC to keep [facility] as the target facility.   updated on family desires.  Will continue to monitor.

## 2023-01-26 NOTE — ED PROVIDER NOTES
Encounter Date: 1/24/2023       History     Chief Complaint   Patient presents with    Abdominal Pain     Pt presents to the ER w/ complaints of abdominal pain, vomiting, and diarrhea. Pt states that symptoms began at 0800 this morning, reports pain to her entire abdomen that worsens w/ palpation. Pt reports hx of bowel obstruction, states current pain feels similar.      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     Past Surgical History:   Procedure Laterality Date    APPENDECTOMY      CHOLECYSTECTOMY      ENDOSCOPIC ULTRASOUND OF UPPER GASTROINTESTINAL TRACT N/A 11/26/2019    Procedure: ULTRASOUND, UPPER GI TRACT, ENDOSCOPIC;  Surgeon: Britton Martinez MD;  Location: [facility];  Service: Endoscopy;  Laterality: N/A;    ENDOSCOPIC ULTRASOUND OF UPPER GASTROINTESTINAL TRACT N/A 6/24/2020    Procedure: ULTRASOUND, UPPER GI TRACT, ENDOSCOPIC;  Surgeon: Delfino Jasso MD;  [facility]  Service: Endoscopy;  Laterality: N/A;  EUS (linear) for abnormal CT scan. Urgent. Can be done by any [facility] physician.  Britton Martinez MD  Covid-19 test 6/22/20 at [facility] - pg  Speaks Hungarian;  offered and declined; daughter will accompany patient as interp    ENDOSCOPIC ULTRASOUND OF UPPER GASTROINTESTINAL TRACT N/A 5/26/2021    Procedure: ULTRASOUND, UPPER GI TRACT, ENDOSCOPIC;  Surgeon: Britton Martinez MD;  [facility]  Service: Endoscopy;  Laterality: N/A;  Need EUS (linear) for dilated bile duct on MRI. Main or [place]. 45 minutes.   Britton Martinez MD   Fully vaccinated - sending in copy of card and bringing it on day of procedure. ttr  *NEEDS Citizen of [place] *    ESOPHAGOGASTRODUODENOSCOPY N/A 9/9/2020     Procedure: EGD (ESOPHAGOGASTRODUODENOSCOPY);  Surgeon: Devyn Miles MD;  [facility];  Service: General;  Laterality: N/A;    HYSTERECTOMY  2012    elective    LAPAROSCOPIC LYSIS OF ADHESIONS  9/9/2020    Procedure: LYSIS, ADHESIONS, LAPAROSCOPIC;  Surgeon: Devyn Miles MD;  [facility];  Service: General;;    LYSIS OF ADHESIONS N/A 6/16/2022    Procedure: LYSIS, ADHESIONS;  Surgeon: Jacob Greco MD;  [facility];  Service: General;  Laterality: N/A;    ROBOT-ASSISTED SURGICAL REMOVAL OF STOMACH USING DA RACHEL XI N/A 9/9/2020    Procedure: XI ROBOTIC GASTRECTOMY/GIST PROXIMAL STOMACH;  Surgeon: Devyn Miles MD;  Location: [facility] OR [address] [place] [address];  Service: General;  Laterality: N/A;     No family history on file.  Social History     Tobacco Use    Smoking status: Never    Smokeless tobacco: Never   Substance Use Topics    Alcohol use: No    Drug use: No     Review of Systems    Physical Exam     Initial Vitals [01/24/23 2306]   BP Pulse Resp Temp SpO2   (!) 114/57 61 18 97.6 °F (36.4 °C) 98 %      MAP       --         Physical Exam    ED Course   Procedures  Labs Reviewed   COMPREHENSIVE METABOLIC PANEL - Abnormal; Notable for the following components:       Result Value    Glucose 180 (*)     Alkaline Phosphatase 138 (*)     AST 50 (*)     eGFR 49.0 (*)     All other components within normal limits   CBC W/ AUTO DIFFERENTIAL - Abnormal; Notable for the following components:    WBC 16.79 (*)     RBC 5.53 (*)     MCV 76 (*)     MCH 25.3 (*)     RDW 14.7 (*)     MPV 9.1 (*)     Immature Granulocytes 0.9 (*)     Gran # (ANC) 13.5 (*)     Immature Grans (Abs) 0.15 (*)     Mono # 1.4 (*)     Gran % 80.5 (*)     Lymph % 8.9 (*)     All other components within normal limits   URINALYSIS, REFLEX TO URINE CULTURE - Abnormal; Notable for the following components:    Protein, UA Trace (*)     All other components within normal limits    Narrative:     Preferred Collection  Type->Urine, Clean Catch  Specimen Source->Urine   CBC W/ AUTO DIFFERENTIAL - Abnormal; Notable for the following components:    Hemoglobin 11.4 (*)     Hematocrit 34.3 (*)     MCV 76 (*)     MCH 25.3 (*)     RDW 14.8 (*)     MPV 9.0 (*)     Lymph # 0.9 (*)     All other components within normal limits   BASIC METABOLIC PANEL - Abnormal; Notable for the following components:    Calcium 8.2 (*)     Anion Gap 5 (*)     All other components within normal limits   LIPASE          Imaging Results              X-Ray Chest 1 View (Final result)  Result time 01/25/23 15:11:39      Final result by Fan Faye MD (01/25/23 15:11:39)                   Impression:      Nasogastric tube in place.      Electronically signed by: Fan Faye MD  Date:    01/25/2023  Time:    15:11               Narrative:    EXAMINATION:  XR CHEST 1 VIEW    CLINICAL HISTORY:  Encounter for other specified special examinations    COMPARISON:  Portable chest 01/25/2023 at 01:53 hours.    FINDINGS:  Frontal chest radiograph demonstrates nasogastric tube in place with tip projecting over the gastric body.  Left basilar atelectasis.                                       X-Ray Chest AP Portable (Final result)  Result time 01/25/23 09:40:54      Final result by Mariah Coreas MD (01/25/23 09:40:54)                   Impression:      Known sclerotic area in the medial right clavicle unchanged with a new NG tube in place.  Chronic changes with no acute lung disease.      Electronically signed by: Mariah Coreas MD  Date:    01/25/2023  Time:    09:40               Narrative:    EXAMINATION:  XR CHEST AP PORTABLE    CLINICAL HISTORY:  Encounter for fitting and adjustment of other gastrointestinal appliance and device    TECHNIQUE:  portable chest x-ray    COMPARISON:  01/01/2022.    FINDINGS:  There is a sclerotic area in the medial right clavicle which is stable and unchanged.  Chronic changes with no acute infiltrates or effusions.  Tube in the  stomach.                                       CT Abdomen Pelvis With Contrast (Final result)  Result time 01/25/23 10:57:11      Final result by Fan Faye MD (01/25/23 10:57:11)                   Impression:      1. Findings consistent with a small-bowel obstruction at the level of the anastomosis in the deep pelvis.  2. No free fluid, free air or abscess.  3.  A preliminary report was faxed by [facility] shortly after completion of this examination.      Electronically signed by: Fan Faye MD  Date:    01/25/2023  Time:    10:57               Narrative:    EXAMINATION:  CT ABDOMEN PELVIS WITH CONTRAST    CLINICAL HISTORY:  Abd pain associated with nausea and vomiting, hx of SBO;    TECHNIQUE:  Axial CT images were obtained. Iterative reconstruction technique was used. CT/cardiac nuclear exam/s in prior 12 months: 1.    COMPARISON:  CT abdomen pelvis without IV contrast 06/14/2022.    FINDINGS:  There is no hepatic abnormality.  Cholecystectomy.  Intra and extrahepatic biliary ductal dilatation, stable from prior exams, likely related to post cholecystectomy state.    Spleen, pancreas, adrenal glands and kidneys demonstrate no abnormality.  There is no gross gastric abnormality.  Prior partial small bowel resection with anastomosis in the pelvis.  There are dilated fluid-filled loops of small bowel with transition point at the anastomotic site in the pelvis.  There is formed stool just proximal to the anastomosis.  These findings are consistent with a small-bowel obstruction.    No large bowel dilatation.    There is no free fluid or free air.    Hysterectomy.  No abnormality of urinary bladder.  The appendix is not visualized.  No pericecal inflammatory change.    No aortic aneurysm.  No lymph node enlargement.  Visualized lung bases demonstrate bilateral dependent atelectasis/scarring.  No osseous abnormality.                                       Medications   0.9%  NaCl infusion (0 mLs  Intravenous Stopped 1/26/23 0421)   0.9%  NaCl infusion (1,000 mLs Intravenous New Bag 1/26/23 0421)   sodium chloride 0.9% bolus 500 mL 500 mL (0 mLs Intravenous Stopped 1/25/23 0129)   morphine injection 4 mg (4 mg Intravenous Given 1/24/23 2340)   ondansetron injection 4 mg (4 mg Intravenous Given 1/24/23 2341)   iohexoL (OMNIPAQUE 350) injection 100 mL (100 mLs Intravenous Given 1/24/23 2349)   morphine injection 4 mg (4 mg Intravenous Given 1/25/23 0111)   ondansetron injection 4 mg (4 mg Intravenous Given 1/25/23 0111)   0.9%  NaCl infusion (0 mLs Intravenous Stopped 1/25/23 1936)   morphine injection 4 mg (4 mg Intravenous Given 1/25/23 1824)                 ED Course as of 01/26/23 0808   Tue Jan 24, 2023   2335 67-year-old female with recent history of bowel obstruction presents due to abdominal pain associated with nausea and vomiting.  Of note patient did have surgery a few months ago to corrected prior obstruction.  Vomiting abdominal pain started today.  Other systems reviewed and noted to be negative.  History provided by patient son.  Will give medication for nausea and pain control.  Will get labs and obtain CT of the abdomen pelvis for further evaluation [NA]   Wed Jan 25, 2023   0004 CT per my review concerning for small-bowel obstruction [NA]   0025 WBC(!): 16.79 [NA]   0041 CT read notable for multiple dilated loops of small bowel with collapse of the distal small bowel concerning for small bowel obstruction.  Also diffuse bladder wall thickening which may represent infection versus cystitis.  Will obtain urinalysis further examination. [NA]   0109 Spoke with general surgery, Dr. Conklin, they will see the pt when transferred.  [NA]   0126 Spoke with Dr. Love, [facility] is accpeting [NA]   0159 NG TUBE IN SATISFACTORY POSITION.  [NA]      ED Course User Index  [NA] Sal Stafford MD               0700  Patient remains boarding in the emergency department now for 30 hours as no beds  available at receiving facility.  We have surgical coverage on today and I discussed case with Dr. Joe who has accepted the patient here for SBO    Clinical Impression:   Final diagnoses:  [K56.609] SBO (small bowel obstruction) (Primary)  [Z46.59] Encounter for nasogastric (NG) tube placement  [R11.2] Nausea and vomiting, unspecified vomiting type  [Z01.89] Encounter for imaging study to confirm nasogastric (NG) tube placement        ED Disposition Condition    Admit Stable                Jean Mcclure MD  01/26/23 0808

## 2023-01-27 LAB
ANION GAP SERPL CALC-SCNC: 13 MMOL/L (ref 8–16)
BUN SERPL-MCNC: 10 MG/DL (ref 8–23)
CALCIUM SERPL-MCNC: 9 MG/DL (ref 8.7–10.5)
CHLORIDE SERPL-SCNC: 104 MMOL/L (ref 95–110)
CO2 SERPL-SCNC: 22 MMOL/L (ref 23–29)
CREAT SERPL-MCNC: 0.5 MG/DL (ref 0.5–1.4)
EST. GFR  (NO RACE VARIABLE): >60 ML/MIN/1.73 M^2
GLUCOSE SERPL-MCNC: 67 MG/DL (ref 70–110)
MAGNESIUM SERPL-MCNC: 1.8 MG/DL (ref 1.6–2.6)
PHOSPHATE SERPL-MCNC: 3.2 MG/DL (ref 2.7–4.5)
POTASSIUM SERPL-SCNC: 3.3 MMOL/L (ref 3.5–5.1)
SODIUM SERPL-SCNC: 139 MMOL/L (ref 136–145)

## 2023-01-27 PROCEDURE — 83735 ASSAY OF MAGNESIUM: CPT | Performed by: STUDENT IN AN ORGANIZED HEALTH CARE EDUCATION/TRAINING PROGRAM

## 2023-01-27 PROCEDURE — 99233 PR SUBSEQUENT HOSPITAL CARE,LEVL III: ICD-10-PCS | Mod: ,,, | Performed by: STUDENT IN AN ORGANIZED HEALTH CARE EDUCATION/TRAINING PROGRAM

## 2023-01-27 PROCEDURE — 25000003 PHARM REV CODE 250: Performed by: STUDENT IN AN ORGANIZED HEALTH CARE EDUCATION/TRAINING PROGRAM

## 2023-01-27 PROCEDURE — 36415 COLL VENOUS BLD VENIPUNCTURE: CPT | Performed by: STUDENT IN AN ORGANIZED HEALTH CARE EDUCATION/TRAINING PROGRAM

## 2023-01-27 PROCEDURE — 99233 SBSQ HOSP IP/OBS HIGH 50: CPT | Mod: ,,, | Performed by: STUDENT IN AN ORGANIZED HEALTH CARE EDUCATION/TRAINING PROGRAM

## 2023-01-27 PROCEDURE — 84100 ASSAY OF PHOSPHORUS: CPT | Performed by: STUDENT IN AN ORGANIZED HEALTH CARE EDUCATION/TRAINING PROGRAM

## 2023-01-27 PROCEDURE — 63600175 PHARM REV CODE 636 W HCPCS: Performed by: STUDENT IN AN ORGANIZED HEALTH CARE EDUCATION/TRAINING PROGRAM

## 2023-01-27 PROCEDURE — 63600175 PHARM REV CODE 636 W HCPCS: Performed by: EMERGENCY MEDICINE

## 2023-01-27 PROCEDURE — 11000001 HC ACUTE MED/SURG PRIVATE ROOM

## 2023-01-27 PROCEDURE — 80048 BASIC METABOLIC PNL TOTAL CA: CPT | Performed by: STUDENT IN AN ORGANIZED HEALTH CARE EDUCATION/TRAINING PROGRAM

## 2023-01-27 RX ORDER — AMLODIPINE BESYLATE 2.5 MG/1
2.5 TABLET ORAL DAILY
Status: DISCONTINUED | OUTPATIENT
Start: 2023-01-27 | End: 2023-01-28

## 2023-01-27 RX ORDER — LOSARTAN POTASSIUM 50 MG/1
100 TABLET ORAL DAILY
Status: DISCONTINUED | OUTPATIENT
Start: 2023-01-27 | End: 2023-01-28 | Stop reason: HOSPADM

## 2023-01-27 RX ORDER — FAMOTIDINE 20 MG/1
20 TABLET, FILM COATED ORAL 2 TIMES DAILY
Status: DISCONTINUED | OUTPATIENT
Start: 2023-01-27 | End: 2023-01-28 | Stop reason: HOSPADM

## 2023-01-27 RX ORDER — ESCITALOPRAM OXALATE 10 MG/1
10 TABLET ORAL DAILY
Status: DISCONTINUED | OUTPATIENT
Start: 2023-01-27 | End: 2023-01-28 | Stop reason: HOSPADM

## 2023-01-27 RX ADMIN — ENOXAPARIN SODIUM 40 MG: 40 INJECTION SUBCUTANEOUS at 05:01

## 2023-01-27 RX ADMIN — LOSARTAN POTASSIUM 100 MG: 50 TABLET, FILM COATED ORAL at 12:01

## 2023-01-27 RX ADMIN — ESCITALOPRAM OXALATE 10 MG: 10 TABLET ORAL at 12:01

## 2023-01-27 RX ADMIN — FAMOTIDINE 20 MG: 10 INJECTION, SOLUTION INTRAVENOUS at 09:01

## 2023-01-27 RX ADMIN — MORPHINE SULFATE 2 MG: 2 INJECTION, SOLUTION INTRAMUSCULAR; INTRAVENOUS at 12:01

## 2023-01-27 RX ADMIN — FAMOTIDINE 20 MG: 20 TABLET, FILM COATED ORAL at 09:01

## 2023-01-27 RX ADMIN — HYDRALAZINE HYDROCHLORIDE 10 MG: 20 INJECTION, SOLUTION INTRAMUSCULAR; INTRAVENOUS at 09:01

## 2023-01-27 RX ADMIN — AMLODIPINE BESYLATE 2.5 MG: 2.5 TABLET ORAL at 12:01

## 2023-01-27 NOTE — PROGRESS NOTES
[facility] Surg  General Surgery  Progress Note    Subjective:     History of Present Illness:  68yo female with history of several episodes of small bowel obstruction s/p ex lap with HERNANDEZ in June 2022 who presents with repeat SBO.  Presented 30hrs prior with abdominal pain, nausea, and vomiting.  Initially admitted to OSH, but kept at [facility] due to bed availability.  NGT with 800cc dark output in the past 24hrs.  Patient reporting flatus this afternoon.  600cc currently in canister.  C/o mild abdominal soreness.        Post-Op Info:  * No surgery found *         Interval History:   Patient seen and examined.  NAEON.  SBP elevated overnight.  +flatus.  C/o mild abdominal soreness. Denies CP, SOB, nausea, vomiting, fevers, or chills.       Medications:  Continuous Infusions:  Scheduled Meds:   enoxaparin  40 mg Subcutaneous Daily    famotidine (PF)  20 mg Intravenous BID     PRN Meds:hydrALAZINE, morphine, morphine, ondansetron, sodium chloride 0.9%     Review of patient's allergies indicates:  No Known Allergies  Objective:     Vital Signs (Most Recent):  Temp: 98.4 °F (36.9 °C) (01/27/23 0705)  Pulse: 84 (01/27/23 0705)  Resp: 18 (01/27/23 0705)  BP: (!) 177/80 (01/27/23 0705)  SpO2: 97 % (01/27/23 0705)   Vital Signs (24h Range):  Temp:  [98.1 °F (36.7 °C)-98.4 °F (36.9 °C)] 98.4 °F (36.9 °C)  Pulse:  [69-84] 84  Resp:  [16-20] 18  SpO2:  [96 %-98 %] 97 %  BP: (156-189)/() 177/80     Weight: 51.6 kg (113 lb 11.2 oz)  Body mass index is 20.8 kg/m².    Intake/Output - Last 3 Shifts         01/25 0700 01/26 0659 01/26 0700 01/27 0659 01/27 0700 01/28 0659    I.V. (mL/kg) 2000 (38.8)      IV Piggyback       Total Intake(mL/kg) 2000 (38.8)      Urine (mL/kg/hr)  0 (0)     Drains  700     Other 800      Total Output 800 700     Net +1200 -700            Urine Occurrence  2 x             Physical Exam  Constitutional:       General: She is not in acute distress.     Appearance: She is not ill-appearing or  toxic-appearing.   Cardiovascular:      Rate and Rhythm: Normal rate and regular rhythm.   Pulmonary:      Effort: Pulmonary effort is normal. No respiratory distress.   Abdominal:      General: There is no distension.      Palpations: Abdomen is soft.      Tenderness: There is no abdominal tenderness. There is no guarding.      Hernia: No hernia is present.   Skin:     Capillary Refill: Capillary refill takes less than 2 seconds.   Neurological:      Mental Status: She is alert. Mental status is at baseline.       Significant Labs:  I have reviewed all pertinent lab results within the past 24 hours.  CBC:   Recent Labs   Lab 01/26/23  0656   WBC 4.26   RBC 4.50   HGB 11.4*   HCT 34.3*      MCV 76*   MCH 25.3*   MCHC 33.2     CMP:   Recent Labs   Lab 01/24/23  2337 01/26/23  0656 01/27/23  0551   *   < > 67*   CALCIUM 10.0   < > 9.0   ALBUMIN 3.8  --   --    PROT 7.7  --   --       < > 139   K 3.9   < > 3.3*   CO2 25   < > 22*      < > 104   BUN 23   < > 10   CREATININE 1.2   < > 0.5   ALKPHOS 138*  --   --    ALT 43  --   --    AST 50*  --   --    BILITOT 0.5  --   --     < > = values in this interval not displayed.       Significant Diagnostics:  I have reviewed all pertinent imaging results/findings within the past 24 hours.    Assessment/Plan:     Small bowel obstruction  Clamp NGT- check residuals in 5 hours   IVFs   NPO   Pain control   Lovenox; pepcid  Correct lytes as necessary     Essential hypertension  SBP 180s  Hydralazine 10mg PRN for SBP > 160  Restart home medications         Toshia Arechiga MD  General Surgery  [facility]

## 2023-01-27 NOTE — SUBJECTIVE & OBJECTIVE
"No current facility-administered medications on file prior to encounter.     Current Outpatient Medications on File Prior to Encounter   Medication Sig    albuterol (PROVENTIL/VENTOLIN HFA) 90 mcg/actuation inhaler Inhale 2 puffs into the lungs every 4 (four) hours as needed.    aluminum-magnesium hydroxide-simethicone (MAALOX ADVANCED) 200-200-20 mg/5 mL Susp Take 30 mLs by mouth every 6 (six) hours as needed (nausea, abdominal pain, chest pain).    amLODIPine (NORVASC) 2.5 MG tablet Take 2.5 mg by mouth once daily.    atorvastatin (LIPITOR) 20 MG tablet Take 20 mg by mouth once daily.    EScitalopram oxalate (LEXAPRO) 10 MG tablet TAKE 1/2 TABLET BY MOUTH DAILY FOR 7 DAYS, THEN TAKE 1 TABLET BY MOUTH DAILY    famotidine (PEPCID) 20 MG tablet Take 20 mg by mouth 2 (two) times daily.    fluticasone (VERAMYST) 27.5 mcg/actuation nasal spray 2 sprays by Nasal route once daily.    HYDROcodone-acetaminophen (NORCO) 5-325 mg per tablet Take 1 tablet by mouth every 6 (six) hours as needed for Pain. (Patient not taking: Reported on 10/21/2022)    levocetirizine (XYZAL) 5 MG tablet Take 1 tablet (5 mg total) by mouth every evening.    losartan (COZAAR) 100 MG tablet Take 1 tablet (100 mg total) by mouth once daily.    olopatadine (PATANOL) 0.1 % ophthalmic solution Place 1 drop into both eyes 2 (two) times daily.    omeprazole (PRILOSEC) 40 MG capsule Take 1 capsule (40 mg total) by mouth every morning.    pen needle, diabetic 31 gauge x 1/4" Ndle Use with Forteo injection daily    polyethylene glycol (GLYCOLAX) 17 gram/dose powder Dissolve one capful (17g) into liquid and take by mouth once daily.    teriparatide (FORTEO) 20 mcg/dose (600mcg/2.4mL) PnIj Inject 0.08 mLs (20 mcg total) into the skin once daily.       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 Urgent. Can be done by any [facility] physician.  Britton Martinez MD  Covid-19 test 6/22/20 at [facility] - [facility]  Speaks Khmer;  offered and declined; daughter will accompany patient as interp    ENDOSCOPIC ULTRASOUND OF UPPER GASTROINTESTINAL TRACT N/A 5/26/2021    Procedure: ULTRASOUND, UPPER GI TRACT, ENDOSCOPIC;  Surgeon: Britton Martinez MD;  Location: [facility] (2ND FLR);  Service: Endoscopy;  Laterality: N/A;  Need EUS (linear) for dilated bile duct on MRI. Main or [place]. 45 minutes.   Britton Martinez MD   Fully vaccinated - sending in copy of card and bringing it on day of procedure. ttr  *NEEDS Slov[place] *    ESOPHAGOGASTRODUODENOSCOPY N/A 9/9/2020    Procedure: EGD (ESOPHAGOGASTRODUODENOSCOPY);  Surgeon: Devyn Miles MD;  Location: [address];  Service: General;  Laterality: N/A;    HYSTERECTOMY  2012    elective    LAPAROSCOPIC LYSIS OF ADHESIONS  9/9/2020    Procedure: LYSIS, ADHESIONS, LAPAROSCOPIC;  Surgeon: Devyn Miles MD;  Location: [address];  Service: General;;    LYSIS OF ADHESIONS N/A 6/16/2022    Procedure: LYSIS, ADHESIONS;  Surgeon: Jacob Greco MD;  [facility];  Service: General;  Laterality: N/A;    ROBOT-ASSISTED SURGICAL REMOVAL OF STOMACH USING DA RACHEL XI N/A  9/9/2020    Procedure: XI ROBOTIC GASTRECTOMY/GIST PROXIMAL STOMACH;  Surgeon: Devyn Miles MD;  Location: [facility] OR [address] [place] [address];  Service: General;  Laterality: N/A;     Family History    None       Tobacco Use    Smoking status: Never    Smokeless tobacco: Never   Substance and Sexual Activity    Alcohol use: No    Drug use: No    Sexual activity: Yes     Partners: Male     Review of Systems   Constitutional:  Negative for activity change, appetite change, fatigue and fever.   Respiratory:  Negative for apnea, cough, chest tightness and shortness of breath.    Cardiovascular:  Negative for chest pain.   Gastrointestinal:  Positive for abdominal distention and abdominal pain. Negative for anal bleeding, blood in stool, constipation, diarrhea, nausea, rectal pain and vomiting.   All other systems reviewed and are negative.  Objective:     Vital Signs (Most Recent):  Temp: 98.1 °F (36.7 °C) (01/26/23 1930)  Pulse: 72 (01/26/23 1930)  Resp: 16 (01/26/23 1930)  BP: (!) 168/83 (01/26/23 1930)  SpO2: 96 % (01/26/23 1930)   Vital Signs (24h Range):  Temp:  [98.1 °F (36.7 °C)-98.6 °F (37 °C)] 98.1 °F (36.7 °C)  Pulse:  [68-75] 72  Resp:  [14-20] 16  SpO2:  [95 %-97 %] 96 %  BP: (121-189)/(62-86) 168/83     Weight: 51.6 kg (113 lb 11.2 oz)  Body mass index is 20.8 kg/m².    Physical Exam  Constitutional:       General: She is not in acute distress.     Appearance: She is normal weight. She is not ill-appearing or toxic-appearing.   HENT:      Nose:      Comments: NGT in place with L nares   Cardiovascular:      Rate and Rhythm: Normal rate and regular rhythm.   Pulmonary:      Effort: Pulmonary effort is normal. No respiratory distress.   Abdominal:      General: There is no distension.      Palpations: Abdomen is soft.      Tenderness: There is no abdominal tenderness. There is no guarding.      Hernia: No hernia is present.      Comments: Well healed midline incision   Skin:     General: Skin is warm and dry.       Capillary Refill: Capillary refill takes less than 2 seconds.   Neurological:      Mental Status: She is alert. Mental status is at baseline.       Significant Labs:  I have reviewed all pertinent lab results within the past 24 hours.  CBC:   Recent Labs   Lab 01/26/23  0656   WBC 4.26   RBC 4.50   HGB 11.4*   HCT 34.3*      MCV 76*   MCH 25.3*   MCHC 33.2     CMP:   Recent Labs   Lab 01/24/23  2337 01/26/23  0656   * 100   CALCIUM 10.0 8.2*   ALBUMIN 3.8  --    PROT 7.7  --     140   K 3.9 3.8   CO2 25 28    107   BUN 23 13   CREATININE 1.2 0.7   ALKPHOS 138*  --    ALT 43  --    AST 50*  --    BILITOT 0.5  --        Significant Diagnostics:  I have reviewed all pertinent imaging results/findings within the past 24 hours.

## 2023-01-27 NOTE — PLAN OF CARE
Plan of care reviewed at bedside, needs reinforcement. Cloraseptic given for discomfort from NG per orders. Denies any other pain or discomfort. Call bell in reach, will continue to monitor.

## 2023-01-27 NOTE — HPI
68yo female with history of several episodes of small bowel obstruction s/p ex lap with HERNANDEZ in June 2022 who presents with repeat SBO.  Presented 30hrs prior with abdominal pain, nausea, and vomiting.  Initially admitted to OSH, but kept at [place] due to bed availability.  NGT with 800cc dark output in the past 24hrs.  Patient reporting flatus this afternoon.  600cc currently in canister.  C/o mild abdominal soreness.

## 2023-01-27 NOTE — PLAN OF CARE
01/27/23 1335   Medicare Message   Important Message from Medicare regarding Discharge Appeal Rights Explained to patient/caregiver;Given to patient/caregiver;Signed/date by patient/caregiver   Date IMM was signed 01/27/23   Time IMM was signed [id number]     Spoke with patient at bedside.  Reviewed Medicare IM appeal rights with patient.  Verbalizes understanding.  Signs IM.  Given copy of IM.

## 2023-01-27 NOTE — ASSESSMENT & PLAN NOTE
Continue NGT to LIWS  Will monitory overnight output  Possible gastrograffin challenge in AM   IVFs   NPO   Pain control   Lovenox; pepcid  Correct lytes as necessary

## 2023-01-27 NOTE — ASSESSMENT & PLAN NOTE
Clamp NGT- check residuals in 5 hours   IVFs   NPO   Pain control   Lovenox; pepcid  Correct lytes as necessary

## 2023-01-27 NOTE — SUBJECTIVE & OBJECTIVE
Interval History:   Patient seen and examined.  NAEON.  SBP elevated overnight.  +flatus.  C/o mild abdominal soreness. Denies CP, SOB, nausea, vomiting, fevers, or chills.       Medications:  Continuous Infusions:  Scheduled Meds:   enoxaparin  40 mg Subcutaneous Daily    famotidine (PF)  20 mg Intravenous BID     PRN Meds:hydrALAZINE, morphine, morphine, ondansetron, sodium chloride 0.9%     Review of patient's allergies indicates:  No Known Allergies  Objective:     Vital Signs (Most Recent):  Temp: 98.4 °F (36.9 °C) (01/27/23 0705)  Pulse: 84 (01/27/23 0705)  Resp: 18 (01/27/23 0705)  BP: (!) 177/80 (01/27/23 0705)  SpO2: 97 % (01/27/23 0705)   Vital Signs (24h Range):  Temp:  [98.1 °F (36.7 °C)-98.4 °F (36.9 °C)] 98.4 °F (36.9 °C)  Pulse:  [69-84] 84  Resp:  [16-20] 18  SpO2:  [96 %-98 %] 97 %  BP: (156-189)/() 177/80     Weight: 51.6 kg (113 lb 11.2 oz)  Body mass index is 20.8 kg/m².    Intake/Output - Last 3 Shifts         01/25 0700  01/26 0659 01/26 0700  01/27 0659 01/27 0700  01/28 0659    I.V. (mL/kg) 2000 (38.8)      IV Piggyback       Total Intake(mL/kg) 2000 (38.8)      Urine (mL/kg/hr)  0 (0)     Drains  700     Other 800      Total Output 800 700     Net +1200 -700            Urine Occurrence  2 x             Physical Exam  Constitutional:       General: She is not in acute distress.     Appearance: She is not ill-appearing or toxic-appearing.   Cardiovascular:      Rate and Rhythm: Normal rate and regular rhythm.   Pulmonary:      Effort: Pulmonary effort is normal. No respiratory distress.   Abdominal:      General: There is no distension.      Palpations: Abdomen is soft.      Tenderness: There is no abdominal tenderness. There is no guarding.      Hernia: No hernia is present.   Skin:     Capillary Refill: Capillary refill takes less than 2 seconds.   Neurological:      Mental Status: She is alert. Mental status is at baseline.       Significant Labs:  I have reviewed all pertinent lab  results within the past 24 hours.  CBC:   Recent Labs   Lab 01/26/23  0656   WBC 4.26   RBC 4.50   HGB 11.4*   HCT 34.3*      MCV 76*   MCH 25.3*   MCHC 33.2     CMP:   Recent Labs   Lab 01/24/23  2337 01/26/23  0656 01/27/23  0551   *   < > 67*   CALCIUM 10.0   < > 9.0   ALBUMIN 3.8  --   --    PROT 7.7  --   --       < > 139   K 3.9   < > 3.3*   CO2 25   < > 22*      < > 104   BUN 23   < > 10   CREATININE 1.2   < > 0.5   ALKPHOS 138*  --   --    ALT 43  --   --    AST 50*  --   --    BILITOT 0.5  --   --     < > = values in this interval not displayed.       Significant Diagnostics:  I have reviewed all pertinent imaging results/findings within the past 24 hours.

## 2023-01-27 NOTE — H&P
[facility] Surg  General Surgery  History & Physical    Patient Name: Meredith Keen  MRN: [id number]  Admission Date: 1/24/2023  Attending Physician: Toshia Arechiga MD   Primary Care Provider: Alla Jade MD    Patient information was obtained from patient, past medical records and ER records.     Subjective:     Chief Complaint/Reason for Admission: small bowel obstruction    History of Present Illness: 68yo female with history of several episodes of small bowel obstruction s/p ex lap with HERNANDEZ in June 2022 who presents with repeat SBO.  Presented 30hrs prior with abdominal pain, nausea, and vomiting.  Initially admitted to OS, but kept at [place] due to bed availability.  NGT with 800cc dark output in the past 24hrs.  Patient reporting flatus this afternoon.  600cc currently in canister.  C/o mild abdominal soreness.        No current facility-administered medications on file prior to encounter.     Current Outpatient Medications on File Prior to Encounter   Medication Sig    albuterol (PROVENTIL/VENTOLIN HFA) 90 mcg/actuation inhaler Inhale 2 puffs into the lungs every 4 (four) hours as needed.    aluminum-magnesium hydroxide-simethicone (MAALOX ADVANCED) 200-200-20 mg/5 mL Susp Take 30 mLs by mouth every 6 (six) hours as needed (nausea, abdominal pain, chest pain).    amLODIPine (NORVASC) 2.5 MG tablet Take 2.5 mg by mouth once daily.    atorvastatin (LIPITOR) 20 MG tablet Take 20 mg by mouth once daily.    EScitalopram oxalate (LEXAPRO) 10 MG tablet TAKE 1/2 TABLET BY MOUTH DAILY FOR 7 DAYS, THEN TAKE 1 TABLET BY MOUTH DAILY    famotidine (PEPCID) 20 MG tablet Take 20 mg by mouth 2 (two) times daily.    fluticasone (VERAMYST) 27.5 mcg/actuation nasal spray 2 sprays by Nasal route once daily.    HYDROcodone-acetaminophen (NORCO) 5-325 mg per tablet Take 1 tablet by mouth every 6 (six) hours as needed for Pain. (Patient not taking: Reported on 10/21/2022)    levocetirizine (XYZAL) 5 MG tablet  "Take 1 tablet (5 mg total) by mouth every evening.    losartan (COZAAR) 100 MG tablet Take 1 tablet (100 mg total) by mouth once daily.    olopatadine (PATANOL) 0.1 % ophthalmic solution Place 1 drop into both eyes 2 (two) times daily.    omeprazole (PRILOSEC) 40 MG capsule Take 1 capsule (40 mg total) by mouth every morning.    pen needle, diabetic 31 gauge x 1/4" Ndle Use with Forteo injection daily    polyethylene glycol (GLYCOLAX) 17 gram/dose powder Dissolve one capful (17g) into liquid and take by mouth once daily.    teriparatide (FORTEO) 20 mcg/dose (600mcg/2.4mL) PnIj Inject 0.08 mLs (20 mcg total) into the skin once daily.       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     Past Surgical History:   Procedure Laterality Date    APPENDECTOMY      CHOLECYSTECTOMY      ENDOSCOPIC ULTRASOUND OF UPPER GASTROINTESTINAL TRACT N/A 11/26/2019    Procedure: ULTRASOUND, UPPER GI TRACT, ENDOSCOPIC;  Surgeon: Britton Martinez MD;  Location: [facility];  Service: Endoscopy;  Laterality: N/A;    ENDOSCOPIC ULTRASOUND OF UPPER GASTROINTESTINAL TRACT N/A 6/24/2020    Procedure: ULTRASOUND, UPPER GI TRACT, ENDOSCOPIC;  Surgeon: Delfino Jasso MD;  Location: [facility] ([address] [place] [address]);  Service: Endoscopy;  Laterality: N/A;  EUS (linear) for abnormal CT scan. Urgent. Can be done by any [facility] physician.  Britton Martinez MD  Covid-19 test 6/22/20 at [facility] - [facility]  Speaks Grenadian;  offered and declined; daughter will accompany patient as interp    ENDOSCOPIC ULTRASOUND OF UPPER GASTROINTESTINAL TRACT N/A 5/26/2021    Procedure: ULTRASOUND, UPPER GI TRACT, ENDOSCOPIC;  Surgeon: Britton Martinez MD;  [facility]  Service: " Endoscopy;  Laterality: N/A;  Need EUS (linear) for dilated bile duct on MRI. [facility] or [facility]. 45 minutes.   Britton Martinez MD   Fully vaccinated - sending in copy of card and bringing it on day of procedure. ttr  *NEEDS Haitian *    ESOPHAGOGASTRODUODENOSCOPY N/A 9/9/2020    Procedure: EGD (ESOPHAGOGASTRODUODENOSCOPY);  Surgeon: Devyn Miles MD;  [facility];  Service: General;  Laterality: N/A;    HYSTERECTOMY  2012    elective    LAPAROSCOPIC LYSIS OF ADHESIONS  9/9/2020    Procedure: LYSIS, ADHESIONS, LAPAROSCOPIC;  Surgeon: Devyn Miles MD;  [facility];  Service: General;;    LYSIS OF ADHESIONS N/A 6/16/2022    Procedure: LYSIS, ADHESIONS;  Surgeon: Jacob Greco MD;  [facility];  Service: General;  Laterality: N/A;    ROBOT-ASSISTED SURGICAL REMOVAL OF STOMACH USING DA RACHEL XI N/A 9/9/2020    Procedure: XI ROBOTIC GASTRECTOMY/GIST PROXIMAL STOMACH;  Surgeon: Devyn Miles MD;  [facility];  Service: General;  Laterality: N/A;     Family History    None       Tobacco Use    Smoking status: Never    Smokeless tobacco: Never   Substance and Sexual Activity    Alcohol use: No    Drug use: No    Sexual activity: Yes     Partners: Male     Review of Systems   Constitutional:  Negative for activity change, appetite change, fatigue and fever.   Respiratory:  Negative for apnea, cough, chest tightness and shortness of breath.    Cardiovascular:  Negative for chest pain.   Gastrointestinal:  Positive for abdominal distention and abdominal pain. Negative for anal bleeding, blood in stool, constipation, diarrhea, nausea, rectal pain and vomiting.   All other systems reviewed and are negative.  Objective:     Vital Signs (Most Recent):  Temp: 98.1 °F (36.7 °C) (01/26/23 1930)  Pulse: 72 (01/26/23 1930)  Resp: 16 (01/26/23 1930)  BP: (!) 168/83 (01/26/23 1930)  SpO2: 96 % (01/26/23 1930)   Vital Signs (24h Range):  Temp:   [98.1 °F (36.7 °C)-98.6 °F (37 °C)] 98.1 °F (36.7 °C)  Pulse:  [68-75] 72  Resp:  [14-20] 16  SpO2:  [95 %-97 %] 96 %  BP: (121-189)/(62-86) 168/83     Weight: 51.6 kg (113 lb 11.2 oz)  Body mass index is 20.8 kg/m².    Physical Exam  Constitutional:       General: She is not in acute distress.     Appearance: She is normal weight. She is not ill-appearing or toxic-appearing.   HENT:      Nose:      Comments: NGT in place with L nares   Cardiovascular:      Rate and Rhythm: Normal rate and regular rhythm.   Pulmonary:      Effort: Pulmonary effort is normal. No respiratory distress.   Abdominal:      General: There is no distension.      Palpations: Abdomen is soft.      Tenderness: There is no abdominal tenderness. There is no guarding.      Hernia: No hernia is present.      Comments: Well healed midline incision   Skin:     General: Skin is warm and dry.      Capillary Refill: Capillary refill takes less than 2 seconds.   Neurological:      Mental Status: She is alert. Mental status is at baseline.       Significant Labs:  I have reviewed all pertinent lab results within the past 24 hours.  CBC:   Recent Labs   Lab 01/26/23  0656   WBC 4.26   RBC 4.50   HGB 11.4*   HCT 34.3*      MCV 76*   MCH 25.3*   MCHC 33.2     CMP:   Recent Labs   Lab 01/24/23  2337 01/26/23  0656   * 100   CALCIUM 10.0 8.2*   ALBUMIN 3.8  --    PROT 7.7  --     140   K 3.9 3.8   CO2 25 28    107   BUN 23 13   CREATININE 1.2 0.7   ALKPHOS 138*  --    ALT 43  --    AST 50*  --    BILITOT 0.5  --        Significant Diagnostics:  I have reviewed all pertinent imaging results/findings within the past 24 hours.      Assessment/Plan:     Small bowel obstruction  Continue NGT to LIWS  Will monitory overnight output  Possible gastrograffin challenge in AM   IVFs   NPO   Pain control   Lovenox; pepcid  Correct lytes as necessary     Essential hypertension  SBP 180s  Hydralazine 10mg PRN for SBP > 160      VTE Risk Mitigation  (From admission, onward)         Ordered     enoxaparin injection 40 mg  Daily         01/26/23 1955     IP VTE LOW RISK PATIENT  Once         01/26/23 0940     Place sequential compression device  Until discontinued         01/26/23 0940                Toshia Arechiga MD  General Surgery  [facility]

## 2023-01-28 VITALS
WEIGHT: 113.69 LBS | SYSTOLIC BLOOD PRESSURE: 167 MMHG | DIASTOLIC BLOOD PRESSURE: 70 MMHG | OXYGEN SATURATION: 95 % | TEMPERATURE: 98 F | BODY MASS INDEX: 20.92 KG/M2 | RESPIRATION RATE: 18 BRPM | HEIGHT: 62 IN | HEART RATE: 75 BPM

## 2023-01-28 PROBLEM — K56.609 SMALL BOWEL OBSTRUCTION: Status: RESOLVED | Noted: 2023-01-26 | Resolved: 2023-01-28

## 2023-01-28 PROCEDURE — 25000003 PHARM REV CODE 250: Performed by: STUDENT IN AN ORGANIZED HEALTH CARE EDUCATION/TRAINING PROGRAM

## 2023-01-28 RX ORDER — AMLODIPINE BESYLATE 2.5 MG/1
2.5 TABLET ORAL NIGHTLY
Status: DISCONTINUED | OUTPATIENT
Start: 2023-01-28 | End: 2023-01-28 | Stop reason: HOSPADM

## 2023-01-28 RX ADMIN — ESCITALOPRAM OXALATE 10 MG: 10 TABLET ORAL at 09:01

## 2023-01-28 RX ADMIN — LOSARTAN POTASSIUM 100 MG: 50 TABLET, FILM COATED ORAL at 09:01

## 2023-01-28 RX ADMIN — FAMOTIDINE 20 MG: 20 TABLET, FILM COATED ORAL at 09:01

## 2023-01-28 NOTE — SUBJECTIVE & OBJECTIVE
Interval History:   Patient seen and examined.  NAEON.  NGT remove yesterday.  Tolerated CLD.  +flatus but no BM.  Voices no complaints.     Medications:  Continuous Infusions:  Scheduled Meds:   amLODIPine  2.5 mg Oral Nightly    enoxaparin  40 mg Subcutaneous Daily    EScitalopram oxalate  10 mg Oral Daily    famotidine  20 mg Oral BID    losartan  100 mg Oral Daily     PRN Meds:hydrALAZINE, morphine, morphine, ondansetron, sodium chloride 0.9%     Review of patient's allergies indicates:  No Known Allergies  Objective:     Vital Signs (Most Recent):  Temp: 98.3 °F (36.8 °C) (01/28/23 1135)  Pulse: 75 (01/28/23 1135)  Resp: 18 (01/28/23 1135)  BP: (!) 167/70 (01/28/23 1135)  SpO2: 95 % (01/28/23 1135)   Vital Signs (24h Range):  Temp:  [98.1 °F (36.7 °C)-99 °F (37.2 °C)] 98.3 °F (36.8 °C)  Pulse:  [69-83] 75  Resp:  [18-20] 18  SpO2:  [95 %-98 %] 95 %  BP: (113-187)/(52-84) 167/70     Weight: 51.6 kg (113 lb 11.2 oz)  Body mass index is 20.8 kg/m².    Intake/Output - Last 3 Shifts         01/26 0700  01/27 0659 01/27 0700  01/28 0659 01/28 0700  01/29 0659    P.O.  600     I.V. (mL/kg)       Total Intake(mL/kg)  600 (11.6)     Urine (mL/kg/hr) 0 (0)      Drains 700      Other       Total Output 700      Net -700 +600            Urine Occurrence 2 x 8 x             Physical Exam  Constitutional:       General: She is not in acute distress.     Appearance: She is not ill-appearing or toxic-appearing.   Cardiovascular:      Rate and Rhythm: Normal rate and regular rhythm.   Pulmonary:      Effort: Pulmonary effort is normal. No respiratory distress.   Abdominal:      General: There is no distension.      Palpations: Abdomen is soft.      Tenderness: There is no abdominal tenderness. There is no guarding.      Hernia: No hernia is present.   Skin:     Capillary Refill: Capillary refill takes less than 2 seconds.   Neurological:      Mental Status: She is alert. Mental status is at baseline.       Significant  Labs:  CBC:   Recent Labs   Lab 01/26/23  0656   WBC 4.26   RBC 4.50   HGB 11.4*   HCT 34.3*      MCV 76*   MCH 25.3*   MCHC 33.2     CMP:   Recent Labs   Lab 01/24/23  2337 01/26/23  0656 01/27/23  0551   *   < > 67*   CALCIUM 10.0   < > 9.0   ALBUMIN 3.8  --   --    PROT 7.7  --   --       < > 139   K 3.9   < > 3.3*   CO2 25   < > 22*      < > 104   BUN 23   < > 10   CREATININE 1.2   < > 0.5   ALKPHOS 138*  --   --    ALT 43  --   --    AST 50*  --   --    BILITOT 0.5  --   --     < > = values in this interval not displayed.       Significant Diagnostics:  I have reviewed all pertinent imaging results/findings within the past 24 hours.

## 2023-01-28 NOTE — PLAN OF CARE
Plan of care reviewed with pt. Pt voiced understanding. Pt c/o headache due to bp being elevated all during the day but states is better after receiving prn hydralazine. No apparent distress noted. Fall precautions maintained. Pt remains free fall or injury. Bed in lowest position, locked, and call light within reach. Side rails up x's 2 with slip resistant socks on.     Problem: Adult Inpatient Plan of Care  Goal: Plan of Care Review  Outcome: Ongoing, Progressing  Flowsheets (Taken 1/28/2023 0628)  Plan of Care Reviewed With: patient  Goal: Patient-Specific Goal (Individualized)  Outcome: Ongoing, Progressing  Goal: Absence of Hospital-Acquired Illness or Injury  Outcome: Ongoing, Progressing  Goal: Optimal Comfort and Wellbeing  Outcome: Ongoing, Progressing     Problem: Fall Injury Risk  Goal: Absence of Fall and Fall-Related Injury  Outcome: Ongoing, Progressing     Problem: Pain Acute  Goal: Acceptable Pain Control and Functional Ability  Outcome: Ongoing, Progressing

## 2023-01-28 NOTE — PROGRESS NOTES
[facility] Surg  General Surgery  Progress Note    Subjective:     History of Present Illness:  66yo female with history of several episodes of small bowel obstruction s/p ex lap with HERNANDEZ in June 2022 who presents with repeat SBO.  Presented 30hrs prior with abdominal pain, nausea, and vomiting.  Initially admitted to [facility], but kept at [place] due to bed availability.  NGT with 800cc dark output in the past 24hrs.  Patient reporting flatus this afternoon.  600cc currently in canister.  C/o mild abdominal soreness.        Post-Op Info:  * No surgery found *         Interval History:   Patient seen and examined.  NAEON.  NGT remove yesterday.  Tolerated CLD.  +flatus but no BM.  Voices no complaints.     Medications:  Continuous Infusions:  Scheduled Meds:   amLODIPine  2.5 mg Oral Nightly    enoxaparin  40 mg Subcutaneous Daily    EScitalopram oxalate  10 mg Oral Daily    famotidine  20 mg Oral BID    losartan  100 mg Oral Daily     PRN Meds:hydrALAZINE, morphine, morphine, ondansetron, sodium chloride 0.9%     Review of patient's allergies indicates:  No Known Allergies  Objective:     Vital Signs (Most Recent):  Temp: 98.3 °F (36.8 °C) (01/28/23 1135)  Pulse: 75 (01/28/23 1135)  Resp: 18 (01/28/23 1135)  BP: (!) 167/70 (01/28/23 1135)  SpO2: 95 % (01/28/23 1135)   Vital Signs (24h Range):  Temp:  [98.1 °F (36.7 °C)-99 °F (37.2 °C)] 98.3 °F (36.8 °C)  Pulse:  [69-83] 75  Resp:  [18-20] 18  SpO2:  [95 %-98 %] 95 %  BP: (113-187)/(52-84) 167/70     Weight: 51.6 kg (113 lb 11.2 oz)  Body mass index is 20.8 kg/m².    Intake/Output - Last 3 Shifts         01/26 0700  01/27 0659 01/27 0700 01/28 0659 01/28 0700 01/29 0659    P.O.  600     I.V. (mL/kg)       Total Intake(mL/kg)  600 (11.6)     Urine (mL/kg/hr) 0 (0)      Drains 700      Other       Total Output 700      Net -700 +600            Urine Occurrence 2 x 8 x             Physical Exam  Constitutional:       General: She is not in acute distress.      Appearance: She is not ill-appearing or toxic-appearing.   Cardiovascular:      Rate and Rhythm: Normal rate and regular rhythm.   Pulmonary:      Effort: Pulmonary effort is normal. No respiratory distress.   Abdominal:      General: There is no distension.      Palpations: Abdomen is soft.      Tenderness: There is no abdominal tenderness. There is no guarding.      Hernia: No hernia is present.   Skin:     Capillary Refill: Capillary refill takes less than 2 seconds.   Neurological:      Mental Status: She is alert. Mental status is at baseline.       Significant Labs:  CBC:   Recent Labs   Lab 01/26/23  0656   WBC 4.26   RBC 4.50   HGB 11.4*   HCT 34.3*      MCV 76*   MCH 25.3*   MCHC 33.2     CMP:   Recent Labs   Lab 01/24/23  2337 01/26/23  0656 01/27/23  0551   *   < > 67*   CALCIUM 10.0   < > 9.0   ALBUMIN 3.8  --   --    PROT 7.7  --   --       < > 139   K 3.9   < > 3.3*   CO2 25   < > 22*      < > 104   BUN 23   < > 10   CREATININE 1.2   < > 0.5   ALKPHOS 138*  --   --    ALT 43  --   --    AST 50*  --   --    BILITOT 0.5  --   --     < > = values in this interval not displayed.       Significant Diagnostics:  I have reviewed all pertinent imaging results/findings within the past 24 hours.    Assessment/Plan:     * Small bowel obstruction  Soft diet  Discontinue IVFs   Correct lytes as necessary   Anticipate discharge after soft diet tolerance today    Essential hypertension  SBP 180s  Hydralazine 10mg PRN for SBP > 160  Restart home medications         Toshia Arechiga MD  General Surgery  [facility] S[facility]

## 2023-01-28 NOTE — DISCHARGE SUMMARY
[facility] Surg  General Surgery  Discharge Summary      Patient Name: Meredith Keen  MRN: [id number]  Admission Date: 1/24/2023  Hospital Length of Stay: 2 days  Discharge Date and Time:  01/28/2023 1:36 PM  Attending Physician: Toshia Arechiga MD   Discharging Provider: Toshia Arechiga MD  Primary Care Provider: Alla Jade MD    HPI:   68yo female with history of several episodes of small bowel obstruction s/p ex lap with HERNANDEZ in June 2022 who presents with repeat SBO.  Presented 30hrs prior with abdominal pain, nausea, and vomiting.  Initially admitted to OS, but kept at [place] due to bed availability.  NGT with 800cc dark output in the past 24hrs.  Patient reporting flatus this afternoon.  600cc currently in canister.  C/o mild abdominal soreness.        * No surgery found *      Indwelling Lines/Drains at time of discharge:   Lines/Drains/Airways     None               Hospital Course: Patient admitted to med/surg floor with NGT, bowel rest and IV resuscitation.  NGT clamped and removed on 1/27 after return of bowel function.  Patient tolerating soft diet on 1/28 without abdominal pain and deemed eligible for discharge.       Goals of Care Treatment Preferences:  Code Status: Full Code      Consults:     Significant Diagnostic Studies: see above    Pending Diagnostic Studies:     None        Final Active Diagnoses:    Diagnosis Date Noted POA    Essential hypertension [I10] 05/28/2017 Yes      Problems Resolved During this Admission:    Diagnosis Date Noted Date Resolved POA    PRINCIPAL PROBLEM:  Small bowel obstruction [K56.609] 01/26/2023 01/28/2023 Yes      Discharged Condition: good    Disposition: Home or Self Care    Follow Up:    Patient Instructions:      Diet Dysphagia Mechanical Soft   Order Comments: Soft, low residue     Notify your health care provider if you experience any of the following:  temperature >100.4     Notify your health care provider if you experience any of the  following:  persistent nausea and vomiting or diarrhea     Notify your health care provider if you experience any of the following:  severe uncontrolled pain     Notify your health care provider if you experience any of the following:  redness, tenderness, or signs of infection (pain, swelling, redness, odor or green/yellow discharge around incision site)     Activity as tolerated     Medications:  Reconciled Home Medications:      Medication List      CONTINUE taking these medications    albuterol 90 mcg/actuation inhaler  Commonly known as: PROVENTIL/VENTOLIN HFA  Inhale 2 puffs into the lungs every 4 (four) hours as needed.     aluminum-magnesium hydroxide-simethicone 200-200-20 mg/5 mL Susp  Commonly known as: MAALOX ADVANCED  Take 30 mLs by mouth every 6 (six) hours as needed (nausea, abdominal pain, chest pain).     amLODIPine 2.5 MG tablet  Commonly known as: NORVASC  Take 2.5 mg by mouth once daily.     atorvastatin 20 MG tablet  Commonly known as: LIPITOR  Take 20 mg by mouth once daily.     EScitalopram oxalate 10 MG tablet  Commonly known as: LEXAPRO  TAKE 1/2 TABLET BY MOUTH DAILY FOR 7 DAYS, THEN TAKE 1 TABLET BY MOUTH DAILY     famotidine 20 MG tablet  Commonly known as: PEPCID  Take 20 mg by mouth 2 (two) times daily.     fluticasone 27.5 mcg/actuation nasal spray  Commonly known as: VERAMYST  2 sprays by Nasal route once daily.     FORTEO 20 mcg/dose (600mcg/2.4mL) Pnij  Generic drug: teriparatide  Inject 0.08 mLs (20 mcg total) into the skin once daily.     levocetirizine 5 MG tablet  Commonly known as: XYZAL  Take 1 tablet (5 mg total) by mouth every evening.     losartan 100 MG tablet  Commonly known as: COZAAR  Take 1 tablet (100 mg total) by mouth once daily.     olopatadine 0.1 % ophthalmic solution  Commonly known as: PATANOL  Place 1 drop into both eyes 2 (two) times daily.     omeprazole 40 MG capsule  Commonly known as: PRILOSEC  Take 1 capsule (40 mg total) by mouth every morning.     pen  "needle, diabetic 31 gauge x 1/4" Ndle  Use with Forteo injection daily     polyethylene glycol 17 gram/dose powder  Commonly known as: GLYCOLAX  Dissolve one capful (17g) into liquid and take by mouth once daily.        ASK your doctor about these medications    HYDROcodone-acetaminophen 5-325 mg per tablet  Commonly known as: NORCO  Take 1 tablet by mouth every 6 (six) hours as needed for Pain.          Time spent on the discharge of patient: 5 minutes    Toshia Arechiga MD  General Surgery  [facility]  "

## 2023-01-28 NOTE — ASSESSMENT & PLAN NOTE
Soft diet  Discontinue IVFs   Correct lytes as necessary   Anticipate discharge after soft diet tolerance today

## 2023-01-28 NOTE — HOSPITAL COURSE
Patient admitted to med/surg floor with NGT, bowel rest and IV resuscitation.  NGT clamped and removed on 1/27 after return of bowel function.  Patient tolerating soft diet on 1/28 without abdominal pain and deemed eligible for discharge.

## 2023-01-28 NOTE — PROGRESS NOTES
01/27/23 2102   Vital Signs   Pulse 69   BP (!) 162/77   MAP (mmHg) 110     Pt c/o headache stating she knows her bp is high. Pt asking for bp meds. Pt meets criteria for prn meds due to bp being >160. Pt states she takes Losartan in the morning and Norvasc at night. Pt requesting that medication get ordered with how she takes it at home due to her bp not being controlled. Medication changed at this time.

## 2023-01-28 NOTE — PLAN OF CARE
Discharge instructions reviewed with the patient. Peripheral IV removed and intact. Patient wheeled downstairs by staff to personal vehicle.

## 2023-02-15 ENCOUNTER — HOSPITAL ENCOUNTER (EMERGENCY)
Facility: HOSPITAL | Age: 68
Discharge: HOME | End: 2023-02-15
Attending: STUDENT IN AN ORGANIZED HEALTH CARE EDUCATION/TRAINING PROGRAM
Payer: MEDICARE

## 2023-02-15 VITALS
SYSTOLIC BLOOD PRESSURE: 186 MMHG | HEART RATE: 63 BPM | WEIGHT: 113.75 LBS | DIASTOLIC BLOOD PRESSURE: 83 MMHG | TEMPERATURE: 98 F | BODY MASS INDEX: 20.81 KG/M2 | OXYGEN SATURATION: 97 % | RESPIRATION RATE: 16 BRPM

## 2023-02-15 DIAGNOSIS — R91.1 INCIDENTAL LUNG NODULE, > 3MM AND < 8MM: ICD-10-CM

## 2023-02-15 DIAGNOSIS — R10.84 GENERALIZED ABDOMINAL PAIN: Primary | ICD-10-CM

## 2023-02-15 LAB
ALBUMIN SERPL BCP-MCNC: 3.7 G/DL (ref 3.5–5.2)
ALP SERPL-CCNC: 107 U/L (ref 55–135)
ALT SERPL W/O P-5'-P-CCNC: 12 U/L (ref 10–44)
ANION GAP SERPL CALC-SCNC: 6 MMOL/L (ref 8–16)
AST SERPL-CCNC: 17 U/L (ref 10–40)
BASOPHILS # BLD AUTO: 0.05 K/UL (ref 0–0.2)
BASOPHILS NFR BLD: 0.7 % (ref 0–1.9)
BILIRUB SERPL-MCNC: 0.5 MG/DL (ref 0.1–1)
BILIRUB UR QL STRIP: NEGATIVE
BUN SERPL-MCNC: 12 MG/DL (ref 8–23)
CALCIUM SERPL-MCNC: 9.6 MG/DL (ref 8.7–10.5)
CHLORIDE SERPL-SCNC: 105 MMOL/L (ref 95–110)
CLARITY UR REFRACT.AUTO: CLEAR
CO2 SERPL-SCNC: 25 MMOL/L (ref 23–29)
COLOR UR AUTO: YELLOW
CREAT SERPL-MCNC: 0.6 MG/DL (ref 0.5–1.4)
DIFFERENTIAL METHOD: ABNORMAL
EOSINOPHIL # BLD AUTO: 0.2 K/UL (ref 0–0.5)
EOSINOPHIL NFR BLD: 2.8 % (ref 0–8)
ERYTHROCYTE [DISTWIDTH] IN BLOOD BY AUTOMATED COUNT: 14.7 % (ref 11.5–14.5)
EST. GFR  (NO RACE VARIABLE): >60 ML/MIN/1.73 M^2
GLUCOSE SERPL-MCNC: 82 MG/DL (ref 70–110)
GLUCOSE UR QL STRIP: NEGATIVE
HCT VFR BLD AUTO: 35 % (ref 37–48.5)
HGB BLD-MCNC: 11.4 G/DL (ref 12–16)
HGB UR QL STRIP: NEGATIVE
IMM GRANULOCYTES # BLD AUTO: 0.02 K/UL (ref 0–0.04)
IMM GRANULOCYTES NFR BLD AUTO: 0.3 % (ref 0–0.5)
KETONES UR QL STRIP: ABNORMAL
LEUKOCYTE ESTERASE UR QL STRIP: NEGATIVE
LIPASE SERPL-CCNC: 35 U/L (ref 4–60)
LYMPHOCYTES # BLD AUTO: 1.6 K/UL (ref 1–4.8)
LYMPHOCYTES NFR BLD: 22.7 % (ref 18–48)
MAGNESIUM SERPL-MCNC: 1.9 MG/DL (ref 1.6–2.6)
MCH RBC QN AUTO: 24.9 PG (ref 27–31)
MCHC RBC AUTO-ENTMCNC: 32.6 G/DL (ref 32–36)
MCV RBC AUTO: 76 FL (ref 82–98)
MONOCYTES # BLD AUTO: 0.6 K/UL (ref 0.3–1)
MONOCYTES NFR BLD: 8.1 % (ref 4–15)
NEUTROPHILS # BLD AUTO: 4.6 K/UL (ref 1.8–7.7)
NEUTROPHILS NFR BLD: 65.4 % (ref 38–73)
NITRITE UR QL STRIP: NEGATIVE
NRBC BLD-RTO: 0 /100 WBC
PH UR STRIP: 7 [PH] (ref 5–8)
PLATELET # BLD AUTO: 262 K/UL (ref 150–450)
PMV BLD AUTO: 9.3 FL (ref 9.2–12.9)
POTASSIUM SERPL-SCNC: 4 MMOL/L (ref 3.5–5.1)
PROT SERPL-MCNC: 6.7 G/DL (ref 6–8.4)
PROT UR QL STRIP: NEGATIVE
RBC # BLD AUTO: 4.58 M/UL (ref 4–5.4)
SODIUM SERPL-SCNC: 136 MMOL/L (ref 136–145)
SP GR UR STRIP: 1.01 (ref 1–1.03)
URN SPEC COLLECT METH UR: ABNORMAL
WBC # BLD AUTO: 7.05 K/UL (ref 3.9–12.7)

## 2023-02-15 PROCEDURE — 93010 EKG 12-LEAD: ICD-10-PCS | Mod: ,,, | Performed by: INTERNAL MEDICINE

## 2023-02-15 PROCEDURE — 81003 URINALYSIS AUTO W/O SCOPE: CPT

## 2023-02-15 PROCEDURE — 80048 BASIC METABOLIC PNL TOTAL CA: CPT | Mod: XB

## 2023-02-15 PROCEDURE — 99285 EMERGENCY DEPT VISIT HI MDM: CPT | Mod: 25

## 2023-02-15 PROCEDURE — 25000003 PHARM REV CODE 250

## 2023-02-15 PROCEDURE — 99284 PR EMERGENCY DEPT VISIT,LEVEL IV: ICD-10-PCS | Mod: ,,, | Performed by: STUDENT IN AN ORGANIZED HEALTH CARE EDUCATION/TRAINING PROGRAM

## 2023-02-15 PROCEDURE — 93010 ELECTROCARDIOGRAM REPORT: CPT | Mod: ,,, | Performed by: INTERNAL MEDICINE

## 2023-02-15 PROCEDURE — 99284 EMERGENCY DEPT VISIT MOD MDM: CPT | Mod: ,,, | Performed by: STUDENT IN AN ORGANIZED HEALTH CARE EDUCATION/TRAINING PROGRAM

## 2023-02-15 PROCEDURE — 83690 ASSAY OF LIPASE: CPT

## 2023-02-15 PROCEDURE — 83735 ASSAY OF MAGNESIUM: CPT

## 2023-02-15 PROCEDURE — 80053 COMPREHEN METABOLIC PANEL: CPT

## 2023-02-15 PROCEDURE — 93005 ELECTROCARDIOGRAM TRACING: CPT

## 2023-02-15 PROCEDURE — 85025 COMPLETE CBC W/AUTO DIFF WBC: CPT

## 2023-02-15 RX ORDER — AMLODIPINE BESYLATE 2.5 MG/1
2.5 TABLET ORAL DAILY
Status: DISCONTINUED | OUTPATIENT
Start: 2023-02-15 | End: 2023-02-15

## 2023-02-15 RX ORDER — LOSARTAN POTASSIUM 50 MG/1
100 TABLET ORAL DAILY
Status: DISCONTINUED | OUTPATIENT
Start: 2023-02-15 | End: 2023-02-16 | Stop reason: HOSPADM

## 2023-02-15 RX ADMIN — LOSARTAN POTASSIUM 100 MG: 50 TABLET, FILM COATED ORAL at 10:02

## 2023-02-15 NOTE — ED TRIAGE NOTES
Meredith Keen, a 67 y.o. female presents to the ED w/ complaint of Pt has a small bowel obstruction 2 weeks ago. Pt reports RLQ pain that is tender to touch. Pts daughter reports that Pt left the hospital too soon after her SBO. Pt denies nausea, vomiting, fever and chills.     Triage note:  Chief Complaint   Patient presents with    Abdominal Pain     SBO 2wks ago, relief. 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

## 2023-02-15 NOTE — ED NOTES
iSTAT Chem 8    Na+ 139   K+ 4.0   Cl 105   iCa 1.18   TCO2 27   Glu 87   BUN 13   Creat 0.5   Hct% 36

## 2023-02-15 NOTE — ED PROVIDER NOTES
Encounter Date: 2/15/2023       History     Chief Complaint   Patient presents with    Abdominal Pain     SBO 2wks ago, relief. Reports RLQ abdominal pain     58-year-old female with past medical history hypertension, hyperlipidemia, small-bowel obstruction status post ex lap with HERNANDEZ in June 2022, and repeat SBO with hospital admission on January 26th presenting with complaints of sharp/colicky diffuse abdominal pain for about 2 weeks.   used for HPI.  Patient endorses history of hysterectomy which led to multiple intra-abdominal complications and adhesions.  Patient endorses 5 episodes of small-bowel obstruction in the past, but she states her pain today is not the same as her previous SBOs.  She states that she had abdominal pain that she did not tell the physician about prior to discharge from recent hospitalization because she wanted to go home.  She states that her pain is worse with eating and drinking but denies radiation of pain.  She has tried Tylenol with minimal relief.  She endorses regular bowel movements and passing gas, but she sometimes sees streaks of blood in her stool and states that she has known hemorrhoids.  She denies nausea, vomiting, fever, chills, constipation, diarrhea, abdominal distention.      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     Past Surgical History:   Procedure Laterality Date    APPENDECTOMY      CHOLECYSTECTOMY      ENDOSCOPIC ULTRASOUND OF UPPER GASTROINTESTINAL TRACT N/A 11/26/2019    Procedure: ULTRASOUND, UPPER GI TRACT, ENDOSCOPIC;  Surgeon: Britton Martinez MD;  Location: [facility];  Service: Endoscopy;  Laterality: N/A;    ENDOSCOPIC ULTRASOUND OF UPPER GASTROINTESTINAL TRACT N/A 6/24/2020    Procedure:  ULTRASOUND, UPPER GI TRACT, ENDOSCOPIC;  Surgeon: Delfino Jasso MD;  Location: [facility] ENDO (2ND FLR);  Service: Endoscopy;  Laterality: N/A;  EUS (linear) for abnormal CT scan. Urgent. Can be done by any [facility] physician.  Britton Martinez MD  Covid-19 test 6/22/20 at [facility] - pg  Speaks Panamanian;  offered and declined; daughter will accompany patient as interp    ENDOSCOPIC ULTRASOUND OF UPPER GASTROINTESTINAL TRACT N/A 5/26/2021    Procedure: ULTRASOUND, UPPER GI TRACT, ENDOSCOPIC;  Surgeon: Britton Martinez MD;  Location: [facility] ENDO (2ND FLR);  Service: Endoscopy;  Laterality: N/A;  Need EUS (linear) for dilated bile duct on MRI. [facility] or [facility]. 45 minutes.   Britton Martinez MD   Fully vaccinated - sending in copy of card and bringing it on day of procedure. ttr  *NEEDS Kosovan *    ESOPHAGOGASTRODUODENOSCOPY N/A 9/9/2020    Procedure: EGD (ESOPHAGOGASTRODUODENOSCOPY);  Surgeon: Devyn Miles MD;  [facility];  Service: General;  Laterality: N/A;    HYSTERECTOMY  2012    elective    LAPAROSCOPIC LYSIS OF ADHESIONS  9/9/2020    Procedure: LYSIS, ADHESIONS, LAPAROSCOPIC;  Surgeon: Devyn Miles MD;  Location: [facility] OR [address] [place] [address];  Service: General;;    LYSIS OF ADHESIONS N/A 6/16/2022    Procedure: LYSIS, ADHESIONS;  Surgeon: Jacob Greco MD;  [facility];  Service: General;  Laterality: N/A;    ROBOT-ASSISTED SURGICAL REMOVAL OF STOMACH USING DA RACHEL XI N/A 9/9/2020    Procedure: XI ROBOTIC GASTRECTOMY/GIST PROXIMAL STOMACH;  Surgeon: Devyn Miles MD;  [facility];  Service: General;  Laterality: N/A;     History reviewed. No pertinent family history.  Social History     Tobacco Use    Smoking status: Never    Smokeless tobacco: Never   Substance Use Topics    Alcohol use: No    Drug use: No     Review of Systems   Constitutional:  Negative for chills and fever.   HENT:  Negative for congestion and rhinorrhea.     Eyes:  Negative for photophobia and visual disturbance.   Respiratory:  Positive for shortness of breath (Reports shortness of breath after eating due to increased abdominal pain). Negative for chest tightness.    Cardiovascular:  Negative for chest pain and palpitations.   Gastrointestinal:  Positive for abdominal pain and blood in stool (Streaks of blood on stool. patient reports hemorrhoids). Negative for abdominal distention, constipation, diarrhea, nausea and vomiting.   Genitourinary:  Negative for dysuria and hematuria.   Musculoskeletal:  Negative for arthralgias and myalgias.   Skin:  Negative for rash and wound.   Neurological:  Negative for weakness, light-headedness, numbness and headaches.   Psychiatric/Behavioral:  Negative for confusion and decreased concentration.      Physical Exam     Initial Vitals [02/15/23 1420]   BP Pulse Resp Temp SpO2   (!) 165/74 66 16 98.1 °F (36.7 °C) 98 %      MAP       --         Physical Exam    Constitutional: She appears well-developed and well-nourished. She is not diaphoretic. No distress.   HENT:   Head: Normocephalic and atraumatic.   Eyes: EOM are normal. Pupils are equal, round, and reactive to light.   Neck: Neck supple.   Normal range of motion.  Cardiovascular:  Normal rate, regular rhythm, normal heart sounds and intact distal pulses.           Pulmonary/Chest: Breath sounds normal. No respiratory distress. She has no wheezes. She has no rhonchi. She has no rales.   Abdominal: Abdomen is soft. Bowel sounds are normal. She exhibits no distension. There is abdominal tenderness.   Midline abdominal incision without erythema, induration, increased heat. There is no rebound and no guarding.   Musculoskeletal:         General: Normal range of motion.      Cervical back: Normal range of motion and neck supple.     Neurological: She is alert and oriented to person, place, and time.   Skin: Skin is warm and dry.   Psychiatric: She has a normal mood and affect. Her  behavior is normal. Judgment and thought content normal.       ED Course     Labs Reviewed   CBC W/ AUTO DIFFERENTIAL - Abnormal; Notable for the following components:       Result Value    Hemoglobin 11.4 (*)     Hematocrit 35.0 (*)     MCV 76 (*)     MCH 24.9 (*)     RDW 14.7 (*)     All other components within normal limits   COMPREHENSIVE METABOLIC PANEL - Abnormal; Notable for the following components:    Anion Gap 6 (*)     All other components within normal limits   URINALYSIS, REFLEX TO URINE CULTURE - Abnormal; Notable for the following components:    Ketones, UA 1+ (*)     All other components within normal limits    Narrative:     Specimen Source->Urine   LIPASE   MAGNESIUM   ISTAT CHEM8          Imaging Results              CT Abdomen Pelvis With Contrast (Final result)  Result time 02/15/23 21:58:41      Final result by Mihir Weinstein MD (02/15/23 21:58:41)                   Impression:      1. No acute intra-abdominopelvic abnormality.  2. Stable-appearing intra-and extrahepatic biliary ductal dilatation, as above.  3. 6 mm solid pulmonary nodule in the right lower lobe.  For a solid nodule 6-8 mm, Fleischner Society 2017 guidelines recommend follow up with non-contrast chest CT at 6-12 months and 18-24 months after discovery.  4. Additional findings as above.    Electronically signed by resident: Yusuf Anton  Date:    02/15/2023  Time:    21:19    Electronically signed by: Mihir Weinstein  Date:    02/15/2023  Time:    21:58               Narrative:    EXAMINATION:  CT ABDOMEN PELVIS WITH CONTRAST    CLINICAL HISTORY:  Abdominal pain, acute, nonlocalized;hx of several episodes of SBO;    TECHNIQUE:  Low dose axial images, sagittal and coronal reformations were obtained from the lung bases to the pubic symphysis following the IV administration of 75 mL of Omnipaque 350 .  Oral contrast was not administered.    COMPARISON:  CT abdomen pelvis 01/24/2023, 06/14/2022    FINDINGS:  LUNG BASES &  MEDIASTINUM (limited):  No pericardial effusion.  No pleural effusion.  Bibasilar subsegmental atelectasis.  No consolidation.  6 mm solid pulmonary nodule at the right lower lobe (axial series 2, image 20).    HEPATOBILIARY: Normal in size.  Subcentimeter hypodensity in the right hepatic lobe, too small to characterize.  Cholecystectomy.  Common bile duct measures up to 1.3 cm (axial series 2, image 48).  Mild intrahepatic biliary ductal dilatation.    SPLEEN:  No splenomegaly.    PANCREAS:  No focal masses. No ductal dilatation.    ADRENALS:  No adrenal nodules.    KIDNEYS/URETERS: Normal in size and location with symmetric attenuation.  No hydronephrosis, stones or solid mass lesions. Ureters are unremarkable.    PELVIC ORGANS/BLADDER:  Bladder is moderately distended without focal wall thickening.  Hysterectomy.  No adnexal masses.    PERITONEUM / RETROPERITONEUM:  No free air. No free fluid.    LYMPH NODES:  No lymphadenopathy.    VESSELS:  Aorta tapers normally.  Moderate aortoiliac calcific plaque.    GI TRACT: Distal esophagus is unremarkable.  Postsurgical change of fundoplication, stomach is otherwise unremarkable.  Postsurgical change of small-bowel resection with anastomosis in the left lower quadrant.  No small bowel distension, inflammation or obstruction.  Appendectomy.  No colonic distension or wall thickening.  Moderate stool fills the colon.    BONES AND SOFT TISSUES:  Soft tissues are unremarkable. Stable-appearing bone island in the L5 vertebral body.  No acute fractures.  No aggressive osseous lesions.                                       Medications   losartan tablet 100 mg (100 mg Oral Given 2/15/23 5224)       Medical Decision Making:   History:   Old Medical Records: I decided to obtain old medical records.  Initial Assessment:   67-year-old female with history of recurrent SBO status post ex lap with HERNANDEZ presenting with abdominal pain.  Patient afebrile and hemodynamically stable on  presentation.  Patient reports movement of bowels and passage of gas, so SBO is lower on differential.  Patient also reports history of pancreatitis, so pancreatitis remains in the differential.    Differential Diagnosis:   Partial SBO  Gastroenteritis  Diverticulitis  Intra-abdominal abscess  SBO  UTI   Pyelo  Acute pancreatitis   Chronic pancreatitis  Clinical Tests:   Lab Tests: Ordered  Radiological Study: Ordered  Medical Tests: Ordered  ED Management:  Patient made NPO initially until small-bowel obstruction ruled out.  CBC ordered to evaluate for leukocytosis/anemia.  CMP ordered to evaluate for electrolyte derangement/SALO.  EKG ordered to rule out cardiac cause of upper abdominal pain.  Lipase ordered to rule out acute pancreatitis serous ordered to rule out UTI.  CT abdomen and pelvis with contrast ordered to evaluate for SBO/abscess/other acute intra-abdominal process contributing to patient's abdominal pain.    Patient signed out to Dr. Trinidad who will assume care of the patient following shift change.    At 5:22 pm - I assumed care of this patient from Dr. Webb. Patient states that this pain is different than her prior SBO admissions. Chart review states that she had a colonoscopy in April 2022? I am unable to see an encounter with the procedure. Started home losartan for elevated blood pressure.  CMP, lipase WNL. CT abdomen resulted with no evidence of bowel obstruction. Incidental finding of pulmonary nodule. Patient advised to follow up with Pulmonary for repeat scan in 6 months.  UA without UTI.  Referral placed. CT did show moderate stool burden. Patient advised to use home MiraLax BID and stool softener PRN. Discharged home with instructions to return if symptoms worsen.  Other:   I have discussed this case with another health care provider.       <> Summary of the Discussion: Discussed case with Dr. Rinaldi and Dr. Trinidad.                        Clinical Impression:   Final diagnoses:  [R10.84]  Generalized abdominal pain (Primary)  [R91.1] Incidental lung nodule, > 3mm and < 8mm                  Lukas Trinidad MD  Resident  02/16/23 0001

## 2023-02-16 LAB
BUN SERPL-MCNC: 13 MG/DL (ref 6–30)
CHLORIDE SERPL-SCNC: 105 MMOL/L (ref 95–110)
CREAT SERPL-MCNC: 0.5 MG/DL (ref 0.5–1.4)
GLUCOSE SERPL-MCNC: 87 MG/DL (ref 70–110)
HCT VFR BLD CALC: 36 %PCV (ref 36–54)
POC IONIZED CALCIUM: 1.18 MMOL/L (ref 1.06–1.42)
POC TCO2 (MEASURED): 27 MMOL/L (ref 23–29)
POTASSIUM BLD-SCNC: 4 MMOL/L (ref 3.5–5.1)
SAMPLE: NORMAL
SODIUM BLD-SCNC: 139 MMOL/L (ref 136–145)

## 2023-02-16 NOTE — ED NOTES
Received pt AAO and in NAD.  Pt breathing E/U on room air. Bed rails up x2. Pt and family advised of plan of care to include all test and procedures. Patient stable at this time; will continue with plan of care.

## 2023-02-16 NOTE — DISCHARGE INSTRUCTIONS
Please continue to use miralax in addition to a stool softener for constipation. If symptoms get worse please return to ED.     You have a lung nodule which you need to follow up with pulmonary services

## 2023-02-16 NOTE — ED NOTES
Patient identifiers for Meredith Keen checked and correct.    LOC: The patient is awake, alert and aware of environment with an appropriate affect, the patient is oriented x 4 and speaking appropriately.  APPEARANCE: Patient resting comfortably and in no acute distress, patient is clean and well groomed, patient's clothing is properly fastened.  SKIN: The skin is warm and dry, color consistent with ethnicity, patient has normal skin turgor and moist mucus membranes, skin intact, no breakdown or bruising noted.  MUSCULOSKELETAL: Patient moving all extremities well, no obvious swelling or deformities noted.  RESPIRATORY: Airway is open and patent, respirations are spontaneous and even, patient has a normal effort and rate.  CARDIAC: Patient has a normal rate and rhythm, no periphreal edema noted, capillary refill < 3 seconds. Normal +2 pedal pulses present.  ABDOMEN: Soft and tender to palpation, no distention noted. Patient denies any nausea, vomiting, diarrhea, or constipation. +RLQ abd pain  NEUROLOGIC: Eyes open spontaneously, PERRL, behavior appropriate to situation, follows commands, facial expression symmetrical, bilateral hand grasp equal and even, purposeful motor response noted, normal sensation in all extremities.     Allergies reported: Review of patient's allergies indicates:  No Known Allergies

## 2023-02-27 ENCOUNTER — TELEPHONE (OUTPATIENT)
Dept: PULMONOLOGY | Facility: CLINIC | Age: 68
End: 2023-02-27
Payer: MEDICARE

## 2023-02-27 NOTE — TELEPHONE ENCOUNTER
Spoke with pts daughter Ty to schedule appointment with Pulmonary.  Appointment confirmed for Wednesday 3/29.  Instructions given on where to go with verbalized understanding.

## 2023-03-07 ENCOUNTER — TELEPHONE (OUTPATIENT)
Dept: ENDOSCOPY | Facility: HOSPITAL | Age: 68
End: 2023-03-07
Payer: MEDICARE

## 2023-03-07 NOTE — TELEPHONE ENCOUNTER
----- Message from Danita Quevedo sent at 3/7/2023 12:16 PM CST -----  Regarding: Pt Advice  Contact: Thy/daughter [phone]  Pt has had all of her Gastro appts at [facility] in [place], but since she's been coming to the ER @ [facility] they recommend her to have a Small Bowel Series done at an [facility] facility.  Please call.

## 2023-03-29 ENCOUNTER — OFFICE VISIT (OUTPATIENT)
Dept: PULMONOLOGY | Facility: CLINIC | Age: 68
End: 2023-03-29
Payer: MEDICARE

## 2023-03-29 VITALS
SYSTOLIC BLOOD PRESSURE: 121 MMHG | OXYGEN SATURATION: 97 % | HEIGHT: 62 IN | BODY MASS INDEX: 20.9 KG/M2 | WEIGHT: 113.56 LBS | DIASTOLIC BLOOD PRESSURE: 57 MMHG | HEART RATE: 56 BPM | TEMPERATURE: 98 F

## 2023-03-29 DIAGNOSIS — J84.9 INTERSTITIAL PULMONARY DISEASE, UNSPECIFIED: ICD-10-CM

## 2023-03-29 DIAGNOSIS — R06.00 DYSPNEA, UNSPECIFIED TYPE: Primary | ICD-10-CM

## 2023-03-29 DIAGNOSIS — R91.1 INCIDENTAL LUNG NODULE, > 3MM AND < 8MM: ICD-10-CM

## 2023-03-29 DIAGNOSIS — R91.1 PULMONARY NODULE: ICD-10-CM

## 2023-03-29 PROCEDURE — 3078F DIAST BP <80 MM HG: CPT | Mod: CPTII,S$GLB,, | Performed by: INTERNAL MEDICINE

## 2023-03-29 PROCEDURE — 99999 PR PBB SHADOW E&M-EST. PATIENT-LVL IV: ICD-10-PCS | Mod: PBBFAC,,, | Performed by: INTERNAL MEDICINE

## 2023-03-29 PROCEDURE — 1159F PR MEDICATION LIST DOCUMENTED IN MEDICAL RECORD: ICD-10-PCS | Mod: CPTII,S$GLB,, | Performed by: INTERNAL MEDICINE

## 2023-03-29 PROCEDURE — 3078F PR MOST RECENT DIASTOLIC BLOOD PRESSURE < 80 MM HG: ICD-10-PCS | Mod: CPTII,S$GLB,, | Performed by: INTERNAL MEDICINE

## 2023-03-29 PROCEDURE — 99204 PR OFFICE/OUTPT VISIT, NEW, LEVL IV, 45-59 MIN: ICD-10-PCS | Mod: S$GLB,,, | Performed by: INTERNAL MEDICINE

## 2023-03-29 PROCEDURE — 1159F MED LIST DOCD IN RCRD: CPT | Mod: CPTII,S$GLB,, | Performed by: INTERNAL MEDICINE

## 2023-03-29 PROCEDURE — 99204 OFFICE O/P NEW MOD 45 MIN: CPT | Mod: S$GLB,,, | Performed by: INTERNAL MEDICINE

## 2023-03-29 PROCEDURE — 3074F PR MOST RECENT SYSTOLIC BLOOD PRESSURE < 130 MM HG: ICD-10-PCS | Mod: CPTII,S$GLB,, | Performed by: INTERNAL MEDICINE

## 2023-03-29 PROCEDURE — 1126F PR PAIN SEVERITY QUANTIFIED, NO PAIN PRESENT: ICD-10-PCS | Mod: CPTII,S$GLB,, | Performed by: INTERNAL MEDICINE

## 2023-03-29 PROCEDURE — 3008F PR BODY MASS INDEX (BMI) DOCUMENTED: ICD-10-PCS | Mod: CPTII,S$GLB,, | Performed by: INTERNAL MEDICINE

## 2023-03-29 PROCEDURE — 1126F AMNT PAIN NOTED NONE PRSNT: CPT | Mod: CPTII,S$GLB,, | Performed by: INTERNAL MEDICINE

## 2023-03-29 PROCEDURE — 3074F SYST BP LT 130 MM HG: CPT | Mod: CPTII,S$GLB,, | Performed by: INTERNAL MEDICINE

## 2023-03-29 PROCEDURE — 3008F BODY MASS INDEX DOCD: CPT | Mod: CPTII,S$GLB,, | Performed by: INTERNAL MEDICINE

## 2023-03-29 PROCEDURE — 99999 PR PBB SHADOW E&M-EST. PATIENT-LVL IV: CPT | Mod: PBBFAC,,, | Performed by: INTERNAL MEDICINE

## 2023-03-29 NOTE — PROGRESS NOTES
"Subjective:       Patient ID: Meredith Keen is a 67 y.o. female.    Chief Complaint: Consult from Dr. Lukas Trinidad for pulmonary nodule>  She is accompanied by her daughter who preferred interpretation, Amharic.     66yo female with history of several episodes of small bowel obstruction s/p ex lap with HERNANDEZ in 2022 who presents with repeat SBO in 2023.  CT of ABD at that time reported a 6 mm pulmonary nodule at the lung base.  She is here for evaluation and management of nodule.  Patient does endorse mild cough and some APPLE during day.  Has cough that lasts 2-3 times a day.  No sinus congestion.  No heartburn or indigestion.  No fevers.  No cough.  No sore throat.  No headaches.      Weight is stable.  Appetite is good.      Retired from store owner.      No new PETs.  Dog has been present for 6 wwkes.  SAme house, no new environments.    Never smoked.  Second hand smoke from  who  of lung cancer    [facility] the [facility] [place] since .  No known TB.  No h/o pneumonia.     Review of Systems    Objective:      Vitals:    23 1018   BP: (!) 121/57   BP Location: Left arm   Patient Position: Sitting   BP Method: Medium (Automatic)   Pulse: (!) 56   Temp: 97.8 °F (36.6 °C)   TempSrc: Oral   SpO2: 97%   Weight: 51.5 kg (113 lb 8.6 oz)   Height: 5' 2" (1.575 m)      Physical Exam   Constitutional: She is oriented to person, place, and time. She appears well-developed and well-nourished.   HENT:   Head: Normocephalic.   Cardiovascular: Normal rate.   Pulmonary/Chest: Normal expansion and hyperinflation. She has no wheezes. She has no rales.   Musculoskeletal:         General: Normal range of motion.      Cervical back: Normal range of motion and neck supple.   Neurological: She is alert and oriented to person, place, and time.   Skin: Skin is warm and dry.   Psychiatric: She has a normal mood and affect.     Personal Diagnostic Review  CT of abdomen 2/15/23 with 5 mm RLL nodules that has present since " previous and appears stable.  Small areas of subpleural reticular disease concerning for ILD>   No flowsheet data found.        Assessment:       1. Dyspnea, unspecified type    2. Pulmonary nodule    3. Incidental lung nodule, > 3mm and < 8mm    4. Interstitial pulmonary disease, unspecified        Outpatient Encounter Medications as of 3/29/2023   Medication Sig Dispense Refill    albuterol (PROVENTIL/VENTOLIN HFA) 90 mcg/actuation inhaler Inhale 2 puffs into the lungs every 4 (four) hours as needed.      aluminum-magnesium hydroxide-simethicone (MAALOX ADVANCED) 200-200-20 mg/5 mL Susp Take 30 mLs by mouth every 6 (six) hours as needed (nausea, abdominal pain, chest pain). 354 mL 0    amLODIPine (NORVASC) 2.5 MG tablet Take 2.5 mg by mouth once daily.      atorvastatin (LIPITOR) 20 MG tablet Take 20 mg by mouth once daily.      EScitalopram oxalate (LEXAPRO) 10 MG tablet TAKE 1/2 TABLET BY MOUTH DAILY FOR 7 DAYS, THEN TAKE 1 TABLET BY MOUTH DAILY 90 tablet 1    famotidine (PEPCID) 20 MG tablet Take 20 mg by mouth 2 (two) times daily.      fluticasone (VERAMYST) 27.5 mcg/actuation nasal spray 2 sprays by Nasal route once daily. 9.1 mL 3    losartan (COZAAR) 100 MG tablet Take 1 tablet (100 mg total) by mouth once daily. 7 tablet 0    omeprazole (PRILOSEC) 40 MG capsule Take 1 capsule (40 mg total) by mouth every morning. 30 capsule 1    polyethylene glycol (GLYCOLAX) 17 gram/dose powder Dissolve one capful (17g) into liquid and take by mouth once daily. 510 g 0    HYDROcodone-acetaminophen (NORCO) 5-325 mg per tablet Take 1 tablet by mouth every 6 (six) hours as needed for Pain. (Patient not taking: Reported on 10/21/2022) 12 tablet 0    levocetirizine (XYZAL) 5 MG tablet Take 1 tablet (5 mg total) by mouth every evening. (Patient not taking: Reported on 3/29/2023) 90 tablet 1    olopatadine (PATANOL) 0.1 % ophthalmic solution Place 1 drop into both eyes 2 (two) times daily. (Patient not taking: Reported on  "3/29/2023) 5 mL 1    pen needle, diabetic 31 gauge x 1/4" Ndle Use with Forteo injection daily (Patient not taking: Reported on 3/29/2023) 150 each 3    teriparatide (FORTEO) 20 mcg/dose (600mcg/2.4mL) PnIj Inject 0.08 mLs (20 mcg total) into the skin once daily. (Patient not taking: Reported on 3/29/2023) 2.4 mL 11     No facility-administered encounter medications on file as of 3/29/2023.     Orders Placed This Encounter   Procedures    CT Chest Without Contrast     Standing Status:   Future     Standing Expiration Date:   3/29/2024     Scheduling Instructions:      HRCT protocol, prone and supine     Order Specific Question:   May the Radiologist modify the order per protocol to meet the clinical needs of the patient?     Answer:   Yes    Spirometry with/without bronchodilator     Standing Status:   Future     Standing Expiration Date:   3/28/2024     Order Specific Question:   Release to patient     Answer:   Immediate    Lung Volumes     Standing Status:   Future     Standing Expiration Date:   3/29/2024     Order Specific Question:   Release to patient     Answer:   Immediate    DLCO-Carbon Monoxide Diffusing Capacity     Standing Status:   Future     Standing Expiration Date:   3/28/2024     Order Specific Question:   Release to patient     Answer:   Immediate    Stress test, pulmonary     Standing Status:   Future     Standing Expiration Date:   3/28/2024     Order Specific Question:   Reason for study     Answer:   Functional status     Order Specific Question:   Release to patient     Answer:   Immediate     Plan:     Problem List Items Addressed This Visit    None  Visit Diagnoses       Dyspnea, unspecified type    -  Primary    Relevant Orders    CT Chest Without Contrast    Spirometry with/without bronchodilator    Lung Volumes    DLCO-Carbon Monoxide Diffusing Capacity    Stress test, pulmonary    Pulmonary nodule        Incidental lung nodule, > 3mm and < 8mm        Low risk fro malignancy.  Full CT " of chest    Interstitial pulmonary disease, unspecified        SUbpleural reticultation with cough and dyspnea, will need CT of chest and PFTs as ordered to rule out ILD    Relevant Orders    CT Chest Without Contrast    Spirometry with/without bronchodilator    Lung Volumes    DLCO-Carbon Monoxide Diffusing Capacity    Stress test, pulmonary          Call Winifred with results

## 2023-03-31 ENCOUNTER — HOSPITAL ENCOUNTER (OUTPATIENT)
Dept: PULMONOLOGY | Facility: CLINIC | Age: 68
Discharge: HOME | End: 2023-03-31
Payer: MEDICARE

## 2023-03-31 VITALS — HEIGHT: 62 IN | WEIGHT: 113.75 LBS | BODY MASS INDEX: 20.93 KG/M2

## 2023-03-31 DIAGNOSIS — R06.00 DYSPNEA, UNSPECIFIED TYPE: ICD-10-CM

## 2023-03-31 DIAGNOSIS — J84.9 INTERSTITIAL PULMONARY DISEASE, UNSPECIFIED: ICD-10-CM

## 2023-03-31 PROCEDURE — 94729 DIFFUSING CAPACITY: CPT | Mod: S$GLB,,, | Performed by: INTERNAL MEDICINE

## 2023-03-31 PROCEDURE — 94618 PULMONARY STRESS TESTING: ICD-10-PCS | Mod: S$GLB,,, | Performed by: INTERNAL MEDICINE

## 2023-03-31 PROCEDURE — 94060 PR EVAL OF BRONCHOSPASM: ICD-10-PCS | Mod: S$GLB,,, | Performed by: INTERNAL MEDICINE

## 2023-03-31 PROCEDURE — 94618 PULMONARY STRESS TESTING: CPT | Mod: S$GLB,,, | Performed by: INTERNAL MEDICINE

## 2023-03-31 PROCEDURE — 94727 GAS DIL/WSHOT DETER LNG VOL: CPT | Mod: S$GLB,,, | Performed by: INTERNAL MEDICINE

## 2023-03-31 PROCEDURE — 94060 EVALUATION OF WHEEZING: CPT | Mod: S$GLB,,, | Performed by: INTERNAL MEDICINE

## 2023-03-31 PROCEDURE — 94727 PR PULM FUNCTION TEST BY GAS: ICD-10-PCS | Mod: S$GLB,,, | Performed by: INTERNAL MEDICINE

## 2023-03-31 PROCEDURE — 94729 PR C02/MEMBANE DIFFUSE CAPACITY: ICD-10-PCS | Mod: S$GLB,,, | Performed by: INTERNAL MEDICINE

## 2023-03-31 NOTE — PROCEDURES
Meredith Keen is a 67 y.o.  female patient, who presents for a 6 minute walk test ordered by MD Maryann.  The diagnosis is Shortness of Breath.  The patient's BMI is 20.8 kg/m2.  Predicted distance (lower limit of normal) is 345.94 meters.      Test Results:    The test was completed without stopping.  The total time walked was 360 seconds.  During walking, the patient reported:  Dyspnea.  The patient used no assistive devices during testing.     03/31/2023---------Distance: 365.76 meters (1200 feet)     O2 Sat % Supplemental Oxygen Heart Rate Blood Pressure Epifanio Scale   Pre-exercise  (Resting) 97 % Room Air 68 bpm 154/70 mmHg 0   During Exercise 98 % Room Air 81 bpm 174/74 mmHg 2   Post-exercise  (Recovery) 99 % Room Air  73 bpm       Recovery Time: 65 seconds    Performing nurse/tech: Mimi LANDAVERDE      PREVIOUS STUDY:   The patient has not had a previous study.      CLINICAL INTERPRETATION:  Six minute walk distance is 365.76 meters (1200 feet) with light dyspnea.  During exercise, there was no desaturation while breathing room air.  Both blood pressure and heart rate remained stable with walking.  Hypertension was present prior to exercise.  The patient did not report non-pulmonary symptoms during exercise.  No previous study performed.  Based upon age and body mass index, exercise capacity is normal.

## 2023-04-03 ENCOUNTER — HOSPITAL ENCOUNTER (OUTPATIENT)
Dept: RADIOLOGY | Facility: HOSPITAL | Age: 68
Discharge: HOME | End: 2023-04-03
Attending: INTERNAL MEDICINE
Payer: MEDICARE

## 2023-04-03 DIAGNOSIS — J84.9 INTERSTITIAL PULMONARY DISEASE, UNSPECIFIED: ICD-10-CM

## 2023-04-03 DIAGNOSIS — R06.00 DYSPNEA, UNSPECIFIED TYPE: ICD-10-CM

## 2023-04-03 PROCEDURE — 71250 CT THORAX DX C-: CPT | Mod: 26,,, | Performed by: RADIOLOGY

## 2023-04-03 PROCEDURE — 71250 CT CHEST WITHOUT CONTRAST: ICD-10-PCS | Mod: 26,,, | Performed by: RADIOLOGY

## 2023-04-03 PROCEDURE — 71250 CT THORAX DX C-: CPT | Mod: TC

## 2023-04-04 ENCOUNTER — TELEPHONE (OUTPATIENT)
Dept: PULMONOLOGY | Facility: HOSPITAL | Age: 68
End: 2023-04-04
Payer: MEDICARE

## 2023-04-04 NOTE — TELEPHONE ENCOUNTER
Spoke to daughter Winifred regarding follow up results for mother.  No parenchymal lung disease or obstruction> No identifiable pulmonary etiology of symptoms.  Nodule has resolved.

## 2023-05-03 ENCOUNTER — TELEPHONE (OUTPATIENT)
Dept: GASTROENTEROLOGY | Facility: CLINIC | Age: 68
End: 2023-05-03
Payer: MEDICARE

## 2023-05-16 ENCOUNTER — PATIENT MESSAGE (OUTPATIENT)
Dept: ENDOCRINOLOGY | Facility: CLINIC | Age: 68
End: 2023-05-16
Payer: MEDICARE

## 2023-05-16 DIAGNOSIS — M81.0 AGE RELATED OSTEOPOROSIS, UNSPECIFIED PATHOLOGICAL FRACTURE PRESENCE: ICD-10-CM

## 2023-05-16 RX ORDER — PEN NEEDLE, DIABETIC 29 G X1/2"
NEEDLE, DISPOSABLE MISCELLANEOUS
Qty: 150 EACH | Refills: 3 | Status: SHIPPED | OUTPATIENT
Start: 2023-05-16

## 2023-06-06 ENCOUNTER — LAB VISIT (OUTPATIENT)
Dept: PRIMARY CARE CLINIC | Facility: CLINIC | Age: 68
End: 2023-06-06
Payer: MEDICARE

## 2023-06-06 ENCOUNTER — OFFICE VISIT (OUTPATIENT)
Dept: PRIMARY CARE CLINIC | Facility: CLINIC | Age: 68
End: 2023-06-06
Payer: MEDICARE

## 2023-06-06 VITALS
BODY MASS INDEX: 21.15 KG/M2 | HEART RATE: 78 BPM | DIASTOLIC BLOOD PRESSURE: 62 MMHG | SYSTOLIC BLOOD PRESSURE: 121 MMHG | WEIGHT: 115.63 LBS

## 2023-06-06 DIAGNOSIS — I10 ESSENTIAL HYPERTENSION: Primary | ICD-10-CM

## 2023-06-06 DIAGNOSIS — E78.5 HYPERLIPIDEMIA, UNSPECIFIED HYPERLIPIDEMIA TYPE: ICD-10-CM

## 2023-06-06 DIAGNOSIS — R73.03 PREDIABETES: ICD-10-CM

## 2023-06-06 DIAGNOSIS — F41.8 DEPRESSION WITH ANXIETY: ICD-10-CM

## 2023-06-06 DIAGNOSIS — D64.9 ANEMIA, UNSPECIFIED TYPE: ICD-10-CM

## 2023-06-06 DIAGNOSIS — I10 ESSENTIAL HYPERTENSION: ICD-10-CM

## 2023-06-06 DIAGNOSIS — K30 INDIGESTION: ICD-10-CM

## 2023-06-06 LAB
ANION GAP SERPL CALC-SCNC: 7 MMOL/L (ref 8–16)
BASOPHILS # BLD AUTO: 0.08 K/UL (ref 0–0.2)
BASOPHILS NFR BLD: 1.2 % (ref 0–1.9)
BUN SERPL-MCNC: 22 MG/DL (ref 8–23)
CALCIUM SERPL-MCNC: 10 MG/DL (ref 8.7–10.5)
CHLORIDE SERPL-SCNC: 106 MMOL/L (ref 95–110)
CHOLEST SERPL-MCNC: 184 MG/DL (ref 120–199)
CHOLEST/HDLC SERPL: 3.5 {RATIO} (ref 2–5)
CO2 SERPL-SCNC: 26 MMOL/L (ref 23–29)
CREAT SERPL-MCNC: 0.8 MG/DL (ref 0.5–1.4)
DIFFERENTIAL METHOD: ABNORMAL
EOSINOPHIL # BLD AUTO: 0.3 K/UL (ref 0–0.5)
EOSINOPHIL NFR BLD: 4.3 % (ref 0–8)
ERYTHROCYTE [DISTWIDTH] IN BLOOD BY AUTOMATED COUNT: 14.6 % (ref 11.5–14.5)
EST. GFR  (NO RACE VARIABLE): >60 ML/MIN/1.73 M^2
ESTIMATED AVG GLUCOSE: 126 MG/DL (ref 68–131)
GLUCOSE SERPL-MCNC: 87 MG/DL (ref 70–110)
HBA1C MFR BLD: 6 % (ref 4–5.6)
HCT VFR BLD AUTO: 38.2 % (ref 37–48.5)
HDLC SERPL-MCNC: 53 MG/DL (ref 40–75)
HDLC SERPL: 28.8 % (ref 20–50)
HGB BLD-MCNC: 11.8 G/DL (ref 12–16)
IMM GRANULOCYTES # BLD AUTO: 0.02 K/UL (ref 0–0.04)
IMM GRANULOCYTES NFR BLD AUTO: 0.3 % (ref 0–0.5)
LDLC SERPL CALC-MCNC: 113.8 MG/DL (ref 63–159)
LYMPHOCYTES # BLD AUTO: 1.3 K/UL (ref 1–4.8)
LYMPHOCYTES NFR BLD: 19.9 % (ref 18–48)
MCH RBC QN AUTO: 24.6 PG (ref 27–31)
MCHC RBC AUTO-ENTMCNC: 30.9 G/DL (ref 32–36)
MCV RBC AUTO: 80 FL (ref 82–98)
MONOCYTES # BLD AUTO: 0.6 K/UL (ref 0.3–1)
MONOCYTES NFR BLD: 9.3 % (ref 4–15)
NEUTROPHILS # BLD AUTO: 4.4 K/UL (ref 1.8–7.7)
NEUTROPHILS NFR BLD: 65 % (ref 38–73)
NONHDLC SERPL-MCNC: 131 MG/DL
NRBC BLD-RTO: 0 /100 WBC
PLATELET # BLD AUTO: 295 K/UL (ref 150–450)
PMV BLD AUTO: 9.5 FL (ref 9.2–12.9)
POTASSIUM SERPL-SCNC: 4.3 MMOL/L (ref 3.5–5.1)
RBC # BLD AUTO: 4.79 M/UL (ref 4–5.4)
SODIUM SERPL-SCNC: 139 MMOL/L (ref 136–145)
TRIGL SERPL-MCNC: 86 MG/DL (ref 30–150)
WBC # BLD AUTO: 6.69 K/UL (ref 3.9–12.7)

## 2023-06-06 PROCEDURE — 3008F BODY MASS INDEX DOCD: CPT | Mod: CPTII,S$GLB,, | Performed by: STUDENT IN AN ORGANIZED HEALTH CARE EDUCATION/TRAINING PROGRAM

## 2023-06-06 PROCEDURE — 3008F PR BODY MASS INDEX (BMI) DOCUMENTED: ICD-10-PCS | Mod: CPTII,S$GLB,, | Performed by: STUDENT IN AN ORGANIZED HEALTH CARE EDUCATION/TRAINING PROGRAM

## 2023-06-06 PROCEDURE — 3078F DIAST BP <80 MM HG: CPT | Mod: CPTII,S$GLB,, | Performed by: STUDENT IN AN ORGANIZED HEALTH CARE EDUCATION/TRAINING PROGRAM

## 2023-06-06 PROCEDURE — 85025 COMPLETE CBC W/AUTO DIFF WBC: CPT | Performed by: STUDENT IN AN ORGANIZED HEALTH CARE EDUCATION/TRAINING PROGRAM

## 2023-06-06 PROCEDURE — 3078F PR MOST RECENT DIASTOLIC BLOOD PRESSURE < 80 MM HG: ICD-10-PCS | Mod: CPTII,S$GLB,, | Performed by: STUDENT IN AN ORGANIZED HEALTH CARE EDUCATION/TRAINING PROGRAM

## 2023-06-06 PROCEDURE — 1159F PR MEDICATION LIST DOCUMENTED IN MEDICAL RECORD: ICD-10-PCS | Mod: CPTII,S$GLB,, | Performed by: STUDENT IN AN ORGANIZED HEALTH CARE EDUCATION/TRAINING PROGRAM

## 2023-06-06 PROCEDURE — 80061 LIPID PANEL: CPT | Performed by: STUDENT IN AN ORGANIZED HEALTH CARE EDUCATION/TRAINING PROGRAM

## 2023-06-06 PROCEDURE — 4010F ACE/ARB THERAPY RXD/TAKEN: CPT | Mod: CPTII,S$GLB,, | Performed by: STUDENT IN AN ORGANIZED HEALTH CARE EDUCATION/TRAINING PROGRAM

## 2023-06-06 PROCEDURE — 80048 BASIC METABOLIC PNL TOTAL CA: CPT | Performed by: STUDENT IN AN ORGANIZED HEALTH CARE EDUCATION/TRAINING PROGRAM

## 2023-06-06 PROCEDURE — 1159F MED LIST DOCD IN RCRD: CPT | Mod: CPTII,S$GLB,, | Performed by: STUDENT IN AN ORGANIZED HEALTH CARE EDUCATION/TRAINING PROGRAM

## 2023-06-06 PROCEDURE — 99999 PR PBB SHADOW E&M-EST. PATIENT-LVL III: ICD-10-PCS | Mod: PBBFAC,,, | Performed by: STUDENT IN AN ORGANIZED HEALTH CARE EDUCATION/TRAINING PROGRAM

## 2023-06-06 PROCEDURE — 3074F PR MOST RECENT SYSTOLIC BLOOD PRESSURE < 130 MM HG: ICD-10-PCS | Mod: CPTII,S$GLB,, | Performed by: STUDENT IN AN ORGANIZED HEALTH CARE EDUCATION/TRAINING PROGRAM

## 2023-06-06 PROCEDURE — 83036 HEMOGLOBIN GLYCOSYLATED A1C: CPT | Performed by: STUDENT IN AN ORGANIZED HEALTH CARE EDUCATION/TRAINING PROGRAM

## 2023-06-06 PROCEDURE — 3074F SYST BP LT 130 MM HG: CPT | Mod: CPTII,S$GLB,, | Performed by: STUDENT IN AN ORGANIZED HEALTH CARE EDUCATION/TRAINING PROGRAM

## 2023-06-06 PROCEDURE — 4010F PR ACE/ARB THEARPY RXD/TAKEN: ICD-10-PCS | Mod: CPTII,S$GLB,, | Performed by: STUDENT IN AN ORGANIZED HEALTH CARE EDUCATION/TRAINING PROGRAM

## 2023-06-06 PROCEDURE — 99214 PR OFFICE/OUTPT VISIT, EST, LEVL IV, 30-39 MIN: ICD-10-PCS | Mod: S$GLB,,, | Performed by: STUDENT IN AN ORGANIZED HEALTH CARE EDUCATION/TRAINING PROGRAM

## 2023-06-06 PROCEDURE — 99999 PR PBB SHADOW E&M-EST. PATIENT-LVL III: CPT | Mod: PBBFAC,,, | Performed by: STUDENT IN AN ORGANIZED HEALTH CARE EDUCATION/TRAINING PROGRAM

## 2023-06-06 PROCEDURE — 99214 OFFICE O/P EST MOD 30 MIN: CPT | Mod: S$GLB,,, | Performed by: STUDENT IN AN ORGANIZED HEALTH CARE EDUCATION/TRAINING PROGRAM

## 2023-06-06 RX ORDER — ESCITALOPRAM OXALATE 10 MG/1
10 TABLET ORAL DAILY
Qty: 90 TABLET | Refills: 3 | Status: SHIPPED | OUTPATIENT
Start: 2023-06-06

## 2023-06-06 RX ORDER — PANCRELIPASE 36000; 180000; 114000 [USP'U]/1; [USP'U]/1; [USP'U]/1
1 CAPSULE, DELAYED RELEASE PELLETS ORAL 4 TIMES DAILY
COMMUNITY
Start: 2023-05-08

## 2023-06-06 RX ORDER — LOSARTAN POTASSIUM 100 MG/1
100 TABLET ORAL DAILY
Qty: 90 TABLET | Refills: 3 | Status: SHIPPED | OUTPATIENT
Start: 2023-06-06 | End: 2023-12-21

## 2023-06-06 RX ORDER — PREDNISONE 20 MG/1
20 TABLET ORAL 2 TIMES DAILY
COMMUNITY
Start: 2022-12-20 | End: 2023-06-06 | Stop reason: ALTCHOICE

## 2023-06-06 RX ORDER — DOXYCYCLINE 100 MG/1
100 CAPSULE ORAL 2 TIMES DAILY
COMMUNITY
Start: 2022-12-20 | End: 2023-06-06 | Stop reason: ALTCHOICE

## 2023-06-06 RX ORDER — METHYLPREDNISOLONE 4 MG/1
TABLET ORAL
COMMUNITY
Start: 2022-12-15 | End: 2023-06-06 | Stop reason: ALTCHOICE

## 2023-06-06 RX ORDER — PEN NEEDLE, DIABETIC 31 GX5/16"
NEEDLE, DISPOSABLE MISCELLANEOUS
COMMUNITY
Start: 2023-05-16

## 2023-06-06 RX ORDER — ATORVASTATIN CALCIUM 20 MG/1
20 TABLET, FILM COATED ORAL DAILY
Qty: 90 TABLET | Refills: 3 | Status: SHIPPED | OUTPATIENT
Start: 2023-06-06

## 2023-06-06 RX ORDER — AMLODIPINE BESYLATE 2.5 MG/1
2.5 TABLET ORAL DAILY
Qty: 90 TABLET | Refills: 3 | Status: SHIPPED | OUTPATIENT
Start: 2023-06-06 | End: 2023-12-21

## 2023-06-06 RX ORDER — PROMETHAZINE HYDROCHLORIDE AND DEXTROMETHORPHAN HYDROBROMIDE 6.25; 15 MG/5ML; MG/5ML
5 SYRUP ORAL EVERY 4 HOURS PRN
COMMUNITY
Start: 2022-12-20 | End: 2023-06-06 | Stop reason: ALTCHOICE

## 2023-06-06 NOTE — PATIENT INSTRUCTIONS
[facility] - [facility]                                                        [address], [facility] [address]                                                         [place]  [id number] [phone]

## 2023-06-06 NOTE — PROGRESS NOTES
"06/06/2023    Meredith Keen  [id number]    Chief Complaint   Patient presents with    Follow-up       [facility]  Virtual  present for communication    This pt is known to me and presents for routine follow up. Shortly after last visit was admitted for SBO that resolved with conservative management. Still feeling like she isn't able to eat 2/2 bloating, but has not been able to follow up with gastroenterology since d/c. Is still taking creon and omeprazole daily.         Negative 10 point ROS outside of HPI    Social History     Socioeconomic History    Marital status:    Tobacco Use    Smoking status: Never    Smokeless tobacco: Never   Substance and Sexual Activity    Alcohol use: No    Drug use: No    Sexual activity: Yes     Partners: Male           Current Outpatient Medications:     albuterol (PROVENTIL/VENTOLIN HFA) 90 mcg/actuation inhaler, Inhale 2 puffs into the lungs every 4 (four) hours as needed., Disp: , Rfl:     aluminum-magnesium hydroxide-simethicone (MAALOX ADVANCED) 200-200-20 mg/5 mL Susp, Take 30 mLs by mouth every 6 (six) hours as needed (nausea, abdominal pain, chest pain)., Disp: 354 mL, Rfl: 0    BD ULTRA-FINE SHORT PEN NEEDLE 31 gauge x 5/16" Ndle, Inject into the skin., Disp: , Rfl:     CREON 36,000-114,000- 180,000 unit CpDR, Take 1 capsule by mouth 4 (four) times daily., Disp: , Rfl:     fluticasone (VERAMYST) 27.5 mcg/actuation nasal spray, 2 sprays by Nasal route once daily., Disp: 9.1 mL, Rfl: 3    omeprazole (PRILOSEC) 40 MG capsule, Take 1 capsule (40 mg total) by mouth every morning., Disp: 30 capsule, Rfl: 1    pen needle, diabetic 31 gauge x 1/4" Ndle, Use with Forteo injection daily, Disp: 150 each, Rfl: 3    polyethylene glycol (GLYCOLAX) 17 gram/dose powder, Dissolve one capful (17g) into liquid and take by mouth once daily., Disp: 510 g, Rfl: 0    amLODIPine (NORVASC) 2.5 MG tablet, Take 1 tablet (2.5 mg total) by mouth once daily., Disp: 90 tablet, Rfl: 3    " atorvastatin (LIPITOR) 20 MG tablet, Take 1 tablet (20 mg total) by mouth once daily., Disp: 90 tablet, Rfl: 3    EScitalopram oxalate (LEXAPRO) 10 MG tablet, Take 1 tablet (10 mg total) by mouth once daily., Disp: 90 tablet, Rfl: 3    levocetirizine (XYZAL) 5 MG tablet, Take 1 tablet (5 mg total) by mouth every evening. (Patient not taking: Reported on 3/29/2023), Disp: 90 tablet, Rfl: 1    losartan (COZAAR) 100 MG tablet, Take 1 tablet (100 mg total) by mouth once daily., Disp: 90 tablet, Rfl: 3    olopatadine (PATANOL) 0.1 % ophthalmic solution, Place 1 drop into both eyes 2 (two) times daily. (Patient not taking: Reported on 3/29/2023), Disp: 5 mL, Rfl: 1    teriparatide (FORTEO) 20 mcg/dose (600mcg/2.4mL) PnIj, Inject 0.08 mLs (20 mcg total) into the skin once daily. (Patient not taking: Reported on 3/29/2023), Disp: 2.4 mL, Rfl: 11      Physical Exam  Vitals:    06/06/23 1059   BP: 121/62   Pulse: 78       Physical Exam      Gen: well appearing, NAD  Resp: non labored breathing, no crackles, no wheezes, CTAB  CV: RRR no murmur, gallops, rubs, no LE edema  Abd: soft nontender BS present no organomegaly      1. Essential hypertension  - Basic Metabolic Panel; Future  - amLODIPine (NORVASC) 2.5 MG tablet; Take 1 tablet (2.5 mg total) by mouth once daily.  Dispense: 90 tablet; Refill: 3  - atorvastatin (LIPITOR) 20 MG tablet; Take 1 tablet (20 mg total) by mouth once daily.  Dispense: 90 tablet; Refill: 3  - losartan (COZAAR) 100 MG tablet; Take 1 tablet (100 mg total) by mouth once daily.  Dispense: 90 tablet; Refill: 3    2. Anemia, unspecified type  - CBC Auto Differential; Future    3. Prediabetes  Last a1c 5.9 one year  - Hemoglobin A1C; Future    4. Depression with anxiety  - EScitalopram oxalate (LEXAPRO) 10 MG tablet; Take 1 tablet (10 mg total) by mouth once daily.  Dispense: 90 tablet; Refill: 3    5. Hyperlipidemia, unspecified hyperlipidemia type  - Lipid Panel; Future    6. Indigestion  Recommended  rescheduling with gastroenterology to follow up continued GERD and epigastric pain    RTC in 6 months     Alla Jade MD  Family Medicine

## 2023-06-08 PROBLEM — C49.A0 GASTROINTESTINAL STROMAL TUMOR (GIST): Status: RESOLVED | Noted: 2020-08-05 | Resolved: 2023-06-08

## 2023-07-05 ENCOUNTER — OFFICE VISIT (OUTPATIENT)
Dept: URGENT CARE | Facility: CLINIC | Age: 68
End: 2023-07-05
Payer: MEDICARE

## 2023-07-05 VITALS
OXYGEN SATURATION: 95 % | HEART RATE: 69 BPM | RESPIRATION RATE: 16 BRPM | SYSTOLIC BLOOD PRESSURE: 129 MMHG | HEIGHT: 62 IN | DIASTOLIC BLOOD PRESSURE: 79 MMHG | TEMPERATURE: 98 F | WEIGHT: 115 LBS | BODY MASS INDEX: 21.16 KG/M2

## 2023-07-05 DIAGNOSIS — R10.9 ABDOMINAL PAIN, UNSPECIFIED ABDOMINAL LOCATION: Primary | ICD-10-CM

## 2023-07-05 LAB
BILIRUB UR QL STRIP: NEGATIVE
GLUCOSE UR QL STRIP: NEGATIVE
KETONES UR QL STRIP: NEGATIVE
LEUKOCYTE ESTERASE UR QL STRIP: NEGATIVE
PH, POC UA: 7.5 (ref 5–8)
POC BLOOD, URINE: NEGATIVE
POC NITRATES, URINE: NEGATIVE
PROT UR QL STRIP: NEGATIVE
SP GR UR STRIP: 1.01 (ref 1–1.03)
UROBILINOGEN UR STRIP-ACNC: NORMAL (ref 0.1–1.1)

## 2023-07-05 PROCEDURE — 99203 PR OFFICE/OUTPT VISIT, NEW, LEVL III, 30-44 MIN: ICD-10-PCS | Mod: S$GLB,,, | Performed by: NURSE PRACTITIONER

## 2023-07-05 PROCEDURE — 81003 URINALYSIS AUTO W/O SCOPE: CPT | Mod: QW,S$GLB,, | Performed by: NURSE PRACTITIONER

## 2023-07-05 PROCEDURE — 81003 POCT URINALYSIS, DIPSTICK, AUTOMATED, W/O SCOPE: ICD-10-PCS | Mod: QW,S$GLB,, | Performed by: NURSE PRACTITIONER

## 2023-07-05 PROCEDURE — 99203 OFFICE O/P NEW LOW 30 MIN: CPT | Mod: S$GLB,,, | Performed by: NURSE PRACTITIONER

## 2023-07-05 NOTE — PROGRESS NOTES
"Subjective:      Patient ID: Meredith Keen is a 67 y.o. female.    Vitals:  height is 5' 2" (1.575 m) and weight is 52.2 kg (115 lb). Her oral temperature is 97.8 °F (36.6 °C). Her blood pressure is 129/79 and her pulse is 69. Her respiration is 16 and oxygen saturation is 95%.     Chief Complaint: Abdominal Pain (Started 3 days ago. Had gallbladder removed in the past.)    Pt is coming in for abdominal pains that started 3 days ago. Pt has not taken any medicine for relief not sure what may be the cause. Pt has been diagnosed w/ GI infections in the past so she is worried something is going in. Had gallbladder removed in the past.      ID: [facility] [id number].    Abdominal Pain  This is a new problem. The current episode started in the past 7 days. The onset quality is undetermined. The problem occurs constantly. The most recent episode lasted 3 days. The problem has been gradually worsening. The pain is located in the generalized abdominal region and suprapubic region. The pain is at a severity of 3/10. The pain is mild. The quality of the pain is aching. The abdominal pain radiates to the suprapubic region, RLQ, LLQ and pelvis. Pertinent negatives include no anorexia, arthralgias, belching, constipation, diarrhea, dysuria, fever, flatus, frequency, headaches, hematochezia, hematuria, melena, myalgias, nausea or weight loss. Nothing aggravates the pain. The pain is relieved by Nothing. She has tried nothing for the symptoms. The treatment provided no relief. Prior diagnostic workup includes GI consult.     Constitution: Negative for fever.   Gastrointestinal:  Positive for abdominal pain. Negative for nausea, constipation, diarrhea and bright red blood in stool.   Genitourinary:  Negative for dysuria, frequency and hematuria.   Musculoskeletal:  Negative for joint pain and muscle ache.   Neurological:  Negative for headaches.    Objective:     Physical Exam   Constitutional: She is oriented to person, place, and " time. She appears well-developed.  Non-toxic appearance. She does not appear ill. No distress.   HENT:   Head: Normocephalic and atraumatic.   Ears:   Right Ear: External ear normal.   Left Ear: External ear normal.   Nose: Nose normal.   Mouth/Throat: Mucous membranes are normal.   Eyes: Conjunctivae and lids are normal.   Neck: Trachea normal. Neck supple.   Cardiovascular: Normal rate, regular rhythm and normal heart sounds.   Pulmonary/Chest: Effort normal and breath sounds normal. No respiratory distress.   Abdominal: Normal appearance and bowel sounds are normal. She exhibits no distension and no mass. Soft. flat abdomen There is no abdominal tenderness. There is no rebound, no guarding, no left CVA tenderness and no right CVA tenderness. No hernia.   Musculoskeletal: Normal range of motion.         General: Normal range of motion.   Neurological: She is alert and oriented to person, place, and time. She has normal strength.   Skin: Skin is warm, dry, intact, not diaphoretic and not pale.   Psychiatric: Her speech is normal and behavior is normal. Judgment and thought content normal.   Nursing note and vitals reviewed.    Assessment:     1. Abdominal pain, unspecified abdominal location        Plan:   Patient presents with c/o lower abdominal pain to area of her c section scar.  She says that it's been an ache that is only a 3/10 for 3 days after eating at a buffet in [place].  No sick contacts.  Denies fever, chills,  complaints, hematuria, flank pain, upper abdominal pain, nausea, vomiting, or diarrhea.  Last bowel movement was last night and reports that it was normal.  Denies constipation.  She is non tender to the area, no rash, states sometimes the area is itchy.  Hx of pancreatitis but denies upper pain.  She is concerned because they go out of town in a few days for [place] and wants to address this early but admits symptoms are mild.  UA normal in clinic.   used for this visit.   Daughter is with her as well.  She is non toxic non ill appearing, afebrile with normal vital signs.  Abdomen is soft and non tender.    Results for orders placed or performed in visit on 07/05/23   POCT Urinalysis, Dipstick, Automated, W/O Scope   Result Value Ref Range    POC Blood, Urine Negative Negative    POC Bilirubin, Urine Negative Negative    POC Urobilinogen, Urine Normal 0.1 - 1.1    POC Ketones, Urine Negative Negative    POC Protein, Urine Negative Negative    POC Nitrates, Urine Negative Negative    POC Glucose, Urine Negative Negative    pH, UA 7.5 5 - 8    POC Specific Gravity, Urine 1.015 1.003 - 1.029    POC Leukocytes, Urine Negative Negative       Abdominal pain, unspecified abdominal location  -     POCT Urinalysis, Dipstick, Automated, W/O Scope      Patient Instructions     If your condition worsens or fails to improve we recommend that you receive another evaluation at the ER immediately or contact your PCP to discuss your concerns or return here. You must understand that you've received an urgent care treatment only and that you may be released before all your medical problems are known or treated. You the patient will arrange for followup care as instructed.   Maintain hydration by drinking small amounts of clear fluids frequently, then soft diet, and then advance diet as tolerated. May use OTC Imodium OR Pepto Bismol if desired for any diarrhea.    Watch for any increase pain, fever, localized pain to right lower abdomen or continued vomiting or diarrhea.

## 2023-07-21 ENCOUNTER — OFFICE VISIT (OUTPATIENT)
Dept: ENDOCRINOLOGY | Facility: CLINIC | Age: 68
End: 2023-07-21
Payer: MEDICARE

## 2023-07-21 VITALS
WEIGHT: 113.63 LBS | TEMPERATURE: 100 F | HEART RATE: 93 BPM | DIASTOLIC BLOOD PRESSURE: 68 MMHG | BODY MASS INDEX: 20.78 KG/M2 | SYSTOLIC BLOOD PRESSURE: 120 MMHG

## 2023-07-21 DIAGNOSIS — M81.0 AGE RELATED OSTEOPOROSIS, UNSPECIFIED PATHOLOGICAL FRACTURE PRESENCE: Primary | ICD-10-CM

## 2023-07-21 DIAGNOSIS — K56.600 PARTIAL SMALL BOWEL OBSTRUCTION: ICD-10-CM

## 2023-07-21 DIAGNOSIS — K21.9 GASTROESOPHAGEAL REFLUX DISEASE, UNSPECIFIED WHETHER ESOPHAGITIS PRESENT: ICD-10-CM

## 2023-07-21 PROCEDURE — 99999 PR PBB SHADOW E&M-EST. PATIENT-LVL III: ICD-10-PCS | Mod: PBBFAC,HCNC,, | Performed by: HOSPITALIST

## 2023-07-21 PROCEDURE — 3078F PR MOST RECENT DIASTOLIC BLOOD PRESSURE < 80 MM HG: ICD-10-PCS | Mod: HCNC,CPTII,S$GLB, | Performed by: HOSPITALIST

## 2023-07-21 PROCEDURE — 3074F SYST BP LT 130 MM HG: CPT | Mod: HCNC,CPTII,S$GLB, | Performed by: HOSPITALIST

## 2023-07-21 PROCEDURE — 3288F FALL RISK ASSESSMENT DOCD: CPT | Mod: HCNC,CPTII,S$GLB, | Performed by: HOSPITALIST

## 2023-07-21 PROCEDURE — 3074F PR MOST RECENT SYSTOLIC BLOOD PRESSURE < 130 MM HG: ICD-10-PCS | Mod: HCNC,CPTII,S$GLB, | Performed by: HOSPITALIST

## 2023-07-21 PROCEDURE — 3044F PR MOST RECENT HEMOGLOBIN A1C LEVEL <7.0%: ICD-10-PCS | Mod: HCNC,CPTII,S$GLB, | Performed by: HOSPITALIST

## 2023-07-21 PROCEDURE — 3008F PR BODY MASS INDEX (BMI) DOCUMENTED: ICD-10-PCS | Mod: HCNC,CPTII,S$GLB, | Performed by: HOSPITALIST

## 2023-07-21 PROCEDURE — 3078F DIAST BP <80 MM HG: CPT | Mod: HCNC,CPTII,S$GLB, | Performed by: HOSPITALIST

## 2023-07-21 PROCEDURE — 3288F PR FALLS RISK ASSESSMENT DOCUMENTED: ICD-10-PCS | Mod: HCNC,CPTII,S$GLB, | Performed by: HOSPITALIST

## 2023-07-21 PROCEDURE — 3044F HG A1C LEVEL LT 7.0%: CPT | Mod: HCNC,CPTII,S$GLB, | Performed by: HOSPITALIST

## 2023-07-21 PROCEDURE — 3008F BODY MASS INDEX DOCD: CPT | Mod: HCNC,CPTII,S$GLB, | Performed by: HOSPITALIST

## 2023-07-21 PROCEDURE — 1101F PR PT FALLS ASSESS DOC 0-1 FALLS W/OUT INJ PAST YR: ICD-10-PCS | Mod: HCNC,CPTII,S$GLB, | Performed by: HOSPITALIST

## 2023-07-21 PROCEDURE — 99214 PR OFFICE/OUTPT VISIT, EST, LEVL IV, 30-39 MIN: ICD-10-PCS | Mod: HCNC,S$GLB,, | Performed by: HOSPITALIST

## 2023-07-21 PROCEDURE — 99999 PR PBB SHADOW E&M-EST. PATIENT-LVL III: CPT | Mod: PBBFAC,HCNC,, | Performed by: HOSPITALIST

## 2023-07-21 PROCEDURE — 1159F MED LIST DOCD IN RCRD: CPT | Mod: HCNC,CPTII,S$GLB, | Performed by: HOSPITALIST

## 2023-07-21 PROCEDURE — 1101F PT FALLS ASSESS-DOCD LE1/YR: CPT | Mod: HCNC,CPTII,S$GLB, | Performed by: HOSPITALIST

## 2023-07-21 PROCEDURE — 4010F ACE/ARB THERAPY RXD/TAKEN: CPT | Mod: HCNC,CPTII,S$GLB, | Performed by: HOSPITALIST

## 2023-07-21 PROCEDURE — 4010F PR ACE/ARB THEARPY RXD/TAKEN: ICD-10-PCS | Mod: HCNC,CPTII,S$GLB, | Performed by: HOSPITALIST

## 2023-07-21 PROCEDURE — 99214 OFFICE O/P EST MOD 30 MIN: CPT | Mod: HCNC,S$GLB,, | Performed by: HOSPITALIST

## 2023-07-21 PROCEDURE — 1159F PR MEDICATION LIST DOCUMENTED IN MEDICAL RECORD: ICD-10-PCS | Mod: HCNC,CPTII,S$GLB, | Performed by: HOSPITALIST

## 2023-07-21 NOTE — ASSESSMENT & PLAN NOTE
- status post ex lap by General surgery  - continue remote routine monitoring with surgery  - would avoid the use of oral bisphosphonate

## 2023-07-21 NOTE — PROGRESS NOTES
Subjective:      Patient ID: Meredith Keen is a 68 y.o. female presented to [facility] on 7/21/2023.  Chief Complaint:  Osteoporosis      History of Present Illness: Meredith Keen is a 68 y.o. [place] citizen speaking female here for osteoporosis  Other significant past medical history:  Hypertension, small bowel obstruction with recent ex lap due to adhesion  Daughter in law present for office visit, who is physician assistant at [employer]    Interval history:  Patient is here for follow-up osteoporosis, last office visit was 07/2022  Here for follow-up, compliance with Forteo daily injection, unclear of initiation date, possibly early 08/2022.  No issues with the injection Reports is expensive.    Currently has 3 more month supply left of Forteo  Patient steroid report occasional arthralgias.  Currently not taking calcium or vitamin-D   No falls  See dentist  Daughter present  Office visit conducted in [place] citizen    Osteoporosis  - Diagnosis on DXA in 5/19/2022, denies prior diagnosis  - Currently taking- Centrum MVN daily  - Vit D> Not taking  - Never been on anti resorptive medication  - Recent:  SBO leading to ex lap 6/2022, due to adhesions.  Now has resolved.  - Recent lab work show mild anemia, hypocalcemia:  Post surgical  - On initial visit 07/2022:  It was decided to start patient on Forteo daily injection after family discussion, possibly started injection on started 8/2022    Osteoporosis Risk Factor Assessment:  History of falls over the last 2 years: yes, 3 weeks ago, fell trip, injured her knee  History of fractures to wrist, hip or spine:no  History of loss of height of more than 1 1/2 to 2 inches: no  Family history of osteoporosis: no  Family history of fractures of hip: no    Age of menopause: 2010, hysterectomy, no: use of HRT  Tobacco use, including use in the past:  no   EtOH use? No     no: history of CKD3   no: history of thyroid disease   no: history of kidney stones    Denies  active malignancy, history of malignancy the involved the bone, prior radiation treatment, or unexplained elevations of alk phos on labs.  No current:  Gastrointestinal issues including:  Chronic diarrhea, celiac disease, ulcerative colitis/Crohn or h/o malabsorption or gastric surgery  Diet: Does eat regularly Cheese/Dairy/Milk/Greens    Weight bearing exercise?   no (no walking)  Dental work planned? no, dentures    Recent DXA:   5/19/2022  The L1 to L4 vertebral bone mineral density is equal to 0.907 g/cm squared with a T score of -2.3.  The left femoral neck bone mineral density is equal to 0.585 g/cm squared with a T score of -3.3.  The total hip bone mineral density is equal to 0.669 g/cm squared with a T score of -2.7.  There is a 10.9% risk of a major osteoporotic fracture and a 4.1% risk of hip fracture in the next 10 years (FRAX).     Impression:  Osteoporosis of the hips, osteopenia of the spine    Lab work reviewed  Lab Results   Component Value Date    [id number] 29 (L) 07/05/2022    CALCIUM 10.0 06/06/2023    CALCIUM 9.6 02/15/2023    CALCIUM 9.0 01/27/2023    PHOS 3.2 01/27/2023    PHOS 3.3 07/05/2022    PHOS 2.5 (L) 06/19/2022    ALKPHOS 107 02/15/2023    ALKPHOS 138 (H) 01/24/2023    ALKPHOS 114 01/18/2023    TSH 1.172 07/05/2022      Reviewed past surgical, medical, family, social history and updated as appropriate.  Review of Systems: see above    Objective:   /68   Pulse 93   Temp 99.7 °F (37.6 °C) (Oral)   Wt 51.5 kg (113 lb 9.6 oz)   BMI 20.78 kg/m²     Body mass index is 20.78 kg/m².  Vital signs reviewed    Physical Exam  Vitals and nursing note reviewed.   Constitutional:       Appearance: Normal appearance. She is well-developed. She is not ill-appearing.   Neck:      Thyroid: No thyromegaly.   Pulmonary:      Effort: Pulmonary effort is normal. No respiratory distress.   Musculoskeletal:         General: Normal range of motion.      Cervical back: Normal range of motion.    Neurological:      General: No focal deficit present.      Mental Status: She is alert. Mental status is at baseline.   Psychiatric:         Mood and Affect: Mood normal.         Behavior: Behavior normal.     Lab Reviewed:  See results in subjective  Lab Results   Component Value Date    HGBA1C 6.0 (H) 06/06/2023     Lab Results   Component Value Date    CHOL 184 06/06/2023    HDL 53 06/06/2023    LDLCALC 113.8 06/06/2023    TRIG 86 06/06/2023    CHOLHDL 28.8 06/06/2023     Lab Results   Component Value Date     06/06/2023    K 4.3 06/06/2023     06/06/2023    CO2 26 06/06/2023    GLU 87 06/06/2023    BUN 22 06/06/2023    CREATININE 0.8 06/06/2023    CALCIUM 10.0 06/06/2023    PHOS 3.2 01/27/2023    PROT 6.7 02/15/2023    ALBUMIN 3.7 02/15/2023    BILITOT 0.5 02/15/2023    ALKPHOS 107 02/15/2023    AST 17 02/15/2023    ALT 12 02/15/2023    ANIONGAP 7 (L) 06/06/2023    ESTGFRAFRICA >60 07/05/2022    EGFRNONAA >60 07/05/2022    TSH 1.172 07/05/2022    PTH 63.2 07/05/2022    [id number] 29 (L) 07/05/2022     Assessment     1. Age related osteoporosis, unspecified pathological fracture presence  DXA Bone Density Axial Skeleton 1 or more sites      2. Partial small bowel obstruction        3. Gastroesophageal reflux disease, unspecified whether esophagitis present             Plan     Age related osteoporosis  - Patient with Osteoporosis diagnosed on DEXA scan 5/2022>> new diagnosis  - high T-score, >-3.0, severe osteoporosis  - Risk factors include:  Postmenopausal, small stature, malabsorption  - SBO leading to ex lap 6/2022/GI issue/GERD/acid reflux (not candidate for oral bisphosphonate)  - On initial visit 07/2022:  It was decided to start patient on Forteo daily injection after family discussion, possibly started injection on 8/2022  - Currently still on Forteo daily injection without any issues.  Getting close to 1 year of therapy  - currently not taking calcium or vitamin-D supplement.  Advised  patient to take OTC calcium 500 mg daily, also restart vitamin D3 2000 IU daily  - plan to get bone density for 2023 for evaluation given 1 year of Forteo use  - if bone density has improved, consider switching patient to IV Reclast or SC Prolia  - patient to follow-up with me every 6 months  - Fall precautions/Exercise regimen: advised  - routine dental health advised    Partial small bowel obstruction  - status post ex lap by General surgery  - continue remote routine monitoring with surgery  - would avoid the use of oral bisphosphonate    Gastroesophageal reflux disease  - high-risk, avoid the use of oral bisphosphonate    Advised patient to follow up with PCP for routine health maintenance care.   RTC in 6 months to continue monitor bone health    Mikal Duarte M.D.  Endocrinology  [facility]  7/21/2023      Disclaimer: This note has been generated in part with the use of voice-recognition software. There may be typographical errors that have been missed during proof-reading.

## 2023-07-21 NOTE — ASSESSMENT & PLAN NOTE
- Patient with Osteoporosis diagnosed on DEXA scan 5/2022>> new diagnosis  - high T-score, >-3.0, severe osteoporosis  - Risk factors include:  Postmenopausal, small stature, malabsorption  - SBO leading to ex lap 6/2022/GI issue/GERD/acid reflux (not candidate for oral bisphosphonate)  - On initial visit 07/2022:  It was decided to start patient on Forteo daily injection after family discussion, possibly started injection on 8/2022  - Currently still on Forteo daily injection without any issues.  Getting close to 1 year of therapy  - currently not taking calcium or vitamin-D supplement.  Advised patient to take OTC calcium 500 mg daily, also restart vitamin D3 2000 IU daily  - plan to get bone density for 2023 for evaluation given 1 year of Forteo use  - if bone density has improved, consider switching patient to IV Reclast or SC Prolia  - patient to follow-up with me every 6 months  - Fall precautions/Exercise regimen: advised  - routine dental health advised

## 2023-08-31 DIAGNOSIS — M81.0 AGE RELATED OSTEOPOROSIS, UNSPECIFIED PATHOLOGICAL FRACTURE PRESENCE: ICD-10-CM

## 2023-09-01 RX ORDER — TERIPARATIDE 250 UG/ML
INJECTION, SOLUTION SUBCUTANEOUS
Qty: 9.6 ML | Refills: 0 | Status: SHIPPED | OUTPATIENT
Start: 2023-09-01 | End: 2023-09-27

## 2023-09-21 ENCOUNTER — HOSPITAL ENCOUNTER (OUTPATIENT)
Dept: RADIOLOGY | Facility: OTHER | Age: 68
Discharge: HOME | End: 2023-09-21
Attending: HOSPITALIST
Payer: MEDICARE

## 2023-09-21 DIAGNOSIS — M81.0 AGE RELATED OSTEOPOROSIS, UNSPECIFIED PATHOLOGICAL FRACTURE PRESENCE: ICD-10-CM

## 2023-09-21 PROCEDURE — 77080 DXA BONE DENSITY AXIAL: CPT | Mod: TC,HCNC

## 2023-09-21 PROCEDURE — 77080 DXA BONE DENSITY AXIAL: CPT | Mod: 26,HCNC,, | Performed by: RADIOLOGY

## 2023-09-21 PROCEDURE — 77080 DXA BONE DENSITY AXIAL SKELETON 1 OR MORE SITES: ICD-10-PCS | Mod: 26,HCNC,, | Performed by: RADIOLOGY

## 2023-09-26 ENCOUNTER — PATIENT MESSAGE (OUTPATIENT)
Dept: ENDOCRINOLOGY | Facility: CLINIC | Age: 68
End: 2023-09-26
Payer: MEDICARE

## 2023-09-26 DIAGNOSIS — M81.0 AGE-RELATED OSTEOPOROSIS WITHOUT CURRENT PATHOLOGICAL FRACTURE: Primary | ICD-10-CM

## 2023-09-27 ENCOUNTER — PATIENT MESSAGE (OUTPATIENT)
Dept: PRIMARY CARE CLINIC | Facility: CLINIC | Age: 68
End: 2023-09-27
Payer: MEDICARE

## 2023-10-30 ENCOUNTER — TELEPHONE (OUTPATIENT)
Dept: INFUSION THERAPY | Facility: HOSPITAL | Age: 68
End: 2023-10-30
Payer: MEDICARE

## 2023-11-03 ENCOUNTER — INFUSION (OUTPATIENT)
Dept: INFUSION THERAPY | Facility: HOSPITAL | Age: 68
End: 2023-11-03
Payer: MEDICARE

## 2023-11-03 DIAGNOSIS — M81.0 AGE-RELATED OSTEOPOROSIS WITHOUT CURRENT PATHOLOGICAL FRACTURE: Primary | ICD-10-CM

## 2023-11-03 PROCEDURE — 96372 THER/PROPH/DIAG INJ SC/IM: CPT | Mod: HCNC

## 2023-11-03 NOTE — NURSING
Pt here for prolia injection.  Endorses taking Ca++ and vitamin D supplements.  Denies jaw pain or recent invasive dental procedures.  Prolia administered to left arm.  Pt tolerated.  Ambulated out unassisted by self.

## 2023-11-30 ENCOUNTER — PATIENT MESSAGE (OUTPATIENT)
Dept: ADMINISTRATIVE | Facility: HOSPITAL | Age: 68
End: 2023-11-30
Payer: MEDICARE

## 2023-12-01 ENCOUNTER — OFFICE VISIT (OUTPATIENT)
Dept: PRIMARY CARE CLINIC | Facility: CLINIC | Age: 68
End: 2023-12-01
Payer: MEDICARE

## 2023-12-01 ENCOUNTER — LAB VISIT (OUTPATIENT)
Dept: PRIMARY CARE CLINIC | Facility: CLINIC | Age: 68
End: 2023-12-01
Payer: MEDICARE

## 2023-12-01 VITALS
HEART RATE: 80 BPM | TEMPERATURE: 97 F | OXYGEN SATURATION: 97 % | BODY MASS INDEX: 20.99 KG/M2 | HEIGHT: 62 IN | WEIGHT: 114.06 LBS | SYSTOLIC BLOOD PRESSURE: 122 MMHG | DIASTOLIC BLOOD PRESSURE: 78 MMHG

## 2023-12-01 DIAGNOSIS — M54.50 ACUTE BILATERAL LOW BACK PAIN WITHOUT SCIATICA: ICD-10-CM

## 2023-12-01 DIAGNOSIS — M62.830 MUSCLE SPASM OF BACK: ICD-10-CM

## 2023-12-01 DIAGNOSIS — I10 ESSENTIAL HYPERTENSION: ICD-10-CM

## 2023-12-01 DIAGNOSIS — Z12.31 SCREENING MAMMOGRAM FOR BREAST CANCER: ICD-10-CM
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
## 2023-12-01 LAB
ALBUMIN SERPL BCP-MCNC: 3.9 G/DL (ref 3.5–5.2)
ALP SERPL-CCNC: 109 U/L (ref 55–135)
ALT SERPL W/O P-5'-P-CCNC: 14 U/L (ref 10–44)
ANION GAP SERPL CALC-SCNC: 9 MMOL/L (ref 8–16)
AST SERPL-CCNC: 19 U/L (ref 10–40)
BASOPHILS # BLD AUTO: 0.06 K/UL (ref 0–0.2)
BASOPHILS NFR BLD: 0.9 % (ref 0–1.9)
BILIRUB SERPL-MCNC: 0.6 MG/DL (ref 0.1–1)
BUN SERPL-MCNC: 19 MG/DL (ref 8–23)
CALCIUM SERPL-MCNC: 9.4 MG/DL (ref 8.7–10.5)
CHLORIDE SERPL-SCNC: 107 MMOL/L (ref 95–110)
CO2 SERPL-SCNC: 24 MMOL/L (ref 23–29)
CREAT SERPL-MCNC: 0.9 MG/DL (ref 0.5–1.4)
DIFFERENTIAL METHOD: ABNORMAL
EOSINOPHIL # BLD AUTO: 0.2 K/UL (ref 0–0.5)
EOSINOPHIL NFR BLD: 3 % (ref 0–8)
ERYTHROCYTE [DISTWIDTH] IN BLOOD BY AUTOMATED COUNT: 14.6 % (ref 11.5–14.5)
EST. GFR  (NO RACE VARIABLE): >60 ML/MIN/1.73 M^2
GLUCOSE SERPL-MCNC: 59 MG/DL (ref 70–110)
HCT VFR BLD AUTO: 39.6 % (ref 37–48.5)
HGB BLD-MCNC: 12.7 G/DL (ref 12–16)
IMM GRANULOCYTES # BLD AUTO: 0.02 K/UL (ref 0–0.04)
IMM GRANULOCYTES NFR BLD AUTO: 0.3 % (ref 0–0.5)
LYMPHOCYTES # BLD AUTO: 1.3 K/UL (ref 1–4.8)
LYMPHOCYTES NFR BLD: 20.1 % (ref 18–48)
MCH RBC QN AUTO: 24.7 PG (ref 27–31)
MCHC RBC AUTO-ENTMCNC: 32.1 G/DL (ref 32–36)
MCV RBC AUTO: 77 FL (ref 82–98)
MONOCYTES # BLD AUTO: 0.6 K/UL (ref 0.3–1)
MONOCYTES NFR BLD: 9.1 % (ref 4–15)
NEUTROPHILS # BLD AUTO: 4.4 K/UL (ref 1.8–7.7)
NEUTROPHILS NFR BLD: 66.6 % (ref 38–73)
NRBC BLD-RTO: 0 /100 WBC
PLATELET # BLD AUTO: 302 K/UL (ref 150–450)
PMV BLD AUTO: 9.1 FL (ref 9.2–12.9)
POTASSIUM SERPL-SCNC: 4.6 MMOL/L (ref 3.5–5.1)
PROT SERPL-MCNC: 7.2 G/DL (ref 6–8.4)
RBC # BLD AUTO: 5.14 M/UL (ref 4–5.4)
SODIUM SERPL-SCNC: 140 MMOL/L (ref 136–145)
WBC # BLD AUTO: 6.58 K/UL (ref 3.9–12.7)

## 2023-12-01 PROCEDURE — 3008F PR BODY MASS INDEX (BMI) DOCUMENTED: ICD-10-PCS | Mod: CPTII,S$GLB,, | Performed by: NURSE PRACTITIONER

## 2023-12-01 PROCEDURE — 3074F SYST BP LT 130 MM HG: CPT | Mod: CPTII,S$GLB,, | Performed by: NURSE PRACTITIONER

## 2023-12-01 PROCEDURE — 4010F PR ACE/ARB THEARPY RXD/TAKEN: ICD-10-PCS | Mod: CPTII,S$GLB,, | Performed by: NURSE PRACTITIONER

## 2023-12-01 PROCEDURE — 99214 OFFICE O/P EST MOD 30 MIN: CPT | Mod: 25,S$GLB,, | Performed by: NURSE PRACTITIONER

## 2023-12-01 PROCEDURE — 80053 COMPREHEN METABOLIC PANEL: CPT | Performed by: NURSE PRACTITIONER

## 2023-12-01 PROCEDURE — 96372 PR INJECTION,THERAP/PROPH/DIAG2ST, IM OR SUBCUT: ICD-10-PCS | Mod: S$GLB,,, | Performed by: NURSE PRACTITIONER

## 2023-12-01 PROCEDURE — 99214 PR OFFICE/OUTPT VISIT, EST, LEVL IV, 30-39 MIN: ICD-10-PCS | Mod: 25,S$GLB,, | Performed by: NURSE PRACTITIONER

## 2023-12-01 PROCEDURE — 3044F PR MOST RECENT HEMOGLOBIN A1C LEVEL <7.0%: ICD-10-PCS | Mod: CPTII,S$GLB,, | Performed by: NURSE PRACTITIONER

## 2023-12-01 PROCEDURE — 3288F PR FALLS RISK ASSESSMENT DOCUMENTED: ICD-10-PCS | Mod: CPTII,S$GLB,, | Performed by: NURSE PRACTITIONER

## 2023-12-01 PROCEDURE — 99999 PR PBB SHADOW E&M-EST. PATIENT-LVL V: CPT | Mod: PBBFAC,,, | Performed by: NURSE PRACTITIONER

## 2023-12-01 PROCEDURE — 1159F PR MEDICATION LIST DOCUMENTED IN MEDICAL RECORD: ICD-10-PCS | Mod: CPTII,S$GLB,, | Performed by: NURSE PRACTITIONER

## 2023-12-01 PROCEDURE — 1159F MED LIST DOCD IN RCRD: CPT | Mod: CPTII,S$GLB,, | Performed by: NURSE PRACTITIONER

## 2023-12-01 PROCEDURE — 96372 THER/PROPH/DIAG INJ SC/IM: CPT | Mod: S$GLB,,, | Performed by: NURSE PRACTITIONER

## 2023-12-01 PROCEDURE — 1125F PR PAIN SEVERITY QUANTIFIED, PAIN PRESENT: ICD-10-PCS | Mod: CPTII,S$GLB,, | Performed by: NURSE PRACTITIONER

## 2023-12-01 PROCEDURE — 4010F ACE/ARB THERAPY RXD/TAKEN: CPT | Mod: CPTII,S$GLB,, | Performed by: NURSE PRACTITIONER

## 2023-12-01 PROCEDURE — 99999 PR PBB SHADOW E&M-EST. PATIENT-LVL V: ICD-10-PCS | Mod: PBBFAC,,, | Performed by: NURSE PRACTITIONER

## 2023-12-01 PROCEDURE — 1100F PTFALLS ASSESS-DOCD GE2>/YR: CPT | Mod: CPTII,S$GLB,, | Performed by: NURSE PRACTITIONER

## 2023-12-01 PROCEDURE — 85025 COMPLETE CBC W/AUTO DIFF WBC: CPT | Performed by: NURSE PRACTITIONER

## 2023-12-01 PROCEDURE — 3074F PR MOST RECENT SYSTOLIC BLOOD PRESSURE < 130 MM HG: ICD-10-PCS | Mod: CPTII,S$GLB,, | Performed by: NURSE PRACTITIONER

## 2023-12-01 PROCEDURE — 3078F DIAST BP <80 MM HG: CPT | Mod: CPTII,S$GLB,, | Performed by: NURSE PRACTITIONER

## 2023-12-01 PROCEDURE — 3078F PR MOST RECENT DIASTOLIC BLOOD PRESSURE < 80 MM HG: ICD-10-PCS | Mod: CPTII,S$GLB,, | Performed by: NURSE PRACTITIONER

## 2023-12-01 PROCEDURE — 1125F AMNT PAIN NOTED PAIN PRSNT: CPT | Mod: CPTII,S$GLB,, | Performed by: NURSE PRACTITIONER

## 2023-12-01 PROCEDURE — 3008F BODY MASS INDEX DOCD: CPT | Mod: CPTII,S$GLB,, | Performed by: NURSE PRACTITIONER

## 2023-12-01 PROCEDURE — 3044F HG A1C LEVEL LT 7.0%: CPT | Mod: CPTII,S$GLB,, | Performed by: NURSE PRACTITIONER

## 2023-12-01 PROCEDURE — 3288F FALL RISK ASSESSMENT DOCD: CPT | Mod: CPTII,S$GLB,, | Performed by: NURSE PRACTITIONER

## 2023-12-01 PROCEDURE — 1100F PR PT FALLS ASSESS DOC 2+ FALLS/FALL W/INJURY/YR: ICD-10-PCS | Mod: CPTII,S$GLB,, | Performed by: NURSE PRACTITIONER

## 2023-12-01 RX ORDER — KETOROLAC TROMETHAMINE 10 MG/1
10 TABLET, FILM COATED ORAL EVERY 6 HOURS
Qty: 20 TABLET | Refills: 0 | Status: SHIPPED | OUTPATIENT
Start: 2023-12-01 | End: 2023-12-06

## 2023-12-01 RX ORDER — BUTALBITAL, ACETAMINOPHEN AND CAFFEINE 50; 325; 40 MG/1; MG/1; MG/1
TABLET ORAL
COMMUNITY
End: 2024-03-08

## 2023-12-01 RX ORDER — AMITRIPTYLINE HYDROCHLORIDE 10 MG/1
TABLET, FILM COATED ORAL
COMMUNITY
End: 2024-03-08

## 2023-12-01 RX ORDER — KETOROLAC TROMETHAMINE 30 MG/ML
30 INJECTION, SOLUTION INTRAMUSCULAR; INTRAVENOUS
Status: COMPLETED | OUTPATIENT
Start: 2023-12-01 | End: 2023-12-01

## 2023-12-01 RX ORDER — DEXAMETHASONE SODIUM PHOSPHATE 4 MG/ML
4 INJECTION, SOLUTION INTRA-ARTICULAR; INTRALESIONAL; INTRAMUSCULAR; INTRAVENOUS; SOFT TISSUE
Status: COMPLETED | OUTPATIENT
Start: 2023-12-01 | End: 2023-12-01

## 2023-12-01 RX ORDER — METHOCARBAMOL 500 MG/1
500 TABLET, FILM COATED ORAL 4 TIMES DAILY
Qty: 20 TABLET | Refills: 0 | Status: SHIPPED | OUTPATIENT
Start: 2023-12-01 | End: 2023-12-06

## 2023-12-01 RX ORDER — SODIUM FLUORIDE 5 MG/G
PASTE, DENTIFRICE DENTAL
COMMUNITY

## 2023-12-01 RX ORDER — DEXLANSOPRAZOLE 60 MG/1
CAPSULE, DELAYED RELEASE ORAL
COMMUNITY
End: 2024-03-08

## 2023-12-01 RX ADMIN — KETOROLAC TROMETHAMINE 30 MG: 30 INJECTION, SOLUTION INTRAMUSCULAR; INTRAVENOUS at 11:12

## 2023-12-01 RX ADMIN — DEXAMETHASONE SODIUM PHOSPHATE 4 MG: 4 INJECTION, SOLUTION INTRA-ARTICULAR; INTRALESIONAL; INTRAMUSCULAR; INTRAVENOUS; SOFT TISSUE at 11:12

## 2023-12-01 NOTE — PROGRESS NOTES
Subjective:       Patient ID: Meredith Keen is a 68 y.o. female.    Chief Complaint: Back Pain    Ms. Meredith Cuellar is a 68 year old female, new to me, presents to the clinic with complaints of back pain. PCP is Alla Jade. Medical and surgical history in addition to problem list reviewed as listed below. Meredith#[id number] AMN Interpretor.     Back Pain  This is a new problem. The current episode started yesterday. The problem occurs constantly. The pain is present in the lumbar spine. The quality of the pain is described as aching. The pain does not radiate. The pain is at a severity of 10/10. The pain is severe. The pain is The same all the time. The symptoms are aggravated by sitting, position and standing. Stiffness is present All day. Pertinent negatives include no numbness. Risk factors include lack of exercise and sedentary lifestyle. She has tried nothing for the symptoms.   Fall  The accident occurred 12 to 24 hours ago. Fall occurred: fell from step chair reaching for something fell on back. She fell from a height of 3 to 5 ft. Impact surface: cement floor. There was no blood loss. The point of impact was the buttocks. The pain is at a severity of 10/10. The pain is severe. The symptoms are aggravated by ambulation, movement and standing. Pertinent negatives include no numbness. She has tried acetaminophen for the symptoms. The treatment provided no relief.     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        Past Surgical History:   Procedure Laterality Date    APPENDECTOMY      CHOLECYSTECTOMY      ENDOSCOPIC ULTRASOUND OF UPPER GASTROINTESTINAL TRACT N/A 11/26/2019    Procedure: ULTRASOUND, UPPER GI TRACT, ENDOSCOPIC;  Surgeon: Britton Martniez MD;  Location: [facility] ENDO;  Service: Endoscopy;  Laterality: N/A;    ENDOSCOPIC  ULTRASOUND OF UPPER GASTROINTESTINAL TRACT N/A 6/24/2020    Procedure: ULTRASOUND, UPPER GI TRACT, ENDOSCOPIC;  Surgeon: Delfino Jasso MD;  Location: [facility] ENDO (2ND FLR);  Service: Endoscopy;  Laterality: N/A;  EUS (linear) for abnormal CT scan. Urgent. Can be done by any [facility] physician.  Britton Martinez MD  Covid-19 test 6/22/20 at [facility] - pg  Speaks Azeri;  offered and declined; daughter will accompany patient as interp    ENDOSCOPIC ULTRASOUND OF UPPER GASTROINTESTINAL TRACT N/A 5/26/2021    Procedure: ULTRASOUND, UPPER GI TRACT, ENDOSCOPIC;  Surgeon: Britton Martinez MD;  Location: [facility] ENDO (2ND FLR);  Service: Endoscopy;  Laterality: N/A;  Need EUS (linear) for dilated bile duct on MRI. Main or [facility]. 45 minutes.   Britton Martinez MD   Fully vaccinated - sending in copy of card and bringing it on day of procedure. ttr  *NEEDS Lithuanian *    ESOPHAGOGASTRODUODENOSCOPY N/A 9/9/2020    Procedure: EGD (ESOPHAGOGASTRODUODENOSCOPY);  Surgeon: Devyn Miles MD;  [facility];  Service: General;  Laterality: N/A;    HYSTERECTOMY  2012    elective    LAPAROSCOPIC LYSIS OF ADHESIONS  9/9/2020    Procedure: LYSIS, ADHESIONS, LAPAROSCOPIC;  Surgeon: Devyn Miles MD;  [facility];  Service: General;;    LYSIS OF ADHESIONS N/A 6/16/2022    Procedure: LYSIS, ADHESIONS;  Surgeon: Jacob Greco MD;  [facility];  Service: General;  Laterality: N/A;    ROBOT-ASSISTED SURGICAL REMOVAL OF STOMACH USING DA RACHEL XI N/A 9/9/2020    Procedure: XI ROBOTIC GASTRECTOMY/GIST PROXIMAL STOMACH;  Surgeon: Devyn Miles MD;  [facility];  Service: General;  Laterality: N/A;        History reviewed. No pertinent family history.    Social History     Tobacco Use   Smoking Status Never   Smokeless Tobacco Never       Social History     Social History Narrative    Not on file       Review of patient's allergies  indicates:  No Known Allergies     Review of Systems   Musculoskeletal:  Positive for back pain.   Neurological:  Negative for numbness.         Objective:        Vitals:    12/01/23 1123   BP: 122/78   Pulse:    Temp: 97.4 °F (36.3 °C)        Physical Exam  Pulmonary:      Effort: Pulmonary effort is normal. No respiratory distress.      Breath sounds: No wheezing.   Musculoskeletal:         General: Tenderness and signs of injury present.      Lumbar back: Signs of trauma (fall from step chair) and tenderness present. Decreased range of motion.   Skin:     Capillary Refill: Capillary refill takes 2 to 3 seconds.   Neurological:      Mental Status: She is alert and oriented to person, place, and time.         Assessment:       1. Fall, initial encounter    2. Acute bilateral low back pain without sciatica    3. Muscle spasm of back    4. Screening mammogram for breast cancer    5. Essential hypertension        Plan:       Fall, initial encounter  -     X-Ray Lumbar Spine AP And Lateral; Future; Expected date: 12/01/2023  -     ketorolac injection 30 mg  -     dexAMETHasone injection 4 mg    Acute bilateral low back pain without sciatica  Rule out fracture, dislocation, tendinitis, muscle strain/sprain.  -     ketorolac (TORADOL) 10 mg tablet; Take 1 tablet (10 mg total) by mouth every 6 (six) hours. for 5 days  Dispense: 20 tablet; Refill: 0  -     CBC Auto Differential; Future; Expected date: 12/01/2023  -     Comprehensive Metabolic Panel; Future; Expected date: 12/01/2023    Muscle spasm of back  -     methocarbamoL (ROBAXIN) 500 MG Tab; Take 1 tablet (500 mg total) by mouth 4 (four) times daily. Please do not drive or operate machinery while taking this medication, it can cause drowsiness. for 5 days  Dispense: 20 tablet; Refill: 0    Screening mammogram for breast cancer  -     Mammo Digital Screening Bilat w/ Nii; Future; Expected date: 12/01/2023    Essential hypertension  The current medical regimen is  "effective;  continue present plan and medications.     Encouraged to go to the emergency room if symptoms persist/worsen.     Medication List with Changes/Refills   New Medications    KETOROLAC (TORADOL) 10 MG TABLET    Take 1 tablet (10 mg total) by mouth every 6 (six) hours. for 5 days    METHOCARBAMOL (ROBAXIN) 500 MG TAB    Take 1 tablet (500 mg total) by mouth 4 (four) times daily. Please do not drive or operate machinery while taking this medication, it can cause drowsiness. for 5 days   Current Medications    ALBUTEROL (PROVENTIL/VENTOLIN HFA) 90 MCG/ACTUATION INHALER    Inhale 2 puffs into the lungs every 4 (four) hours as needed.    ALUMINUM-MAGNESIUM HYDROXIDE-SIMETHICONE (MAALOX ADVANCED) 200-200-20 MG/5 ML SUSP    Take 30 mLs by mouth every 6 (six) hours as needed (nausea, abdominal pain, chest pain).    AMITRIPTYLINE (ELAVIL) 10 MG TABLET        AMLODIPINE (NORVASC) 2.5 MG TABLET    Take 1 tablet (2.5 mg total) by mouth once daily.    ATORVASTATIN (LIPITOR) 20 MG TABLET    Take 1 tablet (20 mg total) by mouth once daily.    BD ULTRA-FINE SHORT PEN NEEDLE 31 GAUGE X 5/16" NDLE    Inject into the skin.    BUTALBITAL-ACETAMINOPHEN-CAFFEINE -40 MG (FIORICET, ESGIC) -40 MG PER TABLET        CREON 36,000-114,000- 180,000 UNIT CPDR    Take 1 capsule by mouth 4 (four) times daily.    DEXLANSOPRAZOLE (DEXILANT) 60 MG CAPSULE    Take 1 capsule every day by oral route for 56 days.    ESCITALOPRAM OXALATE (LEXAPRO) 10 MG TABLET    Take 1 tablet (10 mg total) by mouth once daily.    FLUORIDE, SODIUM, (DENTA 5000 PLUS) 1.1 % CREA    BRUSH ON SENSITIVE TEETH 3-5 MINUTES TWICE A DAY    FLUTICASONE (VERAMYST) 27.5 MCG/ACTUATION NASAL SPRAY    2 sprays by Nasal route once daily.    LEVOCETIRIZINE (XYZAL) 5 MG TABLET    Take 1 tablet (5 mg total) by mouth every evening.    LOSARTAN (COZAAR) 100 MG TABLET    Take 1 tablet (100 mg total) by mouth once daily.    OLOPATADINE (PATANOL) 0.1 % OPHTHALMIC SOLUTION    " "Place 1 drop into both eyes 2 (two) times daily.    OMEPRAZOLE (PRILOSEC) 40 MG CAPSULE    Take 1 capsule (40 mg total) by mouth every morning.    PEN NEEDLE, DIABETIC 31 GAUGE X 1/4" NDLE    Use with Forteo injection daily    POLYETHYLENE GLYCOL (GLYCOLAX) 17 GRAM/DOSE POWDER    Dissolve one capful (17g) into liquid and take by mouth once daily.        Follow up if symptoms worsen or fail to improve.    I spent a total of 30 minutes on the day of the visit.This includes face to face time and non-face to face time preparing to see the patient (eg, review of tests), obtaining and/or reviewing separately obtained history, documenting clinical information in the electronic or other health record, independently interpreting results and communicating results to the patient/family/caregiver, or care coordinator with assistance of AMN interpretor.    Seema Leal APRN, MSN, FNP-C     "

## 2023-12-04 DIAGNOSIS — M47.816 FACET ARTHROPATHY, LUMBAR: Primary | ICD-10-CM

## 2023-12-04 DIAGNOSIS — M43.10 ANTEROLISTHESIS: ICD-10-CM

## 2023-12-21 ENCOUNTER — HOSPITAL ENCOUNTER (EMERGENCY)
Facility: HOSPITAL | Age: 68
Discharge: HOME | End: 2023-12-21
Attending: EMERGENCY MEDICINE
Payer: MEDICARE

## 2023-12-21 VITALS
WEIGHT: 114 LBS | RESPIRATION RATE: 18 BRPM | DIASTOLIC BLOOD PRESSURE: 72 MMHG | SYSTOLIC BLOOD PRESSURE: 161 MMHG | HEIGHT: 62 IN | BODY MASS INDEX: 20.98 KG/M2 | OXYGEN SATURATION: 100 % | HEART RATE: 72 BPM | TEMPERATURE: 98 F

## 2023-12-21 DIAGNOSIS — I10 ESSENTIAL HYPERTENSION: ICD-10-CM

## 2023-12-21 DIAGNOSIS — R20.2 TINGLING IN EXTREMITIES: ICD-10-CM

## 2023-12-21 LAB
ALBUMIN SERPL BCP-MCNC: 3.8 G/DL (ref 3.5–5.2)
ALP SERPL-CCNC: 97 U/L (ref 55–135)
ALT SERPL W/O P-5'-P-CCNC: 24 U/L (ref 10–44)
ANION GAP SERPL CALC-SCNC: 3 MMOL/L (ref 3–11)
AST SERPL-CCNC: 14 U/L (ref 10–40)
BASOPHILS # BLD AUTO: 0.07 K/UL (ref 0–0.2)
BASOPHILS NFR BLD: 1.3 % (ref 0–1.9)
BILIRUB SERPL-MCNC: 0.5 MG/DL (ref 0.1–1)
BUN SERPL-MCNC: 16 MG/DL (ref 8–23)
CALCIUM SERPL-MCNC: 9.4 MG/DL (ref 8.7–10.5)
CHLORIDE SERPL-SCNC: 108 MMOL/L (ref 95–110)
CO2 SERPL-SCNC: 28 MMOL/L (ref 23–29)
CREAT SERPL-MCNC: 0.6 MG/DL (ref 0.5–1.4)
DIFFERENTIAL METHOD: ABNORMAL
EOSINOPHIL # BLD AUTO: 0.3 K/UL (ref 0–0.5)
EOSINOPHIL NFR BLD: 5.7 % (ref 0–8)
ERYTHROCYTE [DISTWIDTH] IN BLOOD BY AUTOMATED COUNT: 14 % (ref 11.5–14.5)
EST. GFR  (NO RACE VARIABLE): >60 ML/MIN/1.73 M^2
GLUCOSE SERPL-MCNC: 138 MG/DL (ref 70–110)
HCT VFR BLD AUTO: 39.4 % (ref 37–48.5)
HGB BLD-MCNC: 13.1 G/DL (ref 12–16)
IMM GRANULOCYTES # BLD AUTO: 0.01 K/UL (ref 0–0.04)
IMM GRANULOCYTES NFR BLD AUTO: 0.2 % (ref 0–0.5)
LYMPHOCYTES # BLD AUTO: 1.5 K/UL (ref 1–4.8)
LYMPHOCYTES NFR BLD: 26.6 % (ref 18–48)
MCH RBC QN AUTO: 25.3 PG (ref 27–31)
MCHC RBC AUTO-ENTMCNC: 33.2 G/DL (ref 32–36)
MCV RBC AUTO: 76 FL (ref 82–98)
MONOCYTES # BLD AUTO: 0.6 K/UL (ref 0.3–1)
MONOCYTES NFR BLD: 10.8 % (ref 4–15)
NEUTROPHILS # BLD AUTO: 3.1 K/UL (ref 1.8–7.7)
NEUTROPHILS NFR BLD: 55.4 % (ref 38–73)
NRBC BLD-RTO: 0 /100 WBC
PLATELET # BLD AUTO: 248 K/UL (ref 150–450)
PMV BLD AUTO: 9 FL (ref 9.2–12.9)
POTASSIUM SERPL-SCNC: 4.4 MMOL/L (ref 3.5–5.1)
PROT SERPL-MCNC: 7.4 G/DL (ref 6–8.4)
RBC # BLD AUTO: 5.18 M/UL (ref 4–5.4)
SODIUM SERPL-SCNC: 139 MMOL/L (ref 136–145)
TROPONIN I SERPL DL<=0.01 NG/ML-MCNC: 16.9 PG/ML (ref 0–60)
WBC # BLD AUTO: 5.57 K/UL (ref 3.9–12.7)

## 2023-12-21 PROCEDURE — 85025 COMPLETE CBC W/AUTO DIFF WBC: CPT | Performed by: EMERGENCY MEDICINE

## 2023-12-21 PROCEDURE — 25000003 PHARM REV CODE 250: Performed by: EMERGENCY MEDICINE

## 2023-12-21 PROCEDURE — 93005 ELECTROCARDIOGRAM TRACING: CPT

## 2023-12-21 PROCEDURE — 80053 COMPREHEN METABOLIC PANEL: CPT | Performed by: EMERGENCY MEDICINE

## 2023-12-21 PROCEDURE — 93010 ELECTROCARDIOGRAM REPORT: CPT | Mod: ,,, | Performed by: INTERNAL MEDICINE

## 2023-12-21 PROCEDURE — 99285 EMERGENCY DEPT VISIT HI MDM: CPT | Mod: 25

## 2023-12-21 PROCEDURE — 93010 EKG 12-LEAD: ICD-10-PCS | Mod: ,,, | Performed by: INTERNAL MEDICINE

## 2023-12-21 PROCEDURE — 84484 ASSAY OF TROPONIN QUANT: CPT | Performed by: EMERGENCY MEDICINE

## 2023-12-21 PROCEDURE — 36415 COLL VENOUS BLD VENIPUNCTURE: CPT | Performed by: EMERGENCY MEDICINE

## 2023-12-21 RX ORDER — HYDRALAZINE HYDROCHLORIDE 20 MG/ML
10 INJECTION INTRAMUSCULAR; INTRAVENOUS
Status: DISCONTINUED | OUTPATIENT
Start: 2023-12-21 | End: 2023-12-21

## 2023-12-21 RX ORDER — CLONIDINE HYDROCHLORIDE 0.1 MG/1
0.1 TABLET ORAL
Status: COMPLETED | OUTPATIENT
Start: 2023-12-21 | End: 2023-12-21

## 2023-12-21 RX ORDER — AMLODIPINE BESYLATE 2.5 MG/1
2.5 TABLET ORAL DAILY
Qty: 30 TABLET | Refills: 0 | Status: SHIPPED | OUTPATIENT
Start: 2023-12-21

## 2023-12-21 RX ORDER — NAPROXEN SODIUM 220 MG/1
81 TABLET, FILM COATED ORAL DAILY
Qty: 30 TABLET | Refills: 0 | Status: SHIPPED | OUTPATIENT
Start: 2023-12-21 | End: 2024-03-08

## 2023-12-21 RX ORDER — LOSARTAN POTASSIUM 100 MG/1
100 TABLET ORAL DAILY
Qty: 30 TABLET | Refills: 0 | Status: SHIPPED | OUTPATIENT
Start: 2023-12-21

## 2023-12-21 RX ADMIN — CLONIDINE HYDROCHLORIDE 0.1 MG: 0.1 TABLET ORAL at 04:12

## 2023-12-21 NOTE — ED PROVIDER NOTES
Encounter Date: 12/21/2023       History     Chief Complaint   Patient presents with    numbess      Pt presents to ed via pov with complaints of numbness on left side of face and both hands X2 days. Came tonight because her blood pressure was high at home, no hisotry of stroke, only htn.      69 yo female with history of HTN here via POV with elevated BP. Onset unclear. Patient reports tingling to L face and both hands onset 2 days ago. No weakness. No speech abnormality. No swallowing problems. No issue ambulating. History per translation device.       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     Past Surgical History:   Procedure Laterality Date    APPENDECTOMY      CHOLECYSTECTOMY      ENDOSCOPIC ULTRASOUND OF UPPER GASTROINTESTINAL TRACT N/A 11/26/2019    Procedure: ULTRASOUND, UPPER GI TRACT, ENDOSCOPIC;  Surgeon: Britton Martinez MD;  Location: [facility];  Service: Endoscopy;  Laterality: N/A;    ENDOSCOPIC ULTRASOUND OF UPPER GASTROINTESTINAL TRACT N/A 6/24/2020    Procedure: ULTRASOUND, UPPER GI TRACT, ENDOSCOPIC;  Surgeon: Delfino Jasso MD;  [facility] (2ND FLR);  Service: Endoscopy;  Laterality: N/A;  EUS (linear) for abnormal CT scan. Urgent. Can be done by any [facility] physician.  Britton Martinez MD  Covid-19 test 6/22/20 at [facility] - [facility]  Speaks Georgian;  offered and declined; daughter will accompany patient as interp    ENDOSCOPIC ULTRASOUND OF UPPER GASTROINTESTINAL TRACT N/A 5/26/2021    Procedure: ULTRASOUND, UPPER GI TRACT, ENDOSCOPIC;  Surgeon: Britton Martinez MD;  [facility]  Service: Endoscopy;  Laterality: N/A;  Need EUS (linear) for dilated bile duct on MRI. Main or [facility]. 45 minutes.    Britton Martinez MD   Fully vaccinated - sending in copy of card and bringing it on day of procedure. ttr  *NEEDS Guamanian *    ESOPHAGOGASTRODUODENOSCOPY N/A 9/9/2020    Procedure: EGD (ESOPHAGOGASTRODUODENOSCOPY);  Surgeon: Devyn Miles MD;  Location: [address];  Service: General;  Laterality: N/A;    HYSTERECTOMY  2012    elective    LAPAROSCOPIC LYSIS OF ADHESIONS  9/9/2020    Procedure: LYSIS, ADHESIONS, LAPAROSCOPIC;  Surgeon: Devyn Miles MD;  Location: [address];  Service: General;;    LYSIS OF ADHESIONS N/A 6/16/2022    Procedure: LYSIS, ADHESIONS;  Surgeon: Jacob Greco MD;  Location: [facility] OR [address] [place] [address];  Service: General;  Laterality: N/A;    ROBOT-ASSISTED SURGICAL REMOVAL OF STOMACH USING DA RACHEL XI N/A 9/9/2020    Procedure: XI ROBOTIC GASTRECTOMY/GIST PROXIMAL STOMACH;  Surgeon: Devyn Miles MD;  Location: [address];  Service: General;  Laterality: N/A;     No family history on file.  Social History     Tobacco Use    Smoking status: Never    Smokeless tobacco: Never   Substance Use Topics    Alcohol use: No    Drug use: No     Review of Systems   Constitutional: Negative.    Respiratory: Negative.     Cardiovascular: Negative.    Gastrointestinal: Negative.    Neurological:  Negative for facial asymmetry and numbness.   All other systems reviewed and are negative.      Physical Exam     Initial Vitals   BP Pulse Resp Temp SpO2   12/21/23 0047 12/21/23 0047 12/21/23 0047 12/21/23 0048 12/21/23 0047   (!) 192/93 73 18 98.3 °F (36.8 °C) 100 %      MAP       --                Physical Exam    Nursing note and vitals reviewed.  Constitutional: She appears well-developed and well-nourished. She is not diaphoretic. No distress.   HENT:   Head: Normocephalic and atraumatic.   Mouth/Throat: Oropharynx is clear and moist. No oropharyngeal exudate.   Eyes: Conjunctivae and EOM are normal. Pupils are equal, round, and reactive to light. No scleral icterus.    Neck: Neck supple.   Normal range of motion.  Cardiovascular:  Normal rate, regular rhythm and normal heart sounds.     Exam reveals no gallop and no friction rub.       No murmur heard.  Pulmonary/Chest: Breath sounds normal. No respiratory distress. She has no wheezes. She has no rales.   Abdominal: Abdomen is soft. Bowel sounds are normal. She exhibits no distension. There is no abdominal tenderness. There is no rebound.   Musculoskeletal:         General: No tenderness or edema. Normal range of motion.      Cervical back: Normal range of motion and neck supple.     Neurological: She is alert and oriented to person, place, and time. She has normal strength and normal reflexes. She displays normal reflexes. No cranial nerve deficit or sensory deficit. GCS score is 15. GCS eye subscore is 4. GCS verbal subscore is 5. GCS motor subscore is 6.   Normal finger to nose. Normal gait.    Skin: Skin is warm and dry. Capillary refill takes less than 2 seconds.         ED Course   Procedures  Labs Reviewed   CBC W/ AUTO DIFFERENTIAL - Abnormal; Notable for the following components:       Result Value    MCV 76 (*)     MCH 25.3 (*)     MPV 9.0 (*)     All other components within normal limits   COMPREHENSIVE METABOLIC PANEL - Abnormal; Notable for the following components:    Glucose 138 (*)     All other components within normal limits   TROPONIN I HIGH SENSITIVITY     EKG Readings: (Independently Interpreted)   Initial Reading: No STEMI. Rhythm: Normal Sinus Rhythm. Ectopy: No Ectopy. Clinical Impression: Normal Sinus Rhythm     ECG Results              EKG 12-lead (Final result)  Result time 12/21/23 11:05:07      Final result by Interface, Lab In [facility] (12/21/23 11:05:07)                   Narrative:    Test Reason : R20.2,    Vent. Rate : 070 BPM     Atrial Rate : 070 BPM     P-R Int : 158 ms          QRS Dur : 088 ms      QT Int : 392 ms       P-R-T Axes : 074 080 050 degrees     QTc Int : 423 ms    Normal sinus  rhythm  Low voltage, precordial leads  Otherwise normal ECG  When compared with ECG of 15-FEB-2023 17:52,  No significant change was found  Confirmed by Shelia Aparicio MD ([id number]) on 12/21/2023 11:04:55 AM    Referred By: CECY   SELF           Confirmed By:Shelia Aparicio MD                                  Imaging Results              CT Head Without Contrast (Final result)  Result time 12/21/23 08:19:26      Final result by Fan Faye MD (12/21/23 08:19:26)                   Impression:      No acute intracranial hemorrhage, mass or CT evidence of acute ischemia.    A preliminary report was faxed by [facility] shortly after completion of this examination.      Electronically signed by: Fan Faye MD  Date:    12/21/2023  Time:    08:19               Narrative:    EXAMINATION:  CT HEAD WITHOUT CONTRAST    CLINICAL HISTORY:  Neuro deficit, acute, stroke suspected;    TECHNIQUE:  Axial CT images were obtained. Iterative reconstruction technique was used.  CT/cardiac nuclear exam/s in prior 12 months: 3.    COMPARISON:  CT head without IV contrast 06/15/2021.    FINDINGS:  There is no acute intracranial hemorrhage.  There is no mass or mass effect.  No ventricular dilatation or abnormal extra-axial fluid collection.  No osseous abnormality.                                    X-Rays:   Other Radiology Reports: CTH negative for acute finding     Medications   cloNIDine tablet 0.1 mg (0.1 mg Oral Given 12/21/23 0436)     Medical Decision Making  Symptoms improved with improved BP. Labs, CTH reassuring. Stable for outpatient follow up. Encouraged to be strictly compliant with HTN meds.     Problems Addressed:  Tingling in extremities: acute illness or injury    Amount and/or Complexity of Data Reviewed  Labs: ordered. Decision-making details documented in ED Course.  Radiology: ordered and independent interpretation performed. Decision-making details documented in ED Course.  ECG/medicine tests:  ordered and independent interpretation performed. Decision-making details documented in ED Course.    Risk  OTC drugs.  Prescription drug management.                                      Clinical Impression:  Final diagnoses:  [R20.2] Tingling in extremities          ED Disposition Condition    Discharge Stable          ED Prescriptions       Medication Sig Dispense Start Date End Date Auth. Provider    amLODIPine (NORVASC) 2.5 MG tablet Take 1 tablet (2.5 mg total) by mouth once daily. 30 tablet 12/21/2023 -- Navneet Cole MD    losartan (COZAAR) 100 MG tablet Take 1 tablet (100 mg total) by mouth once daily. 30 tablet 12/21/2023 -- Navneet Cole MD    aspirin 81 MG Chew Take 1 tablet (81 mg total) by mouth once daily. 30 tablet 12/21/2023 12/20/2024 Navneet Cole MD          Follow-up Information       Follow up With Specialties Details Why Contact Info    Alla Jade MD Family Medicine Schedule an appointment as soon as possible for a visit   [address] [facility] [address]  [phone]               Navneet Cole MD  12/21/23 2313

## 2024-01-01 ENCOUNTER — HOSPITAL ENCOUNTER (INPATIENT)
Facility: HOSPITAL | Age: 69
LOS: 1 days | Discharge: HOME | DRG: 390 | End: 2024-01-02
Attending: STUDENT IN AN ORGANIZED HEALTH CARE EDUCATION/TRAINING PROGRAM | Admitting: SURGERY
Payer: MEDICARE

## 2024-01-01 DIAGNOSIS — K56.600 PARTIAL SMALL BOWEL OBSTRUCTION: ICD-10-CM

## 2024-01-01 DIAGNOSIS — Z46.59 ENCOUNTER FOR NASOGASTRIC (NG) TUBE PLACEMENT: ICD-10-CM

## 2024-01-01 DIAGNOSIS — R10.9 ABDOMINAL PAIN: ICD-10-CM

## 2024-01-01 DIAGNOSIS — K56.609 SMALL BOWEL OBSTRUCTION: Primary | ICD-10-CM

## 2024-01-01 DIAGNOSIS — E83.39 HYPOPHOSPHATEMIA: ICD-10-CM

## 2024-01-01 LAB
ALBUMIN SERPL BCP-MCNC: 4 G/DL (ref 3.5–5.2)
ALP SERPL-CCNC: 80 U/L (ref 55–135)
ALT SERPL W/O P-5'-P-CCNC: 14 U/L (ref 10–44)
ANION GAP SERPL CALC-SCNC: 16 MMOL/L (ref 8–16)
AST SERPL-CCNC: 18 U/L (ref 10–40)
BASOPHILS # BLD AUTO: 0.04 K/UL (ref 0–0.2)
BASOPHILS NFR BLD: 0.3 % (ref 0–1.9)
BILIRUB SERPL-MCNC: 0.9 MG/DL (ref 0.1–1)
BILIRUB UR QL STRIP: NEGATIVE
BUN SERPL-MCNC: 19 MG/DL (ref 6–30)
BUN SERPL-MCNC: 19 MG/DL (ref 8–23)
CALCIUM SERPL-MCNC: 10.4 MG/DL (ref 8.7–10.5)
CAOX CRY UR QL COMP ASSIST: ABNORMAL
CHLORIDE SERPL-SCNC: 103 MMOL/L (ref 95–110)
CHLORIDE SERPL-SCNC: 103 MMOL/L (ref 95–110)
CLARITY UR REFRACT.AUTO: ABNORMAL
CO2 SERPL-SCNC: 20 MMOL/L (ref 23–29)
COLOR UR AUTO: YELLOW
CREAT SERPL-MCNC: 0.5 MG/DL (ref 0.5–1.4)
CREAT SERPL-MCNC: 0.7 MG/DL (ref 0.5–1.4)
DIFFERENTIAL METHOD BLD: ABNORMAL
EOSINOPHIL # BLD AUTO: 0.1 K/UL (ref 0–0.5)
EOSINOPHIL NFR BLD: 0.8 % (ref 0–8)
ERYTHROCYTE [DISTWIDTH] IN BLOOD BY AUTOMATED COUNT: 14.6 % (ref 11.5–14.5)
EST. GFR  (NO RACE VARIABLE): >60 ML/MIN/1.73 M^2
GLUCOSE SERPL-MCNC: 187 MG/DL (ref 70–110)
GLUCOSE SERPL-MCNC: 191 MG/DL (ref 70–110)
GLUCOSE UR QL STRIP: NEGATIVE
HCT VFR BLD AUTO: 38.7 % (ref 37–48.5)
HCT VFR BLD CALC: 40 %PCV (ref 36–54)
HGB BLD-MCNC: 12.7 G/DL (ref 12–16)
HGB UR QL STRIP: NEGATIVE
IMM GRANULOCYTES # BLD AUTO: 0.04 K/UL (ref 0–0.04)
IMM GRANULOCYTES NFR BLD AUTO: 0.3 % (ref 0–0.5)
KETONES UR QL STRIP: ABNORMAL
LACTATE SERPL-SCNC: 2.1 MMOL/L (ref 0.5–2.2)
LEUKOCYTE ESTERASE UR QL STRIP: ABNORMAL
LIPASE SERPL-CCNC: 36 U/L (ref 4–60)
LYMPHOCYTES # BLD AUTO: 0.9 K/UL (ref 1–4.8)
LYMPHOCYTES NFR BLD: 7.5 % (ref 18–48)
MCH RBC QN AUTO: 24.7 PG (ref 27–31)
MCHC RBC AUTO-ENTMCNC: 32.8 G/DL (ref 32–36)
MCV RBC AUTO: 75 FL (ref 82–98)
MICROSCOPIC COMMENT: ABNORMAL
MONOCYTES # BLD AUTO: 0.6 K/UL (ref 0.3–1)
MONOCYTES NFR BLD: 4.9 % (ref 4–15)
NEUTROPHILS # BLD AUTO: 10.7 K/UL (ref 1.8–7.7)
NEUTROPHILS NFR BLD: 86.2 % (ref 38–73)
NITRITE UR QL STRIP: NEGATIVE
NON-SQ EPI CELLS #/AREA URNS AUTO: 1 /HPF
NRBC BLD-RTO: 0 /100 WBC
PH UR STRIP: 6 [PH] (ref 5–8)
PLATELET # BLD AUTO: 287 K/UL (ref 150–450)
PMV BLD AUTO: 9 FL (ref 9.2–12.9)
POC IONIZED CALCIUM: 1.28 MMOL/L (ref 1.06–1.42)
POC TCO2 (MEASURED): 23 MMOL/L (ref 23–27)
POCT GLUCOSE: 140 MG/DL (ref 70–110)
POCT GLUCOSE: 151 MG/DL (ref 70–110)
POTASSIUM BLD-SCNC: 3.9 MMOL/L (ref 3.5–5.1)
POTASSIUM SERPL-SCNC: 3.9 MMOL/L (ref 3.5–5.1)
PROT SERPL-MCNC: 7.1 G/DL (ref 6–8.4)
PROT UR QL STRIP: ABNORMAL
RBC # BLD AUTO: 5.15 M/UL (ref 4–5.4)
RBC #/AREA URNS AUTO: 12 /HPF (ref 0–4)
SAMPLE: ABNORMAL
SODIUM BLD-SCNC: 137 MMOL/L (ref 136–145)
SODIUM SERPL-SCNC: 139 MMOL/L (ref 136–145)
SP GR UR STRIP: 1.03 (ref 1–1.03)
SQUAMOUS #/AREA URNS AUTO: 4 /HPF
URN SPEC COLLECT METH UR: ABNORMAL
WBC # BLD AUTO: 12.43 K/UL (ref 3.9–12.7)
WBC #/AREA URNS AUTO: 16 /HPF (ref 0–5)

## 2024-01-01 PROCEDURE — 11000001 HC ACUTE MED/SURG PRIVATE ROOM: Mod: HCNC

## 2024-01-01 PROCEDURE — 96374 THER/PROPH/DIAG INJ IV PUSH: CPT | Mod: HCNC

## 2024-01-01 PROCEDURE — 85025 COMPLETE CBC W/AUTO DIFF WBC: CPT | Mod: HCNC | Performed by: EMERGENCY MEDICINE

## 2024-01-01 PROCEDURE — 0D9670Z DRAINAGE OF STOMACH WITH DRAINAGE DEVICE, VIA NATURAL OR ARTIFICIAL OPENING: ICD-10-PCS | Performed by: SURGERY

## 2024-01-01 PROCEDURE — 99285 EMERGENCY DEPT VISIT HI MDM: CPT | Mod: 25,HCNC

## 2024-01-01 PROCEDURE — 25500020 PHARM REV CODE 255: Mod: HCNC | Performed by: STUDENT IN AN ORGANIZED HEALTH CARE EDUCATION/TRAINING PROGRAM

## 2024-01-01 PROCEDURE — 87086 URINE CULTURE/COLONY COUNT: CPT | Mod: HCNC | Performed by: EMERGENCY MEDICINE

## 2024-01-01 PROCEDURE — 93005 ELECTROCARDIOGRAM TRACING: CPT | Mod: HCNC

## 2024-01-01 PROCEDURE — 83690 ASSAY OF LIPASE: CPT | Mod: HCNC | Performed by: EMERGENCY MEDICINE

## 2024-01-01 PROCEDURE — 80048 BASIC METABOLIC PNL TOTAL CA: CPT | Mod: HCNC,XB

## 2024-01-01 PROCEDURE — 83605 ASSAY OF LACTIC ACID: CPT | Mod: HCNC | Performed by: EMERGENCY MEDICINE

## 2024-01-01 PROCEDURE — 96375 TX/PRO/DX INJ NEW DRUG ADDON: CPT | Mod: HCNC

## 2024-01-01 PROCEDURE — 63600175 PHARM REV CODE 636 W HCPCS: Mod: HCNC | Performed by: STUDENT IN AN ORGANIZED HEALTH CARE EDUCATION/TRAINING PROGRAM

## 2024-01-01 PROCEDURE — 93005 ELECTROCARDIOGRAM TRACING: CPT | Mod: HCNC | Performed by: INTERNAL MEDICINE

## 2024-01-01 PROCEDURE — 93010 ELECTROCARDIOGRAM REPORT: CPT | Mod: HCNC,,, | Performed by: INTERNAL MEDICINE

## 2024-01-01 PROCEDURE — 80053 COMPREHEN METABOLIC PANEL: CPT | Mod: HCNC | Performed by: EMERGENCY MEDICINE

## 2024-01-01 PROCEDURE — 81001 URINALYSIS AUTO W/SCOPE: CPT | Mod: HCNC | Performed by: EMERGENCY MEDICINE

## 2024-01-01 RX ORDER — MORPHINE SULFATE 4 MG/ML
4 INJECTION, SOLUTION INTRAMUSCULAR; INTRAVENOUS
Status: COMPLETED | OUTPATIENT
Start: 2024-01-01 | End: 2024-01-01

## 2024-01-01 RX ORDER — INSULIN ASPART 100 [IU]/ML
0-5 INJECTION, SOLUTION INTRAVENOUS; SUBCUTANEOUS EVERY 6 HOURS PRN
Status: DISCONTINUED | OUTPATIENT
Start: 2024-01-01 | End: 2024-01-02 | Stop reason: HOSPADM

## 2024-01-01 RX ORDER — GLUCAGON 1 MG
1 KIT INJECTION
Status: DISCONTINUED | OUTPATIENT
Start: 2024-01-01 | End: 2024-01-02 | Stop reason: HOSPADM

## 2024-01-01 RX ORDER — PROCHLORPERAZINE EDISYLATE 5 MG/ML
5 INJECTION INTRAMUSCULAR; INTRAVENOUS EVERY 6 HOURS PRN
Status: DISCONTINUED | OUTPATIENT
Start: 2024-01-01 | End: 2024-01-02 | Stop reason: HOSPADM

## 2024-01-01 RX ORDER — HYDROMORPHONE HYDROCHLORIDE 1 MG/ML
0.2 INJECTION, SOLUTION INTRAMUSCULAR; INTRAVENOUS; SUBCUTANEOUS
Status: DISCONTINUED | OUTPATIENT
Start: 2024-01-01 | End: 2024-01-02 | Stop reason: HOSPADM

## 2024-01-01 RX ORDER — ONDANSETRON 2 MG/ML
4 INJECTION INTRAMUSCULAR; INTRAVENOUS EVERY 6 HOURS PRN
Status: DISCONTINUED | OUTPATIENT
Start: 2024-01-01 | End: 2024-01-02 | Stop reason: HOSPADM

## 2024-01-01 RX ORDER — ONDANSETRON 2 MG/ML
4 INJECTION INTRAMUSCULAR; INTRAVENOUS
Status: COMPLETED | OUTPATIENT
Start: 2024-01-01 | End: 2024-01-01

## 2024-01-01 RX ORDER — LIDOCAINE HYDROCHLORIDE 10 MG/ML
1 INJECTION, SOLUTION EPIDURAL; INFILTRATION; INTRACAUDAL; PERINEURAL ONCE AS NEEDED
Status: DISCONTINUED | OUTPATIENT
Start: 2024-01-01 | End: 2024-01-02 | Stop reason: HOSPADM

## 2024-01-01 RX ORDER — SODIUM CHLORIDE, SODIUM LACTATE, POTASSIUM CHLORIDE, CALCIUM CHLORIDE 600; 310; 30; 20 MG/100ML; MG/100ML; MG/100ML; MG/100ML
INJECTION, SOLUTION INTRAVENOUS CONTINUOUS
Status: DISCONTINUED | OUTPATIENT
Start: 2024-01-01 | End: 2024-01-02 | Stop reason: HOSPADM

## 2024-01-01 RX ORDER — ENOXAPARIN SODIUM 100 MG/ML
40 INJECTION SUBCUTANEOUS EVERY 24 HOURS
Status: DISCONTINUED | OUTPATIENT
Start: 2024-01-01 | End: 2024-01-02 | Stop reason: HOSPADM

## 2024-01-01 RX ORDER — HYDROMORPHONE HYDROCHLORIDE 1 MG/ML
0.5 INJECTION, SOLUTION INTRAMUSCULAR; INTRAVENOUS; SUBCUTANEOUS
Status: DISCONTINUED | OUTPATIENT
Start: 2024-01-01 | End: 2024-01-02

## 2024-01-01 RX ORDER — HYDRALAZINE HYDROCHLORIDE 20 MG/ML
10 INJECTION INTRAMUSCULAR; INTRAVENOUS EVERY 6 HOURS PRN
Status: DISCONTINUED | OUTPATIENT
Start: 2024-01-01 | End: 2024-01-02 | Stop reason: HOSPADM

## 2024-01-01 RX ADMIN — SODIUM CHLORIDE, SODIUM LACTATE, POTASSIUM CHLORIDE, AND CALCIUM CHLORIDE: 600; 310; 30; 20 INJECTION, SOLUTION INTRAVENOUS at 04:01

## 2024-01-01 RX ADMIN — MORPHINE SULFATE 4 MG: 4 INJECTION INTRAVENOUS at 03:01

## 2024-01-01 RX ADMIN — IOHEXOL 75 ML: 350 INJECTION, SOLUTION INTRAVENOUS at 03:01

## 2024-01-01 RX ADMIN — HYDROMORPHONE HYDROCHLORIDE 0.5 MG: 1 INJECTION, SOLUTION INTRAMUSCULAR; INTRAVENOUS; SUBCUTANEOUS at 11:01

## 2024-01-01 RX ADMIN — ONDANSETRON 4 MG: 2 INJECTION INTRAMUSCULAR; INTRAVENOUS at 03:01

## 2024-01-01 RX ADMIN — ENOXAPARIN SODIUM 40 MG: 40 INJECTION SUBCUTANEOUS at 04:01

## 2024-01-01 RX ADMIN — HYDROMORPHONE HYDROCHLORIDE 0.5 MG: 1 INJECTION, SOLUTION INTRAMUSCULAR; INTRAVENOUS; SUBCUTANEOUS at 04:01

## 2024-01-01 RX ADMIN — ONDANSETRON 4 MG: 2 INJECTION INTRAMUSCULAR; INTRAVENOUS at 11:01

## 2024-01-01 RX ADMIN — SODIUM CHLORIDE, SODIUM LACTATE, POTASSIUM CHLORIDE, AND CALCIUM CHLORIDE: 600; 310; 30; 20 INJECTION, SOLUTION INTRAVENOUS at 07:01

## 2024-01-01 RX ADMIN — PROCHLORPERAZINE EDISYLATE 5 MG: 5 INJECTION INTRAMUSCULAR; INTRAVENOUS at 04:01

## 2024-01-01 NOTE — HPI
Meredith Keen is a 68 y.o. 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 Patient states that she noticed some abdominal distension over the last couple of days, which was not associated with pain until yesterday after she drank a cup of milk. States that yesterday afternoon she noticed worsening distension of her abdomen as well as moderate pain and some nausea prompting her to present to the ED. Her last bowel movement was yesterday morning and was normal. She states that she does not know if she has passed gas since that time. CT A/P showing dilated loops of small bowel and thickening of the pylorus with a transition point in the mid pelvis. In the ED, the patient is afebrile, hemodynamically stable, and without leukocytosis. Her lab work is largely unremarkable, save for a slight granulocytosis. UA with 2+ leukocytes and >10 WBC. NGT placed in the ED with about 100 cc of gastric contents on placement. Will admit to general surgery for management of SBO.

## 2024-01-01 NOTE — SUBJECTIVE & OBJECTIVE
"No current facility-administered medications on file prior to encounter.     Current Outpatient Medications on File Prior to Encounter   Medication Sig    albuterol (PROVENTIL/VENTOLIN HFA) 90 mcg/actuation inhaler Inhale 2 puffs into the lungs every 4 (four) hours as needed.    aluminum-magnesium hydroxide-simethicone (MAALOX ADVANCED) 200-200-20 mg/5 mL Susp Take 30 mLs by mouth every 6 (six) hours as needed (nausea, abdominal pain, chest pain). (Patient not taking: Reported on 12/1/2023)    amitriptyline (ELAVIL) 10 MG tablet     amLODIPine (NORVASC) 2.5 MG tablet Take 1 tablet (2.5 mg total) by mouth once daily.    aspirin 81 MG Chew Take 1 tablet (81 mg total) by mouth once daily.    atorvastatin (LIPITOR) 20 MG tablet Take 1 tablet (20 mg total) by mouth once daily.    BD ULTRA-FINE SHORT PEN NEEDLE 31 gauge x 5/16" Ndle Inject into the skin.    butalbital-acetaminophen-caffeine -40 mg (FIORICET, ESGIC) -40 mg per tablet     CREON 36,000-114,000- 180,000 unit CpDR Take 1 capsule by mouth 4 (four) times daily.    dexlansoprazole (DEXILANT) 60 mg capsule Take 1 capsule every day by oral route for 56 days.    EScitalopram oxalate (LEXAPRO) 10 MG tablet Take 1 tablet (10 mg total) by mouth once daily.    fluoride, sodium, (DENTA 5000 PLUS) 1.1 % Crea BRUSH ON SENSITIVE TEETH 3-5 MINUTES TWICE A DAY    fluticasone (VERAMYST) 27.5 mcg/actuation nasal spray 2 sprays by Nasal route once daily. (Patient not taking: Reported on 12/1/2023)    levocetirizine (XYZAL) 5 MG tablet Take 1 tablet (5 mg total) by mouth every evening.    losartan (COZAAR) 100 MG tablet Take 1 tablet (100 mg total) by mouth once daily.    olopatadine (PATANOL) 0.1 % ophthalmic solution Place 1 drop into both eyes 2 (two) times daily.    omeprazole (PRILOSEC) 40 MG capsule Take 1 capsule (40 mg total) by mouth every morning.    pen needle, diabetic 31 gauge x 1/4" Ndle Use with Forteo injection daily    polyethylene glycol (GLYCOLAX) " 17 gram/dose powder Dissolve one capful (17g) into liquid and take by mouth once daily.       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     Past Surgical History:   Procedure Laterality Date    APPENDECTOMY      CHOLECYSTECTOMY      ENDOSCOPIC ULTRASOUND OF UPPER GASTROINTESTINAL TRACT N/A 11/26/2019    Procedure: ULTRASOUND, UPPER GI TRACT, ENDOSCOPIC;  Surgeon: Britton Martinez MD;  [facility]: [facility] ENDO;  Service: Endoscopy;  Laterality: N/A;    ENDOSCOPIC ULTRASOUND OF UPPER GASTROINTESTINAL TRACT N/A 6/24/2020    Procedure: ULTRASOUND, UPPER GI TRACT, ENDOSCOPIC;  Surgeon: Delfino Jasso MD;  Location: [facility] (2ND FLR);  Service: Endoscopy;  Laterality: N/A;  EUS (linear) for abnormal CT scan. Urgent. Can be done by any [facility] physician.  Britton Martinez MD  Covid-19 test 6/22/20 at [facility]  Speaks Italian;  offered and declined; daughter will accompany patient as interp    ENDOSCOPIC ULTRASOUND OF UPPER GASTROINTESTINAL TRACT N/A 5/26/2021    Procedure: ULTRASOUND, UPPER GI TRACT, ENDOSCOPIC;  Surgeon: Britton Martinez MD;  Location: [facility] (2ND FLR);  Service: Endoscopy;  Laterality: N/A;  Need EUS (linear) for dilated bile duct on MRI. Main or [facility]. 45 minutes.   Britton Martinez MD   Fully vaccinated - sending in copy of card and bringing it on day of procedure. ttr  *NEEDS Lebanese *    ESOPHAGOGASTRODUODENOSCOPY N/A 9/9/2020    Procedure: EGD (ESOPHAGOGASTRODUODENOSCOPY);  Surgeon: Devyn Miles MD;  [facility];  Service: General;  Laterality: N/A;    HYSTERECTOMY  2012    elective    LAPAROSCOPIC LYSIS OF ADHESIONS  9/9/2020    Procedure: LYSIS, ADHESIONS, LAPAROSCOPIC;  Surgeon: Devyn  MEE Miles MD;  [facility];  Service: General;;    LYSIS OF ADHESIONS N/A 6/16/2022    Procedure: LYSIS, ADHESIONS;  Surgeon: Jacob Greco MD;  [facility];  Service: General;  Laterality: N/A;    ROBOT-ASSISTED SURGICAL REMOVAL OF STOMACH USING DA RACHEL XI N/A 9/9/2020    Procedure: XI ROBOTIC GASTRECTOMY/GIST PROXIMAL STOMACH;  Surgeon: Devyn Miles MD;  [facility];  Service: General;  Laterality: N/A;     Family History    None       Tobacco Use    Smoking status: Never    Smokeless tobacco: Never   Substance and Sexual Activity    Alcohol use: No    Drug use: No    Sexual activity: Yes     Partners: Male     Review of Systems   Constitutional:  Negative for chills and fever.   HENT:  Negative for trouble swallowing and voice change.    Eyes:  Negative for visual disturbance.   Respiratory:  Negative for chest tightness and shortness of breath.    Cardiovascular:  Negative for chest pain and palpitations.   Gastrointestinal:  Positive for abdominal distention, abdominal pain, nausea and vomiting.   Endocrine: Negative.    Genitourinary:  Negative for difficulty urinating and flank pain.   Musculoskeletal: Negative.    Skin: Negative.    Neurological: Negative.    Hematological: Negative.    Psychiatric/Behavioral: Negative.       Objective:     Vital Signs (Most Recent):  Temp: 97.5 °F (36.4 °C) (01/01/24 0833)  Pulse: 72 (01/01/24 0833)  Resp: 16 (01/01/24 0833)  BP: (!) 164/72 (01/01/24 0833)  SpO2: 98 % (01/01/24 0833) Vital Signs (24h Range):  Temp:  [97.5 °F (36.4 °C)-98.5 °F (36.9 °C)] 97.5 °F (36.4 °C)  Pulse:  [67-72] 72  Resp:  [16-18] 16  SpO2:  [94 %-99 %] 98 %  BP: (143-198)/(72-93) 164/72     Weight: 52.2 kg (115 lb)  Body mass index is 21.03 kg/m².     Physical Exam  Vitals reviewed.   Constitutional:       General: She is not in acute distress.     Appearance: She is normal weight. She is not toxic-appearing.   HENT:      Head: Normocephalic  and atraumatic.      Nose: Nose normal.      Comments: NGT in place to LIWS   Cardiovascular:      Rate and Rhythm: Normal rate.      Pulses: Normal pulses.   Pulmonary:      Effort: Pulmonary effort is normal. No respiratory distress.   Abdominal:      General: There is distension.      Palpations: Abdomen is soft.      Comments: Patient tender to palpation in the RUQ with voluntary guarding, no rebound. Non-peritonitic   Skin:     General: Skin is warm.   Neurological:      General: No focal deficit present.      Mental Status: She is alert and oriented to person, place, and time.   Psychiatric:         Mood and Affect: Mood normal.         Behavior: Behavior normal.            I have reviewed all pertinent lab results within the past 24 hours.  CBC:   Recent Labs   Lab 01/01/24 0154 01/01/24  0226   WBC 12.43  --    RBC 5.15  --    HGB 12.7  --    HCT 38.7 40     --    MCV 75*  --    MCH 24.7*  --    MCHC 32.8  --      CMP:   Recent Labs   Lab 01/01/24 0154   *   CALCIUM 10.4   ALBUMIN 4.0   PROT 7.1      K 3.9   CO2 20*      BUN 19   CREATININE 0.7   ALKPHOS 80   ALT 14   AST 18   BILITOT 0.9       Significant Diagnostics:  I have reviewed all pertinent imaging results/findings within the past 24 hours.    CT Abdomen Pelvis With IV Contrast NO Oral Contrast   1/1/2024    Small-bowel obstruction with transition point in the mid pelvis just distal to the small bowel surgical anastomosis.  The appearance is concerning for a high-grade or complete small bowel obstruction. At the level of the pylorus of the stomach there is appearance of circumferential thickening, this may be transient however can be seen with gastritis or gastric ulcer disease. Clinical and historical correlation is needed if indicated upper GI or endoscopy can be done.

## 2024-01-01 NOTE — ASSESSMENT & PLAN NOTE
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 CT A/P with dilated SB and possible transition point in the mid pelvis. Conservative management of SBO with NGT decompression, mIVF, bowel rest, and eventual GGC.     -- admit to general surgery   -- NPO, mIVF   -- NGT to LIWS   -- prn pain and nausea control   -- DVT ppx, lovenox   -- daily labs   -- follow up urine culture   -- home meds re-ordered as appropriate

## 2024-01-01 NOTE — H&P
General Surgery   History and Physical     Please see consult note dated 01/01/2024    Farideh Ward MD  Pager: [phone]  General Surgery PGY-III  [facility]

## 2024-01-01 NOTE — ED NOTES
I-STAT Chem-8+ Results:   Value Reference Range   Sodium 137 136-145 mmol/L   Potassium  3.9 3.5-5.1 mmol/L   Chloride 103  mmol/L   Ionized Calcium 1.28 1.06-1.42 mmol/L   CO2 (measured) 23 23-29 mmol/L   Glucose 191  mg/dL   BUN 19 6-30 mg/dL   Creatinine 0.5 0.5-1.4 mg/dL   Hematocrit 40 36-54%

## 2024-01-01 NOTE — CONSULTS
[facility] - Surgery  General Surgery  Consult Note    Patient Name: Meredith Keen  MRN: [id number]  Code Status: Full Code  Admission Date: 1/1/2024  Hospital Length of Stay: 0 days  Attending Physician: Lucas Macias MD  Primary Care Provider: Alla Jade MD    Patient information was obtained from patient.     Inpatient consult to General surgery  Consult performed by: Farideh Ward MD  Consult ordered by: Bossman Patrick MD        Subjective:     Principal Problem: SBO    History of Present Illness: Meredith Keen is a 68 y.o. 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 Patient states that she noticed some abdominal distension over the last couple of days, which was not associated with pain until yesterday after she drank a cup of milk. States that yesterday afternoon she noticed worsening distension of her abdomen as well as moderate pain and some nausea prompting her to present to the ED. Her last bowel movement was yesterday morning and was normal. She states that she does not know if she has passed gas since that time. CT A/P showing dilated loops of small bowel and thickening of the pylorus with a transition point in the mid pelvis. In the ED, the patient is afebrile, hemodynamically stable, and without leukocytosis. Her lab work is largely unremarkable, save for a slight granulocytosis. UA with 2+ leukocytes and >10 WBC. NGT placed in the ED with about 100 cc of gastric contents on placement. Will admit to general surgery for management of SBO.           No current facility-administered medications on file prior to encounter.     Current Outpatient Medications on File Prior to Encounter   Medication Sig    albuterol (PROVENTIL/VENTOLIN HFA) 90 mcg/actuation inhaler Inhale 2 puffs  "into the lungs every 4 (four) hours as needed.    aluminum-magnesium hydroxide-simethicone (MAALOX ADVANCED) 200-200-20 mg/5 mL Susp Take 30 mLs by mouth every 6 (six) hours as needed (nausea, abdominal pain, chest pain). (Patient not taking: Reported on 12/1/2023)    amitriptyline (ELAVIL) 10 MG tablet     amLODIPine (NORVASC) 2.5 MG tablet Take 1 tablet (2.5 mg total) by mouth once daily.    aspirin 81 MG Chew Take 1 tablet (81 mg total) by mouth once daily.    atorvastatin (LIPITOR) 20 MG tablet Take 1 tablet (20 mg total) by mouth once daily.    BD ULTRA-FINE SHORT PEN NEEDLE 31 gauge x 5/16" Ndle Inject into the skin.    butalbital-acetaminophen-caffeine -40 mg (FIORICET, ESGIC) -40 mg per tablet     CREON 36,000-114,000- 180,000 unit CpDR Take 1 capsule by mouth 4 (four) times daily.    dexlansoprazole (DEXILANT) 60 mg capsule Take 1 capsule every day by oral route for 56 days.    EScitalopram oxalate (LEXAPRO) 10 MG tablet Take 1 tablet (10 mg total) by mouth once daily.    fluoride, sodium, (DENTA 5000 PLUS) 1.1 % Crea BRUSH ON SENSITIVE TEETH 3-5 MINUTES TWICE A DAY    fluticasone (VERAMYST) 27.5 mcg/actuation nasal spray 2 sprays by Nasal route once daily. (Patient not taking: Reported on 12/1/2023)    levocetirizine (XYZAL) 5 MG tablet Take 1 tablet (5 mg total) by mouth every evening.    losartan (COZAAR) 100 MG tablet Take 1 tablet (100 mg total) by mouth once daily.    olopatadine (PATANOL) 0.1 % ophthalmic solution Place 1 drop into both eyes 2 (two) times daily.    omeprazole (PRILOSEC) 40 MG capsule Take 1 capsule (40 mg total) by mouth every morning.    pen needle, diabetic 31 gauge x 1/4" Ndle Use with Forteo injection daily    polyethylene glycol (GLYCOLAX) 17 gram/dose powder Dissolve one capful (17g) into liquid and take by mouth once daily.       Review of patient's allergies indicates:  No Known Allergies    Past Medical History:   Diagnosis Date    Abdominal pain     " Cholelithiasis     Constipation, chronic     Dilated bile duct     Headache     Hypertension     Pancreatitis     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     Past Surgical History:   Procedure Laterality Date    APPENDECTOMY      CHOLECYSTECTOMY      ENDOSCOPIC ULTRASOUND OF UPPER GASTROINTESTINAL TRACT N/A 11/26/2019    Procedure: ULTRASOUND, UPPER GI TRACT, ENDOSCOPIC;  Surgeon: Britton Martinez MD;  Location: [facility] ENDO;  Service: Endoscopy;  Laterality: N/A;    ENDOSCOPIC ULTRASOUND OF UPPER GASTROINTESTINAL TRACT N/A 6/24/2020    Procedure: ULTRASOUND, UPPER GI TRACT, ENDOSCOPIC;  Surgeon: Delfino Jasso MD;  Location: [facility] ENDO (2ND FLR);  Service: Endoscopy;  Laterality: N/A;  EUS (linear) for abnormal CT scan. Urgent. Can be done by any [facility] physician.  Britton Martinez MD  Covid-19 test 6/22/20 at [facility]  Speaks Korean;  offered and declined; daughter will accompany patient as interp    ENDOSCOPIC ULTRASOUND OF UPPER GASTROINTESTINAL TRACT N/A 5/26/2021    Procedure: ULTRASOUND, UPPER GI TRACT, ENDOSCOPIC;  Surgeon: Britton Martinez MD;  Location: [facility] (2ND FLR);  Service: Endoscopy;  Laterality: N/A;  Need EUS (linear) for dilated bile duct on MRI. Main or [facility]. 45 minutes.   Britton Martinez MD   Fully vaccinated - sending in copy of card and bringing it on day of procedure. ttr  *NEEDS Bengali *    ESOPHAGOGASTRODUODENOSCOPY N/A 9/9/2020    Procedure: EGD (ESOPHAGOGASTRODUODENOSCOPY);  Surgeon: Devyn Miles MD;  Location: [facility] OR [address] [place] [address];  Service: General;  Laterality: N/A;    HYSTERECTOMY  2012    elective    LAPAROSCOPIC LYSIS OF ADHESIONS  9/9/2020    Procedure: LYSIS, ADHESIONS, LAPAROSCOPIC;  Surgeon: Devyn Miles MD;  Location: [facility] OR [address] [place] [address];  Service: General;;    LYSIS OF ADHESIONS N/A 6/16/2022    Procedure: LYSIS, ADHESIONS;  Surgeon: Jacob Greco MD;  Location: [facility] OR [address] [place] [address];  Service: General;   Laterality: N/A;    ROBOT-ASSISTED SURGICAL REMOVAL OF STOMACH USING DA RACHEL XI N/A 9/9/2020    Procedure: XI ROBOTIC GASTRECTOMY/GIST PROXIMAL STOMACH;  Surgeon: Devyn Miles MD;  Location: [facility] OR [address] [place] [address];  Service: General;  Laterality: N/A;     Family History    None       Tobacco Use    Smoking status: Never    Smokeless tobacco: Never   Substance and Sexual Activity    Alcohol use: No    Drug use: No    Sexual activity: Yes     Partners: Male     Review of Systems   Constitutional:  Negative for chills and fever.   HENT:  Negative for trouble swallowing and voice change.    Eyes:  Negative for visual disturbance.   Respiratory:  Negative for chest tightness and shortness of breath.    Cardiovascular:  Negative for chest pain and palpitations.   Gastrointestinal:  Positive for abdominal distention, abdominal pain, nausea and vomiting.   Endocrine: Negative.    Genitourinary:  Negative for difficulty urinating and flank pain.   Musculoskeletal: Negative.    Skin: Negative.    Neurological: Negative.    Hematological: Negative.    Psychiatric/Behavioral: Negative.       Objective:     Vital Signs (Most Recent):  Temp: 97.5 °F (36.4 °C) (01/01/24 0833)  Pulse: 72 (01/01/24 0833)  Resp: 16 (01/01/24 0833)  BP: (!) 164/72 (01/01/24 0833)  SpO2: 98 % (01/01/24 0833) Vital Signs (24h Range):  Temp:  [97.5 °F (36.4 °C)-98.5 °F (36.9 °C)] 97.5 °F (36.4 °C)  Pulse:  [67-72] 72  Resp:  [16-18] 16  SpO2:  [94 %-99 %] 98 %  BP: (143-198)/(72-93) 164/72     Weight: 52.2 kg (115 lb)  Body mass index is 21.03 kg/m².     Physical Exam  Vitals reviewed.   Constitutional:       General: She is not in acute distress.     Appearance: She is normal weight. She is not toxic-appearing.   HENT:      Head: Normocephalic and atraumatic.      Nose: Nose normal.      Comments: NGT in place to LIWS   Cardiovascular:      Rate and Rhythm: Normal rate.      Pulses: Normal pulses.   Pulmonary:      Effort: Pulmonary effort is normal. No  respiratory distress.   Abdominal:      General: There is distension.      Palpations: Abdomen is soft.      Comments: Patient tender to palpation in the RUQ with voluntary guarding, no rebound. Non-peritonitic   Skin:     General: Skin is warm.   Neurological:      General: No focal deficit present.      Mental Status: She is alert and oriented to person, place, and time.   Psychiatric:         Mood and Affect: Mood normal.         Behavior: Behavior normal.            I have reviewed all pertinent lab results within the past 24 hours.  CBC:   Recent Labs   Lab 01/01/24 0154 01/01/24 0226   WBC 12.43  --    RBC 5.15  --    HGB 12.7  --    HCT 38.7 40     --    MCV 75*  --    MCH 24.7*  --    MCHC 32.8  --      CMP:   Recent Labs   Lab 01/01/24 0154   *   CALCIUM 10.4   ALBUMIN 4.0   PROT 7.1      K 3.9   CO2 20*      BUN 19   CREATININE 0.7   ALKPHOS 80   ALT 14   AST 18   BILITOT 0.9       Significant Diagnostics:  I have reviewed all pertinent imaging results/findings within the past 24 hours.    CT Abdomen Pelvis With IV Contrast NO Oral Contrast   1/1/2024    Small-bowel obstruction with transition point in the mid pelvis just distal to the small bowel surgical anastomosis.  The appearance is concerning for a high-grade or complete small bowel obstruction. At the level of the pylorus of the stomach there is appearance of circumferential thickening, this may be transient however can be seen with gastritis or gastric ulcer disease. Clinical and historical correlation is needed if indicated upper GI or endoscopy can be done.     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 CT A/P with dilated SB and possible  transition point in the mid pelvis. Conservative management of SBO with NGT decompression, mIVF, bowel rest, and eventual GGC.     -- admit to general surgery   -- NPO, mIVF   -- NGT to LIWS   -- serial abdominal exams  -- prn pain and nausea control   -- DVT ppx, lovenox   -- daily labs   -- follow up urine culture   -- home meds re-ordered as appropriate         Farideh Ward MD  General Surgery  [facility] - Surgery

## 2024-01-01 NOTE — ED PROVIDER NOTES
Encounter Date: 1/1/2024       History     Chief Complaint   Patient presents with    Abdominal Pain     Pt complaining of abdominal pain that began earlier tonight. No nausea or vomiting or diarrhea     HPI  68-year-old female, with history of cholelithiasis, hypertension, pancreatitis, presents to the ED for abdominal pain.  Patient reports to have abdominal cramping for last 2 days, she drank new tonight, then developed severe abdominal pain, associated with nausea, but no vomiting or diarrhea.  Last bowel movement yesterday.  Unable to pass gas after abdominal pain started.  Patient has history of recurrent SBO, and ex lap done in June 2022, last admission for SBO on injury of 2023 with medical management only.   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     Past Surgical History:   Procedure Laterality Date    APPENDECTOMY      CHOLECYSTECTOMY      ENDOSCOPIC ULTRASOUND OF UPPER GASTROINTESTINAL TRACT N/A 11/26/2019    Procedure: ULTRASOUND, UPPER GI TRACT, ENDOSCOPIC;  Surgeon: Britton Martinez MD;  Location: [facility];  Service: Endoscopy;  Laterality: N/A;    ENDOSCOPIC ULTRASOUND OF UPPER GASTROINTESTINAL TRACT N/A 6/24/2020    Procedure: ULTRASOUND, UPPER GI TRACT, ENDOSCOPIC;  Surgeon: Delfino Jasso MD;  Location: [facility] ([address] [place] [address]);  Service: Endoscopy;  Laterality: N/A;  EUS (linear) for abnormal CT scan. Urgent. Can be done by any [facility] physician.  Britton Martinez MD  Covid-19 test 6/22/20 at [facility] - pg  Speaks Latvian;  offered and declined; daughter will accompany patient as interp    ENDOSCOPIC ULTRASOUND OF UPPER GASTROINTESTINAL TRACT N/A 5/26/2021    Procedure: ULTRASOUND, UPPER GI TRACT, ENDOSCOPIC;  Surgeon: Britton  CONG Martinez MD;  Location: [facility] ENDO (2ND FLR);  Service: Endoscopy;  Laterality: N/A;  Need EUS (linear) for dilated bile duct on MRI. [place] or [place]. 45 minutes.   Britton Martinez MD   Fully vaccinated - sending in copy of card and bringing it on day of procedure. ttr  *NEEDS Swedish *    ESOPHAGOGASTRODUODENOSCOPY N/A 9/9/2020    Procedure: EGD (ESOPHAGOGASTRODUODENOSCOPY);  Surgeon: Devyn Miles MD;  Location: [facility] OR [facility] FLR;  Service: General;  Laterality: N/A;    HYSTERECTOMY  2012    elective    LAPAROSCOPIC LYSIS OF ADHESIONS  9/9/2020    Procedure: LYSIS, ADHESIONS, LAPAROSCOPIC;  Surgeon: Devyn Miles MD;  [facility];  Service: General;;    LYSIS OF ADHESIONS N/A 6/16/2022    Procedure: LYSIS, ADHESIONS;  Surgeon: Jacob Greco MD;  [facility];  Service: General;  Laterality: N/A;    ROBOT-ASSISTED SURGICAL REMOVAL OF STOMACH USING DA RACHEL XI N/A 9/9/2020    Procedure: XI ROBOTIC GASTRECTOMY/GIST PROXIMAL STOMACH;  Surgeon: Devyn Miles MD;  [facility];  Service: General;  Laterality: N/A;     No family history on file.  Social History     Tobacco Use    Smoking status: Never    Smokeless tobacco: Never   Substance Use Topics    Alcohol use: No    Drug use: No     Review of Systems    Physical Exam     Initial Vitals [01/01/24 0016]   BP Pulse Resp Temp SpO2   (!) 155/78 71 18 97.9 °F (36.6 °C) 99 %      MAP       --         Physical Exam    Nursing note and vitals reviewed.  Constitutional: She appears well-developed. No distress.   HENT:   Head: Normocephalic and atraumatic.   Mouth/Throat: Oropharynx is clear and moist.   Eyes: Conjunctivae and EOM are normal.   Neck: No JVD present.   Normal range of motion.  Cardiovascular:  Normal rate, regular rhythm, normal heart sounds and intact distal pulses.           Pulmonary/Chest: Breath sounds normal. No respiratory distress.   Abdominal: Abdomen is soft. She exhibits  distension. There is abdominal tenderness (Diffuse).   Decreased bowel sounds There is no rebound and no guarding.   Musculoskeletal:         General: No tenderness or edema. Normal range of motion.      Cervical back: Normal range of motion.     Neurological: She is alert and oriented to person, place, and time. She has normal strength.   Skin: Skin is warm and dry. Capillary refill takes less than 2 seconds.         ED Course   Procedures  Labs Reviewed   CBC W/ AUTO DIFFERENTIAL - Abnormal; Notable for the following components:       Result Value    MCV 75 (*)     MCH 24.7 (*)     RDW 14.6 (*)     MPV 9.0 (*)     Gran # (ANC) 10.7 (*)     Lymph # 0.9 (*)     Gran % 86.2 (*)     Lymph % 7.5 (*)     All other components within normal limits   COMPREHENSIVE METABOLIC PANEL - Abnormal; Notable for the following components:    CO2 20 (*)     Glucose 187 (*)     All other components within normal limits   URINALYSIS, REFLEX TO URINE CULTURE - Abnormal; Notable for the following components:    Appearance, UA Hazy (*)     Protein, UA Trace (*)     Ketones, UA Trace (*)     Leukocytes, UA 2+ (*)     All other components within normal limits    Narrative:     Specimen Source->Urine   URINALYSIS MICROSCOPIC - Abnormal; Notable for the following components:    RBC, UA 12 (*)     WBC, UA 16 (*)     Non-Squam Epith 1 (*)     All other components within normal limits    Narrative:     Specimen Source->Urine   ISTAT PROCEDURE - Abnormal; Notable for the following components:    POC Glucose 191 (*)     All other components within normal limits   LIPASE   LACTIC ACID, PLASMA     EKG Readings: (Independently Interpreted)   Initial Reading: No STEMI. Previous EKG: Compared with most recent EKG Rhythm: Normal Sinus Rhythm. Heart Rate: 67. Ectopy: No Ectopy. Conduction: Normal. ST Segments: Normal ST Segments. T Waves: Normal. Axis: Normal. Clinical Impression: Normal Sinus Rhythm     ECG Results              EKG 12-lead (Final  result)  Result time 01/02/24 14:14:55      Final result by Interface, Lab In [facility] (01/02/24 14:14:55)                   Narrative:    Test Reason : R10.9,    Vent. Rate : 067 BPM     Atrial Rate : 067 BPM     P-R Int : 148 ms          QRS Dur : 106 ms      QT Int : 426 ms       P-R-T Axes : 028 073 047 degrees     QTc Int : 450 ms    Normal sinus rhythm  Normal ECG  When compared with ECG of 21-DEC-2023 02:18,  QRS duration has increased  Confirmed by JORDEN TURCIOS MD ([id number]) on 1/1/2024 11:15:20 PM    Referred By: [email or web address]   SELF           Confirmed By:JORDEN TURCIOS MD                                  Imaging Results              XR NG/OG tube placement check, non-radiologist performed (Final result)  Result time 01/01/24 08:23:39   Procedure changed from X-Ray Chest AP Portable     Final result by Augustine Aguilar MD (01/01/24 08:23:39)                   Impression:      As above.      Electronically signed by: Augustine Aguilar  Date:    01/01/2024  Time:    08:23               Narrative:    EXAMINATION:  XR NG/OG TUBE PLACEMENT CHECK, NON-RADIOLOGIST PERFORMED    CLINICAL HISTORY:  ngt placement;  Encounter for fitting and adjustment of other gastrointestinal appliance and device    TECHNIQUE:  Frontal radiograph of the chest was performed.    COMPARISON:  Chest radiograph 01/25/2023; CT abdomen pelvis 01/01/2024    FINDINGS:  Enteric tube terminates in the distal stomach.    There are dilated air-filled loops of small bowel in the visualized abdomen, compatible with known small bowel obstruction.    Excreted contrast in the renal collecting systems bilaterally.  Cholecystectomy clips.                                        CT Abdomen Pelvis With IV Contrast NO Oral Contrast (Final result)  Result time 01/01/24 05:56:05      Final result by Pratik Singletary MD (01/01/24 05:56:05)                   Impression:      Small-bowel obstruction with transition point in the mid pelvis just distal to the small bowel  surgical anastomosis.  The appearance is concerning for a high-grade or complete small bowel obstruction.    At the level of the pylorus of the stomach there is appearance of circumferential thickening, this may be transient however can be seen with gastritis or gastric ulcer disease. Clinical and historical correlation is needed if indicated upper GI or endoscopy can be done.    Additional stable findings as above.    This report was flagged in Epic as abnormal.    Electronically signed by resident: Abby Wagoner  Date:    01/01/2024  Time:    05:14    Electronically signed by: Pratik Singletary  Date:    01/01/2024  Time:    05:56               Narrative:    EXAMINATION:  CT ABDOMEN PELVIS WITH IV CONTRAST    CLINICAL HISTORY:  Abdominal pain, acute, nonlocalized;    TECHNIQUE:  Axial images of the abdomen and pelvis were acquired  after the use of 75 cc [id number] IV contrast.  Coronal and sagittal reconstructions were also obtained.  Oral contrast was not utilized, single phase postcontrast CT examination of the abdomen and pelvis is submitted.    COMPARISON:  CT abdomen pelvis February 15, 2023    FINDINGS:  Heart: Normal in size. No pericardial effusion. No significant calcific coronary atherosclerosis.    Lungs: Mild atelectatic changes are noted.    Liver: Normal in size and contour.  Subcentimeter hypodensity in the right hepatic lobe, too small to characterize.    Gallbladder: Surgically absent.    Bile Ducts: Mild intrahepatic biliary ductal dilatation, similar to prior.  Common bile duct measures 1.2 cm, similar to prior.    Pancreas: No mass or peripancreatic fat stranding.    Spleen: Unremarkable.    Stomach and duodenum: There is appearance of the proximal stomach that may relate to postoperative change.  There is nonspecific moderate to prominent distention of the stomach with ingested material and air.  At the level of the pylorus of the stomach there is appearance of circumferential thickening, this  may be transient however can be seen with gastritis or gastric ulcer disease.  Clinical and historical correlation is needed if indicated upper GI or endoscopy can be done.    Adrenals: Unremarkable.    Kidneys/Ureters: Normal in size and location. Normal enhancement. No hydronephrosis or nephrolithiasis. No ureteral dilatation.    Bladder: The bladder is nondistended.    Reproductive organs: Uterus is surgically absent.    Bowel/Mesentery: Postoperative changes of small-bowel resection.  There are multiple dilated loops of small bowel measuring up to 4.2 cm (series 601, image 91), with a transition point in the mid pelvis just distal to the surgical anastomosis (series 601, image 51).  There is associated small bowel feces sign.  The colon is largely decompressed.  Appendix is surgically absent.    Peritoneum: No intraperitoneal free air or fluid.    Lymph nodes: No retroperitoneal lymphadenopathy.    Vasculature: No aneurysm. Moderate calcific atherosclerosis.    Abdominal wall: Unremarkable.    Bones: No acute fracture. No suspicious osseous lesions.                                       Medications   iohexoL (OMNIPAQUE 350) injection 75 mL (75 mLs Intravenous Given 1/1/24 0314)   morphine injection 4 mg (4 mg Intravenous Given 1/1/24 0342)   ondansetron injection 4 mg (4 mg Intravenous Given 1/1/24 0342)   sodium phosphate 20.01 mmol in dextrose 5 % (D5W) 250 mL IVPB (0 mmol Intravenous Stopped 1/2/24 1318)     Medical Decision Making  68-year-old female, with history of cholelithiasis, hypertension, pancreatitis, presents to the ED for abdominal pain.   Differential including but not limited to small-bowel obstruction, gastroenteritis, pancreatitis, appendicitis, UTI doubt peritonitis, perforated    Amount and/or Complexity of Data Reviewed  Labs: ordered. Decision-making details documented in ED Course.  Radiology: ordered and independent interpretation performed. Decision-making details documented in ED  Course.  ECG/medicine tests: ordered and independent interpretation performed. Decision-making details documented in ED Course.    Risk  Prescription drug management.  Decision regarding hospitalization.               ED Course as of 01/10/24 0927   Mon Jan 01, 2024   0500 CBC W/ AUTO DIFFERENTIAL(!)  No leukocytosis, no acute anemia [NC]   0501 Comp. Metabolic Panel(!)  No electrolyte derangement, normal renal function, liver enzyme within normal limits [NC]   0614 CT Abdomen Pelvis With IV Contrast NO Oral Contrast(!)  Concerning for SBO [NC]   0614 Discussed the case with General surgery, will evaluate patient at bedside. [NC]   0839 Patient is admitted to surgery service for SBO [NC]      ED Course User Index  [NC] Bossman Patrick MD                           Clinical Impression:  Final diagnoses:  [R10.9] Abdominal pain  [K56.609] Small bowel obstruction (Primary)  [Z46.59] Encounter for nasogastric (NG) tube placement          ED Disposition Condition    Admit Stable                Bossman Patrick MD  Resident  01/01/24 0839       Monster Peters MD  01/10/24 0927

## 2024-01-02 VITALS
SYSTOLIC BLOOD PRESSURE: 150 MMHG | BODY MASS INDEX: 21.16 KG/M2 | DIASTOLIC BLOOD PRESSURE: 72 MMHG | RESPIRATION RATE: 18 BRPM | WEIGHT: 115 LBS | HEART RATE: 77 BPM | OXYGEN SATURATION: 96 % | TEMPERATURE: 98 F | HEIGHT: 62 IN

## 2024-01-02 PROBLEM — E83.39 HYPOPHOSPHATEMIA: Status: ACTIVE | Noted: 2024-01-02

## 2024-01-02 LAB
ANION GAP SERPL CALC-SCNC: 8 MMOL/L (ref 8–16)
BACTERIA UR CULT: NORMAL
BASOPHILS # BLD AUTO: 0.02 K/UL (ref 0–0.2)
BASOPHILS NFR BLD: 0.5 % (ref 0–1.9)
BUN SERPL-MCNC: 14 MG/DL (ref 8–23)
CALCIUM SERPL-MCNC: 8.3 MG/DL (ref 8.7–10.5)
CHLORIDE SERPL-SCNC: 110 MMOL/L (ref 95–110)
CO2 SERPL-SCNC: 24 MMOL/L (ref 23–29)
CREAT SERPL-MCNC: 0.7 MG/DL (ref 0.5–1.4)
DIFFERENTIAL METHOD BLD: ABNORMAL
EOSINOPHIL # BLD AUTO: 0.1 K/UL (ref 0–0.5)
EOSINOPHIL NFR BLD: 2.6 % (ref 0–8)
ERYTHROCYTE [DISTWIDTH] IN BLOOD BY AUTOMATED COUNT: 14.9 % (ref 11.5–14.5)
EST. GFR  (NO RACE VARIABLE): >60 ML/MIN/1.73 M^2
GLUCOSE SERPL-MCNC: 116 MG/DL (ref 70–110)
HCT VFR BLD AUTO: 36.4 % (ref 37–48.5)
HGB BLD-MCNC: 11.7 G/DL (ref 12–16)
IMM GRANULOCYTES # BLD AUTO: 0.01 K/UL (ref 0–0.04)
IMM GRANULOCYTES NFR BLD AUTO: 0.2 % (ref 0–0.5)
LYMPHOCYTES # BLD AUTO: 1 K/UL (ref 1–4.8)
LYMPHOCYTES NFR BLD: 22.7 % (ref 18–48)
MAGNESIUM SERPL-MCNC: 1.9 MG/DL (ref 1.6–2.6)
MCH RBC QN AUTO: 24.9 PG (ref 27–31)
MCHC RBC AUTO-ENTMCNC: 32.1 G/DL (ref 32–36)
MCV RBC AUTO: 78 FL (ref 82–98)
MONOCYTES # BLD AUTO: 0.6 K/UL (ref 0.3–1)
MONOCYTES NFR BLD: 14.6 % (ref 4–15)
NEUTROPHILS # BLD AUTO: 2.5 K/UL (ref 1.8–7.7)
NEUTROPHILS NFR BLD: 59.4 % (ref 38–73)
NRBC BLD-RTO: 0 /100 WBC
PHOSPHATE SERPL-MCNC: 1.9 MG/DL (ref 2.7–4.5)
PLATELET # BLD AUTO: 256 K/UL (ref 150–450)
PMV BLD AUTO: 9.5 FL (ref 9.2–12.9)
POCT GLUCOSE: 105 MG/DL (ref 70–110)
POCT GLUCOSE: 105 MG/DL (ref 70–110)
POCT GLUCOSE: 92 MG/DL (ref 70–110)
POTASSIUM SERPL-SCNC: 4.5 MMOL/L (ref 3.5–5.1)
RBC # BLD AUTO: 4.69 M/UL (ref 4–5.4)
SODIUM SERPL-SCNC: 142 MMOL/L (ref 136–145)
WBC # BLD AUTO: 4.18 K/UL (ref 3.9–12.7)

## 2024-01-02 PROCEDURE — 63600175 PHARM REV CODE 636 W HCPCS: Mod: HCNC | Performed by: STUDENT IN AN ORGANIZED HEALTH CARE EDUCATION/TRAINING PROGRAM

## 2024-01-02 PROCEDURE — 84100 ASSAY OF PHOSPHORUS: CPT | Mod: HCNC | Performed by: STUDENT IN AN ORGANIZED HEALTH CARE EDUCATION/TRAINING PROGRAM

## 2024-01-02 PROCEDURE — 36415 COLL VENOUS BLD VENIPUNCTURE: CPT | Mod: HCNC | Performed by: STUDENT IN AN ORGANIZED HEALTH CARE EDUCATION/TRAINING PROGRAM

## 2024-01-02 PROCEDURE — 99232 SBSQ HOSP IP/OBS MODERATE 35: CPT | Mod: HCNC,,, | Performed by: STUDENT IN AN ORGANIZED HEALTH CARE EDUCATION/TRAINING PROGRAM

## 2024-01-02 PROCEDURE — 85025 COMPLETE CBC W/AUTO DIFF WBC: CPT | Mod: HCNC | Performed by: STUDENT IN AN ORGANIZED HEALTH CARE EDUCATION/TRAINING PROGRAM

## 2024-01-02 PROCEDURE — 25000003 PHARM REV CODE 250: Mod: HCNC | Performed by: STUDENT IN AN ORGANIZED HEALTH CARE EDUCATION/TRAINING PROGRAM

## 2024-01-02 PROCEDURE — 80048 BASIC METABOLIC PNL TOTAL CA: CPT | Mod: HCNC | Performed by: STUDENT IN AN ORGANIZED HEALTH CARE EDUCATION/TRAINING PROGRAM

## 2024-01-02 PROCEDURE — 94761 N-INVAS EAR/PLS OXIMETRY MLT: CPT | Mod: HCNC

## 2024-01-02 PROCEDURE — 83735 ASSAY OF MAGNESIUM: CPT | Mod: HCNC | Performed by: STUDENT IN AN ORGANIZED HEALTH CARE EDUCATION/TRAINING PROGRAM

## 2024-01-02 RX ADMIN — SODIUM PHOSPHATE, MONOBASIC, MONOHYDRATE AND SODIUM PHOSPHATE, DIBASIC, ANHYDROUS 20.01 MMOL: 142; 276 INJECTION, SOLUTION INTRAVENOUS at 09:01

## 2024-01-02 RX ADMIN — SODIUM CHLORIDE, SODIUM LACTATE, POTASSIUM CHLORIDE, AND CALCIUM CHLORIDE: 600; 310; 30; 20 INJECTION, SOLUTION INTRAVENOUS at 04:01

## 2024-01-02 RX ADMIN — SODIUM CHLORIDE, SODIUM LACTATE, POTASSIUM CHLORIDE, AND CALCIUM CHLORIDE: 600; 310; 30; 20 INJECTION, SOLUTION INTRAVENOUS at 01:01

## 2024-01-02 RX ADMIN — ENOXAPARIN SODIUM 40 MG: 40 INJECTION SUBCUTANEOUS at 05:01

## 2024-01-02 NOTE — PLAN OF CARE
[facility] - [facility]  Initial Discharge Assessment       Primary Care Provider: Alla Jade MD    Admission Diagnosis: Small bowel obstruction [K56.609]  Encounter for nasogastric (NG) tube placement [Z46.59]  Abdominal pain [R10.9]    Admission Date: 1/1/2024  Expected Discharge Date: 1/2/2024         Payor: HUMANA MANAGED MEDICARE / Plan: HU[employer]NA MEDICARE HMO / Product Type: Capitation /     Extended Emergency Contact Information  Primary Emergency Contact: Winifred Cuellar  Address: [address] [place] [address] [facility]  Home Phone: [phone]  Mobile Phone: [phone]  Relation: Daughter  Secondary Emergency Contact: Mariola Coello   [place]  Mobile Phone: [phone]  Relation: Daughter  Preferred language: English   needed? No    Discharge Plan A: Home with family         [id number] - [place], [place] - [address]  [address]  [place] [address]  Phone: [phone] Fax: [phone]    [id number] - [place], [place] - [address]  [address]  [facility] [address]  Phone: [phone] Fax: [phone]    [facility] - [place], [place] - [address]  [address]  [facility] [address]  Phone: [phone] Fax: [phone]      Initial Assessment (most recent)       Adult Discharge Assessment - 01/02/24 1522          Discharge Assessment    Assessment Type Discharge Planning Assessment     Confirmed/corrected address, phone number and insurance Yes     Confirmed Demographics Correct on Facesheet     Source of Information patient     Does patient/caregiver understand observation status Yes     Communicated GORDON with patient/caregiver Yes     People in Home alone     Do you expect to return to your current living situation? Yes     Do you have help at home or someone to help you manage your care at home? Yes     Who are your caregiver(s) and their phone number(s)? Winifred  (Daughter) [phone]     Prior to hospitilization cognitive status: Alert/Oriented     Current cognitive status: Alert/Oriented     Walking or Climbing Stairs Difficulty no     Dressing/Bathing Difficulty no     Equipment Currently Used at Home none     Readmission within 30 days? No     Patient currently being followed by outpatient case management? No     Do you currently have service(s) that help you manage your care at home? No     Do you take prescription medications? Yes     Do you have prescription coverage? Yes     Do you have any problems affording any of your prescribed medications? No     Is the patient taking medications as prescribed? yes     Who is going to help you get home at discharge? Son and daughter     How do you get to doctors appointments? family or friend will provide     Are you on dialysis? No     Do you take coumadin? No     Discharge Plan A Home with family        Physical Activity    On average, how many days per week do you engage in moderate to strenuous exercise (like a brisk walk)? 0 days     On average, how many minutes do you engage in exercise at this level? 0 min        Financial Resource Strain    How hard is it for you to pay for the very basics like food, housing, medical care, and heating? Not hard at all        Housing Stability    In the last 12 months, was there a time when you were not able to pay the mortgage or rent on time? No     In the last 12 months, how many places have you lived? 1     In the last 12 months, was there a time when you did not have a steady place to sleep or slept in a shelter (including now)? No        Transportation Needs    In the past 12 months, has lack of transportation kept you from medical appointments or from getting medications? No     In the past 12 months, has lack of transportation kept you from meetings, work, or from getting things needed for daily living? No        Food Insecurity    Within the past 12 months, you worried that  your food would run out before you got the money to buy more. Never true        Stress    Do you feel stress - tense, restless, nervous, or anxious, or unable to sleep at night because your mind is troubled all the time - these days? Not at all        Social Connections    In a typical week, how many times do you talk on the phone with family, friends, or neighbors? More than three times a week     How often do you get together with friends or relatives? More than three times a week     How often do you attend Rastafarian or Hinduism services? More than 4 times per year     Do you belong to any clubs or organizations such as Rastafarian groups, unions, fraternal or athletic groups, or school groups? Yes     How often do you attend meetings of the clubs or organizations you belong to? More than 4 times per year     Are you , , , , never , or living with a partner?         Alcohol Use    Q1: How often do you have a drink containing alcohol? Never     Q2: How many drinks containing alcohol do you have on a typical day when you are drinking? Patient does not drink     Q3: How often do you have six or more drinks on one occasion? Never                      Spoke with patient's son and daughter-in-law at bedside. Patient lives alone in a two-story home with 3 steps to enter. Patient's bedroom is on the first floor at home. Independent without ADL's. No DME in home. Verified PCP, Pharmacy and health insurance. Patient anticipated to d/c home this afternoon. Will have help at home from family. Discharge Plan A and Plan B have been determined by review of patient's clinical status, future medical and therapeutic needs, and coverage/benefits for post-acute care in coordination with multidisciplinary team members.      Lizett Lion RNCM  Case Management  [facility]  [phone]

## 2024-01-02 NOTE — PROGRESS NOTES
[facility]  General Surgery  Progress Note    Subjective:     History of Present Illness:  Meredith Keen is a 68 y.o. 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 Patient states that she noticed some abdominal distension over the last couple of days, which was not associated with pain until yesterday after she drank a cup of milk. States that yesterday afternoon she noticed worsening distension of her abdomen as well as moderate pain and some nausea prompting her to present to the ED. Her last bowel movement was yesterday morning and was normal. She states that she does not know if she has passed gas since that time. CT A/P showing dilated loops of small bowel and thickening of the pylorus with a transition point in the mid pelvis. In the ED, the patient is afebrile, hemodynamically stable, and without leukocytosis. Her lab work is largely unremarkable, save for a slight granulocytosis. UA with 2+ leukocytes and >10 WBC. NGT placed in the ED with about 100 cc of gastric contents on placement. Will admit to general surgery for management of SBO.           Post-Op Info:  * No surgery found *         Interval History: NAEON. Afebrile. HDS. NGT inadvertently removed overnight, 450cc out prior to removal (100 overnight). Denies n/v. Multiple BM overnight. Pain is controlled with current regimen. No new symptoms or concerns this morning. All questions answered.       Medications:  Continuous Infusions:   lactated ringers 125 mL/hr at 01/02/24 0108     Scheduled Meds:   enoxparin  40 mg Subcutaneous Daily     PRN Meds:dextrose 10%, glucagon (human recombinant), hydrALAZINE, HYDROmorphone, HYDROmorphone, insulin aspart U-100, LIDOcaine (PF) 10 mg/ml (1%), ondansetron, prochlorperazine      Review of patient's allergies indicates:  No Known Allergies  Objective:     Vital Signs (Most Recent):  Temp: 98.8 °F (37.1 °C) (01/02/24 0430)  Pulse: 83 (01/02/24 0430)  Resp: 18 (01/02/24 0430)  BP: 137/64 (01/02/24 0430)  SpO2: 100 % (01/02/24 0430) Vital Signs (24h Range):  Temp:  [97.5 °F (36.4 °C)-99.2 °F (37.3 °C)] 98.8 °F (37.1 °C)  Pulse:  [70-84] 83  Resp:  [16-18] 18  SpO2:  [93 %-100 %] 100 %  BP: (113-164)/(57-72) 137/64     Weight: 52.2 kg (115 lb)  Body mass index is 21.03 kg/m².    Intake/Output - Last 3 Shifts         12/31 0700  01/01 0659 01/01 0700  01/02 0659 01/02 0700  01/03 0659    P.O.  0     Total Intake(mL/kg)  0 (0)     Urine (mL/kg/hr)  0 (0)     Drains  450     Total Output  450     Net  -450            Urine Occurrence  2 x              Physical Exam  Vitals and nursing note reviewed.   Constitutional:       General: She is not in acute distress.     Appearance: She is not diaphoretic.      Comments: Room air   HENT:      Head: Normocephalic and atraumatic.      Mouth/Throat:      Mouth: Mucous membranes are moist.      Pharynx: Oropharynx is clear.   Eyes:      Extraocular Movements: Extraocular movements intact.      Conjunctiva/sclera: Conjunctivae normal.   Cardiovascular:      Rate and Rhythm: Normal rate.   Pulmonary:      Effort: Pulmonary effort is normal. No respiratory distress.   Abdominal:      General: There is no distension.      Palpations: Abdomen is soft.      Tenderness: There is no abdominal tenderness. There is no guarding or rebound.   Musculoskeletal:         General: No deformity.      Cervical back: Normal range of motion.   Skin:     General: Skin is warm and dry.   Neurological:      Mental Status: She is alert and oriented to person, place, and time.          Significant Labs:  I have reviewed all pertinent lab results within the past 24 hours.    Significant Diagnostics:  I have reviewed all pertinent imaging results/findings within the past 24  hours.  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 CT A/P with dilated SB and possible transition point in the mid pelvis. NGT inadvertently removed but low output prior and with ROBF     - CLD  - Aspiration precautions  - IVF until tolerating PO  - Follow up urine culture  - PRN pain and nausea medications  - Daily labs  - Replete electrolytes PRN  - DVT prophylaxis (SCDs and lovenox)  - OOB, ambulate    Dispo: not yet medically stable for dc       Hypophosphatemia  - Replete PRN  - Continue to monitor with daily labs    Case discussed with Dr. Macias.      KRISTIAN Rowley-C  General Surgery  [facility] - [facility]

## 2024-01-02 NOTE — PLAN OF CARE
Problem: Adult Inpatient Plan of Care  Goal: Plan of Care Review  Outcome: Ongoing, Progressing  Goal: Patient-Specific Goal (Individualized)  Outcome: Ongoing, Progressing  Goal: Absence of Hospital-Acquired Illness or Injury  Outcome: Ongoing, Progressing  Goal: Optimal Comfort and Wellbeing  Outcome: Ongoing, Progressing  Goal: Readiness for Transition of Care  Outcome: Ongoing, Progressing      Pt resting in bed comfortably. Ambulated in room and to bathroom with standby assist. PIV line intact and infusing, free of infection and irritation. Fall precautions maintained, no falls noted. Call light within reach, bed locked and in lowest position. Non-skid socks on while out of bed. Patient instructed to call for assistance. Skin integrity maintained as patient is independent with frequent repositioning. C/o pain and intermittent nausea, managed with PRN meds, no other complaints or concerns. NGT in place to LIWS. Will continue to monitor and follow plan of care.

## 2024-01-02 NOTE — ASSESSMENT & PLAN NOTE
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 CT A/P with dilated SB and possible transition point in the mid pelvis. NGT inadvertently removed but low output prior and with ROBF     - CLD  - Aspiration precautions  - IVF until tolerating PO  - Follow up urine culture  - PRN pain and nausea medications  - Daily labs  - Replete electrolytes PRN  - DVT prophylaxis (SCDs and lovenox)  - OOB, ambulate    Dispo: not yet medically stable for dc

## 2024-01-03 NOTE — DISCHARGE SUMMARY
[facility] - [facility]  General Surgery  Discharge Summary      Patient Name: Meredith Keen  MRN: [id number]  Admission Date: 1/1/2024  Hospital Length of Stay: 1 days  Discharge Date and Time: 1/2/2024  6:03 PM  Attending Physician: Lucas Macias MD  Discharging Provider: Tatiana Abbott MD  Primary Care Provider: Alla Jade MD    HPI:   Meredith Keen is a 68 y.o. 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 Patient states that she noticed some abdominal distension over the last couple of days, which was not associated with pain until yesterday after she drank a cup of milk. States that yesterday afternoon she noticed worsening distension of her abdomen as well as moderate pain and some nausea prompting her to present to the ED. Her last bowel movement was yesterday morning and was normal. She states that she does not know if she has passed gas since that time. CT A/P showing dilated loops of small bowel and thickening of the pylorus with a transition point in the mid pelvis. In the ED, the patient is afebrile, hemodynamically stable, and without leukocytosis. Her lab work is largely unremarkable, save for a slight granulocytosis. UA with 2+ leukocytes and >10 WBC. NGT placed in the ED with about 100 cc of gastric contents on placement. Will admit to general surgery for management of SBO.           * No surgery found *      Indwelling Lines/Drains at time of discharge:   Lines/Drains/Airways       None                 Hospital Course: Patient was admitted to general surgery for further management. NGT was place for decompression. After removal her diet was advanced as appropriate. Patient had return of bowel function and was tolerating a regular diet without pain, distention, nausea or  vomiting. She was deemed medically stable for discharge home at this point.     Goals of Care Treatment Preferences:  Code Status: Full Code      Consults:   Consults (From admission, onward)          Status Ordering Provider     Inpatient consult to General surgery  Once        Provider:  (Not yet assigned)    Completed [facility]LLES            Significant Diagnostic Studies: N/A    Pending Diagnostic Studies:       None          Final Active Diagnoses:    Diagnosis Date Noted POA    PRINCIPAL PROBLEM:  Partial small bowel obstruction [K56.600] 01/12/2022 Yes    Hypophosphatemia [E83.39] 01/02/2024 Yes      Problems Resolved During this Admission:      Discharged Condition: good    Disposition: Home or Self Care    Follow Up:   Follow-up Information       Lucas Macias MD Follow up.    Specialty: General Surgery  Why: As needed  Contact information:  [facility] [address] [phone]                           Patient Instructions:      Diet Adult Regular     Notify your health care provider if you experience any of the following:  temperature >100.4     Notify your health care provider if you experience any of the following:  persistent nausea and vomiting or diarrhea     Notify your health care provider if you experience any of the following:  severe uncontrolled pain     Notify your health care provider if you experience any of the following:  redness, tenderness, or signs of infection (pain, swelling, redness, odor or green/yellow discharge around incision site)     Notify your health care provider if you experience any of the following:  difficulty breathing or increased cough     Notify your health care provider if you experience any of the following:  severe persistent headache     Notify your health care provider if you experience any of the following:  worsening rash     Notify your health care provider if you experience any of the following:  persistent dizziness, light-headedness,  "or visual disturbances     Notify your health care provider if you experience any of the following:  increased confusion or weakness     Notify your health care provider if you experience any of the following:     Activity as tolerated     Medications:  Reconciled Home Medications:      Medication List        CONTINUE taking these medications      albuterol 90 mcg/actuation inhaler  Commonly known as: PROVENTIL/VENTOLIN HFA  Inhale 2 puffs into the lungs every 4 (four) hours as needed.     amitriptyline 10 MG tablet  Commonly known as: ELAVIL     amLODIPine 2.5 MG tablet  Commonly known as: NORVASC  Take 1 tablet (2.5 mg total) by mouth once daily.     aspirin 81 MG Chew  Take 1 tablet (81 mg total) by mouth once daily.     atorvastatin 20 MG tablet  Commonly known as: LIPITOR  Take 1 tablet (20 mg total) by mouth once daily.     butalbital-acetaminophen-caffeine -40 mg -40 mg per tablet  Commonly known as: FIORICET, ESGIC     CREON 36,000-114,000- 180,000 unit Cpdr  Generic drug: lipase-protease-amylase  Take 1 capsule by mouth 4 (four) times daily.     DENTA 5000 PLUS 1.1 % Crea  Generic drug: fluoride (sodium)  BRUSH ON SENSITIVE TEETH 3-5 MINUTES TWICE A DAY     DEXILANT 60 mg capsule  Generic drug: dexlansoprazole  Take 1 capsule every day by oral route for 56 days.     EScitalopram oxalate 10 MG tablet  Commonly known as: LEXAPRO  Take 1 tablet (10 mg total) by mouth once daily.     levocetirizine 5 MG tablet  Commonly known as: XYZAL  Take 1 tablet (5 mg total) by mouth every evening.     losartan 100 MG tablet  Commonly known as: COZAAR  Take 1 tablet (100 mg total) by mouth once daily.     olopatadine 0.1 % ophthalmic solution  Commonly known as: PATANOL  Place 1 drop into both eyes 2 (two) times daily.     omeprazole 40 MG capsule  Commonly known as: PRILOSEC  Take 1 capsule (40 mg total) by mouth every morning.     * pen needle, diabetic 31 gauge x 1/4" Ndle  Use with Forteo injection daily   " "  * BD ULTRA-FINE SHORT PEN NEEDLE 31 gauge x 5/16" Ndle  Generic drug: pen needle, diabetic  Inject into the skin.     polyethylene glycol 17 gram/dose powder  Commonly known as: GLYCOLAX  Dissolve one capful (17g) into liquid and take by mouth once daily.           * This list has 2 medication(s) that are the same as other medications prescribed for you. Read the directions carefully, and ask your doctor or other care provider to review them with you.                ASK your doctor about these medications      aluminum-magnesium hydroxide-simethicone 200-200-20 mg/5 mL Susp  Commonly known as: MAALOX ADVANCED  Take 30 mLs by mouth every 6 (six) hours as needed (nausea, abdominal pain, chest pain).     fluticasone 27.5 mcg/actuation nasal spray  Commonly known as: VERAMYST  2 sprays by Nasal route once daily.                Tatiana Abbott MD  General Surgery  [facility] - Surgery  "

## 2024-01-03 NOTE — HOSPITAL COURSE
Patient was admitted to general surgery for further management. NGT was place for decompression. After removal her diet was advanced as appropriate. Patient had return of bowel function and was tolerating a regular diet without pain, distention, nausea or vomiting. She was deemed medically stable for discharge home at this point.

## 2024-01-03 NOTE — PLAN OF CARE
[facility] - [facility]  Discharge Final Note    Primary Care Provider: Alla Jade MD    Expected Discharge Date: 1/2/2024    Final Discharge Note (most recent)       Final Note - 01/03/24 1123          Final Note    Assessment Type Final Discharge Note     Anticipated Discharge Disposition Home or Self Care     What phone number can be called within the next 1-3 days to see how you are doing after discharge? --   [phone]                    Important Message from Medicare             Contact Info       Lucas Macias MD   Specialty: General Surgery    [address]  [facility] [address]   Phone: [phone]       Next Steps: Follow up    Instructions: As needed          Future Appointments   Date Time Provider Department Center   3/26/2024 10:00 AM [facility], [place] [facility] [place]   4/2/2024 10:30 AM Mikal Duarte MD [facility]     Patient discharged home to care of family on 1/2/24.    Lizett Lion RNCM  Case Management  [facility]  [phone]

## 2024-01-04 ENCOUNTER — PATIENT OUTREACH (OUTPATIENT)
Dept: ADMINISTRATIVE | Facility: CLINIC | Age: 69
End: 2024-01-04
Payer: MEDICARE

## 2024-01-04 NOTE — PROGRESS NOTES
C3 nurse attempted to contact Meredith Keen  for a TCC post hospital discharge follow up call. No answer. Left voicemail with callback information per  assistance. The patient does not have a scheduled HOSFU appointment. Message sent to PCP staff for assistance with scheduling visit with patient.

## 2024-01-05 NOTE — PROGRESS NOTES
2nd Attempt made to reach patient for TCC call. Left voicemail please call [phone] leave first name, last name, and  Celina will return your call.

## 2024-01-11 DIAGNOSIS — Z00.00 ENCOUNTER FOR MEDICARE ANNUAL WELLNESS EXAM: ICD-10-CM

## 2024-03-07 ENCOUNTER — TELEPHONE (OUTPATIENT)
Dept: PRIMARY CARE CLINIC | Facility: CLINIC | Age: 69
End: 2024-03-07

## 2024-03-07 NOTE — TELEPHONE ENCOUNTER
----- Message from Payton Cardoso sent at 3/7/2024 12:15 PM CST -----  Name of Caller  pt   Reason for Visit/Symptoms pt requesting to be seen asap for sore throat. Nothing available until may. Call pt   Best Contact Number or Confirm if Charlie Preferred [phone] (home)     Preferred Date/Time of Appointment asap   Interested in Virtual Visit (yes/no)  Additional Information

## 2024-03-08 ENCOUNTER — OFFICE VISIT (OUTPATIENT)
Dept: PRIMARY CARE CLINIC | Facility: CLINIC | Age: 69
End: 2024-03-08
Payer: MEDICARE

## 2024-03-08 ENCOUNTER — HOSPITAL ENCOUNTER (OUTPATIENT)
Dept: RADIOLOGY | Facility: HOSPITAL | Age: 69
Discharge: HOME | End: 2024-03-08
Attending: HOSPITALIST
Payer: MEDICARE

## 2024-03-08 VITALS
TEMPERATURE: 98 F | WEIGHT: 114.5 LBS | OXYGEN SATURATION: 90 % | HEART RATE: 77 BPM | DIASTOLIC BLOOD PRESSURE: 66 MMHG | BODY MASS INDEX: 20.95 KG/M2 | SYSTOLIC BLOOD PRESSURE: 122 MMHG

## 2024-03-08 DIAGNOSIS — J06.9 UPPER RESPIRATORY TRACT INFECTION, UNSPECIFIED TYPE: ICD-10-CM

## 2024-03-08 DIAGNOSIS — E78.2 MIXED HYPERLIPIDEMIA: ICD-10-CM

## 2024-03-08 DIAGNOSIS — K85.00 IDIOPATHIC ACUTE PANCREATITIS WITHOUT INFECTION OR NECROSIS: Primary | ICD-10-CM

## 2024-03-08 DIAGNOSIS — K85.00 IDIOPATHIC ACUTE PANCREATITIS WITHOUT INFECTION OR NECROSIS: ICD-10-CM

## 2024-03-08 DIAGNOSIS — I10 ESSENTIAL HYPERTENSION: ICD-10-CM

## 2024-03-08 DIAGNOSIS — K86.1 OTHER CHRONIC PANCREATITIS: ICD-10-CM

## 2024-03-08 PROCEDURE — 76376 3D RENDER W/INTRP POSTPROCES: CPT | Mod: TC

## 2024-03-08 PROCEDURE — 3008F BODY MASS INDEX DOCD: CPT | Mod: HCNC,CPTII,S$GLB, | Performed by: HOSPITALIST

## 2024-03-08 PROCEDURE — A9585 GADOBUTROL INJECTION: HCPCS | Performed by: HOSPITALIST

## 2024-03-08 PROCEDURE — 99204 OFFICE O/P NEW MOD 45 MIN: CPT | Mod: HCNC,S$GLB,, | Performed by: HOSPITALIST

## 2024-03-08 PROCEDURE — 74183 MRI ABD W/O CNTR FLWD CNTR: CPT | Mod: TC

## 2024-03-08 PROCEDURE — 25500020 PHARM REV CODE 255: Performed by: HOSPITALIST

## 2024-03-08 PROCEDURE — 3074F SYST BP LT 130 MM HG: CPT | Mod: HCNC,CPTII,S$GLB, | Performed by: HOSPITALIST

## 2024-03-08 PROCEDURE — 3078F DIAST BP <80 MM HG: CPT | Mod: HCNC,CPTII,S$GLB, | Performed by: HOSPITALIST

## 2024-03-08 PROCEDURE — 3288F FALL RISK ASSESSMENT DOCD: CPT | Mod: HCNC,CPTII,S$GLB, | Performed by: HOSPITALIST

## 2024-03-08 PROCEDURE — 1126F AMNT PAIN NOTED NONE PRSNT: CPT | Mod: HCNC,CPTII,S$GLB, | Performed by: HOSPITALIST

## 2024-03-08 PROCEDURE — 4010F ACE/ARB THERAPY RXD/TAKEN: CPT | Mod: HCNC,CPTII,S$GLB, | Performed by: HOSPITALIST

## 2024-03-08 PROCEDURE — 76376 3D RENDER W/INTRP POSTPROCES: CPT | Mod: 26,,, | Performed by: RADIOLOGY

## 2024-03-08 PROCEDURE — 74183 MRI ABD W/O CNTR FLWD CNTR: CPT | Mod: 26,,, | Performed by: RADIOLOGY

## 2024-03-08 PROCEDURE — 1159F MED LIST DOCD IN RCRD: CPT | Mod: HCNC,CPTII,S$GLB, | Performed by: HOSPITALIST

## 2024-03-08 PROCEDURE — 99999 PR PBB SHADOW E&M-EST. PATIENT-LVL III: CPT | Mod: PBBFAC,HCNC,, | Performed by: HOSPITALIST

## 2024-03-08 PROCEDURE — 1101F PT FALLS ASSESS-DOCD LE1/YR: CPT | Mod: HCNC,CPTII,S$GLB, | Performed by: HOSPITALIST

## 2024-03-08 RX ORDER — DEXAMETHASONE SODIUM PHOSPHATE 4 MG/ML
INJECTION, SOLUTION INTRA-ARTICULAR; INTRALESIONAL; INTRAMUSCULAR; INTRAVENOUS; SOFT TISSUE
COMMUNITY
Start: 2023-12-01 | End: 2024-03-08 | Stop reason: ALTCHOICE

## 2024-03-08 RX ORDER — NAPROXEN SODIUM 220 MG/1
81 TABLET, FILM COATED ORAL
Qty: 30 TABLET | Refills: 0
Start: 2024-03-08 | End: 2025-03-08

## 2024-03-08 RX ORDER — GADOBUTROL 604.72 MG/ML
10 INJECTION INTRAVENOUS
Status: COMPLETED | OUTPATIENT
Start: 2024-03-08 | End: 2024-03-08

## 2024-03-08 RX ADMIN — GADOBUTROL 10 ML: 604.72 INJECTION INTRAVENOUS at 03:03

## 2024-03-08 NOTE — PROGRESS NOTES
"Primary Care Provider Appointment - [facility]  Jean Pena MD  Initial/Urgent Care Visit    Subjective:      Patient ID: Meredith Keen is a 68 y.o. female with   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           Chief Complaint: Nail Problem, Cough, Follow-up, Chest Pain, and Back Pain    Prior to this visit, patient's last encounter with PCP was 12/1/2023.    Virtual live  used for this visit.    Pt reports cough and back pain.  Back pain is chronic, cough has been for past month.  Runny nose past few days (about 3).  Denies PND.  Cough is keeping her up at night, especially more recently.  No F/C, no body aches or myalgias.  She is concerned back pain might be related to her pancreas.  Previously in hospital for "inflamed pancreas" which felt similar and cannot lay supine due to it worsening the pain.  No N/V.  No change in appetite, but endorses "difficulty w/ digestion." Feels "heavy" if eating w/o taking medication.  Was Rx'd Creon previously but only takes it once per day.  Abdomen feels distended if doesn't take it when eating.  Denies diarrhea prior to starting this.  Had EUS that admission showing lobular pancreas and dilated CBD but no focal obstruction or stricture.  EUS done again in 2021 for similar symptoms w/ same findings.  No explanation for recurrent episodes as of yet identified.  Saw [facility] outpt 8/2022 and told to f/u in event of recurrence.  Underlying etiologies not worked up.      4Ms for Medical Decision-Making in Older Adults    Last Completed EAWV: None    MOBILITY:  Get Up and Go:       No data to display              Activities of Daily Living:       No data to display              Whisper Test:       No data to display              Disability Status:       No data to display    "           Nutrition Screening:       No data to display             Screening Score: 0-7 Malnourished, 8-11 At Risk, 12-14 Normal    MENTATION:   Depression Patient Health Questionnaire:      5/18/2022     1:58 PM   Depression Patient Health Questionnaire   Over the last two weeks how often have you been bothered by little interest or pleasure in doing things Not at all   Over the last two weeks how often have you been bothered by feeling down, depressed or hopeless Not at all   PHQ-2 Total Score 0     Has Dementia Dx: No    Cognitive Function Screening:       No data to display              Cognitive Function Screening Total - Less than 4 = Abnormal,  Greater than or equal to 4 = Normal    MEDICATIONS:  High Risk Medications:  Total Active Medications: 2  amitriptyline - 10 MG  EScitalopram oxalate - 10 MG    WHAT MATTERS MOST:  Advance Care Planning   ACP Status:   Patient does not have an ACP conversation on file  Living Will: No  Power of : No  LaPOST: No    Social History     Socioeconomic History    Marital status:    Tobacco Use    Smoking status: Never    Smokeless tobacco: Never   Substance and Sexual Activity    Alcohol use: No    Drug use: No    Sexual activity: Yes     Partners: Male     Social Determinants of Health     Financial Resource Strain: Low Risk  (1/2/2024)    Overall Financial Resource Strain (CARDIA)     Difficulty of Paying Living Expenses: Not hard at all   Food Insecurity: Unknown (1/2/2024)    Hunger Vital Sign     Worried About Running Out of Food in the Last Year: Never true   Transportation Needs: No Transportation Needs (1/2/2024)    PRAPARE - Transportation     Lack of Transportation (Medical): No     Lack of Transportation (Non-Medical): No   Physical Activity: Inactive (1/2/2024)    Exercise Vital Sign     Days of Exercise per Week: 0 days     Minutes of Exercise per Session: 0 min   Stress: No Stress Concern Present (1/2/2024)    [employer] - Occupational Stress Questionnaire     Feeling of Stress : Not at all   Social Connections: Moderately Integrated (1/2/2024)    Social Connection and Isolation Panel [NHANES]     Frequency of Communication with Friends and Family: More than three times a week     Frequency of Social Gatherings with Friends and Family: More than three times a week     Attends Judaism Services: More than 4 times per year     Active Member of Clubs or Organizations: Yes     Attends Club or Organization Meetings: More than 4 times per year     Marital Status:    Housing Stability: Low Risk  (1/2/2024)    Housing Stability Vital Sign     Unable to Pay for Housing in the Last Year: No     Number of Places Lived in the Last Year: 1     Unstable Housing in the Last Year: No       Review of Systems   Constitutional:  Negative for activity change, appetite change, chills, fever and unexpected weight change.   HENT:  Positive for nasal congestion, rhinorrhea, sinus pressure/congestion and sore throat. Negative for postnasal drip.    Eyes:  Negative for photophobia and visual disturbance.   Respiratory:  Positive for cough. Negative for shortness of breath.    Cardiovascular:  Positive for chest pain. Negative for leg swelling.   Gastrointestinal:  Positive for abdominal distention and abdominal pain. Negative for change in bowel habit, diarrhea, nausea and vomiting.   Musculoskeletal:  Positive for back pain. Negative for arthralgias and myalgias.   Integumentary:  Negative for rash and wound.   Neurological:  Negative for weakness.   Psychiatric/Behavioral:  Positive for sleep disturbance.         Objective:   /66 (BP Location: Left arm, Patient Position: Sitting, BP Method: Medium (Automatic))   Pulse 77   Temp 98.3 °F (36.8 °C) (Oral)   Wt 51.9 kg (114 lb 8.5 oz)   SpO2 (!) 90%   BMI 20.95 kg/m²     Physical Exam  Vitals reviewed.   Constitutional:       Appearance: She is normal weight. She is ill-appearing.       Comments: Looks tired.  Only a limited exam was possible today due to time constraints.   HENT:      Head: Normocephalic and atraumatic.      Nose: Congestion and rhinorrhea present.      Mouth/Throat:      Mouth: Mucous membranes are moist.      Pharynx: Oropharynx is clear.   Eyes:      General: No scleral icterus.     Conjunctiva/sclera: Conjunctivae normal.   Cardiovascular:      Rate and Rhythm: Normal rate.   Pulmonary:      Effort: Pulmonary effort is normal.      Comments: Occasional cough  Abdominal:      General: A surgical scar is present. Bowel sounds are normal. There is no distension.      Palpations: Abdomen is soft. There is no hepatomegaly or splenomegaly.      Tenderness: There is no abdominal tenderness.      Comments: Although itself nontender to palpation, palpation of the epigastrium reproduces patient's back pain.   Skin:     General: Skin is warm and dry.      Coloration: Skin is not pale.      Findings: No rash.   Neurological:      General: No focal deficit present.      Mental Status: She is alert and oriented to person, place, and time.              Lab Results   Component Value Date    WBC 4.18 01/02/2024    HGB 11.7 (L) 01/02/2024    HCT 36.4 (L) 01/02/2024     01/02/2024    CHOL 184 06/06/2023    TRIG 86 06/06/2023    HDL 53 06/06/2023    ALT 14 01/01/2024    AST 18 01/01/2024     01/02/2024    K 4.5 01/02/2024     01/02/2024    CREATININE 0.7 01/02/2024    BUN 14 01/02/2024    CO2 24 01/02/2024    TSH 1.172 07/05/2022    INR 0.9 10/19/2015    HGBA1C 6.0 (H) 06/06/2023       Current Outpatient Medications on File Prior to Visit   Medication Sig Dispense Refill    albuterol (PROVENTIL/VENTOLIN HFA) 90 mcg/actuation inhaler Inhale 2 puffs into the lungs every 4 (four) hours as needed.      aluminum-magnesium hydroxide-simethicone (MAALOX ADVANCED) 200-200-20 mg/5 mL Susp Take 30 mLs by mouth every 6 (six) hours as needed (nausea, abdominal pain, chest pain). (Patient  "not taking: Reported on 12/1/2023) 354 mL 0    amitriptyline (ELAVIL) 10 MG tablet       amLODIPine (NORVASC) 2.5 MG tablet Take 1 tablet (2.5 mg total) by mouth once daily. 30 tablet 0    aspirin 81 MG Chew Take 1 tablet (81 mg total) by mouth once daily. 30 tablet 0    atorvastatin (LIPITOR) 20 MG tablet Take 1 tablet (20 mg total) by mouth once daily. 90 tablet 3    BD ULTRA-FINE SHORT PEN NEEDLE 31 gauge x 5/16" Ndle Inject into the skin.      butalbital-acetaminophen-caffeine -40 mg (FIORICET, ESGIC) -40 mg per tablet       CREON 36,000-114,000- 180,000 unit CpDR Take 1 capsule by mouth 4 (four) times daily.      dexlansoprazole (DEXILANT) 60 mg capsule Take 1 capsule every day by oral route for 56 days.      EScitalopram oxalate (LEXAPRO) 10 MG tablet Take 1 tablet (10 mg total) by mouth once daily. 90 tablet 3    fluoride, sodium, (DENTA 5000 PLUS) 1.1 % Crea BRUSH ON SENSITIVE TEETH 3-5 MINUTES TWICE A DAY      fluticasone (VERAMYST) 27.5 mcg/actuation nasal spray 2 sprays by Nasal route once daily. (Patient not taking: Reported on 12/1/2023) 9.1 mL 3    levocetirizine (XYZAL) 5 MG tablet Take 1 tablet (5 mg total) by mouth every evening. 90 tablet 1    losartan (COZAAR) 100 MG tablet Take 1 tablet (100 mg total) by mouth once daily. 30 tablet 0    olopatadine (PATANOL) 0.1 % ophthalmic solution Place 1 drop into both eyes 2 (two) times daily. 5 mL 1    omeprazole (PRILOSEC) 40 MG capsule Take 1 capsule (40 mg total) by mouth every morning. 30 capsule 1    pen needle, diabetic 31 gauge x 1/4" Ndle Use with Forteo injection daily 150 each 3    polyethylene glycol (GLYCOLAX) 17 gram/dose powder Dissolve one capful (17g) into liquid and take by mouth once daily. 510 g 0     No current facility-administered medications on file prior to visit.         Assessment:   68 y.o. female with multiple co-morbid illnesses here for acute complaint, and to establish care.    Plan:     Problem List Items " Addressed This Visit       Other chronic pancreatitis - Primary     The nature of her discomfort and reproduction with palpation of the epigastrium suggest pancreaticobiliary source of symptoms.  Review of prior MRI of the abdomen notable for dilated common bile duct with taper at the ampulla, which has also been seen on previous EUS as well.  This presentation points towards sphincter of Oddi dysfunction as the likely cause of her symptoms.  We will check lipase and pancreatic autoantibodies.  Her elevated hemoglobin A1c of 6.0 despite normal BMI raises question of possible autoimmune diabetic process such as NEIL, so will check insulin and C-peptide levels.  Patient has been prescribed Creon which she is taking once a day despite lack of history of malabsorptive diarrhea.  In order to rule out exocrine pancreatic insufficiency we will get fecal elastase and quantitative fecal fats, which patient was instructed to provide sample on a day she has not taken the Creon.  Considering her minimal use of the medication it is unclear if she derives any benefit from it or if it is more representative of a placebo effect.  We will repeat MRCP to re-evaluate pancreatic and common biliary ducts.  She will likely need to follow up in [facility] for consideration of sphincterotomy.         Essential hypertension     At goal on amlodipine 2.5 mg and losartan 100 mg.         Hyperlipidemia     At goal on atorvastatin 20 mg.         Upper respiratory tract infection     Swabs for influenza and COVID-19 were ordered, however these were not done.  Considering lack of fever or other constitutional symptoms it is unlikely she had either of these.  Advised supportive care.         Relevant Orders    POCT COVID-19 Rapid Screening    Influenza antigen Nasopharyngeal Swab       Health Maintenance         Date Due Completion Date    RSV Vaccine (Age 60+ and Pregnant patients) (1 - 1-dose 60+ series) Never done ---     Mammogram 09/21/2023 9/21/2022    Shingles Vaccine (2 of 2) 06/06/2024 (Originally 3/30/2020) 2/3/2020    Influenza Vaccine (1) 06/30/2024 (Originally 9/1/2023) 10/21/2022    COVID-19 Vaccine (3 - 2023-24 season) 12/01/2024 (Originally 9/1/2023) 4/3/2021    Hemoglobin A1c (Prediabetes) 06/06/2024 6/6/2023    DEXA Scan 09/21/2026 9/21/2023    Lipid Panel 06/06/2028 6/6/2023    Colorectal Cancer Screening 02/22/2032 2/22/2022    TETANUS VACCINE 05/27/2032 5/27/2022            Future Appointments   Date Time Provider Department Center   3/22/2024  9:40 AM Jean Pena MD [facility]   3/26/2024 10:00 AM LAB, [place] [facility] [place]   4/2/2024 10:30 AM Mikal Duarte MD [facility]         Follow up in about 2 weeks (around 3/22/2024). Total clinical care time was 60 min.    Jean Pena MD   [facility]    This note was partially dictated using voice recognition software and although actively proofread during transcription, it is possible some errors in transcription may have been overlooked.

## 2024-03-11 PROBLEM — K86.1 OTHER CHRONIC PANCREATITIS: Status: ACTIVE | Noted: 2019-11-24

## 2024-03-11 PROBLEM — K56.600 PARTIAL SMALL BOWEL OBSTRUCTION: Status: RESOLVED | Noted: 2022-01-12 | Resolved: 2024-03-11

## 2024-03-11 NOTE — ASSESSMENT & PLAN NOTE
Swabs for influenza and COVID-19 were ordered, however these were not done.  Considering lack of fever or other constitutional symptoms it is unlikely she had either of these.  Advised supportive care.

## 2024-03-11 NOTE — ASSESSMENT & PLAN NOTE
The nature of her discomfort and reproduction with palpation of the epigastrium suggest pancreaticobiliary source of symptoms.  Review of prior MRI of the abdomen notable for dilated common bile duct with taper at the ampulla, which has also been seen on previous EUS as well.  This presentation points towards sphincter of Oddi dysfunction as the likely cause of her symptoms.  We will check lipase and pancreatic autoantibodies.  Her elevated hemoglobin A1c of 6.0 despite normal BMI raises question of possible autoimmune diabetic process such as NEIL, so will check insulin and C-peptide levels.  Patient has been prescribed Creon which she is taking once a day despite lack of history of malabsorptive diarrhea.  In order to rule out exocrine pancreatic insufficiency we will get fecal elastase and quantitative fecal fats, which patient was instructed to provide sample on a day she has not taken the Creon.  Considering her minimal use of the medication it is unclear if she derives any benefit from it or if it is more representative of a placebo effect.  We will repeat MRCP to re-evaluate pancreatic and common biliary ducts.  She will likely need to follow up in [facility] for consideration of sphincterotomy.

## 2024-03-20 ENCOUNTER — PATIENT MESSAGE (OUTPATIENT)
Dept: ENDOSCOPY | Facility: HOSPITAL | Age: 69
End: 2024-03-20
Payer: MEDICARE

## 2024-03-22 ENCOUNTER — OFFICE VISIT (OUTPATIENT)
Dept: PRIMARY CARE CLINIC | Facility: CLINIC | Age: 69
End: 2024-03-22
Payer: MEDICARE

## 2024-03-22 VITALS
TEMPERATURE: 96 F | BODY MASS INDEX: 21.63 KG/M2 | OXYGEN SATURATION: 95 % | SYSTOLIC BLOOD PRESSURE: 124 MMHG | WEIGHT: 118.25 LBS | HEART RATE: 79 BPM | DIASTOLIC BLOOD PRESSURE: 63 MMHG

## 2024-03-22 DIAGNOSIS — M54.2 CERVICALGIA: Primary | ICD-10-CM

## 2024-03-22 DIAGNOSIS — K83.8 DILATED BILE DUCT: ICD-10-CM

## 2024-03-22 DIAGNOSIS — G44.86 CERVICOGENIC HEADACHE: ICD-10-CM

## 2024-03-22 PROCEDURE — 99499 UNLISTED E&M SERVICE: CPT | Mod: S$GLB,,, | Performed by: HOSPITALIST

## 2024-03-22 PROCEDURE — 99999 PR PBB SHADOW E&M-EST. PATIENT-LVL IV: CPT | Mod: PBBFAC,,, | Performed by: HOSPITALIST

## 2024-03-22 RX ORDER — METHOCARBAMOL 500 MG/1
500 TABLET, FILM COATED ORAL 3 TIMES DAILY PRN
Qty: 30 TABLET | Refills: 1 | Status: SHIPPED | OUTPATIENT
Start: 2024-03-22 | End: 2024-04-30

## 2024-03-22 RX ORDER — PREDNISONE 20 MG/1
40 TABLET ORAL DAILY
Qty: 10 TABLET | Refills: 0 | Status: SHIPPED | OUTPATIENT
Start: 2024-03-22 | End: 2024-04-30 | Stop reason: ALTCHOICE
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
## 2024-03-25 PROBLEM — M54.2 CERVICALGIA: Status: ACTIVE | Noted: 2024-03-25

## 2024-03-25 PROBLEM — J06.9 UPPER RESPIRATORY TRACT INFECTION: Status: RESOLVED | Noted: 2024-03-08 | Resolved: 2024-03-25

## 2024-03-25 NOTE — ASSESSMENT & PLAN NOTE
Occipital headache consistent with cervicalgia symptoms.  Prescribed methocarbamol and prednisone as per cervicalgia.

## 2024-03-25 NOTE — ASSESSMENT & PLAN NOTE
Patient reports improvement in symptoms; suspect sphincter of Oddi dysfunction as etiology of recurrent symptoms.  She has upcoming appointment with [facility] to evaluate further and consider ERCP sphincterotomy.

## 2024-03-25 NOTE — ASSESSMENT & PLAN NOTE
Symptoms consistent with cervical radiculopathy, with the recent onset of paresthesias concerning for myelopathy.  Treating with short course of prednisone to reduce local swelling, and methocarbamol for pain.  Referral to PT placed.  An event of persistence or worsening of symptoms we will consider advanced imaging; per [place] protocol we will start with x-rays of C-spine.

## 2024-03-28 ENCOUNTER — HOSPITAL ENCOUNTER (OUTPATIENT)
Dept: RADIOLOGY | Facility: HOSPITAL | Age: 69
Discharge: HOME | End: 2024-03-28
Attending: HOSPITALIST
Payer: MEDICARE

## 2024-03-28 ENCOUNTER — HOSPITAL ENCOUNTER (OUTPATIENT)
Dept: RADIOLOGY | Facility: HOSPITAL | Age: 69
Discharge: HOME | End: 2024-03-28
Attending: NURSE PRACTITIONER
Payer: MEDICARE

## 2024-03-28 VITALS — HEIGHT: 62 IN | BODY MASS INDEX: 21.71 KG/M2 | WEIGHT: 118 LBS

## 2024-03-28 DIAGNOSIS — M54.2 CERVICALGIA: ICD-10-CM

## 2024-03-28 DIAGNOSIS — Z12.31 SCREENING MAMMOGRAM FOR BREAST CANCER: ICD-10-CM

## 2024-03-28 PROCEDURE — 77067 SCR MAMMO BI INCL CAD: CPT | Mod: TC

## 2024-03-28 PROCEDURE — 72040 X-RAY EXAM NECK SPINE 2-3 VW: CPT | Mod: TC

## 2024-03-28 PROCEDURE — 77067 SCR MAMMO BI INCL CAD: CPT | Mod: 26,,, | Performed by: RADIOLOGY

## 2024-03-28 PROCEDURE — 72040 X-RAY EXAM NECK SPINE 2-3 VW: CPT | Mod: 26,,, | Performed by: RADIOLOGY

## 2024-03-28 PROCEDURE — 77063 BREAST TOMOSYNTHESIS BI: CPT | Mod: 26,,, | Performed by: RADIOLOGY

## 2024-03-31 ENCOUNTER — HOSPITAL ENCOUNTER (INPATIENT)
Facility: HOSPITAL | Age: 69
LOS: 2 days | Discharge: HOME | DRG: 389 | End: 2024-04-02
Attending: EMERGENCY MEDICINE | Admitting: SURGERY
Payer: MEDICARE

## 2024-03-31 DIAGNOSIS — R10.9 ABDOMINAL PAIN: ICD-10-CM

## 2024-03-31 DIAGNOSIS — K56.609 SMALL BOWEL OBSTRUCTION: Primary | ICD-10-CM

## 2024-03-31 LAB
ALBUMIN SERPL BCP-MCNC: 4 G/DL (ref 3.5–5.2)
ALP SERPL-CCNC: 52 U/L (ref 55–135)
ALT SERPL W/O P-5'-P-CCNC: 18 U/L (ref 10–44)
ANION GAP SERPL CALC-SCNC: 12 MMOL/L (ref 8–16)
AST SERPL-CCNC: 20 U/L (ref 10–40)
BASOPHILS # BLD AUTO: 0.06 K/UL (ref 0–0.2)
BASOPHILS NFR BLD: 0.6 % (ref 0–1.9)
BILIRUB SERPL-MCNC: 0.6 MG/DL (ref 0.1–1)
BILIRUB UR QL STRIP: NEGATIVE
BUN SERPL-MCNC: 20 MG/DL (ref 8–23)
BUN SERPL-MCNC: 21 MG/DL (ref 6–30)
CALCIUM SERPL-MCNC: 10.2 MG/DL (ref 8.7–10.5)
CHLORIDE SERPL-SCNC: 100 MMOL/L (ref 95–110)
CHLORIDE SERPL-SCNC: 103 MMOL/L (ref 95–110)
CLARITY UR REFRACT.AUTO: CLEAR
CO2 SERPL-SCNC: 24 MMOL/L (ref 23–29)
COLOR UR AUTO: YELLOW
CREAT SERPL-MCNC: 0.5 MG/DL (ref 0.5–1.4)
CREAT SERPL-MCNC: 0.7 MG/DL (ref 0.5–1.4)
DIFFERENTIAL METHOD BLD: ABNORMAL
EOSINOPHIL # BLD AUTO: 0.3 K/UL (ref 0–0.5)
EOSINOPHIL NFR BLD: 2.3 % (ref 0–8)
ERYTHROCYTE [DISTWIDTH] IN BLOOD BY AUTOMATED COUNT: 14.6 % (ref 11.5–14.5)
EST. GFR  (NO RACE VARIABLE): >60 ML/MIN/1.73 M^2
GLUCOSE SERPL-MCNC: 116 MG/DL (ref 70–110)
GLUCOSE SERPL-MCNC: 120 MG/DL (ref 70–110)
GLUCOSE UR QL STRIP: NEGATIVE
HCT VFR BLD AUTO: 39.7 % (ref 37–48.5)
HCT VFR BLD CALC: 41 %PCV (ref 36–54)
HGB BLD-MCNC: 13 G/DL (ref 12–16)
HGB UR QL STRIP: NEGATIVE
IMM GRANULOCYTES # BLD AUTO: 0.09 K/UL (ref 0–0.04)
IMM GRANULOCYTES NFR BLD AUTO: 0.8 % (ref 0–0.5)
KETONES UR QL STRIP: NEGATIVE
LACTATE SERPL-SCNC: 1.9 MMOL/L (ref 0.5–2.2)
LEUKOCYTE ESTERASE UR QL STRIP: NEGATIVE
LIPASE SERPL-CCNC: 44 U/L (ref 4–60)
LYMPHOCYTES # BLD AUTO: 1.4 K/UL (ref 1–4.8)
LYMPHOCYTES NFR BLD: 12.6 % (ref 18–48)
MCH RBC QN AUTO: 25.5 PG (ref 27–31)
MCHC RBC AUTO-ENTMCNC: 32.7 G/DL (ref 32–36)
MCV RBC AUTO: 78 FL (ref 82–98)
MONOCYTES # BLD AUTO: 0.8 K/UL (ref 0.3–1)
MONOCYTES NFR BLD: 7.4 % (ref 4–15)
NEUTROPHILS # BLD AUTO: 8.3 K/UL (ref 1.8–7.7)
NEUTROPHILS NFR BLD: 76.3 % (ref 38–73)
NITRITE UR QL STRIP: NEGATIVE
NRBC BLD-RTO: 0 /100 WBC
OHS QRS DURATION: 92 MS
OHS QTC CALCULATION: 439 MS
PH UR STRIP: 8 [PH] (ref 5–8)
PLATELET # BLD AUTO: 289 K/UL (ref 150–450)
PMV BLD AUTO: 9.1 FL (ref 9.2–12.9)
POC IONIZED CALCIUM: 1.28 MMOL/L (ref 1.06–1.42)
POC TCO2 (MEASURED): 28 MMOL/L (ref 23–29)
POTASSIUM BLD-SCNC: 4.3 MMOL/L (ref 3.5–5.1)
POTASSIUM SERPL-SCNC: 4 MMOL/L (ref 3.5–5.1)
PROT SERPL-MCNC: 7.2 G/DL (ref 6–8.4)
PROT UR QL STRIP: ABNORMAL
RBC # BLD AUTO: 5.09 M/UL (ref 4–5.4)
SAMPLE: ABNORMAL
SODIUM BLD-SCNC: 139 MMOL/L (ref 136–145)
SODIUM SERPL-SCNC: 139 MMOL/L (ref 136–145)
SP GR UR STRIP: >1.03 (ref 1–1.03)
URN SPEC COLLECT METH UR: ABNORMAL
WBC # BLD AUTO: 10.88 K/UL (ref 3.9–12.7)

## 2024-03-31 PROCEDURE — 85025 COMPLETE CBC W/AUTO DIFF WBC: CPT | Mod: HCNC | Performed by: EMERGENCY MEDICINE

## 2024-03-31 PROCEDURE — 83690 ASSAY OF LIPASE: CPT | Mod: HCNC | Performed by: EMERGENCY MEDICINE

## 2024-03-31 PROCEDURE — 81003 URINALYSIS AUTO W/O SCOPE: CPT | Mod: HCNC | Performed by: EMERGENCY MEDICINE

## 2024-03-31 PROCEDURE — 83605 ASSAY OF LACTIC ACID: CPT | Mod: HCNC | Performed by: EMERGENCY MEDICINE

## 2024-03-31 PROCEDURE — 80053 COMPREHEN METABOLIC PANEL: CPT | Mod: HCNC | Performed by: EMERGENCY MEDICINE

## 2024-03-31 PROCEDURE — 93005 ELECTROCARDIOGRAM TRACING: CPT | Mod: HCNC

## 2024-03-31 PROCEDURE — 63600175 PHARM REV CODE 636 W HCPCS: Mod: HCNC | Performed by: EMERGENCY MEDICINE

## 2024-03-31 PROCEDURE — 20600001 HC STEP DOWN PRIVATE ROOM: Mod: HCNC

## 2024-03-31 PROCEDURE — 96375 TX/PRO/DX INJ NEW DRUG ADDON: CPT | Mod: HCNC

## 2024-03-31 PROCEDURE — 93010 ELECTROCARDIOGRAM REPORT: CPT | Mod: HCNC,,, | Performed by: INTERNAL MEDICINE

## 2024-03-31 PROCEDURE — 96374 THER/PROPH/DIAG INJ IV PUSH: CPT | Mod: HCNC

## 2024-03-31 PROCEDURE — 63600175 PHARM REV CODE 636 W HCPCS: Mod: HCNC | Performed by: STUDENT IN AN ORGANIZED HEALTH CARE EDUCATION/TRAINING PROGRAM

## 2024-03-31 PROCEDURE — 96376 TX/PRO/DX INJ SAME DRUG ADON: CPT | Mod: HCNC

## 2024-03-31 PROCEDURE — 99285 EMERGENCY DEPT VISIT HI MDM: CPT | Mod: 25,HCNC

## 2024-03-31 PROCEDURE — 25500020 PHARM REV CODE 255: Mod: HCNC | Performed by: EMERGENCY MEDICINE

## 2024-03-31 RX ORDER — ENOXAPARIN SODIUM 100 MG/ML
40 INJECTION SUBCUTANEOUS EVERY 24 HOURS
Status: DISCONTINUED | OUTPATIENT
Start: 2024-03-31 | End: 2024-04-02 | Stop reason: HOSPADM

## 2024-03-31 RX ORDER — SODIUM CHLORIDE, SODIUM LACTATE, POTASSIUM CHLORIDE, CALCIUM CHLORIDE 600; 310; 30; 20 MG/100ML; MG/100ML; MG/100ML; MG/100ML
INJECTION, SOLUTION INTRAVENOUS CONTINUOUS
Status: DISCONTINUED | OUTPATIENT
Start: 2024-03-31 | End: 2024-04-02

## 2024-03-31 RX ORDER — PROCHLORPERAZINE EDISYLATE 5 MG/ML
5 INJECTION INTRAMUSCULAR; INTRAVENOUS EVERY 6 HOURS PRN
Status: DISCONTINUED | OUTPATIENT
Start: 2024-03-31 | End: 2024-04-02 | Stop reason: HOSPADM

## 2024-03-31 RX ORDER — LIDOCAINE HYDROCHLORIDE 10 MG/ML
1 INJECTION, SOLUTION EPIDURAL; INFILTRATION; INTRACAUDAL; PERINEURAL ONCE AS NEEDED
Status: DISCONTINUED | OUTPATIENT
Start: 2024-03-31 | End: 2024-04-02 | Stop reason: HOSPADM

## 2024-03-31 RX ORDER — MORPHINE SULFATE 2 MG/ML
2 INJECTION, SOLUTION INTRAMUSCULAR; INTRAVENOUS EVERY 4 HOURS PRN
Status: DISCONTINUED | OUTPATIENT
Start: 2024-03-31 | End: 2024-04-02 | Stop reason: HOSPADM

## 2024-03-31 RX ORDER — ONDANSETRON 8 MG/1
8 TABLET, ORALLY DISINTEGRATING ORAL EVERY 8 HOURS PRN
Status: DISCONTINUED | OUTPATIENT
Start: 2024-03-31 | End: 2024-04-02 | Stop reason: HOSPADM

## 2024-03-31 RX ORDER — MORPHINE SULFATE 2 MG/ML
6 INJECTION, SOLUTION INTRAMUSCULAR; INTRAVENOUS
Status: COMPLETED | OUTPATIENT
Start: 2024-03-31 | End: 2024-03-31

## 2024-03-31 RX ORDER — ONDANSETRON HYDROCHLORIDE 2 MG/ML
4 INJECTION, SOLUTION INTRAVENOUS
Status: COMPLETED | OUTPATIENT
Start: 2024-03-31 | End: 2024-03-31

## 2024-03-31 RX ORDER — MORPHINE SULFATE 4 MG/ML
4 INJECTION, SOLUTION INTRAMUSCULAR; INTRAVENOUS
Status: COMPLETED | OUTPATIENT
Start: 2024-03-31 | End: 2024-03-31

## 2024-03-31 RX ADMIN — MORPHINE SULFATE 6 MG: 2 INJECTION, SOLUTION INTRAMUSCULAR; INTRAVENOUS at 02:03

## 2024-03-31 RX ADMIN — MORPHINE SULFATE 2 MG: 2 INJECTION, SOLUTION INTRAMUSCULAR; INTRAVENOUS at 05:03

## 2024-03-31 RX ADMIN — IOHEXOL 75 ML: 350 INJECTION, SOLUTION INTRAVENOUS at 01:03

## 2024-03-31 RX ADMIN — PROCHLORPERAZINE EDISYLATE 5 MG: 5 INJECTION INTRAMUSCULAR; INTRAVENOUS at 03:03

## 2024-03-31 RX ADMIN — ONDANSETRON 4 MG: 2 INJECTION INTRAMUSCULAR; INTRAVENOUS at 02:03

## 2024-03-31 RX ADMIN — SODIUM CHLORIDE, POTASSIUM CHLORIDE, SODIUM LACTATE AND CALCIUM CHLORIDE: 600; 310; 30; 20 INJECTION, SOLUTION INTRAVENOUS at 03:03

## 2024-03-31 RX ADMIN — ENOXAPARIN SODIUM 40 MG: 40 INJECTION SUBCUTANEOUS at 04:03

## 2024-03-31 RX ADMIN — MORPHINE SULFATE 4 MG: 4 INJECTION INTRAVENOUS at 01:03

## 2024-03-31 NOTE — ED NOTES
..I-STAT Chem-8+ Results:   Value Reference Range   Sodium 139 136-145 mmol/L   Potassium  4.3 3.5-5.1 mmol/L   Chloride 100  mmol/L   Ionized Calcium 1.20 1.06-1.42 mmol/L   CO2 (measured) 28 23-29 mmol/L   Glucose 120  mg/dL   BUN 21 6-30 mg/dL   Creatinine 0.3 0.5-1.4 mg/dL   Hematocrit 41 36-54%

## 2024-03-31 NOTE — PLAN OF CARE
Problem: Adult Inpatient Plan of Care  Goal: Plan of Care Review  Outcome: Ongoing, Progressing  Goal: Patient-Specific Goal (Individualized)  Outcome: Ongoing, Progressing  Goal: Absence of Hospital-Acquired Illness or Injury  Outcome: Ongoing, Progressing  Goal: Optimal Comfort and Wellbeing  Outcome: Ongoing, Progressing  Goal: Readiness for Transition of Care  Outcome: Ongoing, Progressing   [facility] Plan of Care Note  Dx SBO    Shift Events transferred from ED this afternoon- NG tube placed - KUB done- NG tube to LIWS per order. ABD is soft but lightly distended, ABD pain under control with Morphine IV     Goals of Care: nausea and pain controlled, SBO resolved    Neuro: AAOx4- Lithuanian speaking    Vital Signs: stable with soft BP     Respiratory: RA    Diet: NPO    Is patient tolerating current diet? NA    GTTS:LR at 100     Urine Output/Bowel Movement: last urine occurrence was in ED around 1400 per pt report; pt said she had 1 BM this morning     Drains/Tubes/Tube Feeds (include total output/shift): NG tube with 150cc clear color drainage ( pt had some chloraseptic spray so clear- light pink color at first )    Lines: 1       Accuchecks:none    Skin: intact ex for NG tube    Fall Risk Score: 10    Activity level? X1 assist- pt said she feels weak    Any scheduled procedures? none    Any safety concerns? Fall risk + aspiration precaution    Other:SCD applied

## 2024-03-31 NOTE — ED TRIAGE NOTES
Patient is a 68-year-old female presents to the ED via personal vehicle with c/o abdominal pain that started yesterday. Patient state she had some crawfish yesterday and this is when the pain started. Patient rates abdominal pain 9/10 and describes the pain as cramping. History of small bowel obstruction. Bulgarian speaking only.  used to obtain history. Denies SOB or chest pain.

## 2024-03-31 NOTE — ED PROVIDER NOTES
Encounter Date: 3/31/2024       History     Chief Complaint   Patient presents with    Abdominal Pain     Patient reports lower abdominal pain that started last night. Reports hx of SBO and this feels similar. +passing stool and flatus,last BM this morning.      68-year-old female, with history of constipation, hypertension, chronic pancreatitis, presents to the ED for abdominal pain.  Patient reports that she ate some seafood last night, started to abdominal pain, intermittently, feels like cramping, localized to her low abdomen, increased severity this morning.  Patient has history of some of her obstruction before, requiring hospital admission.  Patient feeling nauseous, but no vomiting, last bowel movement this morning, able to pass gas.  Denies any chest pain, shortness of breath, fever, chill, or dysuria.        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 ([address] [place] [address]);  Service: Endoscopy;  Laterality: N/A;  EUS (linear) for abnormal CT scan. Urgent. Can be done by any [facility] physician.  Britton Martinez MD  Covid-19 test 6/22/20 at [facility] - pg  Speaks Citizen of [place];  offered and declined; daughter will accompany patient as  interp    ENDOSCOPIC ULTRASOUND OF UPPER GASTROINTESTINAL TRACT N/A 5/26/2021    Procedure: ULTRASOUND, UPPER GI TRACT, ENDOSCOPIC;  Surgeon: Britton Martinez MD;  Location: [facility] ENDO (2ND FLR);  Service: Endoscopy;  Laterality: N/A;  Need EUS (linear) for dilated bile duct on MRI. Main or [place]. 45 minutes.   Britton Martinez MD   Fully vaccinated - sending in copy of card and bringing it on day of procedure. ttr  *NEEDS Pakistani *    ESOPHAGOGASTRODUODENOSCOPY N/A 9/9/2020    Procedure: EGD (ESOPHAGOGASTRODUODENOSCOPY);  Surgeon: Devyn Miles MD;  [facility];  Service: General;  Laterality: N/A;    HYSTERECTOMY  2012    elective    LAPAROSCOPIC LYSIS OF ADHESIONS  9/9/2020    Procedure: LYSIS, ADHESIONS, LAPAROSCOPIC;  Surgeon: Devyn Miles MD;  [facility];  Service: General;;    LYSIS OF ADHESIONS N/A 6/16/2022    Procedure: LYSIS, ADHESIONS;  Surgeon: Jacob Greco MD;  [facility];  Service: General;  Laterality: N/A;    ROBOT-ASSISTED SURGICAL REMOVAL OF STOMACH USING DA RACHEL XI N/A 9/9/2020    Procedure: XI ROBOTIC GASTRECTOMY/GIST PROXIMAL STOMACH;  Surgeon: Devyn Miles MD;  [facility];  Service: General;  Laterality: N/A;     History reviewed. No pertinent family history.  Social History     Tobacco Use    Smoking status: Never    Smokeless tobacco: Never   Substance Use Topics    Alcohol use: No    Drug use: No     Review of Systems    Physical Exam     Initial Vitals [03/31/24 1259]   BP Pulse Resp Temp SpO2   (!) 140/77 76 20 97.6 °F (36.4 °C) 100 %      MAP       --         Physical Exam    Nursing note and vitals reviewed.  Constitutional: She appears well-developed. No distress.   HENT:   Head: Normocephalic and atraumatic.   Mouth/Throat: Oropharynx is clear and moist.   Eyes: Conjunctivae and EOM are normal.   Neck: No JVD present.   Normal range of motion.  Cardiovascular:  Normal rate, regular rhythm,  normal heart sounds and intact distal pulses.           Pulmonary/Chest: Breath sounds normal. No respiratory distress.   Abdominal: Abdomen is soft. She exhibits no distension and no mass. There is abdominal tenderness (Low abdomen). There is no rebound and no guarding.   Musculoskeletal:         General: No tenderness or edema. Normal range of motion.      Cervical back: Normal range of motion.     Neurological: She is alert and oriented to person, place, and time. She has normal strength.   Skin: Skin is warm and dry. Capillary refill takes less than 2 seconds.         ED Course   Procedures  Labs Reviewed   CBC W/ AUTO DIFFERENTIAL - Abnormal; Notable for the following components:       Result Value    MCV 78 (*)     MCH 25.5 (*)     RDW 14.6 (*)     MPV 9.1 (*)     Immature Granulocytes 0.8 (*)     Gran # (ANC) 8.3 (*)     Immature Grans (Abs) 0.09 (*)     Gran % 76.3 (*)     Lymph % 12.6 (*)     All other components within normal limits   COMPREHENSIVE METABOLIC PANEL - Abnormal; Notable for the following components:    Glucose 116 (*)     Alkaline Phosphatase 52 (*)     All other components within normal limits   ISTAT PROCEDURE - Abnormal; Notable for the following components:    POC Glucose 120 (*)     All other components within normal limits   LIPASE   LACTIC ACID, PLASMA   URINALYSIS, REFLEX TO URINE CULTURE     EKG Readings: (Independently Interpreted)   Initial Reading: No STEMI. Previous EKG: Compared with most recent EKG Rhythm: Normal Sinus Rhythm. Heart Rate: 69. Ectopy: No Ectopy. Conduction: Normal. ST Segments: Normal ST Segments. T Waves: Normal. Axis: Normal. Clinical Impression: Normal Sinus Rhythm       Imaging Results              CT Abdomen Pelvis With IV Contrast NO Oral Contrast (Final result)  Result time 03/31/24 13:58:11      Final result by Jamal Johnson MD (03/31/24 13:58:11)                   Impression:      Findings consistent with small-bowel obstruction with likely  transition point in the mid pelvis or at the level of the anastomosis status post small bowel resection.  Surgical consultation is recommended.    Status post cholecystectomy with stable intra and extrahepatic biliary ductal dilatation.    Additional findings as described above.      Electronically signed by: Jamal Johnson MD  Date:    03/31/2024  Time:    13:58               Narrative:    EXAMINATION:  CT ABDOMEN PELVIS WITH IV CONTRAST    CLINICAL HISTORY:  Bowel obstruction suspected;    TECHNIQUE:  Axial images of the abdomen and pelvis were acquired  after the use of 75 cc [id number] IV contrast. No oral contrast.  Coronal and sagittal reconstructions were also obtained    COMPARISON:  CT abdomen pelvis from 01/01/2024    FINDINGS:  Heart: Normal in size. No pericardial effusion. No significant calcific coronary atherosclerosis.    Lungs: Visualized portions of the lungs are clear.    Liver: Normal in size and contour.  No focal hepatic lesion.    Gallbladder: Gallbladder is surgically absent.    Bile Ducts: Mild intrahepatic biliary ductal dilatation.  Stable dilatation of the common bile duct measuring up 2 1.5 cm.    Pancreas: No mass or peripancreatic fat stranding.    Spleen: Unremarkable.    Stomach and duodenum: Unremarkable.    Adrenals: Unremarkable.    Kidneys/ Ureters: Normal in size and location. Normal enhancement. No hydronephrosis or nephrolithiasis. No ureteral dilatation.    Bladder: No evidence of wall thickening.    Reproductive organs: Unremarkable.    Bowel/Mesentery: Multiple dilated fluid-filled loops of small bowel with small bowel feces sign.  There is a likely transition point in the mid pelvis (series 2, image 133) or at the level of the anastomosis status post resection (series 2, image 139).  Distal small bowel is decompressed.  Air and stool within the colon.    Lymph nodes: No lymphadenapathy.    Vasculature: No aneurysm. Moderate calcific atherosclerosis.    Abdominal wall:   Unremarkable.    Bones: No acute fracture. No suspicious osseous lesions.                                       Medications   morphine injection 4 mg (4 mg Intravenous Given 3/31/24 1323)   iohexoL (OMNIPAQUE 350) injection 75 mL (75 mLs Intravenous Given 3/31/24 1342)   ondansetron injection 4 mg (4 mg Intravenous Given 3/31/24 1408)   morphine injection 6 mg (6 mg Intravenous Given 3/31/24 1412)     Medical Decision Making  68-year-old female, with history of constipation, hypertension, chronic pancreatitis, presents to the ED for abdominal pain.  Patient appear to be distress in pain, vital signs within normal limits.    Differential including but not limited to pancreatitis, small-bowel obstruction, ischemic bowel, gastroenteritis, ACS, pneumonia, pyelonephritis, UTI.  Doubt perforated    Amount and/or Complexity of Data Reviewed  Labs: ordered. Decision-making details documented in ED Course.  Radiology: ordered. Decision-making details documented in ED Course.    Risk  Prescription drug management.  Decision regarding hospitalization.              Attending Attestation:   Physician Attestation Statement for Resident:  As the supervising MD   Physician Attestation Statement: I have personally seen and examined this patient.   I agree with the above history.  -: Recurrent SBO.  Surgery consulted and will admit the patient.   As the supervising MD I agree with the above PE.     As the supervising MD I agree with the above treatment, course, plan, and disposition.                    ED Course as of 03/31/24 1441   Sun Mar 31, 2024   1400 CBC W/ AUTO DIFFERENTIAL(!)  No leukocytosis, or anemia [NC]   1403 ISTAT PROCEDURE(!)  No electrolyte derangement, normal renal function [NC]   1403 Lactic Acid Level: 1.9  Within normal limits [NC]   1404 CT Abdomen Pelvis With IV Contrast NO Oral Contrast  indings consistent with small-bowel obstruction with likely transition point in the mid pelvis or at the level of the  anastomosis status post small bowel resection [NC]   1412 Discussed the case with general surgery, they will evaluate patient at bedside, no further recommendation  [NC]   1434 Patient is admitted to surgical service for further management of her SBO [NC]      ED Course User Index  [NC] Bossman Patrick MD                           Clinical Impression:  Final diagnoses:  [R10.9] Abdominal pain  [K56.609] Small bowel obstruction (Primary)          ED Disposition Condition    Admit Stable                Bossman Patrick MD  Resident  03/31/24 0513

## 2024-03-31 NOTE — HPI
Meredith Keen is a 68yoF with a history of recurrent small bowel obstructions who presents with abdominal pain, distension and nausea. She has a past surgical history significant for robotic GIST of proximal stomach and small bowel obstruction that failed to resolve requiring exploratory laparotomy in 2022. She states that she was in her usual state of health until early this morning when she began with crampy abdominal pain and lower abdominal distension. She states that this pain is similar to her presentation in January of this year. She does endorse nausea and PO aversion but no vomiting at this time. She did have some bowel function this morning.     Work-up in the emergency room demonstrates largely normal lab work. A CT scan was performed which demonstrates dilated distal small intestine with an apparent transition point in her pelvis. Given her history, general surgery was consulted for evaluation.     On my exam, the patient is in apparent discomfort. Her lower abdomen is tender and distended. She does not demonstrate peritoneal signs. I explained that I will admit her to the hospital for conservative management of a small bowel obstruction, but that this could ultimately lead to a repeat operation. She voices understanding.     This encounter was mediated using a .

## 2024-03-31 NOTE — CONSULTS
[facility] - Emergency Dept  General Surgery  Consult Note    Patient Name: Meredith Keen  MRN: [id number]  Code Status: Full Code  Admission Date: 3/31/2024  Hospital Length of Stay: 0 days  Attending Physician: Jacob Greco MD  Primary Care Provider: Jean Pena MD    Patient information was obtained from patient and past medical records.     Inpatient consult to General surgery  Consult performed by: Stephen Pimentel MD  Consult ordered by: Bossman Patrick MD        Subjective:     Principal Problem: <principal problem not specified>    History of Present Illness: Meredith Keen is a 68yoF with a history of recurrent small bowel obstructions who presents with abdominal pain, distension and nausea. She has a past surgical history significant for robotic GIST of proximal stomach and small bowel obstruction that failed to resolve requiring exploratory laparotomy in 2022. She states that she was in her usual state of health until early this morning when she began with crampy abdominal pain and lower abdominal distension. She states that this pain is similar to her presentation in January of this year. She does endorse nausea and PO aversion but no vomiting at this time. She did have some bowel function this morning.     Work-up in the emergency room demonstrates largely normal lab work. A CT scan was performed which demonstrates dilated distal small intestine with an apparent transition point in her pelvis. Given her history, general surgery was consulted for evaluation.     On my exam, the patient is in apparent discomfort. Her lower abdomen is tender and distended. She does not demonstrate peritoneal signs. I explained that I will admit her to the hospital for conservative management of a small bowel obstruction, but that this could ultimately lead to a repeat operation. She voices understanding.     This encounter was mediated using a .     No current facility-administered medications on file  "prior to encounter.     Current Outpatient Medications on File Prior to Encounter   Medication Sig    amLODIPine (NORVASC) 2.5 MG tablet Take 1 tablet (2.5 mg total) by mouth once daily.    aspirin 81 MG Chew Take 1 tablet (81 mg total) by mouth 3 (three) times a week.    atorvastatin (LIPITOR) 20 MG tablet Take 1 tablet (20 mg total) by mouth once daily.    BD ULTRA-FINE SHORT PEN NEEDLE 31 gauge x 5/16" Ndle Inject into the skin.    CREON 36,000-114,000- 180,000 unit CpDR Take 1 capsule by mouth 4 (four) times daily.    EScitalopram oxalate (LEXAPRO) 10 MG tablet Take 1 tablet (10 mg total) by mouth once daily.    fluoride, sodium, (DENTA 5000 PLUS) 1.1 % Crea BRUSH ON SENSITIVE TEETH 3-5 MINUTES TWICE A DAY    losartan (COZAAR) 100 MG tablet Take 1 tablet (100 mg total) by mouth once daily.    methocarbamoL (ROBAXIN) 500 MG Tab Take 1 tablet (500 mg total) by mouth 3 (three) times daily as needed (neck pain).    pen needle, diabetic 31 gauge x 1/4" Ndle Use with Forteo injection daily    predniSONE (DELTASONE) 20 MG tablet Take 2 tablets (40 mg total) by mouth once daily. 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     Past Surgical History:   Procedure Laterality Date    APPENDECTOMY      CHOLECYSTECTOMY      ENDOSCOPIC ULTRASOUND OF UPPER GASTROINTESTINAL TRACT N/A 11/26/2019    Procedure: ULTRASOUND, UPPER GI TRACT, ENDOSCOPIC;  Surgeon: Britton Martinez MD;  Location: [facility] ENDO;  Service: Endoscopy;  Laterality: N/A;    ENDOSCOPIC ULTRASOUND OF UPPER GASTROINTESTINAL TRACT N/A 6/24/2020    Procedure: ULTRASOUND, UPPER GI TRACT, ENDOSCOPIC;  Surgeon: Delfino Jasso MD;  Location: [facility] ENDO (2ND FLR);  " Service: Endoscopy;  Laterality: N/A;  EUS (linear) for abnormal CT scan. Urgent. Can be done by any [facility] physician.  Britton Martinez MD  Covid-19 test 6/22/20 at [facility]  Speaks [place];  offered and declined; daughter will accompany patient as interp    ENDOSCOPIC ULTRASOUND OF UPPER GASTROINTESTINAL TRACT N/A 5/26/2021    Procedure: ULTRASOUND, UPPER GI TRACT, ENDOSCOPIC;  Surgeon: Britton Martinez MD;  Location: [facility] ENDO (2ND FLR);  Service: Endoscopy;  Laterality: N/A;  Need EUS (linear) for dilated bile duct on MRI. Main or [place]. 45 minutes.   Britton Martinez MD   Fully vaccinated - sending in copy of card and bringing it on day of procedure. ttr  *NEEDS Greek *    ESOPHAGOGASTRODUODENOSCOPY N/A 9/9/2020    Procedure: EGD (ESOPHAGOGASTRODUODENOSCOPY);  Surgeon: Devyn Miles MD;  [facility];  Service: General;  Laterality: N/A;    HYSTERECTOMY  2012    elective    LAPAROSCOPIC LYSIS OF ADHESIONS  9/9/2020    Procedure: LYSIS, ADHESIONS, LAPAROSCOPIC;  Surgeon: Devyn Miles MD;  [facility];  Service: General;;    LYSIS OF ADHESIONS N/A 6/16/2022    Procedure: LYSIS, ADHESIONS;  Surgeon: Jacob Greco MD;  [facility];  Service: General;  Laterality: N/A;    ROBOT-ASSISTED SURGICAL REMOVAL OF STOMACH USING DA RACHEL XI N/A 9/9/2020    Procedure: XI ROBOTIC GASTRECTOMY/GIST PROXIMAL STOMACH;  Surgeon: Devyn Miles MD;  [facility];  Service: General;  Laterality: N/A;     Family History    None       Tobacco Use    Smoking status: Never    Smokeless tobacco: Never   Substance and Sexual Activity    Alcohol use: No    Drug use: No    Sexual activity: Yes     Partners: Male     Review of Systems   Constitutional:  Positive for activity change and appetite change. Negative for diaphoresis, fatigue and fever.   HENT: Negative.     Respiratory:  Negative for cough, choking and shortness  of breath.    Cardiovascular:  Negative for chest pain.   Gastrointestinal:  Positive for abdominal distention, abdominal pain and nausea. Negative for constipation, diarrhea and vomiting.   Endocrine: Negative.    Genitourinary:  Negative for flank pain.     Objective:     Vital Signs (Most Recent):  Temp: 97.6 °F (36.4 °C) (03/31/24 1259)  Pulse: 65 (03/31/24 1323)  Resp: 20 (03/31/24 1412)  BP: (!) 151/73 (03/31/24 1323)  SpO2: 98 % (03/31/24 1323) Vital Signs (24h Range):  Temp:  [97.6 °F (36.4 °C)] 97.6 °F (36.4 °C)  Pulse:  [65-76] 65  Resp:  [20] 20  SpO2:  [98 %-100 %] 98 %  BP: (140-151)/(73-77) 151/73     Weight: 53.5 kg (118 lb)  Body mass index is 21.58 kg/m².     Physical Exam  Vitals and nursing note reviewed.   Constitutional:       Appearance: She is ill-appearing. She is not toxic-appearing.   HENT:      Head: Normocephalic and atraumatic.      Nose: Nose normal.      Mouth/Throat:      Mouth: Mucous membranes are moist.   Cardiovascular:      Rate and Rhythm: Normal rate and regular rhythm.   Pulmonary:      Effort: Pulmonary effort is normal. No respiratory distress.   Abdominal:      Comments: Well healed midline laparotomy incision  Abdomen soft, moderately distended  Tender to palpation across the lower abdomen: RLQ > LLQ  No signs of peritonitis   Musculoskeletal:         General: Normal range of motion.   Skin:     General: Skin is warm and dry.   Neurological:      General: No focal deficit present.      Mental Status: She is alert.            I have reviewed all pertinent lab results within the past 24 hours.  CBC:   Recent Labs   Lab 03/31/24  1314 03/31/24  1320   WBC 10.88  --    RBC 5.09  --    HGB 13.0  --    HCT 39.7 41     --    MCV 78*  --    MCH 25.5*  --    MCHC 32.7  --      CMP:   Recent Labs   Lab 03/31/24  1314   *   CALCIUM 10.2   ALBUMIN 4.0   PROT 7.2      K 4.0   CO2 24      BUN 20   CREATININE 0.7   ALKPHOS 52*   ALT 18   AST 20   BILITOT 0.6        Significant Diagnostics:  I have reviewed all pertinent imaging results/findings within the past 24 hours.\  CT scan reviewed; there is not much proximal small bowel dilation, but there is significant distension of the distal small intestine with a rat's nest in the pelvis    Assessment/Plan:     Small bowel obstruction  Meredith Cuellar is a 68yoF with a history of HTN, GIST of the distal esophagus, and recurrent small bowel obstructions. She underwent an exploratory laparotomy in 2022 for failed conservative management of a small bowel obstruction. She represents with clinical history and imaging consistent with recurrent small bowel obstruction. General surgery consulted.     - patient seen and examined  - admit to general surgery  - place NG tube; once confirmed with KUB, place to low intermittent wall suction  - NPO  - mIVFs  - PRN pain and antiemetics  - plan for decompression with gastrograffin challenge in next 24-48hrs  - serial abdominal exams  - continue to monitor for bowel function  - discussed with staff on call          VTE Risk Mitigation (From admission, onward)           Ordered     enoxaparin injection 40 mg  Daily         03/31/24 1443     IP VTE HIGH RISK PATIENT  Once         03/31/24 1443     Place sequential compression device  Until discontinued         03/31/24 1443                    Thank you for your consult. I will follow-up with patient. Please contact us if you have any additional questions.    Stephen Pimentel MD  General Surgery  [facility] - Emergency Dept        I have personally performed a detailed history and physical examination on this patient. My findings are summarized in the resident's note included in the record.   Recurrent partial SBO  Will plan GGC Monday

## 2024-03-31 NOTE — SUBJECTIVE & OBJECTIVE
"No current facility-administered medications on file prior to encounter.     Current Outpatient Medications on File Prior to Encounter   Medication Sig    amLODIPine (NORVASC) 2.5 MG tablet Take 1 tablet (2.5 mg total) by mouth once daily.    aspirin 81 MG Chew Take 1 tablet (81 mg total) by mouth 3 (three) times a week.    atorvastatin (LIPITOR) 20 MG tablet Take 1 tablet (20 mg total) by mouth once daily.    BD ULTRA-FINE SHORT PEN NEEDLE 31 gauge x 5/16" Ndle Inject into the skin.    CREON 36,000-114,000- 180,000 unit CpDR Take 1 capsule by mouth 4 (four) times daily.    EScitalopram oxalate (LEXAPRO) 10 MG tablet Take 1 tablet (10 mg total) by mouth once daily.    fluoride, sodium, (DENTA 5000 PLUS) 1.1 % Crea BRUSH ON SENSITIVE TEETH 3-5 MINUTES TWICE A DAY    losartan (COZAAR) 100 MG tablet Take 1 tablet (100 mg total) by mouth once daily.    methocarbamoL (ROBAXIN) 500 MG Tab Take 1 tablet (500 mg total) by mouth 3 (three) times daily as needed (neck pain).    pen needle, diabetic 31 gauge x 1/4" Ndle Use with Forteo injection daily    predniSONE (DELTASONE) 20 MG tablet Take 2 tablets (40 mg total) by mouth once daily. 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;  Service: Endoscopy;  Laterality: N/A;    ENDOSCOPIC ULTRASOUND OF UPPER GASTROINTESTINAL TRACT N/A 6/24/2020    Procedure: ULTRASOUND, UPPER GI TRACT, ENDOSCOPIC;  Surgeon: " Delfino Jasso MD;  Location: [facility] ENDO (2ND FLR);  Service: Endoscopy;  Laterality: N/A;  EUS (linear) for abnormal CT scan. Urgent. Can be done by any [facility] physician.  Britton Martinez MD  Covid-19 test 6/22/20 at [facility] - pg  [place] [place];  offered and declined; daughter will accompany patient as interp    ENDOSCOPIC ULTRASOUND OF UPPER GASTROINTESTINAL TRACT N/A 5/26/2021    Procedure: ULTRASOUND, UPPER GI TRACT, ENDOSCOPIC;  Surgeon: Britton Martinez MD;  Location: [facility] ENDO (2ND FLR);  Service: Endoscopy;  Laterality: N/A;  Need EUS (linear) for dilated bile duct on MRI. Main or [facility]. 45 minutes.   Britton Martinez MD   Fully vaccinated - sending in copy of card and bringing it on day of procedure. ttr  *NEEDS [place] *    ESOPHAGOGASTRODUODENOSCOPY N/A 9/9/2020    Procedure: EGD (ESOPHAGOGASTRODUODENOSCOPY);  Surgeon: Devyn Miles MD;  [facility];  Service: General;  Laterality: N/A;    HYSTERECTOMY  2012    elective    LAPAROSCOPIC LYSIS OF ADHESIONS  9/9/2020    Procedure: LYSIS, ADHESIONS, LAPAROSCOPIC;  Surgeon: Devyn Miles MD;  [facility];  Service: General;;    LYSIS OF ADHESIONS N/A 6/16/2022    Procedure: LYSIS, ADHESIONS;  Surgeon: Jacob Greco MD;  [facility];  Service: General;  Laterality: N/A;    ROBOT-ASSISTED SURGICAL REMOVAL OF STOMACH USING DA RACHEL XI N/A 9/9/2020    Procedure: XI ROBOTIC GASTRECTOMY/GIST PROXIMAL STOMACH;  Surgeon: Devyn Miles MD;  [facility];  Service: General;  Laterality: N/A;     Family History    None       Tobacco Use    Smoking status: Never    Smokeless tobacco: Never   Substance and Sexual Activity    Alcohol use: No    Drug use: No    Sexual activity: Yes     Partners: Male     Review of Systems   Constitutional:  Positive for activity change and appetite change. Negative for diaphoresis, fatigue and fever.   HENT: Negative.      Respiratory:  Negative for cough, choking and shortness of breath.    Cardiovascular:  Negative for chest pain.   Gastrointestinal:  Positive for abdominal distention, abdominal pain and nausea. Negative for constipation, diarrhea and vomiting.   Endocrine: Negative.    Genitourinary:  Negative for flank pain.     Objective:     Vital Signs (Most Recent):  Temp: 97.6 °F (36.4 °C) (03/31/24 1259)  Pulse: 65 (03/31/24 1323)  Resp: 20 (03/31/24 1412)  BP: (!) 151/73 (03/31/24 1323)  SpO2: 98 % (03/31/24 1323) Vital Signs (24h Range):  Temp:  [97.6 °F (36.4 °C)] 97.6 °F (36.4 °C)  Pulse:  [65-76] 65  Resp:  [20] 20  SpO2:  [98 %-100 %] 98 %  BP: (140-151)/(73-77) 151/73     Weight: 53.5 kg (118 lb)  Body mass index is 21.58 kg/m².     Physical Exam  Vitals and nursing note reviewed.   Constitutional:       Appearance: She is ill-appearing. She is not toxic-appearing.   HENT:      Head: Normocephalic and atraumatic.      Nose: Nose normal.      Mouth/Throat:      Mouth: Mucous membranes are moist.   Cardiovascular:      Rate and Rhythm: Normal rate and regular rhythm.   Pulmonary:      Effort: Pulmonary effort is normal. No respiratory distress.   Abdominal:      Comments: Well healed midline laparotomy incision  Abdomen soft, moderately distended  Tender to palpation across the lower abdomen: RLQ > LLQ  No signs of peritonitis   Musculoskeletal:         General: Normal range of motion.   Skin:     General: Skin is warm and dry.   Neurological:      General: No focal deficit present.      Mental Status: She is alert.            I have reviewed all pertinent lab results within the past 24 hours.  CBC:   Recent Labs   Lab 03/31/24  1314 03/31/24  1320   WBC 10.88  --    RBC 5.09  --    HGB 13.0  --    HCT 39.7 41     --    MCV 78*  --    MCH 25.5*  --    MCHC 32.7  --      CMP:   Recent Labs   Lab 03/31/24  1314   *   CALCIUM 10.2   ALBUMIN 4.0   PROT 7.2      K 4.0   CO2 24      BUN 20    CREATININE 0.7   ALKPHOS 52*   ALT 18   AST 20   BILITOT 0.6       Significant Diagnostics:  I have reviewed all pertinent imaging results/findings within the past 24 hours.\  CT scan reviewed; there is not much proximal small bowel dilation, but there is significant distension of the distal small intestine with a rat's nest in the pelvis

## 2024-04-01 ENCOUNTER — PATIENT MESSAGE (OUTPATIENT)
Dept: ENDOCRINOLOGY | Facility: CLINIC | Age: 69
End: 2024-04-01
Payer: MEDICARE

## 2024-04-01 PROBLEM — E87.1 HYPONATREMIA: Status: ACTIVE | Noted: 2024-04-01

## 2024-04-01 LAB
ANION GAP SERPL CALC-SCNC: 7 MMOL/L (ref 8–16)
BASOPHILS # BLD AUTO: 0.02 K/UL (ref 0–0.2)
BASOPHILS NFR BLD: 0.4 % (ref 0–1.9)
BUN SERPL-MCNC: 17 MG/DL (ref 8–23)
CALCIUM SERPL-MCNC: 8.2 MG/DL (ref 8.7–10.5)
CHLORIDE SERPL-SCNC: 104 MMOL/L (ref 95–110)
CO2 SERPL-SCNC: 23 MMOL/L (ref 23–29)
CREAT SERPL-MCNC: 0.6 MG/DL (ref 0.5–1.4)
DIFFERENTIAL METHOD BLD: ABNORMAL
EOSINOPHIL # BLD AUTO: 0.1 K/UL (ref 0–0.5)
EOSINOPHIL NFR BLD: 1.5 % (ref 0–8)
ERYTHROCYTE [DISTWIDTH] IN BLOOD BY AUTOMATED COUNT: 14.8 % (ref 11.5–14.5)
EST. GFR  (NO RACE VARIABLE): >60 ML/MIN/1.73 M^2
GLUCOSE SERPL-MCNC: 116 MG/DL (ref 70–110)
HCT VFR BLD AUTO: 33.4 % (ref 37–48.5)
HGB BLD-MCNC: 11 G/DL (ref 12–16)
IMM GRANULOCYTES # BLD AUTO: 0.02 K/UL (ref 0–0.04)
IMM GRANULOCYTES NFR BLD AUTO: 0.4 % (ref 0–0.5)
LYMPHOCYTES # BLD AUTO: 0.5 K/UL (ref 1–4.8)
LYMPHOCYTES NFR BLD: 8.7 % (ref 18–48)
MAGNESIUM SERPL-MCNC: 1.9 MG/DL (ref 1.6–2.6)
MCH RBC QN AUTO: 25.6 PG (ref 27–31)
MCHC RBC AUTO-ENTMCNC: 32.9 G/DL (ref 32–36)
MCV RBC AUTO: 78 FL (ref 82–98)
MONOCYTES # BLD AUTO: 0.6 K/UL (ref 0.3–1)
MONOCYTES NFR BLD: 10.4 % (ref 4–15)
NEUTROPHILS # BLD AUTO: 4.2 K/UL (ref 1.8–7.7)
NEUTROPHILS NFR BLD: 78.6 % (ref 38–73)
NRBC BLD-RTO: 0 /100 WBC
PHOSPHATE SERPL-MCNC: 2.8 MG/DL (ref 2.7–4.5)
PLATELET # BLD AUTO: 226 K/UL (ref 150–450)
PMV BLD AUTO: 9.4 FL (ref 9.2–12.9)
POTASSIUM SERPL-SCNC: 3.9 MMOL/L (ref 3.5–5.1)
RBC # BLD AUTO: 4.3 M/UL (ref 4–5.4)
SODIUM SERPL-SCNC: 134 MMOL/L (ref 136–145)
WBC # BLD AUTO: 5.38 K/UL (ref 3.9–12.7)

## 2024-04-01 PROCEDURE — 85025 COMPLETE CBC W/AUTO DIFF WBC: CPT | Mod: HCNC | Performed by: STUDENT IN AN ORGANIZED HEALTH CARE EDUCATION/TRAINING PROGRAM

## 2024-04-01 PROCEDURE — 25500020 PHARM REV CODE 255: Mod: HCNC | Performed by: STUDENT IN AN ORGANIZED HEALTH CARE EDUCATION/TRAINING PROGRAM

## 2024-04-01 PROCEDURE — 0D9670Z DRAINAGE OF STOMACH WITH DRAINAGE DEVICE, VIA NATURAL OR ARTIFICIAL OPENING: ICD-10-PCS | Performed by: SURGERY

## 2024-04-01 PROCEDURE — 83735 ASSAY OF MAGNESIUM: CPT | Mod: HCNC | Performed by: STUDENT IN AN ORGANIZED HEALTH CARE EDUCATION/TRAINING PROGRAM

## 2024-04-01 PROCEDURE — 20600001 HC STEP DOWN PRIVATE ROOM: Mod: HCNC

## 2024-04-01 PROCEDURE — 80048 BASIC METABOLIC PNL TOTAL CA: CPT | Mod: HCNC | Performed by: STUDENT IN AN ORGANIZED HEALTH CARE EDUCATION/TRAINING PROGRAM

## 2024-04-01 PROCEDURE — 84100 ASSAY OF PHOSPHORUS: CPT | Mod: HCNC | Performed by: STUDENT IN AN ORGANIZED HEALTH CARE EDUCATION/TRAINING PROGRAM

## 2024-04-01 PROCEDURE — 36415 COLL VENOUS BLD VENIPUNCTURE: CPT | Mod: HCNC | Performed by: STUDENT IN AN ORGANIZED HEALTH CARE EDUCATION/TRAINING PROGRAM

## 2024-04-01 PROCEDURE — 63600175 PHARM REV CODE 636 W HCPCS: Mod: HCNC | Performed by: STUDENT IN AN ORGANIZED HEALTH CARE EDUCATION/TRAINING PROGRAM

## 2024-04-01 PROCEDURE — 99223 1ST HOSP IP/OBS HIGH 75: CPT | Mod: AI,HCNC,, | Performed by: SURGERY

## 2024-04-01 RX ADMIN — DIATRIZOATE MEGLUMINE AND DIATRIZOATE SODIUM 100 ML: 660; 100 LIQUID ORAL; RECTAL at 09:04

## 2024-04-01 RX ADMIN — SODIUM CHLORIDE, POTASSIUM CHLORIDE, SODIUM LACTATE AND CALCIUM CHLORIDE: 600; 310; 30; 20 INJECTION, SOLUTION INTRAVENOUS at 01:04

## 2024-04-01 RX ADMIN — ENOXAPARIN SODIUM 40 MG: 40 INJECTION SUBCUTANEOUS at 04:04

## 2024-04-01 NOTE — CARE UPDATE
I have reviewed the chart of Meredith Keen who is hospitalized for the following:    Active Hospital Problems    Diagnosis    *Small bowel obstruction    Hyponatremia     Monitor daily labs            Tang Pena PA-C  [facility]

## 2024-04-01 NOTE — PROGRESS NOTES
[facility] - [facility]  General Surgery  Progress Note    Subjective:     History of Present Illness:  Meredith Keen is a 68yoF with a history of recurrent small bowel obstructions who presents with abdominal pain, distension and nausea. She has a past surgical history significant for robotic GIST of proximal stomach and small bowel obstruction that failed to resolve requiring exploratory laparotomy in 2022. She states that she was in her usual state of health until early this morning when she began with crampy abdominal pain and lower abdominal distension. She states that this pain is similar to her presentation in January of this year. She does endorse nausea and PO aversion but no vomiting at this time. She did have some bowel function this morning.     Work-up in the emergency room demonstrates largely normal lab work. A CT scan was performed which demonstrates dilated distal small intestine with an apparent transition point in her pelvis. Given her history, general surgery was consulted for evaluation.     On my exam, the patient is in apparent discomfort. Her lower abdomen is tender and distended. She does not demonstrate peritoneal signs. I explained that I will admit her to the hospital for conservative management of a small bowel obstruction, but that this could ultimately lead to a repeat operation. She voices understanding.     This encounter was mediated using a .     Post-Op Info:  * No surgery found *         Interval History: No acute events. AF and VSS. Only 150mL out from NGT.     Medications:  Continuous Infusions:   lactated ringers 100 mL/hr at 04/01/24 0117     Scheduled Meds:   enoxparin  40 mg Subcutaneous Daily     PRN Meds:LIDOcaine (PF) 10 mg/ml (1%), morphine, ondansetron, prochlorperazine     Review of patient's allergies indicates:  No Known Allergies  Objective:     Vital Signs (Most Recent):  Temp: 98.2 °F (36.8 °C) (04/01/24 0503)  Pulse: 75 (04/01/24 0503)  Resp: 18 (04/01/24  0503)  BP: 104/60 (04/01/24 0503)  SpO2: (!) 93 % (04/01/24 0503) Vital Signs (24h Range):  Temp:  [97.5 °F (36.4 °C)-99.4 °F (37.4 °C)] 98.2 °F (36.8 °C)  Pulse:  [58-80] 75  Resp:  [14-20] 18  SpO2:  [86 %-100 %] 93 %  BP: (101-151)/(55-77) 104/60     Weight: 53.5 kg (118 lb)  Body mass index is 21.58 kg/m².    Intake/Output - Last 3 Shifts         03/30 0700  03/31 0659 03/31 0700 04/01 0659 04/01 0700 04/02 0659    I.V. (mL/kg)  1471.5 (27.5)     NG/GT  0     Total Intake(mL/kg)  1471.5 (27.5)     Urine (mL/kg/hr)  500     Drains  200     Stool  0     Total Output  700     Net  +771.5            Stool Occurrence  0 x              Physical Exam  Vitals and nursing note reviewed.   Constitutional:       General: She is not in acute distress.     Appearance: She is well-developed. She is not diaphoretic.   HENT:      Nose:      Comments: NGT with thin brown output     Mouth/Throat:      Pharynx: Oropharynx is clear. No oropharyngeal exudate.   Eyes:      General: No scleral icterus.     Extraocular Movements: Extraocular movements intact.   Cardiovascular:      Rate and Rhythm: Normal rate and regular rhythm.   Pulmonary:      Effort: Pulmonary effort is normal. No respiratory distress.   Abdominal:      General: There is no distension.      Palpations: Abdomen is soft.      Tenderness: There is no abdominal tenderness.   Musculoskeletal:         General: No deformity. Normal range of motion.   Skin:     General: Skin is warm and dry.      Coloration: Skin is not jaundiced.   Neurological:      General: No focal deficit present.      Mental Status: She is alert.      Cranial Nerves: No cranial nerve deficit.   Psychiatric:         Mood and Affect: Mood normal.         Behavior: Behavior normal.          Significant Labs:  I have reviewed all pertinent lab results within the past 24 hours.  CBC:   Recent Labs   Lab 04/01/24  0355   WBC 5.38   RBC 4.30   HGB 11.0*   HCT 33.4*      MCV 78*   MCH 25.6*   MCHC  32.9     CMP:   Recent Labs   Lab 03/31/24  1314 04/01/24  0355   * 116*   CALCIUM 10.2 8.2*   ALBUMIN 4.0  --    PROT 7.2  --     134*   K 4.0 3.9   CO2 24 23    104   BUN 20 17   CREATININE 0.7 0.6   ALKPHOS 52*  --    ALT 18  --    AST 20  --    BILITOT 0.6  --        Significant Diagnostics:  I have reviewed all pertinent imaging results/findings within the past 24 hours.  Assessment/Plan:     Small bowel obstruction  Meredith Cuellar is a 68yoF with a history of HTN, GIST of the distal esophagus, and recurrent small bowel obstructions. She underwent an exploratory laparotomy in 2022 for failed conservative management of a small bowel obstruction. She represents with clinical history and imaging consistent with recurrent small bowel obstruction. General surgery consulted.     - NPO  - mIVF  - GGC today  - PRN pain and nausea meds  - DVT ppx            Rere Orellana MD  General Surgery  [facility]

## 2024-04-01 NOTE — PLAN OF CARE
Problem: Adult Inpatient Plan of Care  Goal: Plan of Care Review  Outcome: Ongoing, Progressing  Flowsheets (Taken 4/1/2024 0226)  Plan of Care Reviewed With: patient  Goal: Patient-Specific Goal (Individualized)  Outcome: Ongoing, Progressing  Goal: Absence of Hospital-Acquired Illness or Injury  Outcome: Ongoing, Progressing  Intervention: Identify and Manage Fall Risk  Flowsheets (Taken 4/1/2024 0226)  Safety Promotion/Fall Prevention:   assistive device/personal item within reach   Fall Risk reviewed with patient/family   high risk medications identified   nonskid shoes/socks when out of bed   medications reviewed   instructed to call staff for mobility  Intervention: Prevent Skin Injury  Flowsheets (Taken 4/1/2024 0226)  Skin Protection: adhesive use limited  Intervention: Prevent and Manage VTE (Venous Thromboembolism) Risk  Flowsheets (Taken 4/1/2024 0226)  VTE Prevention/Management:   ambulation promoted   dorsiflexion/plantar flexion performed   intravenous hydration  Range of Motion: active ROM (range of motion) encouraged  Intervention: Prevent Infection  Flowsheets (Taken 4/1/2024 0226)  Infection Prevention:   hand hygiene promoted   rest/sleep promoted   single patient room provided  Goal: Optimal Comfort and Wellbeing  Outcome: Ongoing, Progressing  Intervention: Monitor Pain and Promote Comfort  Flowsheets (Taken 4/1/2024 0226)  Pain Management Interventions:   care clustered   medication offered but refused   pain management plan reviewed with patient/caregiver   pillow support provided   position adjusted   quiet environment facilitated  Intervention: Provide Person-Centered Care  Flowsheets (Taken 4/1/2024 0226)  Trust Relationship/Rapport:   care explained   questions encouraged   choices provided   questions answered   emotional support provided   reassurance provided   empathic listening provided   thoughts/feelings acknowledged  Goal: Readiness for Transition of Care  Outcome: Ongoing,  Progressing     Problem: Fall Injury Risk  Goal: Absence of Fall and Fall-Related Injury  Outcome: Ongoing, Progressing  Intervention: Identify and Manage Contributors  Flowsheets (Taken 4/1/2024 0226)  Self-Care Promotion:   independence encouraged   BADL personal objects within reach  Medication Review/Management:   medications reviewed   high-risk medications identified  Intervention: Promote Injury-Free Environment  Flowsheets (Taken 4/1/2024 0226)  Safety Promotion/Fall Prevention:   assistive device/personal item within reach   Fall Risk reviewed with patient/family   high risk medications identified   nonskid shoes/socks when out of bed   medications reviewed   instructed to call staff for mobility

## 2024-04-01 NOTE — ASSESSMENT & PLAN NOTE
Meredith Cuellar is a 68yoF with a history of HTN, GIST of the distal esophagus, and recurrent small bowel obstructions. She underwent an exploratory laparotomy in 2022 for failed conservative management of a small bowel obstruction. She represents with clinical history and imaging consistent with recurrent small bowel obstruction. General surgery consulted.     - NPO  - mIVF  - GGC today  - PRN pain and nausea meds  - DVT ppx

## 2024-04-01 NOTE — PLAN OF CARE
NGT removed and advanced to clear liquid diet  Plans to d/c home tomorrow if no nausea with advancing diet  Multiple BM today    Problem: Adult Inpatient Plan of Care  Goal: Plan of Care Review  Outcome: Ongoing, Progressing  Goal: Patient-Specific Goal (Individualized)  Outcome: Ongoing, Progressing  Goal: Absence of Hospital-Acquired Illness or Injury  Outcome: Ongoing, Progressing  Goal: Optimal Comfort and Wellbeing  Outcome: Ongoing, Progressing  Goal: Readiness for Transition of Care  Outcome: Ongoing, Progressing     Problem: Fall Injury Risk  Goal: Absence of Fall and Fall-Related Injury  Outcome: Ongoing, Progressing

## 2024-04-01 NOTE — SUBJECTIVE & OBJECTIVE
Interval History: No acute events. AF and VSS. Only 150mL out from NGT.     Medications:  Continuous Infusions:   lactated ringers 100 mL/hr at 04/01/24 0117     Scheduled Meds:   enoxparin  40 mg Subcutaneous Daily     PRN Meds:LIDOcaine (PF) 10 mg/ml (1%), morphine, ondansetron, prochlorperazine     Review of patient's allergies indicates:  No Known Allergies  Objective:     Vital Signs (Most Recent):  Temp: 98.2 °F (36.8 °C) (04/01/24 0503)  Pulse: 75 (04/01/24 0503)  Resp: 18 (04/01/24 0503)  BP: 104/60 (04/01/24 0503)  SpO2: (!) 93 % (04/01/24 0503) Vital Signs (24h Range):  Temp:  [97.5 °F (36.4 °C)-99.4 °F (37.4 °C)] 98.2 °F (36.8 °C)  Pulse:  [58-80] 75  Resp:  [14-20] 18  SpO2:  [86 %-100 %] 93 %  BP: (101-151)/(55-77) 104/60     Weight: 53.5 kg (118 lb)  Body mass index is 21.58 kg/m².    Intake/Output - Last 3 Shifts         03/30 0700  03/31 0659 03/31 0700  04/01 0659 04/01 0700  04/02 0659    I.V. (mL/kg)  1471.5 (27.5)     NG/GT  0     Total Intake(mL/kg)  1471.5 (27.5)     Urine (mL/kg/hr)  500     Drains  200     Stool  0     Total Output  700     Net  +771.5            Stool Occurrence  0 x              Physical Exam  Vitals and nursing note reviewed.   Constitutional:       General: She is not in acute distress.     Appearance: She is well-developed. She is not diaphoretic.   HENT:      Nose:      Comments: NGT with thin brown output     Mouth/Throat:      Pharynx: Oropharynx is clear. No oropharyngeal exudate.   Eyes:      General: No scleral icterus.     Extraocular Movements: Extraocular movements intact.   Cardiovascular:      Rate and Rhythm: Normal rate and regular rhythm.   Pulmonary:      Effort: Pulmonary effort is normal. No respiratory distress.   Abdominal:      General: There is no distension.      Palpations: Abdomen is soft.      Tenderness: There is no abdominal tenderness.   Musculoskeletal:         General: No deformity. Normal range of motion.   Skin:     General: Skin is warm  and dry.      Coloration: Skin is not jaundiced.   Neurological:      General: No focal deficit present.      Mental Status: She is alert.      Cranial Nerves: No cranial nerve deficit.   Psychiatric:         Mood and Affect: Mood normal.         Behavior: Behavior normal.          Significant Labs:  I have reviewed all pertinent lab results within the past 24 hours.  CBC:   Recent Labs   Lab 04/01/24  0355   WBC 5.38   RBC 4.30   HGB 11.0*   HCT 33.4*      MCV 78*   MCH 25.6*   MCHC 32.9     CMP:   Recent Labs   Lab 03/31/24  1314 04/01/24  0355   * 116*   CALCIUM 10.2 8.2*   ALBUMIN 4.0  --    PROT 7.2  --     134*   K 4.0 3.9   CO2 24 23    104   BUN 20 17   CREATININE 0.7 0.6   ALKPHOS 52*  --    ALT 18  --    AST 20  --    BILITOT 0.6  --        Significant Diagnostics:  I have reviewed all pertinent imaging results/findings within the past 24 hours.

## 2024-04-01 NOTE — NURSING
Nurses Note -- 4 Eyes      3/31/2024   1700        Skin assessed during: Transfer from ED to [facility]      [x] No Altered Skin Integrity Present    []Prevention Measures Documented      [] Yes- Altered Skin Integrity Present or Discovered   [] LDA Added if Not in Epic (Describe Wound)   [] New Altered Skin Integrity was Present on Admit and Documented in LDA   [] Wound Image Taken    Wound Care Consulted? No    Attending Nurse:  Alfredo Hyde RN/Staff Member:  BETY Lewis

## 2024-04-02 VITALS
WEIGHT: 118 LBS | BODY MASS INDEX: 21.71 KG/M2 | HEIGHT: 62 IN | DIASTOLIC BLOOD PRESSURE: 72 MMHG | TEMPERATURE: 98 F | HEART RATE: 70 BPM | OXYGEN SATURATION: 96 % | SYSTOLIC BLOOD PRESSURE: 162 MMHG | RESPIRATION RATE: 16 BRPM

## 2024-04-02 PROBLEM — E87.1 HYPONATREMIA: Status: RESOLVED | Noted: 2024-04-01 | Resolved: 2024-04-02

## 2024-04-02 PROBLEM — K56.609 SMALL BOWEL OBSTRUCTION: Status: RESOLVED | Noted: 2023-01-26 | Resolved: 2024-04-02

## 2024-04-02 LAB
ANION GAP SERPL CALC-SCNC: 7 MMOL/L (ref 8–16)
BASOPHILS # BLD AUTO: 0.04 K/UL (ref 0–0.2)
BASOPHILS NFR BLD: 0.9 % (ref 0–1.9)
BUN SERPL-MCNC: 6 MG/DL (ref 8–23)
CALCIUM SERPL-MCNC: 8.5 MG/DL (ref 8.7–10.5)
CHLORIDE SERPL-SCNC: 110 MMOL/L (ref 95–110)
CO2 SERPL-SCNC: 22 MMOL/L (ref 23–29)
CREAT SERPL-MCNC: 0.6 MG/DL (ref 0.5–1.4)
DIFFERENTIAL METHOD BLD: ABNORMAL
EOSINOPHIL # BLD AUTO: 0.4 K/UL (ref 0–0.5)
EOSINOPHIL NFR BLD: 8.8 % (ref 0–8)
ERYTHROCYTE [DISTWIDTH] IN BLOOD BY AUTOMATED COUNT: 14.6 % (ref 11.5–14.5)
EST. GFR  (NO RACE VARIABLE): >60 ML/MIN/1.73 M^2
GLUCOSE SERPL-MCNC: 92 MG/DL (ref 70–110)
HCT VFR BLD AUTO: 35.7 % (ref 37–48.5)
HGB BLD-MCNC: 11.6 G/DL (ref 12–16)
IMM GRANULOCYTES # BLD AUTO: 0.02 K/UL (ref 0–0.04)
IMM GRANULOCYTES NFR BLD AUTO: 0.5 % (ref 0–0.5)
LYMPHOCYTES # BLD AUTO: 0.8 K/UL (ref 1–4.8)
LYMPHOCYTES NFR BLD: 19 % (ref 18–48)
MAGNESIUM SERPL-MCNC: 2 MG/DL (ref 1.6–2.6)
MCH RBC QN AUTO: 25 PG (ref 27–31)
MCHC RBC AUTO-ENTMCNC: 32.5 G/DL (ref 32–36)
MCV RBC AUTO: 77 FL (ref 82–98)
MONOCYTES # BLD AUTO: 0.4 K/UL (ref 0.3–1)
MONOCYTES NFR BLD: 10.2 % (ref 4–15)
NEUTROPHILS # BLD AUTO: 2.6 K/UL (ref 1.8–7.7)
NEUTROPHILS NFR BLD: 60.6 % (ref 38–73)
NRBC BLD-RTO: 0 /100 WBC
PHOSPHATE SERPL-MCNC: 1.9 MG/DL (ref 2.7–4.5)
PLATELET # BLD AUTO: 211 K/UL (ref 150–450)
PMV BLD AUTO: 9.2 FL (ref 9.2–12.9)
POTASSIUM SERPL-SCNC: 3.7 MMOL/L (ref 3.5–5.1)
RBC # BLD AUTO: 4.64 M/UL (ref 4–5.4)
SODIUM SERPL-SCNC: 139 MMOL/L (ref 136–145)
WBC # BLD AUTO: 4.31 K/UL (ref 3.9–12.7)

## 2024-04-02 PROCEDURE — 99238 HOSP IP/OBS DSCHRG MGMT 30/<: CPT | Mod: HCNC,,, | Performed by: STUDENT IN AN ORGANIZED HEALTH CARE EDUCATION/TRAINING PROGRAM

## 2024-04-02 PROCEDURE — 25000003 PHARM REV CODE 250: Mod: HCNC

## 2024-04-02 PROCEDURE — 80048 BASIC METABOLIC PNL TOTAL CA: CPT | Mod: HCNC | Performed by: STUDENT IN AN ORGANIZED HEALTH CARE EDUCATION/TRAINING PROGRAM

## 2024-04-02 PROCEDURE — 83735 ASSAY OF MAGNESIUM: CPT | Mod: HCNC | Performed by: STUDENT IN AN ORGANIZED HEALTH CARE EDUCATION/TRAINING PROGRAM

## 2024-04-02 PROCEDURE — 84100 ASSAY OF PHOSPHORUS: CPT | Mod: HCNC | Performed by: STUDENT IN AN ORGANIZED HEALTH CARE EDUCATION/TRAINING PROGRAM

## 2024-04-02 PROCEDURE — 85025 COMPLETE CBC W/AUTO DIFF WBC: CPT | Mod: HCNC | Performed by: STUDENT IN AN ORGANIZED HEALTH CARE EDUCATION/TRAINING PROGRAM

## 2024-04-02 PROCEDURE — 36415 COLL VENOUS BLD VENIPUNCTURE: CPT | Mod: HCNC | Performed by: STUDENT IN AN ORGANIZED HEALTH CARE EDUCATION/TRAINING PROGRAM

## 2024-04-02 PROCEDURE — 25000003 PHARM REV CODE 250: Mod: HCNC | Performed by: STUDENT IN AN ORGANIZED HEALTH CARE EDUCATION/TRAINING PROGRAM

## 2024-04-02 PROCEDURE — 1111F DSCHRG MED/CURRENT MED MERGE: CPT | Mod: HCNC,CPTII,, | Performed by: STUDENT IN AN ORGANIZED HEALTH CARE EDUCATION/TRAINING PROGRAM

## 2024-04-02 RX ORDER — SODIUM,POTASSIUM PHOSPHATES 280-250MG
2 POWDER IN PACKET (EA) ORAL ONCE
Status: COMPLETED | OUTPATIENT
Start: 2024-04-02 | End: 2024-04-02

## 2024-04-02 RX ORDER — POLYETHYLENE GLYCOL 3350 17 G/17G
17 POWDER, FOR SOLUTION ORAL DAILY
Refills: 0 | COMMUNITY
Start: 2024-04-02 | End: 2024-04-16

## 2024-04-02 RX ADMIN — POTASSIUM & SODIUM PHOSPHATES POWDER PACK 280-160-250 MG 2 PACKET: 280-160-250 PACK at 05:04

## 2024-04-02 RX ADMIN — POTASSIUM & SODIUM PHOSPHATES POWDER PACK 280-160-250 MG 2 PACKET: 280-160-250 PACK at 09:04

## 2024-04-02 NOTE — PLAN OF CARE
Problem: Adult Inpatient Plan of Care  Goal: Plan of Care Review  4/2/2024 0239 by Mandy Newman RN  Outcome: Ongoing, Progressing  Flowsheets (Taken 4/2/2024 0239)  Plan of Care Reviewed With: patient  4/2/2024 0238 by Mandy Newman RN  Outcome: Ongoing, Progressing  Flowsheets (Taken 4/2/2024 0238)  Plan of Care Reviewed With: patient  Goal: Patient-Specific Goal (Individualized)  4/2/2024 0239 by Mandy Newman RN  Outcome: Ongoing, Progressing  4/2/2024 0238 by Mandy Newman RN  Outcome: Ongoing, Progressing  Goal: Absence of Hospital-Acquired Illness or Injury  4/2/2024 0239 by Mandy Newman RN  Outcome: Ongoing, Progressing  4/2/2024 0238 by Mandy Newman RN  Outcome: Ongoing, Progressing  Intervention: Identify and Manage Fall Risk  4/2/2024 0239 by Mandy Newman RN  Flowsheets (Taken 4/2/2024 0239)  Safety Promotion/Fall Prevention:   assistive device/personal item within reach   Fall Risk reviewed with patient/family   high risk medications identified   nonskid shoes/socks when out of bed   medications reviewed  4/2/2024 0238 by Mandy Newman RN  Flowsheets (Taken 4/2/2024 0238)  Safety Promotion/Fall Prevention:   assistive device/personal item within reach   Fall Risk reviewed with patient/family   high risk medications identified   medications reviewed   nonskid shoes/socks when out of bed  Intervention: Prevent Skin Injury  4/2/2024 0239 by Mandy Newman RN  Flowsheets (Taken 4/2/2024 0239)  Skin Protection: adhesive use limited  4/2/2024 0238 by Mandy Newman RN  Flowsheets (Taken 4/2/2024 0238)  Skin Protection: adhesive use limited  Intervention: Prevent and Manage VTE (Venous Thromboembolism) Risk  4/2/2024 0239 by Mandy Newman RN  Flowsheets (Taken 4/2/2024 0239)  VTE Prevention/Management:   dorsiflexion/plantar flexion performed   ambulation promoted   ROM (active) performed  Range of Motion: active ROM (range of motion) encouraged  4/2/2024 0238 by Mandy Newman  RN  Flowsheets (Taken 4/2/2024 0238)  VTE Prevention/Management:   dorsiflexion/plantar flexion performed   ambulation promoted   ROM (active) performed  Range of Motion: active ROM (range of motion) encouraged  Intervention: Prevent Infection  4/2/2024 0239 by Mandy Newman RN  Flowsheets (Taken 4/2/2024 0239)  Infection Prevention:   hand hygiene promoted   rest/sleep promoted   single patient room provided  4/2/2024 0238 by Mandy Newman RN  Flowsheets (Taken 4/2/2024 0238)  Infection Prevention:   hand hygiene promoted   rest/sleep promoted   single patient room provided  Goal: Optimal Comfort and Wellbeing  4/2/2024 0239 by Mandy Newman RN  Outcome: Ongoing, Progressing  4/2/2024 0238 by Mandy Newman RN  Outcome: Ongoing, Progressing  Intervention: Monitor Pain and Promote Comfort  4/2/2024 0239 by Mandy Newman RN  Flowsheets (Taken 4/2/2024 0239)  Pain Management Interventions:   care clustered   medication offered   pillow support provided   position adjusted   quiet environment facilitated  4/2/2024 0238 by Mandy Newman RN  Flowsheets (Taken 4/2/2024 0238)  Pain Management Interventions:   care clustered   medication offered   pillow support provided   quiet environment facilitated  Intervention: Provide Person-Centered Care  4/2/2024 0239 by Mandy Newman RN  Flowsheets (Taken 4/2/2024 0239)  Trust Relationship/Rapport:   care explained   questions answered   choices provided   questions encouraged   emotional support provided   reassurance provided   empathic listening provided   thoughts/feelings acknowledged  4/2/2024 0238 by Mandy Newman RN  Flowsheets (Taken 4/2/2024 0238)  Trust Relationship/Rapport:   care explained   questions answered   choices provided   questions encouraged   emotional support provided   reassurance provided   empathic listening provided   thoughts/feelings acknowledged  Goal: Readiness for Transition of Care  4/2/2024 0239 by Mandy Newman RN  Outcome:  Ongoing, Progressing  4/2/2024 0238 by Mandy Newman RN  Outcome: Ongoing, Progressing     Problem: Fall Injury Risk  Goal: Absence of Fall and Fall-Related Injury  4/2/2024 0239 by Mandy Newman RN  Outcome: Ongoing, Progressing  4/2/2024 0238 by Mandy Newman RN  Outcome: Ongoing, Progressing  Intervention: Identify and Manage Contributors  4/2/2024 0239 by Mandy Newman RN  Flowsheets (Taken 4/2/2024 0239)  Self-Care Promotion:   independence encouraged   BADL personal objects within reach  Medication Review/Management:   medications reviewed   high-risk medications identified  4/2/2024 0238 by Mandy Newman RN  Flowsheets (Taken 4/2/2024 0238)  Self-Care Promotion:   independence encouraged   BADL personal objects within reach  Medication Review/Management:   medications reviewed   high-risk medications identified  Intervention: Promote Injury-Free Environment  4/2/2024 0239 by Mandy Newman RN  Flowsheets (Taken 4/2/2024 0239)  Safety Promotion/Fall Prevention:   assistive device/personal item within reach   Fall Risk reviewed with patient/family   high risk medications identified   nonskid shoes/socks when out of bed   medications reviewed  4/2/2024 0238 by Mandy Newman RN  Flowsheets (Taken 4/2/2024 0238)  Safety Promotion/Fall Prevention:   assistive device/personal item within reach   Fall Risk reviewed with patient/family   high risk medications identified   medications reviewed   nonskid shoes/socks when out of bed

## 2024-04-02 NOTE — DISCHARGE SUMMARY
[facility]  General Surgery  Discharge Summary      Patient Name: Meredith Keen  MRN: [id number]  Admission Date: 3/31/2024  Hospital Length of Stay: 2 days  Discharge Date and Time:  04/02/2024 7:13 AM  Attending Physician: Jacob Greco MD   Discharging Provider: Fede Mullins PA-C  Primary Care Provider: Jean Pena MD     HPI:   Meredith Keen is a 68yoF with a history of recurrent small bowel obstructions who presents with abdominal pain, distension and nausea. She has a past surgical history significant for robotic GIST of proximal stomach and small bowel obstruction that failed to resolve requiring exploratory laparotomy in 2022. She states that she was in her usual state of health until early this morning when she began with crampy abdominal pain and lower abdominal distension. She states that this pain is similar to her presentation in January of this year. She does endorse nausea and PO aversion but no vomiting at this time. She did have some bowel function this morning.      Work-up in the emergency room demonstrates largely normal lab work. A CT scan was performed which demonstrates dilated distal small intestine with an apparent transition point in her pelvis. Given her history, general surgery was consulted for evaluation.      On my exam, the patient is in apparent discomfort. Her lower abdomen is tender and distended. She does not demonstrate peritoneal signs. I explained that I will admit her to the hospital for conservative management of a small bowel obstruction, but that this could ultimately lead to a repeat operation. She voices understanding.      This encounter was mediated using a .     * No surgery found *     Hospital Course:   Patient was admitted to the general surgery service. she was made NPO, given IVF, as well as pain and nausea control. She underwent conservative m,management of SBO with bowel rest, NGT decompression, and gastrograffin  challenge. GGC with contrast into colon, had ROBF, diet progressed. The patient's clinical condition progressively improved. Patient was HDS throughout admission. By the time of discharge, she was meeting all discharge milestones, tolerating a diet without nausea or vomiting, pain was well controlled with oral medications, and she was ambulating without difficulty. Voiding appropriately. On hospital day 2,  the patient was discharged to home. The patient will follow up in her PCP's clinic in 2 weeks. Discussed POC and ED precautions with patient. Patient verbalized understanding and is agreeable to plan. All questions answered.    Please see hospital and op notes for further detail regarding patient's admission.    Patient's discharge was discussed with Dr. Greco.    Physical Exam  Vitals and nursing note reviewed.   Constitutional:       General: She is not in acute distress.     Appearance: She is not diaphoretic.      Comments: Room air   HENT:      Head: Normocephalic and atraumatic.      Mouth/Throat:      Mouth: Mucous membranes are moist.      Pharynx: Oropharynx is clear.   Eyes:      Extraocular Movements: Extraocular movements intact.      Conjunctiva/sclera: Conjunctivae normal.   Cardiovascular:      Rate and Rhythm: Normal rate.   Pulmonary:      Effort: Pulmonary effort is normal. No respiratory distress.   Abdominal:      General: There is no distension.      Palpations: Abdomen is soft.      Tenderness: There is no abdominal tenderness. There is no guarding or rebound.   Musculoskeletal:         General: No deformity.      Cervical back: Normal range of motion.   Skin:     General: Skin is warm and dry.   Neurological:      Mental Status: She is alert and oriented to person, place, and time.              Consults (From admission, onward)          Status Ordering Provider     Inpatient consult to General surgery  Once        Provider:  (Not yet assigned)    Completed [facility]               Indwelling Lines/Drains at time of discharge:   Lines/Drains/Airways       None                   Significant Diagnostic Studies: Labs: CMP   Recent Labs   Lab 03/31/24  1314 04/01/24  0355 04/02/24  0324    134* 139   K 4.0 3.9 3.7    104 110   CO2 24 23 22*   * 116* 92   BUN 20 17 6*   CREATININE 0.7 0.6 0.6   CALCIUM 10.2 8.2* 8.5*   PROT 7.2  --   --    ALBUMIN 4.0  --   --    BILITOT 0.6  --   --    ALKPHOS 52*  --   --    AST 20  --   --    ALT 18  --   --    ANIONGAP 12 7* 7*   , CBC   Recent Labs   Lab 03/31/24  1314 03/31/24  1320 04/01/24  0355 04/02/24  0324   WBC 10.88  --  5.38 4.31   HGB 13.0  --  11.0* 11.6*   HCT 39.7   < > 33.4* 35.7*     --  226 211    < > = values in this interval not displayed.   , and All labs within the past 24 hours have been reviewed  Radiology: CT scan: CT ABDOMEN PELVIS WITH CONTRAST:   Results for orders placed or performed during the hospital encounter of 03/31/24   CT Abdomen Pelvis With IV Contrast NO Oral Contrast    Narrative    EXAMINATION:  CT ABDOMEN PELVIS WITH IV CONTRAST    CLINICAL HISTORY:  Bowel obstruction suspected;    TECHNIQUE:  Axial images of the abdomen and pelvis were acquired  after the use of 75 cc [id number] IV contrast. No oral contrast.  Coronal and sagittal reconstructions were also obtained    COMPARISON:  CT abdomen pelvis from 01/01/2024    FINDINGS:  Heart: Normal in size. No pericardial effusion. No significant calcific coronary atherosclerosis.    Lungs: Visualized portions of the lungs are clear.    Liver: Normal in size and contour.  No focal hepatic lesion.    Gallbladder: Gallbladder is surgically absent.    Bile Ducts: Mild intrahepatic biliary ductal dilatation.  Stable dilatation of the common bile duct measuring up 2 1.5 cm.    Pancreas: No mass or peripancreatic fat stranding.    Spleen: Unremarkable.    Stomach and duodenum: Unremarkable.    Adrenals: Unremarkable.    Kidneys/ Ureters: Normal in  size and location. Normal enhancement. No hydronephrosis or nephrolithiasis. No ureteral dilatation.    Bladder: No evidence of wall thickening.    Reproductive organs: Unremarkable.    Bowel/Mesentery: Multiple dilated fluid-filled loops of small bowel with small bowel feces sign.  There is a likely transition point in the mid pelvis (series 2, image 133) or at the level of the anastomosis status post resection (series 2, image 139).  Distal small bowel is decompressed.  Air and stool within the colon.    Lymph nodes: No lymphadenapathy.    Vasculature: No aneurysm. Moderate calcific atherosclerosis.    Abdominal wall:  Unremarkable.    Bones: No acute fracture. No suspicious osseous lesions.      Impression    Findings consistent with small-bowel obstruction with likely transition point in the mid pelvis or at the level of the anastomosis status post small bowel resection.  Surgical consultation is recommended.    Status post cholecystectomy with stable intra and extrahepatic biliary ductal dilatation.    Additional findings as described above.      Electronically signed by: aJmal Johnson MD  Date:    03/31/2024  Time:    13:58     X-Ray Abdomen AP 1 View [[id number]] Resulted: 04/01/24 1303   Order Status: Completed Updated: 04/01/24 1305   Narrative:     EXAMINATION:  XR ABDOMEN AP 1 VIEW    CLINICAL HISTORY:  4 hr GGC film;    TECHNIQUE:  AP View(s) of the abdomen was performed.    COMPARISON:  N 03/31/2024 one    FINDINGS:  NG tube in the stomach.  Contrast material identified in the distal small bowel loops and in the entire colon.  Postoperative changes in the right upper quadrant of the abdomen.  No definite free air noted.   Impression:       See above      Electronically signed by: Delfion Gimenez MD  Date: 04/01/2024  Time: 13:03   X-Ray Abdomen AP 1 View [[id number]] Resulted: 03/31/24 1646   Order Status: Completed Updated: 03/31/24 1649   Narrative:     EXAMINATION:  XR ABDOMEN AP 1 VIEW    CLINICAL  HISTORY:  NG tube placement;    TECHNIQUE:  AP View(s) of the abdomen was performed.    COMPARISON:  01/01/2024    FINDINGS:  Nasogastric tube tip and side hole projects over the expected location of the gastric lumen.  There are prominent bowel loops, please see CT 03/31/2024.  There is residual contrast within the bilateral renal collecting systems.  The visualized lower lung zones are grossly clear.   Impression:       As above      Electronically signed by: Hiro Sousa MD  Date: 03/31/2024  Time: 16:46   CT Abdomen Pelvis With IV Contrast NO Oral Contrast [[id number]] Resulted: 03/31/24 1358   Order Status: Completed Updated: 03/31/24 1400   Narrative:     EXAMINATION:  CT ABDOMEN PELVIS WITH IV CONTRAST    CLINICAL HISTORY:  Bowel obstruction suspected;    TECHNIQUE:  Axial images of the abdomen and pelvis were acquired  after the use of 75 cc [id number] IV contrast. No oral contrast.  Coronal and sagittal reconstructions were also obtained    COMPARISON:  CT abdomen pelvis from 01/01/2024    FINDINGS:  Heart: Normal in size. No pericardial effusion. No significant calcific coronary atherosclerosis.    Lungs: Visualized portions of the lungs are clear.    Liver: Normal in size and contour.  No focal hepatic lesion.    Gallbladder: Gallbladder is surgically absent.    Bile Ducts: Mild intrahepatic biliary ductal dilatation.  Stable dilatation of the common bile duct measuring up 2 1.5 cm.    Pancreas: No mass or peripancreatic fat stranding.    Spleen: Unremarkable.    Stomach and duodenum: Unremarkable.    Adrenals: Unremarkable.    Kidneys/ Ureters: Normal in size and location. Normal enhancement. No hydronephrosis or nephrolithiasis. No ureteral dilatation.    Bladder: No evidence of wall thickening.    Reproductive organs: Unremarkable.    Bowel/Mesentery: Multiple dilated fluid-filled loops of small bowel with small bowel feces sign.  There is a likely transition point in the mid pelvis (series 2, image  133) or at the level of the anastomosis status post resection (series 2, image 139).  Distal small bowel is decompressed.  Air and stool within the colon.    Lymph nodes: No lymphadenapathy.    Vasculature: No aneurysm. Moderate calcific atherosclerosis.    Abdominal wall:  Unremarkable.    Bones: No acute fracture. No suspicious osseous lesions.   Impression:       Findings consistent with small-bowel obstruction with likely transition point in the mid pelvis or at the level of the anastomosis status post small bowel resection.  Surgical consultation is recommended.    Status post cholecystectomy with stable intra and extrahepatic biliary ductal dilatation.    Additional findings as described above.       Pending Diagnostic Studies:       None            Final Active Diagnoses:      Problems Resolved During this Admission:    Diagnosis Date Noted Date Resolved POA    PRINCIPAL PROBLEM:  Small bowel obstruction [K56.609] 01/26/2023 04/02/2024 Yes    Hyponatremia [E87.1] 04/01/2024 04/02/2024 Yes        Discharged Condition: good    Disposition: Home or Self Care    Follow Up:   Follow-up Information       Jean Pena MD Follow up in 2 week(s).    Specialty: Internal Medicine  Why: Hospital follow up  Contact information:  [facility] [address]  [phone]                             Patient Instructions:      Diet Adult Regular     Notify your health care provider if you experience any of the following:  increased confusion or weakness     Notify your health care provider if you experience any of the following:  persistent dizziness, light-headedness, or visual disturbances     Notify your health care provider if you experience any of the following:  worsening rash     Notify your health care provider if you experience any of the following:  severe persistent headache     Notify your health care provider if you experience any of the following:  difficulty breathing or increased cough  "    Notify your health care provider if you experience any of the following:  redness, tenderness, or signs of infection (pain, swelling, redness, odor or green/yellow discharge around incision site)     Notify your health care provider if you experience any of the following:  severe uncontrolled pain     Notify your health care provider if you experience any of the following:  persistent nausea and vomiting or diarrhea     Notify your health care provider if you experience any of the following:  temperature >100.4     Activity as tolerated       Medications:  Reconciled Home Medications:      Medication List        START taking these medications      polyethylene glycol 17 gram Pwpk  Commonly known as: GLYCOLAX  Take 17 g by mouth once daily. for 14 days            CONTINUE taking these medications      amLODIPine 2.5 MG tablet  Commonly known as: NORVASC  Take 1 tablet (2.5 mg total) by mouth once daily.     aspirin 81 MG Chew  Take 1 tablet (81 mg total) by mouth 3 (three) times a week.     atorvastatin 20 MG tablet  Commonly known as: LIPITOR  Take 1 tablet (20 mg total) by mouth once daily.     CREON 36,000-114,000- 180,000 unit Cpdr  Generic drug: lipase-protease-amylase  Take 1 capsule by mouth 4 (four) times daily.     DENTA 5000 PLUS 1.1 % Crea  Generic drug: fluoride (sodium)  BRUSH ON SENSITIVE TEETH 3-5 MINUTES TWICE A DAY     EScitalopram oxalate 10 MG tablet  Commonly known as: LEXAPRO  Take 1 tablet (10 mg total) by mouth once daily.     losartan 100 MG tablet  Commonly known as: COZAAR  Take 1 tablet (100 mg total) by mouth once daily.     methocarbamoL 500 MG Tab  Commonly known as: ROBAXIN  Take 1 tablet (500 mg total) by mouth 3 (three) times daily as needed (neck pain).     * pen needle, diabetic 31 gauge x 1/4" Ndle  Use with Forteo injection daily     * BD ULTRA-FINE SHORT PEN NEEDLE 31 gauge x 5/16" Ndle  Generic drug: pen needle, diabetic  Inject into the skin.     predniSONE 20 MG " tablet  Commonly known as: DELTASONE  Take 2 tablets (40 mg total) by mouth once daily. for 5 days           * This list has 2 medication(s) that are the same as other medications prescribed for you. Read the directions carefully, and ask your doctor or other care provider to review them with you.                    Patient was seen and examined on the date of discharge and determined to be suitable for discharge.  Total time spent preparing discharge services: 20 minutes.  Time was spent speaking with consultants and case management, reviewing records, and/or discussing the plan of care with patient/family.        Fede Mullins PA-C  General Surgery   [facility]

## 2024-04-02 NOTE — PLAN OF CARE
04/02/24 0929   Final Note   Assessment Type Final Discharge Note   Anticipated Discharge Disposition Home   What phone number can be called within the next 1-3 days to see how you are doing after discharge? [phone]   Hospital Resources/Appts/Education Provided Provided patient/caregiver with written discharge plan information;Provided education on problems/symptoms using teachback;Appointments scheduled and added to AVS         Patient will discharge home with family. No needs at discharge stated by medical team. Patient's daughter will transport her home.

## 2024-04-08 ENCOUNTER — PATIENT MESSAGE (OUTPATIENT)
Dept: ADMINISTRATIVE | Facility: CLINIC | Age: 69
End: 2024-04-08
Payer: MEDICARE

## 2024-04-08 ENCOUNTER — PATIENT OUTREACH (OUTPATIENT)
Dept: ADMINISTRATIVE | Facility: CLINIC | Age: 69
End: 2024-04-08
Payer: MEDICARE

## 2024-04-08 NOTE — PROGRESS NOTES
C3 nurse attempted to contact Meredith Keen for a TCC post hospital discharge follow up call. No answer. Left voicemail with callback information. The patient has a scheduled HOS appointment with Jean Pena MD on 04/19/24 @ [id number].

## 2024-04-09 NOTE — PROGRESS NOTES
2nd attempt made to reach patient for TCC call. Left voicemail please call [phone] and leave first name, last name and  for Stevo. I will return your call.

## 2024-04-09 NOTE — PROGRESS NOTES
3rd Attempt made to reach patient for TCC call. Left voicemail please call [phone] leave first name, last name, and  for Stevo.  I will return your call.

## 2024-04-23 ENCOUNTER — TELEPHONE (OUTPATIENT)
Dept: GASTROENTEROLOGY | Facility: CLINIC | Age: 69
End: 2024-04-23
Payer: MEDICARE

## 2024-04-23 NOTE — TELEPHONE ENCOUNTER
----- Message from Kerri Ramachandran sent at 4/23/2024  9:18 AM CDT -----  Type:  Patient Returning Call    Who Called:Sigrid Cuellar/ daughter   Would the patient rather a call back or a response via [facility]? Call back  Best Call Back Number:[phone]  Additional Information: need to reschedule appt.. pt is out of town

## 2024-04-26 ENCOUNTER — PATIENT MESSAGE (OUTPATIENT)
Dept: ENDOSCOPY | Facility: HOSPITAL | Age: 69
End: 2024-04-26
Payer: MEDICARE

## 2024-04-30 ENCOUNTER — OFFICE VISIT (OUTPATIENT)
Facility: CLINIC | Age: 69
End: 2024-04-30
Payer: MEDICARE

## 2024-04-30 VITALS
TEMPERATURE: 98 F | DIASTOLIC BLOOD PRESSURE: 69 MMHG | BODY MASS INDEX: 22.82 KG/M2 | HEART RATE: 75 BPM | OXYGEN SATURATION: 99 % | SYSTOLIC BLOOD PRESSURE: 143 MMHG | WEIGHT: 124.75 LBS

## 2024-04-30 DIAGNOSIS — K21.00 GASTROESOPHAGEAL REFLUX DISEASE WITH ESOPHAGITIS WITHOUT HEMORRHAGE: Primary | ICD-10-CM

## 2024-04-30 DIAGNOSIS — M54.2 CERVICALGIA: ICD-10-CM

## 2024-04-30 DIAGNOSIS — I10 ESSENTIAL HYPERTENSION: ICD-10-CM

## 2024-04-30 PROBLEM — D72.829 LEUKOCYTOSIS: Status: RESOLVED | Noted: 2019-11-25 | Resolved: 2024-04-30

## 2024-04-30 PROCEDURE — 99499 UNLISTED E&M SERVICE: CPT | Mod: HCNC,S$GLB,, | Performed by: HOSPITALIST

## 2024-04-30 PROCEDURE — 99999 PR PBB SHADOW E&M-EST. PATIENT-LVL III: CPT | Mod: PBBFAC,HCNC,, | Performed by: HOSPITALIST

## 2024-04-30 RX ORDER — FAMOTIDINE 20 MG/1
20 TABLET, FILM COATED ORAL 2 TIMES DAILY PRN
Qty: 60 TABLET | Refills: 11 | Status: SHIPPED | OUTPATIENT
Start: 2024-04-30 | End: 2025-04-30

## 2024-04-30 RX ORDER — GADOBUTROL 604.72 MG/ML
INJECTION INTRAVENOUS
COMMUNITY
Start: 2024-03-08 | End: 2024-04-30 | Stop reason: ALTCHOICE

## 2024-04-30 RX ORDER — METHOCARBAMOL 500 MG/1
500 TABLET, FILM COATED ORAL 3 TIMES DAILY PRN
Start: 2024-04-30
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
## 2024-04-30 NOTE — ASSESSMENT & PLAN NOTE
X-ray of C-spine done in interim shows disc space narrowing from C5 to 7 as well as a 2 mm C6 retrolisthesis.  She denies any symptomatic improvement with the methocarbamol and prednisone, but the improvement in her upper extremity myelopathy symptoms may be attributable to the steroid.  No contact from PT for scheduling as of yet; elicited from patient today that she prefers her daughters be contacted to manage her appointments.  Chart advisory placed specifying her daughter Thy as primary contact for scheduling.  We will [place] back with PT to contact her to schedule.  Her headaches still most likely represent referred C-spine symptoms.  Since the methocarbamol does not affect the symptoms or cause any drowsiness at that dose I advised her to go up to taking 1-1/2 tablets for 750 mg total t.i.d. as needed, and advised her that this should improve with PT.

## 2024-04-30 NOTE — Clinical Note
She still needs to get scheduled with PT; can you have them reach out to her daughter Thy to do that?

## 2024-04-30 NOTE — ASSESSMENT & PLAN NOTE
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  Her nighttime cough likely represents LPR.  Counseled patient on lifestyle interventions to reduce reflux symptoms and provided informational handout in Estonian.  Prescribing Pepcid 20 mg b.i.d. p.r.n., and advised her in the short term to take it before her evening meal and see if this resolves the cough.  If not we will discuss ENT referral.

## 2024-04-30 NOTE — ASSESSMENT & PLAN NOTE
BP slightly above goal but does have some acute symptoms as potential confounders so we will reassess at follow-up visit.

## 2024-05-07 ENCOUNTER — TELEPHONE (OUTPATIENT)
Facility: CLINIC | Age: 69
End: 2024-05-07
Payer: MEDICARE

## 2024-05-07 ENCOUNTER — CLINICAL SUPPORT (OUTPATIENT)
Dept: REHABILITATION | Facility: HOSPITAL | Age: 69
End: 2024-05-07
Payer: MEDICARE

## 2024-05-07 DIAGNOSIS — M54.2 CERVICALGIA: ICD-10-CM

## 2024-05-07 DIAGNOSIS — R51.9 RECURRENT OCCIPITAL HEADACHE: Primary | ICD-10-CM

## 2024-05-07 PROCEDURE — 97161 PT EVAL LOW COMPLEX 20 MIN: CPT
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
## 2024-05-07 NOTE — TELEPHONE ENCOUNTER
----- Message from Jean Pena MD sent at 5/7/2024  2:48 PM CDT -----  Regarding: FW: Physical Therapy Evaluation  Please call pt's daughter Thy and let her know we placed a referral to Neurology for her headaches, so they will likely be reaching out to her to schedule an appointment.  ----- Message -----  From: Millie Lundberg, ROSALBA  Sent: 5/7/2024  12:05 PM CDT  To: Jean Pena MD  Subject: Physical Therapy Evaluation                      [facility] Dr. Pena,  Great to speak with you again.  I just evaluated our patient, Ms. Meredith Keen, and unfortunately did not find an orthopedic reproduction of her pain in her neck or headache pain. Her cervical range of motion is normal, she has good strength in deep cervical flexors, there was no reproduction with joint accessory mobility, and no pain with any of the examination. She may be more of a candidate for the headache clinic, as I don't think her symptoms have an orthopedic cause.  Please let me know what your thoughts are.    Millie Lundberg, PT DPT  Board Certified in Orthopedic Physical Therapy

## 2024-05-07 NOTE — PROGRESS NOTES
Contacted by physical therapist who evaluated patient today for presumed cervicalgia.  She found that her exam was not consistent with a cervical spine etiology for her symptoms.  This may represent a cephalalgia so we will place referral to Neurology.  I would be hesitant to prescribe anything present without being able to have a direct conversation with the patient in a  to discuss therapies and risk/benefit.
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
## 2024-05-22 DIAGNOSIS — F41.8 DEPRESSION WITH ANXIETY: ICD-10-CM

## 2024-05-22 RX ORDER — ESCITALOPRAM OXALATE 10 MG/1
10 TABLET ORAL
Qty: 90 TABLET | Refills: 3 | Status: SHIPPED | OUTPATIENT
Start: 2024-05-22

## 2024-05-22 NOTE — TELEPHONE ENCOUNTER
Refill Routing Note   Medication(s) are not appropriate for processing by [facility] for the following reason(s):        Non-participating provider    [facility] action(s):  Route             Appointments  past 12m or future 3m with PCP    Date Provider   Last Visit   4/30/2024 Jean Pena MD   Next Visit   5/28/2024 Jean Pena MD   ED visits in past 90 days: 0        Note composed:10:12 AM 05/22/2024

## 2024-05-23 ENCOUNTER — OFFICE VISIT (OUTPATIENT)
Dept: ENDOCRINOLOGY | Facility: CLINIC | Age: 69
End: 2024-05-23
Payer: MEDICARE

## 2024-05-23 VITALS
HEART RATE: 81 BPM | WEIGHT: 116.19 LBS | DIASTOLIC BLOOD PRESSURE: 66 MMHG | SYSTOLIC BLOOD PRESSURE: 104 MMHG | BODY MASS INDEX: 21.25 KG/M2

## 2024-05-23 DIAGNOSIS — E55.9 VITAMIN D DEFICIENCY: ICD-10-CM

## 2024-05-23 DIAGNOSIS — I10 ESSENTIAL HYPERTENSION: ICD-10-CM

## 2024-05-23 DIAGNOSIS — M81.0 AGE-RELATED OSTEOPOROSIS WITHOUT CURRENT PATHOLOGICAL FRACTURE: Primary | ICD-10-CM

## 2024-05-23 PROCEDURE — 3288F FALL RISK ASSESSMENT DOCD: CPT | Mod: HCNC,CPTII,S$GLB, | Performed by: HOSPITALIST

## 2024-05-23 PROCEDURE — 1101F PT FALLS ASSESS-DOCD LE1/YR: CPT | Mod: HCNC,CPTII,S$GLB, | Performed by: HOSPITALIST

## 2024-05-23 PROCEDURE — 3074F SYST BP LT 130 MM HG: CPT | Mod: HCNC,CPTII,S$GLB, | Performed by: HOSPITALIST

## 2024-05-23 PROCEDURE — 99999 PR PBB SHADOW E&M-EST. PATIENT-LVL III: CPT | Mod: PBBFAC,HCNC,, | Performed by: HOSPITALIST

## 2024-05-23 PROCEDURE — 4010F ACE/ARB THERAPY RXD/TAKEN: CPT | Mod: HCNC,CPTII,S$GLB, | Performed by: HOSPITALIST

## 2024-05-23 PROCEDURE — 3008F BODY MASS INDEX DOCD: CPT | Mod: HCNC,CPTII,S$GLB, | Performed by: HOSPITALIST

## 2024-05-23 PROCEDURE — 3078F DIAST BP <80 MM HG: CPT | Mod: HCNC,CPTII,S$GLB, | Performed by: HOSPITALIST

## 2024-05-23 PROCEDURE — 1159F MED LIST DOCD IN RCRD: CPT | Mod: HCNC,CPTII,S$GLB, | Performed by: HOSPITALIST

## 2024-05-23 PROCEDURE — 99214 OFFICE O/P EST MOD 30 MIN: CPT | Mod: HCNC,S$GLB,, | Performed by: HOSPITALIST

## 2024-05-23 NOTE — ASSESSMENT & PLAN NOTE
- advised that she start vitamin D3 to 4000 IU daily.  Written instruction given 5/23/2024  - repeat lab work every 6 months

## 2024-05-23 NOTE — Clinical Note
Please schedule this patient her Prolia injection soon, she last got her injection in 11/3/2023, she is now over due.  Needs to get Prolia injection every 6 months.

## 2024-05-23 NOTE — ASSESSMENT & PLAN NOTE
- Patient is here for evaluation and management of osteoporosis, Diagnosis on DXA in 5/19/2022, severe osteoporosis T-score-3.2 in femoral neck  - history of bowel obstruction requiring ex lap.  Would avoid the use of oral bisphosphonate  - Forteo daily injection, START 8/2022- ENDED: 9/27/2023, ended due to cost  - DXA done 5/19/2022 compared to 9/21/2023:  5.2% improvement in lumbar spine, 2.3% improvement in femoral neck.  - After the bone density done on 09/2023, patient and family decided to switch to SC Prolia every 6 months due to cost of Forteo    Plan  - Continue SC Prolia every 6 months.  First injection 11/3/2023., 1 injection so far. Over due for her next injection  - Reassurance given to patient that her arthralgias are not related to Prolia, as this has been a chronic issue>> offered to send her to Orthopedic surgery.  She declined  - advised patient that she needs to take 500-750 mg of calcium carbonate regularly, advised patient she needs to start taking vitamin D3 4000 IU regularly.  She verbalized that she will start ASAP  - Recommend the patient take sufficient calcium and vitamin D3 OTC  - Check routine lab work: check Renal Panel, vitamin-D regularly   - Fall precautions/Exercise regimen: advised, (weight bearing exercises recommended)  - Routine dental health screening advised, dental cleaning every 6 months  - Next DXA: 2 years from most current, 5/2026  - Routine follow-up with me every 6 months  - message sent to [facility] infusion to schedule Prolia ASAP

## 2024-05-23 NOTE — PROGRESS NOTES
Subjective:      Patient ID: Meredith Keen is a 68 y.o. female presented to [facility] on 5/23/2024.  Chief Complaint:  Osteoporosis      History of Present Illness: Meredith Keen is a 68 y.o. Citizen of [place] speaking female here for osteoporosis  Other significant past medical history:  Hypertension, small bowel obstruction with recent ex lap due to adhesion  Daughter in law present for office visit, who is physician assistant at [employer]    Interval history:  Patient is here for follow-up osteoporosis, cessation of Forteo 09/2023 due to cost.  She was then given SC Prolia started on 11/3/2023.  One injection so far.  Unfortunately had a hospital stay in 05/2024 due to stomach issue leading to miss dose.    Now over due for her next Prolia injection   Patient reports occasional arthralgias of her knees.  Currently not taking calcium or vitamin-D   Patient denies any falls.  No fractures.    Saw dentist recently no dental issue.  Daughter not present this office visit  Office visit conducted in Citizen of [place]      Osteoporosis  - Diagnosis on DXA in 5/19/2022, denies prior diagnosis  - Recent:  SBO leading to ex lap 6/2022, due to adhesions.  Now has resolved.  - Recent lab work show mild anemia, hypocalcemia:  Post surgical  - On initial visit 07/2022:  It was decided to start patient on Forteo daily injection, START 8/2022- ENDED: 9/27/2023, ended due to cost  - DXA done 5/19/2022 compared to 9/21/2023:  5.2% improvement in lumbar spine, 2.3% improvement in femoral neck.  - After the bone density done on 09/2023, patient and family decided to switch to SC Prolia every 6 months due to cost of Forteo  - CURRENT Therapy: First Prolia injection 11/3/2023, 1 injection so far at [facility]    Osteoporosis Risk Factor Assessment:  History of falls over the last 2 years: yes, 3 weeks ago, fell trip, injured her knee  History of fractures to wrist, hip or spine:no  History of loss of height of more than 1 1/2 to 2  inches: no  Family history of osteoporosis: no  Family history of fractures of hip: no    Age of menopause: 2010, hysterectomy, no: use of HRT  Tobacco use, including use in the past:  no   EtOH use? No     no: history of CKD3   no: history of thyroid disease   no: history of kidney stones    Denies active malignancy, history of malignancy the involved the bone, prior radiation treatment, or unexplained elevations of alk phos on labs.  No current:  Gastrointestinal issues including:  Chronic diarrhea, celiac disease, ulcerative colitis/Crohn or h/o malabsorption or gastric surgery  Diet: Does eat regularly Cheese/Dairy/Milk/Greens    Weight bearing exercise?   no (no walking)  Dental work planned? no, dentures    Recent DXA:   09/21/2023  The bone mineral density measured from L1 through L4 is 0.954g/cm2.  This corresponds to a T score of -1.9 and a Z score of 0.2.  Increase in bone mineral density by 5.2%.  The bone mineral density within the left femoral neck measures 0.588 g/cm2.  This corresponds to a T score of -3.2 and a Z score of -1.2.  The bone mineral density within the right femoral neck measures 0.754 g/cm2.  This corresponds to a T score of -2.0 and a Z score of -0.1.  No significant interval change.     FRAX RESULTS:  10-year Probability of Fracture:  Major Osteoporotic Fracture 11.2%.  Hip Fracture 4.4%.     Impression:  Osteoporosis left femoral neck.  Osteopenia right femoral neck.  No significant interval change.  Osteopenia lumbar spine with increase in bone mineral density by 5.2%.       5/19/2022  The L1 to L4 vertebral bone mineral density is equal to 0.907 g/cm squared with a T score of -2.3.  The left femoral neck bone mineral density is equal to 0.585 g/cm squared with a T score of -3.3.  The total hip bone mineral density is equal to 0.669 g/cm squared with a T score of -2.7.  There is a 10.9% risk of a major osteoporotic fracture and a 4.1% risk of hip fracture in the next 10 years  (FRAX).     Impression:  Osteoporosis of the hips, osteopenia of the spine    Lab work reviewed  Lab Results   Component Value Date    PTH 63.2 07/05/2022    GKZBZOUS14IN 21 (L) 03/28/2024    [id number] 29 (L) 07/05/2022    CALCIUM 8.5 (L) 04/02/2024    CALCIUM 8.2 (L) 04/01/2024    CALCIUM 10.2 03/31/2024    PHOS 1.9 (L) 04/02/2024    PHOS 2.8 04/01/2024    PHOS 3.4 03/28/2024    ALKPHOS 52 (L) 03/31/2024    ALKPHOS 80 01/01/2024    ALKPHOS 97 12/21/2023    TSH 1.172 07/05/2022      Reviewed past surgical, medical, family, social history and updated as appropriate.  Review of Systems: see above    Objective:   /66   Pulse 81   Wt 52.7 kg (116 lb 3.2 oz)   BMI 21.25 kg/m²     Body mass index is 21.25 kg/m².  Vital signs reviewed    Physical Exam  Vitals and nursing note reviewed.   Constitutional:       Appearance: Normal appearance. She is well-developed. She is not ill-appearing.   Neck:      Thyroid: No thyromegaly.   Pulmonary:      Effort: Pulmonary effort is normal. No respiratory distress.   Musculoskeletal:         General: Normal range of motion.      Cervical back: Normal range of motion.   Neurological:      General: No focal deficit present.      Mental Status: She is alert. Mental status is at baseline.   Psychiatric:         Mood and Affect: Mood normal.         Behavior: Behavior normal.       Lab Reviewed:  See results in subjective  Lab Results   Component Value Date    HGBA1C 6.0 (H) 06/06/2023     Lab Results   Component Value Date    CHOL 184 06/06/2023    HDL 53 06/06/2023    LDLCALC 113.8 06/06/2023    TRIG 86 06/06/2023    CHOLHDL 28.8 06/06/2023     Lab Results   Component Value Date     04/02/2024    K 3.7 04/02/2024     04/02/2024    CO2 22 (L) 04/02/2024    GLU 92 04/02/2024    BUN 6 (L) 04/02/2024    CREATININE 0.6 04/02/2024    CALCIUM 8.5 (L) 04/02/2024    PHOS 1.9 (L) 04/02/2024    PROT 7.2 03/31/2024    ALBUMIN 4.0 03/31/2024    BILITOT 0.6 03/31/2024    ALKPHOS  52 (L) 03/31/2024    AST 20 03/31/2024    ALT 18 03/31/2024    ANIONGAP 7 (L) 04/02/2024    ESTGFRAFRICA >60 07/05/2022    EGFRNONAA >60 07/05/2022    TSH 1.172 07/05/2022    PTH 63.2 07/05/2022    [id number] 21 (L) 03/28/2024     Assessment     1. Age-related osteoporosis without current pathological fracture  COMPREHENSIVE METABOLIC PANEL    RENAL FUNCTION PANEL      2. Vitamin D deficiency        3. Essential hypertension          Plan     Age related osteoporosis  - Patient is here for evaluation and management of osteoporosis, Diagnosis on DXA in 5/19/2022, severe osteoporosis T-score-3.2 in femoral neck  - history of bowel obstruction requiring ex lap.  Would avoid the use of oral bisphosphonate  - Forteo daily injection, START 8/2022- ENDED: 9/27/2023, ended due to cost  - DXA done 5/19/2022 compared to 9/21/2023:  5.2% improvement in lumbar spine, 2.3% improvement in femoral neck.  - After the bone density done on 09/2023, patient and family decided to switch to SC Prolia every 6 months due to cost of Forteo    Plan  - Continue SC Prolia every 6 months.  First injection 11/3/2023., 1 injection so far. Over due for her next injection  - Reassurance given to patient that her arthralgias are not related to Prolia, as this has been a chronic issue>> offered to send her to Orthopedic surgery.  She declined  - advised patient that she needs to take 500-750 mg of calcium carbonate regularly, advised patient she needs to start taking vitamin D3 4000 IU regularly.  She verbalized that she will start ASAP  - Recommend the patient take sufficient calcium and vitamin D3 OTC  - Check routine lab work: check Renal Panel, vitamin-D regularly   - Fall precautions/Exercise regimen: advised, (weight bearing exercises recommended)  - Routine dental health screening advised, dental cleaning every 6 months  - Next DXA: 2 years from most current, 5/2026  - Routine follow-up with me every 6 months  - message sent to [facility] to schedule Prolia ASAP    Vitamin D deficiency  - advised that she start vitamin D3 to 4000 IU daily.  Written instruction given 5/23/2024  - repeat lab work every 6 months    Essential hypertension  - monitor and management with PCP    Hypercalcemia  - noted on lab work due to recent hospital admission.  - advise some over-the-counter calcium supplement repeat lab work prior to next Prolia injection      Advised patient to follow up with PCP for routine health maintenance care.   RTC in 6 months to continue monitor bone health    Mikal Duarte M.D.  Endocrinology  [facility]  5/23/2024      Disclaimer: This note has been generated in part with the use of voice-recognition software. There may be typographical errors that have been missed during proof-reading.

## 2024-05-27 ENCOUNTER — LAB VISIT (OUTPATIENT)
Dept: LAB | Facility: HOSPITAL | Age: 69
End: 2024-05-27
Attending: HOSPITALIST
Payer: MEDICARE

## 2024-05-27 DIAGNOSIS — M81.0 AGE-RELATED OSTEOPOROSIS WITHOUT CURRENT PATHOLOGICAL FRACTURE: ICD-10-CM

## 2024-05-27 LAB
ALBUMIN SERPL BCP-MCNC: 4.1 G/DL (ref 3.5–5.2)
ALP SERPL-CCNC: 59 U/L (ref 55–135)
ALT SERPL W/O P-5'-P-CCNC: 14 U/L (ref 10–44)
ANION GAP SERPL CALC-SCNC: 9 MMOL/L (ref 8–16)
AST SERPL-CCNC: 20 U/L (ref 10–40)
BILIRUB SERPL-MCNC: 0.5 MG/DL (ref 0.1–1)
BUN SERPL-MCNC: 20 MG/DL (ref 8–23)
CALCIUM SERPL-MCNC: 10 MG/DL (ref 8.7–10.5)
CHLORIDE SERPL-SCNC: 110 MMOL/L (ref 95–110)
CO2 SERPL-SCNC: 23 MMOL/L (ref 23–29)
CREAT SERPL-MCNC: 0.7 MG/DL (ref 0.5–1.4)
EST. GFR  (NO RACE VARIABLE): >60 ML/MIN/1.73 M^2
GLUCOSE SERPL-MCNC: 100 MG/DL (ref 70–110)
POTASSIUM SERPL-SCNC: 4.8 MMOL/L (ref 3.5–5.1)
PROT SERPL-MCNC: 7.1 G/DL (ref 6–8.4)
SODIUM SERPL-SCNC: 142 MMOL/L (ref 136–145)

## 2024-05-27 PROCEDURE — 80053 COMPREHEN METABOLIC PANEL: CPT | Mod: HCNC | Performed by: HOSPITALIST

## 2024-05-27 PROCEDURE — 36415 COLL VENOUS BLD VENIPUNCTURE: CPT | Performed by: HOSPITALIST

## 2024-05-29 ENCOUNTER — TELEPHONE (OUTPATIENT)
Facility: CLINIC | Age: 69
End: 2024-05-29
Payer: MEDICARE

## 2024-05-29 NOTE — TELEPHONE ENCOUNTER
----- Message from Fede Whyte sent at 5/28/2024 10:29 PM CDT -----  Contact: [phone]    ----- Message -----  From: Tameka Feng  Sent: 5/28/2024  10:36 AM CDT  To: Jean Pena Staff    [email or web address]     Patient is calling for Medical Advice regarding:reschedule the appt for today     How long has patient had these symptoms:    Pharmacy name and phone#:    Would like response via AMEE:  no     Comments:  Ps daughter is calling to reschedule the appt for today and no appts coming up for me

## 2024-06-07 ENCOUNTER — INFUSION (OUTPATIENT)
Dept: INFUSION THERAPY | Facility: HOSPITAL | Age: 69
End: 2024-06-07
Payer: MEDICARE

## 2024-06-07 ENCOUNTER — OFFICE VISIT (OUTPATIENT)
Facility: CLINIC | Age: 69
End: 2024-06-07
Payer: MEDICARE

## 2024-06-07 VITALS
DIASTOLIC BLOOD PRESSURE: 68 MMHG | TEMPERATURE: 98 F | WEIGHT: 116.06 LBS | OXYGEN SATURATION: 97 % | HEART RATE: 75 BPM | BODY MASS INDEX: 21.23 KG/M2 | SYSTOLIC BLOOD PRESSURE: 114 MMHG

## 2024-06-07 DIAGNOSIS — J98.11 ATELECTASIS OF BOTH LUNGS: ICD-10-CM

## 2024-06-07 DIAGNOSIS — G44.86 CERVICOGENIC HEADACHE: ICD-10-CM

## 2024-06-07 DIAGNOSIS — M81.0 AGE-RELATED OSTEOPOROSIS WITHOUT CURRENT PATHOLOGICAL FRACTURE: Primary | ICD-10-CM

## 2024-06-07 DIAGNOSIS — R73.03 PREDIABETES: Primary | ICD-10-CM

## 2024-06-07 PROBLEM — E83.39 HYPOPHOSPHATEMIA: Status: RESOLVED | Noted: 2024-01-02 | Resolved: 2024-06-07

## 2024-06-07 PROCEDURE — 96372 THER/PROPH/DIAG INJ SC/IM: CPT | Mod: HCNC

## 2024-06-07 PROCEDURE — 99215 OFFICE O/P EST HI 40 MIN: CPT | Mod: HCNC,S$GLB,, | Performed by: HOSPITALIST

## 2024-06-07 PROCEDURE — 4010F ACE/ARB THERAPY RXD/TAKEN: CPT | Mod: HCNC,CPTII,S$GLB, | Performed by: HOSPITALIST

## 2024-06-07 PROCEDURE — 3078F DIAST BP <80 MM HG: CPT | Mod: HCNC,CPTII,S$GLB, | Performed by: HOSPITALIST

## 2024-06-07 PROCEDURE — 3008F BODY MASS INDEX DOCD: CPT | Mod: HCNC,CPTII,S$GLB, | Performed by: HOSPITALIST

## 2024-06-07 PROCEDURE — 63600175 PHARM REV CODE 636 W HCPCS: Mod: JZ,JG,HCNC | Performed by: HOSPITALIST

## 2024-06-07 PROCEDURE — 99999 PR PBB SHADOW E&M-EST. PATIENT-LVL III: CPT | Mod: PBBFAC,HCNC,, | Performed by: HOSPITALIST

## 2024-06-07 PROCEDURE — 3044F HG A1C LEVEL LT 7.0%: CPT | Mod: HCNC,CPTII,S$GLB, | Performed by: HOSPITALIST

## 2024-06-07 PROCEDURE — 1126F AMNT PAIN NOTED NONE PRSNT: CPT | Mod: HCNC,CPTII,S$GLB, | Performed by: HOSPITALIST

## 2024-06-07 PROCEDURE — 3074F SYST BP LT 130 MM HG: CPT | Mod: HCNC,CPTII,S$GLB, | Performed by: HOSPITALIST

## 2024-06-07 RX ADMIN — DENOSUMAB 60 MG: 60 INJECTION SUBCUTANEOUS at 02:06
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
## 2024-06-07 NOTE — ASSESSMENT & PLAN NOTE
Interstitial opacities noted on CT abdomen during admission in 2023 for SBO, for which she was referred to Dr. Wolf with pulmonology to evaluate further.  Dedicated CT chest showed resolution of those opacities, likely representing atelectasis in context of hospitalization.  No longer an active issue and unlikely to be contributing to current symptoms.

## 2024-06-07 NOTE — NURSING
Pt here for prolia injection.  Aware to take Ca++ and vitamin D supplement daily.  Denies jaw pain or invasive dental procedures.  Prolia administered to L arm.  Pt tolerated.

## 2024-06-15 DIAGNOSIS — I10 ESSENTIAL HYPERTENSION: ICD-10-CM

## 2024-06-15 NOTE — TELEPHONE ENCOUNTER
Refill Routing Note   Medication(s) are not appropriate for processing by [facility] for the following reason(s):        Non-participating provider    [facility] action(s):  Route             Appointments  past 12m or future 3m with PCP    Date Provider   Last Visit   6/7/2024 Jean Pena MD   Next Visit   9/9/2024 Jean Pena MD   ED visits in past 90 days: 0        Note composed:8:51 AM 06/15/2024

## 2024-06-17 RX ORDER — LOSARTAN POTASSIUM 100 MG/1
100 TABLET ORAL
Qty: 90 TABLET | Refills: 3 | Status: SHIPPED | OUTPATIENT
Start: 2024-06-17

## 2024-06-26 ENCOUNTER — OFFICE VISIT (OUTPATIENT)
Dept: HOME HEALTH SERVICES | Facility: CLINIC | Age: 69
End: 2024-06-26
Payer: MEDICARE

## 2024-06-26 VITALS
HEIGHT: 62 IN | RESPIRATION RATE: 16 BRPM | HEART RATE: 76 BPM | DIASTOLIC BLOOD PRESSURE: 70 MMHG | OXYGEN SATURATION: 98 % | WEIGHT: 116.19 LBS | SYSTOLIC BLOOD PRESSURE: 118 MMHG | BODY MASS INDEX: 21.38 KG/M2 | TEMPERATURE: 97 F

## 2024-06-26 DIAGNOSIS — K21.9 GASTROESOPHAGEAL REFLUX DISEASE, UNSPECIFIED WHETHER ESOPHAGITIS PRESENT: ICD-10-CM

## 2024-06-26 DIAGNOSIS — Z00.00 ENCOUNTER FOR PREVENTIVE HEALTH EXAMINATION: Primary | ICD-10-CM

## 2024-06-26 DIAGNOSIS — R73.03 PREDIABETES: ICD-10-CM

## 2024-06-26 DIAGNOSIS — Z00.00 ENCOUNTER FOR MEDICARE ANNUAL WELLNESS EXAM: ICD-10-CM

## 2024-06-26 DIAGNOSIS — E78.2 MIXED HYPERLIPIDEMIA: ICD-10-CM

## 2024-06-26 DIAGNOSIS — M81.0 AGE-RELATED OSTEOPOROSIS WITHOUT CURRENT PATHOLOGICAL FRACTURE: ICD-10-CM

## 2024-06-26 DIAGNOSIS — I70.0 AORTIC ATHEROSCLEROSIS: ICD-10-CM

## 2024-06-26 DIAGNOSIS — K86.1 OTHER CHRONIC PANCREATITIS: ICD-10-CM

## 2024-06-26 DIAGNOSIS — I10 ESSENTIAL HYPERTENSION: ICD-10-CM

## 2024-06-26 DIAGNOSIS — E55.9 VITAMIN D DEFICIENCY: ICD-10-CM

## 2024-06-26 PROCEDURE — 3044F HG A1C LEVEL LT 7.0%: CPT | Mod: CPTII,S$GLB,,

## 2024-06-26 PROCEDURE — 1159F MED LIST DOCD IN RCRD: CPT | Mod: CPTII,S$GLB,,

## 2024-06-26 PROCEDURE — 1170F FXNL STATUS ASSESSED: CPT | Mod: CPTII,S$GLB,,

## 2024-06-26 PROCEDURE — 3288F FALL RISK ASSESSMENT DOCD: CPT | Mod: CPTII,S$GLB,,

## 2024-06-26 PROCEDURE — 1158F ADVNC CARE PLAN TLK DOCD: CPT | Mod: CPTII,S$GLB,,

## 2024-06-26 PROCEDURE — 3078F DIAST BP <80 MM HG: CPT | Mod: CPTII,S$GLB,,

## 2024-06-26 PROCEDURE — G0439 PPPS, SUBSEQ VISIT: HCPCS | Mod: S$GLB,,,

## 2024-06-26 PROCEDURE — 4010F ACE/ARB THERAPY RXD/TAKEN: CPT | Mod: CPTII,S$GLB,,

## 2024-06-26 PROCEDURE — 1160F RVW MEDS BY RX/DR IN RCRD: CPT | Mod: CPTII,S$GLB,,

## 2024-06-26 PROCEDURE — 1101F PT FALLS ASSESS-DOCD LE1/YR: CPT | Mod: CPTII,S$GLB,,

## 2024-06-26 PROCEDURE — 3074F SYST BP LT 130 MM HG: CPT | Mod: CPTII,S$GLB,,

## 2024-06-26 PROCEDURE — 1126F AMNT PAIN NOTED NONE PRSNT: CPT | Mod: CPTII,S$GLB,,

## 2024-06-26 NOTE — PATIENT INSTRUCTIONS
Counseling and Referral of Other Preventative  (Italic type indicates deductible and co-insurance are waived)    Patient Name: Meredith Cuellar  Today's Date: 6/26/2024    Health Maintenance       Date Due Completion Date    RSV Vaccine (Age 60+ and Pregnant patients) (1 - 1-dose 60+ series) Never done ---    Shingles Vaccine (2 of 2) 03/30/2020 2/3/2020    COVID-19 Vaccine (5 - 2023-24 season) 12/01/2024 (Originally 9/1/2023) 4/3/2021    Influenza Vaccine (Season Ended) 09/01/2024 10/21/2022    Mammogram 03/28/2025 3/28/2024    High Dose Statin 06/07/2025 6/7/2024    Hemoglobin A1c (Prediabetes) 06/07/2025 6/7/2024    DEXA Scan 09/21/2026 9/21/2023    Lipid Panel 06/06/2028 6/6/2023    Colorectal Cancer Screening 02/22/2032 2/22/2022    TETANUS VACCINE 05/27/2032 5/27/2022        No orders of the defined types were placed in this encounter.    The following information is provided to all patients.  This information is to help you find resources for any of the problems found today that may be affecting your health:                  Living healthy guide: www.[facility].[place].gov      Understanding Diabetes: www.diabetes.org      Eating healthy: www.cdc.gov/healthyweight      CDC home safety checklist: www.cdc.gov/steadi/patient.html      Agency on Aging: www.goea.[place].gov      Alcoholics anonymous (AA): www.aa.org      Physical Activity: www.romaine.nih.gov/rd8qgnb      Tobacco use: www.quitwithusla.org

## 2024-06-26 NOTE — PROGRESS NOTES
"Meredith Cuellar presented for an initial Medicare AWV today. The following components were reviewed and updated:    Medical history  Family History  Social history  Allergies and Current Medications  Health Risk Assessment  Health Maintenance  Care Team    **See Completed Assessments for Annual Wellness visit with in the encounter summary    The following assessments were completed:  Depression Screening  Cognitive function Screening: Declines  Timed Get Up Test  Whisper Test      Opioid documentation:      Patient does not have a current opioid prescription.          Vitals:    06/26/24 1001   BP: 118/70   BP Location: Left arm   Patient Position: Sitting   Pulse: 76   Resp: 16   Temp: 97 °F (36.1 °C)   SpO2: 98%   Weight: 52.7 kg (116 lb 2.9 oz)   Height: 5' 2" (1.575 m)     Body mass index is 21.25 kg/m².       Physical Exam  Vitals reviewed.   Constitutional:       General: She is not in acute distress.     Appearance: Normal appearance.   HENT:      Head: Normocephalic.   Cardiovascular:      Rate and Rhythm: Normal rate and regular rhythm.      Pulses: Normal pulses.      Heart sounds: Normal heart sounds.   Pulmonary:      Effort: Pulmonary effort is normal. No respiratory distress.      Breath sounds: Normal breath sounds. No wheezing.   Abdominal:      General: Bowel sounds are normal.      Tenderness: There is no abdominal tenderness.   Musculoskeletal:         General: Normal range of motion.      Cervical back: Normal range of motion.      Right lower leg: No edema.      Left lower leg: No edema.   Skin:     General: Skin is warm and dry.      Capillary Refill: Capillary refill takes less than 2 seconds.   Neurological:      General: No focal deficit present.      Mental Status: She is alert and oriented to person, place, and time.   Psychiatric:         Mood and Affect: Mood normal.         Behavior: Behavior normal.         Diagnoses and health risks identified today and associated " recommendations/orders:    1. Encounter for preventive health examination  Assessments completed.   recommendations reviewed.  F/u with PCP as instructed.      2. Aortic atherosclerosis - CT 2024  Chronic, stable on current statin regimen. Followed by PCP.     3. Other chronic pancreatitis  Chronic, stable on current regimen. Followed by PCP.     4. Essential hypertension  Chronic, stable on current Amlodipine, Losartan regimen. Followed by PCP.     5. Mixed hyperlipidemia  Chronic, stable on current statin regimen. Followed by PCP.     6. Prediabetes  Chronic, stable on current regimen. Followed by PCP.   Lab Results   Component Value Date    HGBA1C 6.2 (H) 06/07/2024     7. Age-related osteoporosis without current pathological fracture  Chronic, stable on current regimen. Followed by PCP.     8. Vitamin D deficiency  Chronic, stable on current regimen. Followed by PCP.     9. Gastroesophageal reflux disease, unspecified whether esophagitis present  Chronic, stable on current Pepcid regimen. Followed by PCP.     10. Encounter for Medicare annual wellness exam  Referred by [employer].   - Ambulatory Referral/Consult to Enhanced Annual Wellness Visit (eAWV)      Provided Yen with a 5-10 year written screening schedule and personal prevention plan. Recommendations were developed using the USPSTF age appropriate recommendations. Education, counseling, and referrals were provided as needed.  After Visit Summary printed and given to patient which includes a list of additional screenings\tests needed.    Follow up in about 1 year (around 6/26/2025) for Medicare AWV.      Laney Lyon NP    I offered to discuss advanced care planning, including how to pick a person who would make decisions for you if you were unable to make them for yourself, called a health care power of , and what kind of decisions you might make such as use of life sustaining treatments such as ventilators and tube feeding when  faced with a life limiting illness recorded on a living will that they will need to know. (How you want to be cared for as you near the end of your natural life)     X Patient is interested in learning more about how to make advanced directives.  I provided them paperwork and offered to discuss this with them.

## 2024-07-31 ENCOUNTER — TELEPHONE (OUTPATIENT)
Dept: GASTROENTEROLOGY | Facility: CLINIC | Age: 69
End: 2024-07-31
Payer: MEDICARE

## 2024-07-31 ENCOUNTER — OFFICE VISIT (OUTPATIENT)
Dept: GASTROENTEROLOGY | Facility: CLINIC | Age: 69
End: 2024-07-31
Payer: MEDICARE

## 2024-07-31 VITALS
BODY MASS INDEX: 21.06 KG/M2 | DIASTOLIC BLOOD PRESSURE: 82 MMHG | WEIGHT: 114.44 LBS | HEIGHT: 62 IN | SYSTOLIC BLOOD PRESSURE: 136 MMHG | HEART RATE: 68 BPM

## 2024-07-31 DIAGNOSIS — Z87.19 HISTORY OF PANCREATITIS: ICD-10-CM

## 2024-07-31 DIAGNOSIS — K83.8 DILATED BILE DUCT: ICD-10-CM

## 2024-07-31 DIAGNOSIS — R10.13 EPIGASTRIC PAIN: Primary | ICD-10-CM

## 2024-07-31 PROCEDURE — 99999 PR PBB SHADOW E&M-EST. PATIENT-LVL III: CPT | Mod: PBBFAC,HCNC,, | Performed by: INTERNAL MEDICINE

## 2024-07-31 NOTE — PROGRESS NOTES
Subjective     Patient ID: Meredith Keen is a 69 y.o. female.    Chief Complaint: Abdominal Pain    The patient is here for intermittent episode of epigastric pain and back pain not related to food. Denied use of NSAID's. No smoking. She have a previous history of pancreatitis. Previous EUS showed dilated CBD and some lobularity of the pancreas but no obvious evidence of chronic pancreatitis. For unclear reason, the patient is on Creon with meals and is carrying a diagnosis of chronic pancreatitis. Denied weight loss or diarrhea. Stated constipation. No diabetes.    Abdominal Pain  Associated symptoms include constipation. Pertinent negatives include no diarrhea.     Review of Systems   Constitutional:  Negative for unexpected weight change.   Gastrointestinal:  Positive for abdominal pain and constipation. Negative for diarrhea.          Objective     Physical Exam  Constitutional:       Appearance: Normal appearance. She is not ill-appearing.   Neurological:      Mental Status: She is alert.            Assessment and Plan     1. Epigastric pain  -     CBC Auto Differential; Future; Expected date: 07/31/2024  -     Comprehensive Metabolic Panel; Future; Expected date: 07/31/2024  -     Lipase; Future; Expected date: 07/31/2024  -     Pancreatic elastase, fecal; Future; Expected date: 07/31/2024  -     Case Request Endoscopy: EGD (ESOPHAGOGASTRODUODENOSCOPY), ULTRASOUND, UPPER GI TRACT, ENDOSCOPIC    2. History of pancreatitis  -     CBC Auto Differential; Future; Expected date: 07/31/2024  -     Comprehensive Metabolic Panel; Future; Expected date: 07/31/2024  -     Lipase; Future; Expected date: 07/31/2024  -     Pancreatic elastase, fecal; Future; Expected date: 07/31/2024  -     Case Request Endoscopy: EGD (ESOPHAGOGASTRODUODENOSCOPY), ULTRASOUND, UPPER GI TRACT, ENDOSCOPIC    3. Dilated bile duct  -     CBC Auto Differential; Future; Expected date: 07/31/2024  -     Comprehensive Metabolic Panel; Future;  Expected date: 07/31/2024  -     Lipase; Future; Expected date: 07/31/2024  -     Pancreatic elastase, fecal; Future; Expected date: 07/31/2024  -     Case Request Endoscopy: EGD (ESOPHAGOGASTRODUODENOSCOPY), ULTRASOUND, UPPER GI TRACT, ENDOSCOPIC      The patient is on Creon for an unclear reason. She is carrying a diagnosis of chronic pancreatitis without evidence of endocrine or exocrine pancreas insufficieny. I recommend to hold the Creon.         No follow-ups on file.

## 2024-07-31 NOTE — PROGRESS NOTES
"GENERAL GI PATIENT INTAKE:    COVID symptoms in the last 7 days (runny nose, sore throat, congestion, cough, fever): No  PCP: Jean Pena  If not PCP-  number given to establish [phone]: N/A    ALLERGIES REVIEWED:  N/A    CHIEF COMPLAINT:    Chief Complaint   Patient presents with    Abdominal Pain       VITAL SIGNS:  /82   Pulse 68   Ht 5' 2" (1.575 m)   Wt 51.9 kg (114 lb 6.7 oz)   BMI 20.93 kg/m²      Change in medical, surgical, family or social history: No      REVIEWED MEDICATION LIST RECONCILED INCLUDING ABOVE MEDS:  Yes     "

## 2024-07-31 NOTE — TELEPHONE ENCOUNTER
[facility] patient. Need EGD/EUS (order in) for abdominal pain, dilated CBD and personal history of pancreatitis. Need CBC, CMP, lipase, fecal elastase (order in).  Thanks,  RR

## 2024-08-20 ENCOUNTER — LAB VISIT (OUTPATIENT)
Dept: LAB | Facility: HOSPITAL | Age: 69
End: 2024-08-20
Attending: HOSPITALIST
Payer: MEDICARE

## 2024-08-20 DIAGNOSIS — Z87.19 HISTORY OF PANCREATITIS: ICD-10-CM

## 2024-08-20 DIAGNOSIS — K83.8 DILATED BILE DUCT: ICD-10-CM

## 2024-08-20 DIAGNOSIS — R10.13 EPIGASTRIC PAIN: ICD-10-CM

## 2024-08-20 PROCEDURE — 80053 COMPREHEN METABOLIC PANEL: CPT | Mod: HCNC | Performed by: INTERNAL MEDICINE

## 2024-08-20 PROCEDURE — 85025 COMPLETE CBC W/AUTO DIFF WBC: CPT | Mod: HCNC | Performed by: INTERNAL MEDICINE

## 2024-08-20 PROCEDURE — 83690 ASSAY OF LIPASE: CPT | Mod: HCNC | Performed by: INTERNAL MEDICINE

## 2024-08-20 PROCEDURE — 36415 COLL VENOUS BLD VENIPUNCTURE: CPT | Performed by: INTERNAL MEDICINE

## 2024-08-21 ENCOUNTER — PATIENT MESSAGE (OUTPATIENT)
Dept: ENDOSCOPY | Facility: HOSPITAL | Age: 69
End: 2024-08-21
Payer: MEDICARE

## 2024-08-21 ENCOUNTER — TELEPHONE (OUTPATIENT)
Dept: ENDOSCOPY | Facility: HOSPITAL | Age: 69
End: 2024-08-21
Payer: MEDICARE

## 2024-08-21 DIAGNOSIS — K83.8 DILATED BILE DUCT: Primary | ICD-10-CM

## 2024-08-21 LAB
ALBUMIN SERPL BCP-MCNC: 3.6 G/DL (ref 3.5–5.2)
ALP SERPL-CCNC: 66 U/L (ref 55–135)
ALT SERPL W/O P-5'-P-CCNC: 15 U/L (ref 10–44)
ANION GAP SERPL CALC-SCNC: 9 MMOL/L (ref 8–16)
AST SERPL-CCNC: 19 U/L (ref 10–40)
BASOPHILS # BLD AUTO: 0.07 K/UL (ref 0–0.2)
BASOPHILS NFR BLD: 1 % (ref 0–1.9)
BILIRUB SERPL-MCNC: 0.5 MG/DL (ref 0.1–1)
BUN SERPL-MCNC: 17 MG/DL (ref 8–23)
CALCIUM SERPL-MCNC: 9.5 MG/DL (ref 8.7–10.5)
CHLORIDE SERPL-SCNC: 106 MMOL/L (ref 95–110)
CO2 SERPL-SCNC: 24 MMOL/L (ref 23–29)
CREAT SERPL-MCNC: 0.7 MG/DL (ref 0.5–1.4)
DIFFERENTIAL METHOD BLD: ABNORMAL
EOSINOPHIL # BLD AUTO: 0.2 K/UL (ref 0–0.5)
EOSINOPHIL NFR BLD: 3.2 % (ref 0–8)
ERYTHROCYTE [DISTWIDTH] IN BLOOD BY AUTOMATED COUNT: 14.2 % (ref 11.5–14.5)
EST. GFR  (NO RACE VARIABLE): >60 ML/MIN/1.73 M^2
GLUCOSE SERPL-MCNC: 107 MG/DL (ref 70–110)
HCT VFR BLD AUTO: 39.7 % (ref 37–48.5)
HGB BLD-MCNC: 12.3 G/DL (ref 12–16)
IMM GRANULOCYTES # BLD AUTO: 0.09 K/UL (ref 0–0.04)
IMM GRANULOCYTES NFR BLD AUTO: 1.3 % (ref 0–0.5)
LIPASE SERPL-CCNC: 60 U/L (ref 4–60)
LYMPHOCYTES # BLD AUTO: 1.5 K/UL (ref 1–4.8)
LYMPHOCYTES NFR BLD: 20.4 % (ref 18–48)
MCH RBC QN AUTO: 24.6 PG (ref 27–31)
MCHC RBC AUTO-ENTMCNC: 31 G/DL (ref 32–36)
MCV RBC AUTO: 79 FL (ref 82–98)
MONOCYTES # BLD AUTO: 0.5 K/UL (ref 0.3–1)
MONOCYTES NFR BLD: 7.2 % (ref 4–15)
NEUTROPHILS # BLD AUTO: 4.8 K/UL (ref 1.8–7.7)
NEUTROPHILS NFR BLD: 66.9 % (ref 38–73)
NRBC BLD-RTO: 0 /100 WBC
PLATELET # BLD AUTO: 380 K/UL (ref 150–450)
PMV BLD AUTO: 9.5 FL (ref 9.2–12.9)
POTASSIUM SERPL-SCNC: 4.8 MMOL/L (ref 3.5–5.1)
PROT SERPL-MCNC: 6.9 G/DL (ref 6–8.4)
RBC # BLD AUTO: 5.01 M/UL (ref 4–5.4)
SODIUM SERPL-SCNC: 139 MMOL/L (ref 136–145)
WBC # BLD AUTO: 7.1 K/UL (ref 3.9–12.7)

## 2024-08-21 NOTE — TELEPHONE ENCOUNTER
Spoke to pt to schedule procedure(s) Upper Endoscopy Ultrasound (EUS)       Physician to perform procedure(s) Dr. LAURA Martinez  Date of Procedure (s) 9/20/24  Arrival Time 9:00 AM  Time of Procedure(s) 10:00 AM   Location of Procedure(s) [facility] [address] [facility]  Type of Rx Prep sent to patient: N/A  Instructions provided to patient via [facility]    Patient was informed on the following information and verbalized understanding. Screening questionnaire reviewed with patient and complete. If procedure requires anesthesia, a responsible adult needs to be present to accompany the patient home, patient cannot drive after receiving anesthesia. Appointment details are tentative, especially check-in time. Patient will receive a prep-op call 7 days prior to confirm check-in time for procedure. If applicable the patient should contact their pharmacy to verify Rx for procedure prep is ready for pick-up. Patient was advised to call the scheduling department at [phone] if pharmacy states no Rx is available. Patient was advised to call the endoscopy scheduling department if any questions or concerns arise.      [facility] Scheduling Department

## 2024-09-06 ENCOUNTER — OFFICE VISIT (OUTPATIENT)
Facility: CLINIC | Age: 69
End: 2024-09-06
Payer: MEDICARE

## 2024-09-06 VITALS
BODY MASS INDEX: 21.27 KG/M2 | TEMPERATURE: 99 F | DIASTOLIC BLOOD PRESSURE: 67 MMHG | WEIGHT: 116.31 LBS | OXYGEN SATURATION: 83 % | SYSTOLIC BLOOD PRESSURE: 142 MMHG | HEART RATE: 83 BPM

## 2024-09-06 DIAGNOSIS — U07.1 COVID-19 VIRUS INFECTION: Primary | ICD-10-CM

## 2024-09-06 DIAGNOSIS — I10 ESSENTIAL HYPERTENSION: ICD-10-CM

## 2024-09-06 LAB
CTP QC/QA: YES
SARS-COV-2 AG RESP QL IA.RAPID: POSITIVE

## 2024-09-06 PROCEDURE — 99215 OFFICE O/P EST HI 40 MIN: CPT | Mod: HCNC,S$GLB,, | Performed by: HOSPITALIST

## 2024-09-06 PROCEDURE — 1159F MED LIST DOCD IN RCRD: CPT | Mod: HCNC,CPTII,S$GLB, | Performed by: HOSPITALIST

## 2024-09-06 PROCEDURE — 3008F BODY MASS INDEX DOCD: CPT | Mod: HCNC,CPTII,S$GLB, | Performed by: HOSPITALIST

## 2024-09-06 PROCEDURE — 1126F AMNT PAIN NOTED NONE PRSNT: CPT | Mod: HCNC,CPTII,S$GLB, | Performed by: HOSPITALIST

## 2024-09-06 PROCEDURE — 4010F ACE/ARB THERAPY RXD/TAKEN: CPT | Mod: HCNC,CPTII,S$GLB, | Performed by: HOSPITALIST

## 2024-09-06 PROCEDURE — 3077F SYST BP >= 140 MM HG: CPT | Mod: HCNC,CPTII,S$GLB, | Performed by: HOSPITALIST

## 2024-09-06 PROCEDURE — 3044F HG A1C LEVEL LT 7.0%: CPT | Mod: HCNC,CPTII,S$GLB, | Performed by: HOSPITALIST

## 2024-09-06 PROCEDURE — 87811 SARS-COV-2 COVID19 W/OPTIC: CPT | Mod: QW,HCNC,S$GLB, | Performed by: HOSPITALIST

## 2024-09-06 PROCEDURE — 3078F DIAST BP <80 MM HG: CPT | Mod: HCNC,CPTII,S$GLB, | Performed by: HOSPITALIST

## 2024-09-06 PROCEDURE — 99999 PR PBB SHADOW E&M-EST. PATIENT-LVL IV: CPT | Mod: PBBFAC,HCNC,, | Performed by: HOSPITALIST

## 2024-09-06 RX ORDER — PROMETHAZINE HYDROCHLORIDE 6.25 MG/5ML
6.25 SYRUP ORAL EVERY 6 HOURS PRN
Qty: 120 ML | Refills: 0 | Status: SHIPPED | OUTPATIENT
Start: 2024-09-06

## 2024-09-06 RX ORDER — FLUTICASONE PROPIONATE 50 MCG
1 SPRAY, SUSPENSION (ML) NASAL DAILY PRN
Qty: 11.1 ML | Refills: 1 | Status: SHIPPED | OUTPATIENT
Start: 2024-09-06

## 2024-09-06 RX ORDER — DENOSUMAB 60 MG/ML
INJECTION SUBCUTANEOUS
COMMUNITY
Start: 2024-06-07
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
## 2024-09-07 PROBLEM — U07.1 COVID-19 VIRUS INFECTION: Status: ACTIVE | Noted: 2024-09-07

## 2024-09-07 PROBLEM — J98.11 ATELECTASIS OF BOTH LUNGS: Status: RESOLVED | Noted: 2023-03-31 | Resolved: 2024-09-07

## 2024-09-07 NOTE — ASSESSMENT & PLAN NOTE
COVID swab done in office positive.  Considering the duration of her symptoms she would be unlikely to benefit from antiviral therapy such as Paxlovid.  She was advised that she is likely at the tail end of her illness, and although she is likely not at peak contagiousness, she should still wear a mask when in public until symptoms resolve.  We will prescribe promethazine cough syrup and fluticasone nasal spray to aid in symptomatic relief as it sounds like she is having coughing fits culminating in post-tussive emesis.  She was counseled on general conservative management strategies for viral illnesses.

## 2024-09-07 NOTE — ASSESSMENT & PLAN NOTE
Blood pressure not at goal today, but patient acutely coughing which is likely confounding accurate measurement.   You can access the FollowMyHealth Patient Portal offered by [facility] by registering at the following website: [email or web address]. By joining [facility]’s FollowMyHealth portal, you will also be able to view your health information using other applications (apps) compatible with our system.

## 2024-09-16 ENCOUNTER — TELEPHONE (OUTPATIENT)
Dept: ENDOSCOPY | Facility: HOSPITAL | Age: 69
End: 2024-09-16
Payer: MEDICARE

## 2024-09-16 NOTE — TELEPHONE ENCOUNTER
Spoke to pt's daughter, Sigrid, for pre-call to confirm scheduled Upper Endoscopy Ultrasound (EUS)/EGD and patient verbalized understanding of the following:       Date of Procedure (s)  verified 9/20/24  Arrival Time 9:00 AM verified.  Location of Procedure(s) [place] 2nd Floor verified.  NPO status reinforced. Ok to continue clear liquids up until 4 hours prior to the Endoscopy procedure.   Chemo blood thinners and GLP-1s.  Pt confirmed receipt of prep instructions and Rx prep (if applicable).  Instructions provided to patient via [facility]  Pt confirmed ride home after procedure if procedure requires anesthesia.   Pre-call screening questionnaire reviewed and completed with patient.   Appointment details are tentative, including check-in time.  If the patient begins taking any blood thinning medications, injectable weight loss/diabetes medications (other than insulin), or Adipex (phentermine) patient was instructed to contact the endoscopy scheduling department as soon as possible.  Patient was advised to call the endoscopy scheduling department if any questions or concerns arise.       [facility] Endoscopy Scheduling Department

## 2024-09-19 ENCOUNTER — ANESTHESIA EVENT (OUTPATIENT)
Dept: ENDOSCOPY | Facility: HOSPITAL | Age: 69
End: 2024-09-19
Payer: MEDICARE

## 2024-09-20 ENCOUNTER — ANESTHESIA (OUTPATIENT)
Dept: ENDOSCOPY | Facility: HOSPITAL | Age: 69
End: 2024-09-20
Payer: MEDICARE

## 2024-09-20 ENCOUNTER — HOSPITAL ENCOUNTER (OUTPATIENT)
Facility: HOSPITAL | Age: 69
Discharge: HOME | End: 2024-09-20
Attending: INTERNAL MEDICINE | Admitting: INTERNAL MEDICINE
Payer: MEDICARE

## 2024-09-20 VITALS
DIASTOLIC BLOOD PRESSURE: 76 MMHG | BODY MASS INDEX: 21.53 KG/M2 | WEIGHT: 117 LBS | OXYGEN SATURATION: 99 % | RESPIRATION RATE: 13 BRPM | HEIGHT: 62 IN | SYSTOLIC BLOOD PRESSURE: 139 MMHG | TEMPERATURE: 98 F | HEART RATE: 80 BPM

## 2024-09-20 DIAGNOSIS — K86.1 OTHER CHRONIC PANCREATITIS: Primary | ICD-10-CM

## 2024-09-20 DIAGNOSIS — K85.90 PANCREATITIS, ACUTE: ICD-10-CM

## 2024-09-20 PROCEDURE — 63600175 PHARM REV CODE 636 W HCPCS: Mod: HCNC | Performed by: NURSE ANESTHETIST, CERTIFIED REGISTERED

## 2024-09-20 PROCEDURE — 25000003 PHARM REV CODE 250: Mod: HCNC | Performed by: INTERNAL MEDICINE

## 2024-09-20 PROCEDURE — 37000008 HC ANESTHESIA 1ST 15 MINUTES: Mod: HCNC | Performed by: INTERNAL MEDICINE

## 2024-09-20 PROCEDURE — 37000009 HC ANESTHESIA EA ADD 15 MINS: Mod: HCNC | Performed by: INTERNAL MEDICINE

## 2024-09-20 PROCEDURE — 43259 EGD US EXAM DUODENUM/JEJUNUM: CPT | Mod: HCNC | Performed by: INTERNAL MEDICINE

## 2024-09-20 PROCEDURE — 43259 EGD US EXAM DUODENUM/JEJUNUM: CPT | Mod: HCNC,,, | Performed by: INTERNAL MEDICINE

## 2024-09-20 PROCEDURE — 25000003 PHARM REV CODE 250: Mod: HCNC | Performed by: NURSE ANESTHETIST, CERTIFIED REGISTERED

## 2024-09-20 RX ORDER — LIDOCAINE HYDROCHLORIDE 20 MG/ML
INJECTION, SOLUTION EPIDURAL; INFILTRATION; INTRACAUDAL; PERINEURAL
Status: DISCONTINUED | OUTPATIENT
Start: 2024-09-20 | End: 2024-09-20

## 2024-09-20 RX ORDER — TOPICAL ANESTHETIC 200 MG/ML
SPRAY DENTAL; PERIODONTAL
Status: DISCONTINUED | OUTPATIENT
Start: 2024-09-20 | End: 2024-09-20

## 2024-09-20 RX ORDER — PROPOFOL 10 MG/ML
VIAL (ML) INTRAVENOUS
Status: DISCONTINUED | OUTPATIENT
Start: 2024-09-20 | End: 2024-09-20

## 2024-09-20 RX ORDER — SODIUM CHLORIDE 0.9 % (FLUSH) 0.9 %
10 SYRINGE (ML) INJECTION
Status: DISCONTINUED | OUTPATIENT
Start: 2024-09-20 | End: 2024-09-20 | Stop reason: HOSPADM

## 2024-09-20 RX ORDER — SODIUM CHLORIDE 9 MG/ML
INJECTION, SOLUTION INTRAVENOUS CONTINUOUS
Status: DISCONTINUED | OUTPATIENT
Start: 2024-09-20 | End: 2024-09-20 | Stop reason: HOSPADM

## 2024-09-20 RX ORDER — PROPOFOL 10 MG/ML
VIAL (ML) INTRAVENOUS CONTINUOUS PRN
Status: DISCONTINUED | OUTPATIENT
Start: 2024-09-20 | End: 2024-09-20

## 2024-09-20 RX ADMIN — GLYCOPYRROLATE 0.2 MG: 0.2 INJECTION, SOLUTION INTRAMUSCULAR; INTRAVITREAL at 09:09

## 2024-09-20 RX ADMIN — LIDOCAINE HYDROCHLORIDE 60 MG: 20 INJECTION, SOLUTION EPIDURAL; INFILTRATION; INTRACAUDAL; PERINEURAL at 10:09

## 2024-09-20 RX ADMIN — SODIUM CHLORIDE: 0.9 INJECTION, SOLUTION INTRAVENOUS at 09:09

## 2024-09-20 RX ADMIN — TOPICAL ANESTHETIC 1 EACH: 200 SPRAY DENTAL; PERIODONTAL at 09:09

## 2024-09-20 RX ADMIN — PROPOFOL 60 MG: 10 INJECTION, EMULSION INTRAVENOUS at 10:09

## 2024-09-20 RX ADMIN — PROPOFOL 150 MCG/KG/MIN: 10 INJECTION, EMULSION INTRAVENOUS at 10:09

## 2024-09-20 NOTE — BRIEF OP NOTE
Discharge Summary/Instructions after an Endoscopic Procedure    Patient Name: Meredith Cuellar  Patient MRN: [id number]  Patient YOB: 1955 Friday, September 20, 2024  Birtton Martinez MD  Dear patient,  As a result of recent federal legislation (The Federal Cures Act), you may receive lab or pathology results from your procedure in your [facility] account before your physician is able to contact you. Your physician or their representative will relay the results to you with their recommendations at their soonest availability.  Thank you,    Your health is very important to us during the Covid Crisis. Following your procedure today, you will receive a daily text for 2 weeks asking about signs or symptoms of Covid 19.  Please respond to this text when you receive it so we can follow up and keep you as safe as possible.     RESTRICTIONS:  During your procedure today, you received medications for sedation.  These medications may affect your judgment, balance and coordination.  Therefore, for 24 hours, you have the following restrictions:     - DO NOT drive a car, operate machinery, make legal/financial decisions, sign important papers or drink alcohol.      ACTIVITY:  Today: no heavy lifting, straining or running due to procedural sedation/anesthesia.  The following day: return to full activity including work.    DIET:  Eat and drink normally unless instructed otherwise.     TREATMENT FOR COMMON SIDE EFFECTS:  - Mild abdominal pain, nausea, belching, bloating or excessive gas:  rest, eat lightly and use a heating pad.  - Sore Throat: treat with throat lozenges and/or gargle with warm salt water.  - Because air was used during the procedure, expelling large amounts of air from your rectum or belching is normal.  - If a bowel prep was taken, you may not have a bowel movement for 1-3 days.  This is normal.      SYMPTOMS TO WATCH FOR AND REPORT TO YOUR PHYSICIAN:  1. Abdominal pain or bloating, other than gas cramps.  2.  Chest pain.  3. Back pain.  4. Signs of infection such as: chills or fever occurring within 24 hours after the procedure.  5. Rectal bleeding, which would show as bright red, maroon, or black stools. (A tablespoon of blood from the rectum is not serious, especially if hemorrhoids are present.)  6. Vomiting.  7. Weakness or dizziness.      GO DIRECTLY TO THE NEAREST EMERGENCY ROOM IF YOU HAVE ANY OF THE FOLLOWING:     Difficulty breathing              Chills and/or fever over 101 F   Persistent vomiting and/or vomiting blood   Severe abdominal pain   Severe chest pain   Black, tarry stools   Bleeding- more than one tablespoon   Any other symptom or condition that you feel may need urgent attention    Your doctor recommends these additional instructions:  If any biopsies were taken, your doctors clinic will contact you in 1 to 2 weeks with any results.    - Discharge patient to home (ambulatory).   - Resume previous diet.   - Observe patient's clinical course.    For questions, problems or results please call your physician - Britton Martinez MD.    EMERGENCY PHONE NUMBER: [phone],  LAB RESULTS: [phone]    IF A COMPLICATION OR EMERGENCY SITUATION ARISES AND YOU ARE UNABLE TO REACH YOUR PHYSICIAN - GO DIRECTLY TO THE EMERGENCY ROOM.

## 2024-09-20 NOTE — ANESTHESIA PREPROCEDURE EVALUATION
"                                                                                                             09/20/2024  [facility]  Anesthesia Pre-Operative Evaluation         Patient Name: Meredith Keen  YOB: 1955  MRN: [id number]    SUBJECTIVE:     Pre-operative evaluation for Procedure(s) (LRB):  EGD (ESOPHAGOGASTRODUODENOSCOPY) (N/A)  ULTRASOUND, UPPER GI TRACT, ENDOSCOPIC (N/A)     09/20/2024    Meredith Keen is a 69 y.o. female w/ a significant PMHx of GIST, HTN, GERD.  Patient now presents for the above procedure(s).    TTE:   none documented.     Patient Active Problem List   Diagnosis    Headache(784.0)    Other chronic pancreatitis    Essential hypertension    Gastroesophageal reflux disease    Hyperlipidemia    Benign gastrointestinal stromal tumor (GIST)    Dilated bile duct    Age related osteoporosis    Cervicalgia    Vitamin D deficiency    Prediabetes    Aortic atherosclerosis - CT 2024    COVID-19 virus infection       Review of patient's allergies indicates:  No Known Allergies    Current Inpatient Medications:      No current facility-administered medications on file prior to encounter.     Current Outpatient Medications on File Prior to Encounter   Medication Sig Dispense Refill    amLODIPine (NORVASC) 2.5 MG tablet Take 1 tablet (2.5 mg total) by mouth once daily. 30 tablet 0    aspirin 81 MG Chew Take 1 tablet (81 mg total) by mouth 3 (three) times a week. 30 tablet 0    atorvastatin (LIPITOR) 20 MG tablet Take 1 tablet (20 mg total) by mouth once daily. 90 tablet 3    BD ULTRA-FINE SHORT PEN NEEDLE 31 gauge x 5/16" Ndle Inject into the skin.      EScitalopram oxalate (LEXAPRO) 10 MG tablet TAKE 1 TABLET BY MOUTH EVERY DAY 90 tablet 3    famotidine (PEPCID) 20 MG tablet Take 1 tablet (20 mg total) by mouth 2 (two) times daily as needed for Heartburn (reflux). 60 tablet 11    fluoride, sodium, (DENTA 5000 PLUS) 1.1 % Crea BRUSH ON SENSITIVE TEETH 3-5 MINUTES TWICE " A DAY      losartan (COZAAR) 100 MG tablet TAKE 1 TABLET BY MOUTH EVERY DAY 90 tablet 3    methocarbamoL (ROBAXIN) 500 MG Tab Take 1 tablet (500 mg total) by mouth 3 (three) times daily as needed (neck pain). Can try taking 1 and a half at a time if one tablet is not effective.  (Instructions in Citizen of [place] if possible)         Past Surgical History:   Procedure Laterality Date    APPENDECTOMY      CHOLECYSTECTOMY      ENDOSCOPIC ULTRASOUND OF UPPER GASTROINTESTINAL TRACT N/A 11/26/2019    Procedure: ULTRASOUND, UPPER GI TRACT, ENDOSCOPIC;  Surgeon: Britton Martinez MD;  Location: [facility] ENDO;  Service: Endoscopy;  Laterality: N/A;    ENDOSCOPIC ULTRASOUND OF UPPER GASTROINTESTINAL TRACT N/A 6/24/2020    Procedure: ULTRASOUND, UPPER GI TRACT, ENDOSCOPIC;  Surgeon: Delfino Jasso MD;  Location: [facility] (2ND FLR);  Service: Endoscopy;  Laterality: N/A;  EUS (linear) for abnormal CT scan. Urgent. Can be done by any [facility] physician.  Britton Martinez MD  Covid-19 test 6/22/20 at [facility]  Speaks Citizen of [place];  offered and declined; daughter will accompany patient as interp    ENDOSCOPIC ULTRASOUND OF UPPER GASTROINTESTINAL TRACT N/A 5/26/2021    Procedure: ULTRASOUND, UPPER GI TRACT, ENDOSCOPIC;  Surgeon: Britton Martinez MD;  Location: [facility] (2ND FLR);  Service: Endoscopy;  Laterality: N/A;  Need EUS (linear) for dilated bile duct on MRI. Main or [place]. 45 minutes.   Britton Martinez MD   Fully vaccinated - sending in copy of card and bringing it on day of procedure. ttr  *NEEDS Khmer *    ESOPHAGOGASTRODUODENOSCOPY N/A 9/9/2020    Procedure: EGD (ESOPHAGOGASTRODUODENOSCOPY);  Surgeon: Devyn Miles MD;  [facility];  Service: General;  Laterality: N/A;    HYSTERECTOMY  2012    elective    LAPAROSCOPIC LYSIS OF ADHESIONS  9/9/2020    Procedure: LYSIS, ADHESIONS, LAPAROSCOPIC;  Surgeon: Devyn Miles MD;  [facility];  Service:  General;;    LYSIS OF ADHESIONS N/A 6/16/2022    Procedure: LYSIS, ADHESIONS;  Surgeon: Jacob Greco MD;  [facility];  Service: General;  Laterality: N/A;    ROBOT-ASSISTED SURGICAL REMOVAL OF STOMACH USING DA RACHEL XI N/A 9/9/2020    Procedure: XI ROBOTIC GASTRECTOMY/GIST PROXIMAL STOMACH;  Surgeon: Devyn Miles MD;  [facility];  Service: General;  Laterality: N/A;       OBJECTIVE:     Vital Signs Range (Last 24H):         Significant Labs:  Lab Results   Component Value Date    WBC 7.10 08/20/2024    HGB 12.3 08/20/2024    HCT 39.7 08/20/2024     08/20/2024    CHOL 184 06/06/2023    TRIG 86 06/06/2023    HDL 53 06/06/2023    ALT 15 08/20/2024    AST 19 08/20/2024     08/20/2024    K 4.8 08/20/2024     08/20/2024    CREATININE 0.7 08/20/2024    BUN 17 08/20/2024    CO2 24 08/20/2024    TSH 1.172 07/05/2022    INR 0.9 10/19/2015    HGBA1C 6.2 (H) 06/07/2024       Diagnostic Studies: No relevant studies.    EKG:   Results for orders placed or performed during the hospital encounter of 03/31/24   EKG 12-lead    Collection Time: 03/31/24  1:26 PM   Result Value Ref Range    QRS Duration 92 ms    [facility] QTC Calculation 439 ms    Narrative    Test Reason : R10.9,    Vent. Rate : 069 BPM     Atrial Rate : 069 BPM     P-R Int : 132 ms          QRS Dur : 092 ms      QT Int : 410 ms       P-R-T Axes : -06 086 060 degrees     QTc Int : 439 ms    Normal sinus rhythm  Normal ECG  When compared with ECG of 01-JAN-2024 02:31,  No significant change was found  Confirmed by Saji YIN MD ([id number]) on 3/31/2024 4:02:28 PM    Referred By: System System           Confirmed By:Saji YIN MD       ASSESSMENT/PLAN:           Pre-op Assessment    I have reviewed the Patient Summary Reports.     I have reviewed the Nursing Notes. I have reviewed the NPO Status.   I have reviewed the Medications.     Review of Systems  Anesthesia Hx:   History of prior surgery of interest to airway  management or planning:  Previous anesthesia: General        Denies Family Hx of Anesthesia complications.    Denies Personal Hx of Anesthesia complications.                    Social:  Non-Smoker, Social Alcohol Use       Hematology/Oncology:  Hematology Normal                                     Cardiovascular:     Hypertension                                        Pulmonary:    Denies COPD.  Denies Asthma.   Denies Shortness of breath.   COVID a few weeks ago. Patient still has some residual pain with deep inspiration.                Hepatic/GI:     GERD   N/v, SBO          Neurological:      Headaches                                 Endocrine:  Endocrine Normal                Physical Exam  General: Cooperative, Alert and Oriented    Airway:  Mallampati: III / II  Mouth Opening: Normal  TM Distance: Normal  Tongue: Normal  Neck ROM: Normal ROM    Dental:  Dentures        Anesthesia Plan  Type of Anesthesia, risks & benefits discussed:    Anesthesia Type: Gen Natural Airway  Intra-op Monitoring Plan: Standard ASA Monitors  Post Op Pain Control Plan: multimodal analgesia  Induction:  IV  Informed Consent: Informed consent signed with the Patient and all parties understand the risks and agree with anesthesia plan.  All questions answered.   ASA Score: 3  Day of Surgery Review of History & Physical: H&P Update referred to the surgeon/provider.    Ready For Surgery From Anesthesia Perspective.     .

## 2024-09-20 NOTE — PROVATION PATIENT INSTRUCTIONS
Discharge Summary/Instructions after an Endoscopic Procedure  Patient Name: Meredith Cuellar  Patient MRN: [id number]  Patient YOB: 1955 Friday, September 20, 2024  Britton Martinez MD  Dear patient,  As a result of recent federal legislation (The Federal Cures Act), you may   receive lab or pathology results from your procedure in your [facility]   account before your physician is able to contact you. Your physician or   their representative will relay the results to you with their   recommendations at their soonest availability.  Thank you,  Your health is very important to us during the Covid Crisis. Following your   procedure today, you will receive a daily text for 2 weeks asking about   signs or symptoms of Covid 19.  Please respond to this text when you   receive it so we can follow up and keep you as safe as possible.   RESTRICTIONS:  During your procedure today, you received medications for sedation.  These   medications may affect your judgment, balance and coordination.  Therefore,   for 24 hours, you have the following restrictions:   - DO NOT drive a car, operate machinery, make legal/financial decisions,   sign important papers or drink alcohol.    ACTIVITY:  Today: no heavy lifting, straining or running due to procedural   sedation/anesthesia.  The following day: return to full activity including work.  DIET:  Eat and drink normally unless instructed otherwise.     TREATMENT FOR COMMON SIDE EFFECTS:  - Mild abdominal pain, nausea, belching, bloating or excessive gas:  rest,   eat lightly and use a heating pad.  - Sore Throat: treat with throat lozenges and/or gargle with warm salt   water.  - Because air was used during the procedure, expelling large amounts of air   from your rectum or belching is normal.  - If a bowel prep was taken, you may not have a bowel movement for 1-3 days.    This is normal.  SYMPTOMS TO WATCH FOR AND REPORT TO YOUR PHYSICIAN:  1. Abdominal pain or bloating, other than  gas cramps.  2. Chest pain.  3. Back pain.  4. Signs of infection such as: chills or fever occurring within 24 hours   after the procedure.  5. Rectal bleeding, which would show as bright red, maroon, or black stools.   (A tablespoon of blood from the rectum is not serious, especially if   hemorrhoids are present.)  6. Vomiting.  7. Weakness or dizziness.  GO DIRECTLY TO THE NEAREST EMERGENCY ROOM IF YOU HAVE ANY OF THE FOLLOWING:      Difficulty breathing              Chills and/or fever over 101 F   Persistent vomiting and/or vomiting blood   Severe abdominal pain   Severe chest pain   Black, tarry stools   Bleeding- more than one tablespoon   Any other symptom or condition that you feel may need urgent attention  Your doctor recommends these additional instructions:  If any biopsies were taken, your doctors clinic will contact you in 1 to 2   weeks with any results.  - Discharge patient to home (ambulatory).   - Resume previous diet.   - Observe patient's clinical course.  For questions, problems or results please call your physician - Britton Martinez MD.  EMERGENCY PHONE NUMBER: [phone],  LAB RESULTS: [phone]  IF A COMPLICATION OR EMERGENCY SITUATION ARISES AND YOU ARE UNABLE TO REACH   YOUR PHYSICIAN - GO DIRECTLY TO THE EMERGENCY ROOM.  Britton Martinez MD  9/20/2024 10:41:45 AM  This report has been verified and signed electronically.  Dear patient,  As a result of recent federal legislation (The Federal Cures Act), you may   receive lab or pathology results from your procedure in your [facility]   account before your physician is able to contact you. Your physician or   their representative will relay the results to you with their   recommendations at their soonest availability.  Thank you,  PROVATION

## 2024-09-20 NOTE — H&P
"History & Physical - Short Stay  Gastroenterology      SUBJECTIVE:     Procedure: EUS    Chief Complaint/Indication for Procedure: Personal history of pancreatitis    History of Present Illness:  Patient is a 69 y.o. female presents with personal history of pancreatitis, abdominal pain (currently resolved) and dilated CBD.   PTA Medications   Medication Sig    amLODIPine (NORVASC) 2.5 MG tablet Take 1 tablet (2.5 mg total) by mouth once daily.    aspirin 81 MG Chew Take 1 tablet (81 mg total) by mouth 3 (three) times a week.    atorvastatin (LIPITOR) 20 MG tablet Take 1 tablet (20 mg total) by mouth once daily.    EScitalopram oxalate (LEXAPRO) 10 MG tablet TAKE 1 TABLET BY MOUTH EVERY DAY    famotidine (PEPCID) 20 MG tablet Take 1 tablet (20 mg total) by mouth 2 (two) times daily as needed for Heartburn (reflux).    losartan (COZAAR) 100 MG tablet TAKE 1 TABLET BY MOUTH EVERY DAY    BD ULTRA-FINE SHORT PEN NEEDLE 31 gauge x 5/16" Ndle Inject into the skin.    fluoride, sodium, (DENTA 5000 PLUS) 1.1 % Crea BRUSH ON SENSITIVE TEETH 3-5 MINUTES TWICE A DAY    fluticasone propionate (FLONASE) 50 mcg/actuation nasal spray 1 spray (50 mcg total) by Each Nostril route daily as needed for Rhinitis (congestion, cough).    PROLIA 60 mg/mL Syrg     promethazine (PHENERGAN) 6.25 mg/5 mL syrup Take 5 mLs (6.25 mg total) by mouth every 6 (six) hours as needed (cough).       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     Past Surgical History:   Procedure Laterality Date    APPENDECTOMY      CHOLECYSTECTOMY      ENDOSCOPIC ULTRASOUND OF UPPER GASTROINTESTINAL TRACT N/A 11/26/2019    Procedure: ULTRASOUND, UPPER GI TRACT, ENDOSCOPIC;  " Surgeon: Britton Martinez MD;  Location: [facility] ENDO;  Service: Endoscopy;  Laterality: N/A;    ENDOSCOPIC ULTRASOUND OF UPPER GASTROINTESTINAL TRACT N/A 6/24/2020    Procedure: ULTRASOUND, UPPER GI TRACT, ENDOSCOPIC;  Surgeon: Delfino Jasso MD;  Location: [facility] (2ND FLR);  Service: Endoscopy;  Laterality: N/A;  EUS (linear) for abnormal CT scan. Urgent. Can be done by any [facility] physician.  Britton Martinez MD  Covid-19 test 6/22/20 at [facility]  Speaks Yakut;  offered and declined; daughter will accompany patient as interp    ENDOSCOPIC ULTRASOUND OF UPPER GASTROINTESTINAL TRACT N/A 5/26/2021    Procedure: ULTRASOUND, UPPER GI TRACT, ENDOSCOPIC;  Surgeon: Britton Martinez MD;  Location: [facility] (2ND FLR);  Service: Endoscopy;  Laterality: N/A;  Need EUS (linear) for dilated bile duct on MRI. Main or [place]. 45 minutes.   Britton Martinez MD   Fully vaccinated - sending in copy of card and bringing it on day of procedure. ttr  *NEEDS Greek *    ESOPHAGOGASTRODUODENOSCOPY N/A 9/9/2020    Procedure: EGD (ESOPHAGOGASTRODUODENOSCOPY);  Surgeon: Devyn Miles MD;  Location: [address];  Service: General;  Laterality: N/A;    HYSTERECTOMY  2012    elective    LAPAROSCOPIC LYSIS OF ADHESIONS  9/9/2020    Procedure: LYSIS, ADHESIONS, LAPAROSCOPIC;  Surgeon: Devyn Miles MD;  Location: [address];  Service: General;;    LYSIS OF ADHESIONS N/A 6/16/2022    Procedure: LYSIS, ADHESIONS;  Surgeon: Jacob Greco MD;  Location: [address];  Service: General;  Laterality: N/A;    ROBOT-ASSISTED SURGICAL REMOVAL OF STOMACH USING DA RACHEL XI N/A 9/9/2020    Procedure: XI ROBOTIC GASTRECTOMY/GIST PROXIMAL STOMACH;  Surgeon: Devyn Miles MD;  Location: [address];  Service: General;  Laterality: N/A;     Family History   Problem Relation Name Age of Onset    No Known Problems Mother      No Known Problems Father       Social History  "    Tobacco Use    Smoking status: Never    Smokeless tobacco: Never   Substance Use Topics    Alcohol use: No    Drug use: No       Review of Systems:  Constitutional: no fever or chills  Respiratory: positive for cough  Cardiovascular: no chest pain or palpitations    OBJECTIVE:     Vital Signs (Most Recent)  Temp: 97.9 °F (36.6 °C) (09/20/24 0937)  Pulse: 70 (09/20/24 0937)  Resp: 16 (09/20/24 0937)  BP: (!) 143/76 (09/20/24 0937)  SpO2: 99 % (09/20/24 0937)    Physical Exam:  General: well developed, well nourished  Lungs:  normal respiratory effort  Heart: regular rate, S1, S2 normal    Laboratory  CBC: No results for input(s): "WBC", "RBC", "HGB", "HCT", "PLT", "MCV", "MCH", "MCHC" in the last 168 hours.  CMP: No results for input(s): "GLU", "CALCIUM", "ALBUMIN", "PROT", "NA", "K", "CO2", "CL", "BUN", "CREATININE", "ALKPHOS", "ALT", "AST", "BILITOT" in the last 168 hours.  Coagulation: No results for input(s): "LABPROT", "INR", "APTT" in the last 168 hours.    Diagnostic Results:      ASSESSMENT/PLAN:     Abdominal pain (resolved)  Personal history of pancreatitis  Dilated CBD    Plan: EUS    Anesthesia Plan: MAC    ASA Grade: ASA 2 - Patient with mild systemic disease with no functional limitations    The impression and plan was discussed in detail with the patient and family. All questions have been answered and the patient voices understanding of our plan at this point. The risk of the procedure was discussed in detail which includes but not limited to bleeding, infection, perforation in some cases requiring surgery with its spectrum of complications.   "

## 2024-09-20 NOTE — ANESTHESIA POSTPROCEDURE EVALUATION
Anesthesia Post Evaluation    Patient: Meredith Keen    Procedure(s) Performed: Procedure(s) (LRB):  EGD (ESOPHAGOGASTRODUODENOSCOPY) (N/A)  ULTRASOUND, UPPER GI TRACT, ENDOSCOPIC (N/A)    Final Anesthesia Type: general      Patient location during evaluation: GI PACU  Patient participation: Yes- Able to Participate  Level of consciousness: awake and alert, oriented and awake  Post-procedure vital signs: reviewed and stable  Pain management: adequate  Airway patency: patent  ELIAS mitigation strategies: Multimodal analgesia  PONV status at discharge: No PONV  Anesthetic complications: no      Cardiovascular status: blood pressure returned to baseline and hemodynamically stable  Respiratory status: unassisted and spontaneous ventilation  Hydration status: euvolemic  Follow-up not needed.              Vitals Value Taken Time   /76 09/20/24 1050   Temp 36.7 °C (98.1 °F) 09/20/24 1020   Pulse 80 09/20/24 1050   Resp 13 09/20/24 1050   SpO2 99 % 09/20/24 1050         Event Time   Out of Recovery 10:52:49         Pain/Carlos Manuel Score: No data recorded

## 2024-09-20 NOTE — TRANSFER OF CARE
"Anesthesia Transfer of Care Note    Patient: Meredith Keen    Procedure(s) Performed: Procedure(s) (LRB):  EGD (ESOPHAGOGASTRODUODENOSCOPY) (N/A)  ULTRASOUND, UPPER GI TRACT, ENDOSCOPIC (N/A)    Patient location: GI    Anesthesia Type: general    Transport from OR: Transported from OR on room air with adequate spontaneous ventilation    Post pain: adequate analgesia    Post assessment: no apparent anesthetic complications    Post vital signs: stable    Level of consciousness: awake    Nausea/Vomiting: no nausea/vomiting    Complications: none    Transfer of care protocol was followed      Last vitals: Visit Vitals  BP (!) 143/76 (BP Location: Left arm, Patient Position: Lying)   Pulse 70   Temp 36.6 °C (97.9 °F) (Temporal)   Resp 16   Ht 5' 2" (1.575 m)   Wt 53.1 kg (117 lb)   SpO2 99%   Breastfeeding No   BMI 21.40 kg/m²     "

## 2024-10-17 DIAGNOSIS — I10 ESSENTIAL HYPERTENSION: ICD-10-CM

## 2024-10-17 RX ORDER — AMLODIPINE BESYLATE 2.5 MG/1
2.5 TABLET ORAL DAILY
Qty: 90 TABLET | Refills: 3 | Status: SHIPPED | OUTPATIENT
Start: 2024-10-17 | End: 2025-10-12

## 2024-10-17 NOTE — TELEPHONE ENCOUNTER
----- Message from Maria Dolores sent at 10/17/2024 12:49 PM CDT -----  Contact: Pt [phone]  Requesting an RX refill or new RX.    Is this a refill or new RX: new rx    RX name and strength (copy/paste from chart):  amLODIPine (NORVASC) 2.5 MG tablet    Is this a 30 day or 90 day RX:  up to doctor / old prescription 30 tablet    Pharmacy name and phone # (copy/paste from chart):   [id number] - [place] - [address]  [address] [place] [address]  Phone: [phone] Fax: [phone]           Please Call and advise    Thank you    Please do NOT rep[ly to sender as this is from the call center and they answer incoming calls only.

## 2024-10-26 DIAGNOSIS — I10 ESSENTIAL HYPERTENSION: ICD-10-CM

## 2024-10-28 RX ORDER — ATORVASTATIN CALCIUM 20 MG/1
20 TABLET, FILM COATED ORAL
Qty: 90 TABLET | Refills: 3 | Status: SHIPPED | OUTPATIENT
Start: 2024-10-28

## 2024-11-05 DIAGNOSIS — U07.1 COVID-19 VIRUS INFECTION: ICD-10-CM

## 2024-11-05 RX ORDER — FLUTICASONE PROPIONATE 50 MCG
1 SPRAY, SUSPENSION (ML) NASAL DAILY PRN
Qty: 48 ML | Refills: 1 | Status: SHIPPED | OUTPATIENT
Start: 2024-11-05

## 2024-11-22 ENCOUNTER — OFFICE VISIT (OUTPATIENT)
Facility: CLINIC | Age: 69
End: 2024-11-22
Payer: MEDICARE

## 2024-11-22 VITALS
BODY MASS INDEX: 20.97 KG/M2 | TEMPERATURE: 99 F | HEART RATE: 71 BPM | SYSTOLIC BLOOD PRESSURE: 135 MMHG | OXYGEN SATURATION: 99 % | DIASTOLIC BLOOD PRESSURE: 67 MMHG | WEIGHT: 114.63 LBS

## 2024-11-22 DIAGNOSIS — M81.0 AGE-RELATED OSTEOPOROSIS WITHOUT CURRENT PATHOLOGICAL FRACTURE: Primary | ICD-10-CM

## 2024-11-22 PROBLEM — U07.1 COVID-19 VIRUS INFECTION: Status: RESOLVED | Noted: 2024-09-07 | Resolved: 2024-11-22

## 2024-11-22 PROCEDURE — 99999 PR PBB SHADOW E&M-EST. PATIENT-LVL III: CPT | Mod: PBBFAC,HCNC,, | Performed by: HOSPITALIST
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
## 2024-11-22 NOTE — ASSESSMENT & PLAN NOTE
Managed by Dr. Duarte, her endocrinologist.  On Prolia injections q6mo; will be due for next one next month.  She is scheduled to see Dr. Duarte next week.

## 2024-11-25 ENCOUNTER — OFFICE VISIT (OUTPATIENT)
Dept: ENDOCRINOLOGY | Facility: CLINIC | Age: 69
End: 2024-11-25
Payer: MEDICARE

## 2024-11-25 ENCOUNTER — LAB VISIT (OUTPATIENT)
Dept: LAB | Facility: HOSPITAL | Age: 69
End: 2024-11-25
Attending: HOSPITALIST
Payer: MEDICARE

## 2024-11-25 VITALS
HEART RATE: 70 BPM | SYSTOLIC BLOOD PRESSURE: 120 MMHG | DIASTOLIC BLOOD PRESSURE: 72 MMHG | BODY MASS INDEX: 21.11 KG/M2 | WEIGHT: 115.38 LBS

## 2024-11-25 DIAGNOSIS — M81.0 AGE-RELATED OSTEOPOROSIS WITHOUT CURRENT PATHOLOGICAL FRACTURE: Primary | ICD-10-CM

## 2024-11-25 DIAGNOSIS — E55.9 VITAMIN D DEFICIENCY: ICD-10-CM

## 2024-11-25 DIAGNOSIS — M81.0 AGE-RELATED OSTEOPOROSIS WITHOUT CURRENT PATHOLOGICAL FRACTURE: ICD-10-CM

## 2024-11-25 LAB
25(OH)D3+25(OH)D2 SERPL-MCNC: 39 NG/ML (ref 30–96)
ALBUMIN SERPL BCP-MCNC: 3.7 G/DL (ref 3.5–5.2)
ANION GAP SERPL CALC-SCNC: 7 MMOL/L (ref 8–16)
BUN SERPL-MCNC: 17 MG/DL (ref 8–23)
CALCIUM SERPL-MCNC: 9.5 MG/DL (ref 8.7–10.5)
CHLORIDE SERPL-SCNC: 108 MMOL/L (ref 95–110)
CO2 SERPL-SCNC: 24 MMOL/L (ref 23–29)
CREAT SERPL-MCNC: 0.8 MG/DL (ref 0.5–1.4)
EST. GFR  (NO RACE VARIABLE): >60 ML/MIN/1.73 M^2
GLUCOSE SERPL-MCNC: 89 MG/DL (ref 70–110)
PHOSPHATE SERPL-MCNC: 3.1 MG/DL (ref 2.7–4.5)
POTASSIUM SERPL-SCNC: 4.3 MMOL/L (ref 3.5–5.1)
SODIUM SERPL-SCNC: 139 MMOL/L (ref 136–145)

## 2024-11-25 PROCEDURE — 4010F ACE/ARB THERAPY RXD/TAKEN: CPT | Mod: HCNC,CPTII,S$GLB, | Performed by: HOSPITALIST

## 2024-11-25 PROCEDURE — 80069 RENAL FUNCTION PANEL: CPT | Mod: HCNC | Performed by: HOSPITALIST

## 2024-11-25 PROCEDURE — 3078F DIAST BP <80 MM HG: CPT | Mod: HCNC,CPTII,S$GLB, | Performed by: HOSPITALIST

## 2024-11-25 PROCEDURE — 3008F BODY MASS INDEX DOCD: CPT | Mod: HCNC,CPTII,S$GLB, | Performed by: HOSPITALIST

## 2024-11-25 PROCEDURE — 82306 VITAMIN D 25 HYDROXY: CPT | Mod: HCNC | Performed by: HOSPITALIST

## 2024-11-25 PROCEDURE — 1160F RVW MEDS BY RX/DR IN RCRD: CPT | Mod: HCNC,CPTII,S$GLB, | Performed by: HOSPITALIST

## 2024-11-25 PROCEDURE — 3044F HG A1C LEVEL LT 7.0%: CPT | Mod: HCNC,CPTII,S$GLB, | Performed by: HOSPITALIST

## 2024-11-25 PROCEDURE — 36415 COLL VENOUS BLD VENIPUNCTURE: CPT | Performed by: HOSPITALIST

## 2024-11-25 PROCEDURE — 1101F PT FALLS ASSESS-DOCD LE1/YR: CPT | Mod: HCNC,CPTII,S$GLB, | Performed by: HOSPITALIST

## 2024-11-25 PROCEDURE — 99214 OFFICE O/P EST MOD 30 MIN: CPT | Mod: HCNC,S$GLB,, | Performed by: HOSPITALIST

## 2024-11-25 PROCEDURE — 3288F FALL RISK ASSESSMENT DOCD: CPT | Mod: HCNC,CPTII,S$GLB, | Performed by: HOSPITALIST

## 2024-11-25 PROCEDURE — 1159F MED LIST DOCD IN RCRD: CPT | Mod: HCNC,CPTII,S$GLB, | Performed by: HOSPITALIST

## 2024-11-25 PROCEDURE — 3074F SYST BP LT 130 MM HG: CPT | Mod: HCNC,CPTII,S$GLB, | Performed by: HOSPITALIST

## 2024-11-25 PROCEDURE — 99999 PR PBB SHADOW E&M-EST. PATIENT-LVL III: CPT | Mod: PBBFAC,HCNC,, | Performed by: HOSPITALIST

## 2024-11-25 NOTE — PROGRESS NOTES
Subjective:      Patient ID: Meredith Keen is a 69 y.o. female presented to [facility] on 11/25/2024.  Chief Complaint:  Osteoporosis      History of Present Illness: Meredith Keen is a 69 y.o. Maori speaking female here for osteoporosis  Other significant past medical history:  Hypertension, small bowel obstruction with recent ex lap due to adhesion  Daughter in law present for office visit, who is physician assistant at [employer]      Interval history:  Patient is here for follow-up osteoporosis  She was then given SC Prolia started on 11/3/2023.  2 injections so far.   NEXT INJECTION 12/7/2024 NEEDS TO BE SCHEDULED>> next Prolia injection 12/6/2024  Improvement arthralgias.  Currently on calcium and vitamin-D  Patient denies any falls.  No fractures.    Saw dentist recently no dental issue.  Daughter not present this office visit  Office visit conducted in Maori      Osteoporosis  - Diagnosis on DXA in 5/19/2022, denies prior diagnosis  - Recent:  SBO leading to ex lap 6/2022, due to adhesions.  Now has resolved.  - Recent lab work show mild anemia, hypocalcemia:  Post surgical  - On initial visit 07/2022:  It was decided to start patient on Forteo daily injection, START 8/2022- ENDED: 9/27/2023, ended due to cost  - DXA done 5/19/2022 compared to 9/21/2023:  5.2% improvement in lumbar spine, 2.3% improvement in femoral neck.  - After the bone density done on 09/2023, patient and family decided to switch to SC Prolia every 6 months due to cost of Forteo  - CURRENT Therapy: First Prolia injection 11/3/2023, 2 injection so far at [facility]    Osteoporosis Risk Factor Assessment:  History of falls over the last 2 years: yes, 3 weeks ago, fell trip, injured her knee  History of fractures to wrist, hip or spine:no  History of loss of height of more than 1 1/2 to 2 inches: no  Family history of osteoporosis: no  Family history of fractures of hip: no    Age of menopause: 2010, hysterectomy,  no: use of HRT  Tobacco use, including use in the past:  no   EtOH use? No     no: history of CKD3   no: history of thyroid disease   no: history of kidney stones    Denies active malignancy, history of malignancy the involved the bone, prior radiation treatment, or unexplained elevations of alk phos on labs.  No current:  Gastrointestinal issues including:  Chronic diarrhea, celiac disease, ulcerative colitis/Crohn or h/o malabsorption or gastric surgery  Diet: Does eat regularly Cheese/Dairy/Milk/Greens    Weight bearing exercise?   no (no walking)  Dental work planned? no, dentures    Recent DXA:   09/21/2023  The bone mineral density measured from L1 through L4 is 0.954g/cm2.  This corresponds to a T score of -1.9 and a Z score of 0.2.  Increase in bone mineral density by 5.2%.  The bone mineral density within the left femoral neck measures 0.588 g/cm2.  This corresponds to a T score of -3.2 and a Z score of -1.2.  The bone mineral density within the right femoral neck measures 0.754 g/cm2.  This corresponds to a T score of -2.0 and a Z score of -0.1.  No significant interval change.     FRAX RESULTS:  10-year Probability of Fracture:  Major Osteoporotic Fracture 11.2%.  Hip Fracture 4.4%.     Impression:  Osteoporosis left femoral neck.  Osteopenia right femoral neck.  No significant interval change.  Osteopenia lumbar spine with increase in bone mineral density by 5.2%.       5/19/2022  The L1 to L4 vertebral bone mineral density is equal to 0.907 g/cm squared with a T score of -2.3.  The left femoral neck bone mineral density is equal to 0.585 g/cm squared with a T score of -3.3.  The total hip bone mineral density is equal to 0.669 g/cm squared with a T score of -2.7.  There is a 10.9% risk of a major osteoporotic fracture and a 4.1% risk of hip fracture in the next 10 years (FRAX).     Impression:  Osteoporosis of the hips, osteopenia of the spine    Lab work reviewed  Lab Results   Component Value Date     PTH 63.2 07/05/2022    [id number] 21 (L) 03/28/2024    [id number] 29 (L) 07/05/2022    CALCIUM 9.5 08/20/2024    CALCIUM 10.0 05/27/2024    CALCIUM 8.5 (L) 04/02/2024    PHOS 1.9 (L) 04/02/2024    PHOS 2.8 04/01/2024    PHOS 3.4 03/28/2024    ALKPHOS 66 08/20/2024    ALKPHOS 59 05/27/2024    ALKPHOS 52 (L) 03/31/2024    TSH 1.172 07/05/2022      Reviewed past surgical, medical, family, social history and updated as appropriate.  Review of Systems: see above    Objective:   /72   Pulse 70   Wt 52.3 kg (115 lb 6.4 oz)   BMI 21.11 kg/m²     Body mass index is 21.11 kg/m².  Vital signs reviewed    Physical Exam  Vitals and nursing note reviewed.   Constitutional:       Appearance: Normal appearance. She is well-developed. She is not ill-appearing.   Neck:      Thyroid: No thyromegaly.   Pulmonary:      Effort: Pulmonary effort is normal. No respiratory distress.   Musculoskeletal:         General: Normal range of motion.      Cervical back: Normal range of motion.   Neurological:      General: No focal deficit present.      Mental Status: She is alert. Mental status is at baseline.   Psychiatric:         Mood and Affect: Mood normal.         Behavior: Behavior normal.     Lab Reviewed:  See results in subjective  Lab Results   Component Value Date    HGBA1C 6.2 (H) 06/07/2024     Lab Results   Component Value Date    CHOL 184 06/06/2023    HDL 53 06/06/2023    LDLCALC 113.8 06/06/2023    TRIG 86 06/06/2023    CHOLHDL 28.8 06/06/2023     Lab Results   Component Value Date     08/20/2024    K 4.8 08/20/2024     08/20/2024    CO2 24 08/20/2024     08/20/2024    BUN 17 08/20/2024    CREATININE 0.7 08/20/2024    CALCIUM 9.5 08/20/2024    PHOS 1.9 (L) 04/02/2024    PROT 6.9 08/20/2024    ALBUMIN 3.6 08/20/2024    BILITOT 0.5 08/20/2024    ALKPHOS 66 08/20/2024    AST 19 08/20/2024    ALT 15 08/20/2024    ANIONGAP 9 08/20/2024    EGFRNORACEVR >60.0 08/20/2024    TSH 1.172 07/05/2022    PTH 63.2  07/05/2022    [id number] 21 (L) 03/28/2024     Assessment     1. Age-related osteoporosis without current pathological fracture  RENAL FUNCTION PANEL    RENAL FUNCTION PANEL    Vitamin D      2. Vitamin D deficiency  Vitamin D    Vitamin D        Plan     Age related osteoporosis  - Patient is here for evaluation and management of osteoporosis, history as above  - Recent DXA: Independently reviewed in clinic, confirms osteoporosis with DXA 09/21/2023 left femoral neck: T score of -3.2   - VERY HIGH FRACTURE RISK, Risk factors include:  Postmenopausal  - Past medication: Forteo daily injection, from 8/2022 to 9/27/2023    Plan  - CONTINUE: CURRENT Therapy: First Prolia injection 11/3/2023, 2 injection so far at [facility]>> next injection 12/2024  - Duration of therapy of at minimum 2 years and side effect profile discussed, given High risk of fracture, outweighs the risk of side effects  - Recommend the patient take sufficient calcium and vitamin D3 OTC  - Check routine lab work: check Renal Panel, vitamin-D every 6 months  - Fall precautions/Exercise regimen: advised, exercises recommended  - Routine dental health screening advised, recommend dental cleaning every 6 months.   - Next DXA: 2 years from most current, 9/2025  - Routine follow-up with me every 6 months  - Message sent to [facility] infusion to schedule Prolia ASAP    Vitamin D deficiency  - Vitamin-D goal >30  - advised patient to continue OTC vitamin-D supplements  - check level every 6 months      Advised patient to follow up with PCP for routine health maintenance care.   RTC in 6 months to continue monitor bone health    Mikal Duarte M.D.  Endocrinology  [facility]  11/25/2024      Disclaimer: This note has been generated in part with the use of voice-recognition software. There may be typographical errors that have been missed during proof-reading.

## 2024-11-25 NOTE — ASSESSMENT & PLAN NOTE
- Vitamin-D goal >30  - advised patient to continue OTC vitamin-D supplements  - check level every 6 months

## 2024-11-25 NOTE — ASSESSMENT & PLAN NOTE
- Patient is here for evaluation and management of osteoporosis, history as above  - Recent DXA: Independently reviewed in clinic, confirms osteoporosis with DXA 09/21/2023 left femoral neck: T score of -3.2   - VERY HIGH FRACTURE RISK, Risk factors include:  Postmenopausal  - Past medication: Forteo daily injection, from 8/2022 to 9/27/2023    Plan  - CONTINUE: CURRENT Therapy: First Prolia injection 11/3/2023, 2 injection so far at [facility]>> next injection 12/2024  - Duration of therapy of at minimum 2 years and side effect profile discussed, given High risk of fracture, outweighs the risk of side effects  - Recommend the patient take sufficient calcium and vitamin D3 OTC  - Check routine lab work: check Renal Panel, vitamin-D every 6 months  - Fall precautions/Exercise regimen: advised, exercises recommended  - Routine dental health screening advised, recommend dental cleaning every 6 months.   - Next DXA: 2 years from most current, 9/2025  - Routine follow-up with me every 6 months  - Message sent to [facility] to schedule Prolia ASAP

## 2024-12-03 ENCOUNTER — TELEPHONE (OUTPATIENT)
Dept: ENDOCRINOLOGY | Facility: CLINIC | Age: 69
End: 2024-12-03
Payer: MEDICARE

## 2024-12-03 NOTE — TELEPHONE ENCOUNTER
----- Message from Mikal Duarte MD sent at 12/2/2024  6:47 PM CST -----  Please let patient know that she is scheduled for her Prolia  ----- Message -----  From: Carolin Nielson, RN  Sent: 12/2/2024  12:27 PM CST  To: Mikal Duarte MD    She is scheduled at 2 pm on 12/6.  Please notify the pt of appt date and time.  Thanks  ----- Message -----  From: Mikal Duarte MD  Sent: 11/25/2024  11:08 AM CST  To: Carolin Nielson RN; [facility]    Please, schedule PROLIA injection for this patient on 12/6/2024, or the week after, thank you  Order have been approved

## 2024-12-06 ENCOUNTER — INFUSION (OUTPATIENT)
Dept: INFUSION THERAPY | Facility: HOSPITAL | Age: 69
End: 2024-12-06
Payer: MEDICARE

## 2024-12-06 DIAGNOSIS — M81.0 AGE-RELATED OSTEOPOROSIS WITHOUT CURRENT PATHOLOGICAL FRACTURE: Primary | ICD-10-CM

## 2024-12-06 PROCEDURE — 96372 THER/PROPH/DIAG INJ SC/IM: CPT | Mod: HCNC

## 2024-12-06 PROCEDURE — 63600175 PHARM REV CODE 636 W HCPCS: Mod: JZ,JG,HCNC | Performed by: HOSPITALIST

## 2024-12-06 RX ADMIN — DENOSUMAB 60 MG: 60 INJECTION SUBCUTANEOUS at 02:12

## 2024-12-06 NOTE — NURSING
Ca++ 9.5.  Pt endorses taking Ca++ and vitamin D supplement at home.  Denies jaw pain or invasive dental procedures.  Prolia administered to abdominal tissue.  Pt tolerated.

## 2025-01-13 ENCOUNTER — TELEPHONE (OUTPATIENT)
Dept: PRIMARY CARE CLINIC | Facility: CLINIC | Age: 70
End: 2025-01-13
Payer: MEDICARE

## 2025-01-13 NOTE — TELEPHONE ENCOUNTER
----- Message from Lois sent at 1/13/2025  1:19 PM CST -----  Contact: [phone]  [email or web address]     Patient is calling for Medical Advice regarding: Pt daughter Thy needs a call back she has questions about if her mother needs labs     How long has patient had these symptoms:    Pharmacy name and phone#:    Patient wants a call back or thru [facility]:call back     Comments:    Please advise patient replies from provider may take up to 48 hours.

## 2025-01-14 ENCOUNTER — OFFICE VISIT (OUTPATIENT)
Dept: PRIMARY CARE CLINIC | Facility: CLINIC | Age: 70
End: 2025-01-14
Payer: MEDICARE

## 2025-01-14 VITALS
HEART RATE: 65 BPM | DIASTOLIC BLOOD PRESSURE: 72 MMHG | SYSTOLIC BLOOD PRESSURE: 148 MMHG | HEIGHT: 62 IN | WEIGHT: 115.5 LBS | BODY MASS INDEX: 21.25 KG/M2 | OXYGEN SATURATION: 99 % | TEMPERATURE: 98 F

## 2025-01-14 DIAGNOSIS — K21.00 GASTROESOPHAGEAL REFLUX DISEASE WITH ESOPHAGITIS WITHOUT HEMORRHAGE: ICD-10-CM

## 2025-01-14 DIAGNOSIS — Z12.31 ENCOUNTER FOR SCREENING MAMMOGRAM FOR MALIGNANT NEOPLASM OF BREAST: ICD-10-CM

## 2025-01-14 DIAGNOSIS — Z23 NEED FOR IMMUNIZATION AGAINST INFLUENZA: ICD-10-CM

## 2025-01-14 DIAGNOSIS — L30.0 NUMMULAR ECZEMA: Primary | ICD-10-CM

## 2025-01-14 DIAGNOSIS — K86.1 OTHER CHRONIC PANCREATITIS: ICD-10-CM

## 2025-01-14 DIAGNOSIS — U07.1 COVID-19 VIRUS INFECTION: ICD-10-CM

## 2025-01-14 DIAGNOSIS — I10 ESSENTIAL HYPERTENSION: ICD-10-CM

## 2025-01-14 PROCEDURE — 99213 OFFICE O/P EST LOW 20 MIN: CPT | Mod: HCNC,S$GLB,, | Performed by: HOSPITALIST

## 2025-01-14 PROCEDURE — 1159F MED LIST DOCD IN RCRD: CPT | Mod: HCNC,CPTII,S$GLB, | Performed by: HOSPITALIST

## 2025-01-14 PROCEDURE — 3288F FALL RISK ASSESSMENT DOCD: CPT | Mod: HCNC,CPTII,S$GLB, | Performed by: HOSPITALIST

## 2025-01-14 PROCEDURE — 3078F DIAST BP <80 MM HG: CPT | Mod: HCNC,CPTII,S$GLB, | Performed by: HOSPITALIST

## 2025-01-14 PROCEDURE — 3077F SYST BP >= 140 MM HG: CPT | Mod: HCNC,CPTII,S$GLB, | Performed by: HOSPITALIST

## 2025-01-14 PROCEDURE — 1101F PT FALLS ASSESS-DOCD LE1/YR: CPT | Mod: HCNC,CPTII,S$GLB, | Performed by: HOSPITALIST

## 2025-01-14 PROCEDURE — 1126F AMNT PAIN NOTED NONE PRSNT: CPT | Mod: HCNC,CPTII,S$GLB, | Performed by: HOSPITALIST

## 2025-01-14 PROCEDURE — 3008F BODY MASS INDEX DOCD: CPT | Mod: HCNC,CPTII,S$GLB, | Performed by: HOSPITALIST

## 2025-01-14 RX ORDER — FAMOTIDINE 20 MG/1
20 TABLET, FILM COATED ORAL 2 TIMES DAILY PRN
Qty: 60 TABLET | Refills: 11 | Status: SHIPPED | OUTPATIENT
Start: 2025-01-14 | End: 2026-01-14

## 2025-01-14 RX ORDER — BETAMETHASONE VALERATE 1 MG/G
CREAM TOPICAL 2 TIMES DAILY
Qty: 15 G | Refills: 1 | Status: SHIPPED | OUTPATIENT
Start: 2025-01-14

## 2025-01-14 RX ORDER — ATORVASTATIN CALCIUM 20 MG/1
20 TABLET, FILM COATED ORAL DAILY
Qty: 90 TABLET | Refills: 3 | Status: SHIPPED | OUTPATIENT
Start: 2025-01-14

## 2025-01-14 RX ORDER — FLUTICASONE PROPIONATE 50 MCG
1 SPRAY, SUSPENSION (ML) NASAL DAILY PRN
Qty: 48 ML | Refills: 1 | Status: SHIPPED | OUTPATIENT
Start: 2025-01-14

## 2025-01-14 NOTE — PROGRESS NOTES
"Primary Care Provider Appointment - [facility]  Jean Pena MD      Subjective:      Patient ID: Meredith Keen is a 69 y.o. 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    Prior to this visit, patient's last encounter with PCP was 2024    Pt seen with daughter-in-law Keli who translates.  Pt c/o itching on arms and back primarily at night in bed; sometimes present in daytime but not as prominent.  Feels like "bug bites."  Started about 3 w/a.  Has a rash in affected areas.  Uses CeraVe or Keri cream.  Normally only uses lotion on hands; but not on body due to itching.      : Video  [employer] ([id number]) used for visit.  Feels well today; no complaints.  Had EUS in September; went well.  On Prolia q6mo ordered by her endocrinologist; last in  and will be due for another next month.        4Ms for Medical Decision-Making in Older Adults    Last Completed EAWV: None    MOBILITY:  Get Up and Go:      2024    10:05 AM   Get Up and Go   Trial 1 10 seconds     Activities of Daily Livin/26/2024    10:02 AM   Activities of Daily Living   Ambulation Independent   Dressing Independent   Transfers Independent   Toileting Continent of bladder;Continent of bowel   Feeding Independent   Cleaning home/Chores Independent   Telephone use Independent   Shopping Independent   Paying bills Independent   Taking meds Independent     Whisper Test:      2024    10:05 AM   Whisper Test   Whisper Test Normal     Disability Status:      2024    10:03 AM   Disability Status   Are you deaf or do you have serious difficulty hearing? N   Are you blind or do you have serious difficulty seeing, " even when wearing glasses? N   Because of a physical, mental, or emotional condition, do you have serious difficulty concentrating, remembering, or making decisions? N   Do you have serious difficulty walking or climbing stairs? N   Do you have difficulty dressing or bathing? N   Because of a physical, mental, or emotional condition, do you have difficulty doing errands alone such as visiting a doctor's office or shopping? N     Nutrition Screenin/26/2024    10:02 AM   Nutrition Screening   Has food intake declined over the past three months due to loss of appetite, digestive problems, chewing or swallowing difficulties? No decrease in food intake   Involuntary weight loss during the last 3 months? No weight loss   Mobility? Goes out   Has the patient suffered psychological stress or acute disease in the past three months? No   Neuropsychological problems? No psychological problems   Body Mass Index (BMI)?  BMI 21 to less than 23   Screening Score 13   Interpretation Normal nutritional status    Screening Score: 0-7 Malnourished, 8-11 At Risk, 12-14 Normal    MENTATION:   Depression Patient Health Questionnaire:      2024    10:05 AM   Depression Patient Health Questionnaire   Over the last two weeks how often have you been bothered by little interest or pleasure in doing things Not at all   Over the last two weeks how often have you been bothered by feeling down, depressed or hopeless Not at all   PHQ-2 Total Score 0     Has Dementia Dx: No    Cognitive Function Screening:       No data to display              Cognitive Function Screening Total - Less than 4 = Abnormal,  Greater than or equal to 4 = Normal    MEDICATIONS:  High Risk Medications:  Total Active Medications: 1  EScitalopram oxalate - 10 MG    WHAT MATTERS MOST:  Advance Care Planning   ACP Status:   Patient has had an ACP conversation  Living Will: No  Power of : No  [place]: No    Social History     Socioeconomic History     Marital status:    Tobacco Use    Smoking status: Never    Smokeless tobacco: Never   Substance and Sexual Activity    Alcohol use: No    Drug use: No    Sexual activity: Yes     Partners: Male     Social Drivers of Health     Financial Resource Strain: Medium Risk (5/23/2024)    Overall Financial Resource Strain (CARDIA)     Difficulty of Paying Living Expenses: Somewhat hard   Food Insecurity: No Food Insecurity (5/23/2024)    Hunger Vital Sign     Worried About Running Out of Food in the Last Year: Never true     Ran Out of Food in the Last Year: Never true   Transportation Needs: No Transportation Needs (4/1/2024)    PRAPARE - Transportation     Lack of Transportation (Medical): No     Lack of Transportation (Non-Medical): No   Physical Activity: Insufficiently Active (5/23/2024)    Exercise Vital Sign     Days of Exercise per Week: 2 days     Minutes of Exercise per Session: 30 min   Stress: No Stress Concern Present (5/23/2024)    [employer] [place] [employer] - Occupational Stress Questionnaire     Feeling of Stress : Only a little   Housing Stability: Low Risk  (4/1/2024)    Housing Stability Vital Sign     Unable to Pay for Housing in the Last Year: No     Number of Places Lived in the Last Year: 1     Unstable Housing in the Last Year: No       Review of Systems   Constitutional:  Negative for activity change, appetite change, chills, fever and unexpected weight change.   HENT:  Negative for nasal congestion, hearing loss, postnasal drip, rhinorrhea, sinus pressure/congestion, sore throat and trouble swallowing.    Eyes:  Negative for photophobia, discharge and visual disturbance.   Respiratory:  Negative for cough, chest tightness, shortness of breath and wheezing.    Cardiovascular:  Negative for chest pain, palpitations and leg swelling.   Gastrointestinal:  Negative for abdominal distention, abdominal pain, change in bowel habit, constipation, diarrhea, nausea and vomiting.  "  Genitourinary:  Negative for difficulty urinating and hematuria.   Musculoskeletal:  Negative for arthralgias, back pain, myalgias and neck pain.   Integumentary:  Positive for rash. Negative for wound.   Neurological:  Negative for weakness, numbness and headaches.   Psychiatric/Behavioral:  Negative for dysphoric mood and sleep disturbance.         Objective:   BP (!) 148/72 (BP Location: Left arm, Patient Position: Sitting)   Pulse 65   Temp 98.2 °F (36.8 °C) (Oral)   Ht 5' 2" (1.575 m)   Wt 52.4 kg (115 lb 8.3 oz)   SpO2 99%   BMI 21.13 kg/m²     Physical Exam  Vitals reviewed.   Constitutional:       Appearance: She is normal weight. She is not ill-appearing.   HENT:      Head: Normocephalic and atraumatic.      Nose: No congestion or rhinorrhea.      Mouth/Throat:      Mouth: Mucous membranes are moist.      Pharynx: Oropharynx is clear.   Eyes:      General: No scleral icterus.     Conjunctiva/sclera: Conjunctivae normal.   Cardiovascular:      Rate and Rhythm: Normal rate and regular rhythm.      Heart sounds: Normal heart sounds.   Pulmonary:      Effort: Pulmonary effort is normal.      Breath sounds: Normal breath sounds.   Abdominal:      General: A surgical scar is present. Bowel sounds are normal. There is no distension.      Palpations: Abdomen is soft. There is no hepatomegaly or splenomegaly.      Tenderness: There is no abdominal tenderness. There is no guarding.   Musculoskeletal:      Cervical back: Normal range of motion and neck supple. No rigidity or tenderness.   Lymphadenopathy:      Cervical: No cervical adenopathy.   Skin:     General: Skin is warm and dry.      Coloration: Skin is not pale.      Findings: Rash present.      Comments: Dry skin with punctate raised erythematous lesions c/w nummular eczema.   Neurological:      General: No focal deficit present.      Mental Status: She is alert and oriented to person, place, and time.      Motor: No weakness.      Comments:  " "strength symmetric bilaterally              Lab Results   Component Value Date    WBC 7.10 08/20/2024    HGB 12.3 08/20/2024    HCT 39.7 08/20/2024     08/20/2024    CHOL 184 06/06/2023    TRIG 86 06/06/2023    HDL 53 06/06/2023    ALT 15 08/20/2024    AST 19 08/20/2024     11/25/2024    K 4.3 11/25/2024     11/25/2024    CREATININE 0.8 11/25/2024    BUN 17 11/25/2024    CO2 24 11/25/2024    TSH 1.172 07/05/2022    INR 0.9 10/19/2015    HGBA1C 6.2 (H) 06/07/2024       Current Outpatient Medications on File Prior to Visit   Medication Sig Dispense Refill    amLODIPine (NORVASC) 2.5 MG tablet Take 1 tablet (2.5 mg total) by mouth once daily. 90 tablet 3    aspirin 81 MG Chew Take 1 tablet (81 mg total) by mouth 3 (three) times a week. 30 tablet 0    atorvastatin (LIPITOR) 20 MG tablet TAKE 1 TABLET BY MOUTH EVERY DAY 90 tablet 3    BD ULTRA-FINE SHORT PEN NEEDLE 31 gauge x 5/16" Ndle Inject into the skin.      EScitalopram oxalate (LEXAPRO) 10 MG tablet TAKE 1 TABLET BY MOUTH EVERY DAY 90 tablet 3    famotidine (PEPCID) 20 MG tablet Take 1 tablet (20 mg total) by mouth 2 (two) times daily as needed for Heartburn (reflux). 60 tablet 11    fluoride, sodium, (DENTA 5000 PLUS) 1.1 % Crea BRUSH ON SENSITIVE TEETH 3-5 MINUTES TWICE A DAY      fluticasone propionate (FLONASE) 50 mcg/actuation nasal spray 1 SPRAY (50 MCG TOTAL) BY EACH NOSTRIL ROUTE DAILY AS NEEDED FOR RHINITIS (CONGESTION, COUGH). 48 mL 1    losartan (COZAAR) 100 MG tablet TAKE 1 TABLET BY MOUTH EVERY DAY 90 tablet 3    PROLIA 60 mg/mL Syrg       promethazine (PHENERGAN) 6.25 mg/5 mL syrup Take 5 mLs (6.25 mg total) by mouth every 6 (six) hours as needed (cough). 120 mL 0     No current facility-administered medications on file prior to visit.         Assessment:   69 y.o. female with multiple co-morbid illnesses here for follow-up for ongoing chronic disease management and preventive health maintenance.    Plan:     Problem List Items " Addressed This Visit       Other chronic pancreatitis     Patient has been asymptomatic and follows with [facility].         Essential hypertension     Blood pressure not at goal today, the patient acutely anxious about appointment in a new location.  We will reassess at next visit.         Relevant Medications    atorvastatin (LIPITOR) 20 MG tablet    Gastroesophageal reflux disease     PEPCID REFILLED         Relevant Medications    famotidine (PEPCID) 20 MG tablet    Nummular eczema - Primary     Counseled pt on a/w dry skin to use a moisturizer daily after showering or bathing.  Betamethasone cream prescribed for application to lesions when they appear.         Relevant Medications    betamethasone valerate 0.1% (VALISONE) 0.1 % Crea     Other Visit Diagnoses       Encounter for screening mammogram for malignant neoplasm of breast        Relevant Orders    Mammo Digital Screening Bilat w/ Nii    Need for immunization against influenza        Relevant Medications    influenza (adjuvanted) (Fluad) 45 mcg/0.5 mL IM vaccine (> or = 64 yo) 0.5 mL    COVID-19 virus infection        Relevant Medications    fluticasone propionate (FLONASE) 50 mcg/actuation nasal spray                        Health Maintenance         Date Due Completion Date    RSV Vaccine (Age 60+ and Pregnant patients) (1 - Risk 60-74 years 1-dose series) Never done ---    Shingles Vaccine (2 of 2) 03/30/2020 2/3/2020    Influenza Vaccine (1) 09/01/2024 10/21/2022    COVID-19 Vaccine (5 - 2024-25 season) 09/01/2024 4/3/2021    Mammogram 03/28/2025 3/28/2024    Hemoglobin A1c (Prediabetes) 06/07/2025 6/7/2024    High Dose Statin 11/25/2025 11/25/2024    DEXA Scan 09/21/2026 9/21/2023    Lipid Panel 06/06/2028 6/6/2023    Colorectal Cancer Screening 02/22/2032 2/22/2022    TETANUS VACCINE 05/27/2032 5/27/2022        Pt reports having had 2 shingles shots but at 2 different locations; 2nd at [facility] (documented) but doesn't remember where the 1st  was done, but her son might as he took her there.  Flu shot given today.    Future Appointments   Date Time Provider Department Center   5/19/2025 10:00 AM [facility] [place]   5/26/2025  1:00 PM Mikal Duarte MD [facility]         Follow up in about 4 months (around 5/14/2025). Total clinical care time was 20 min.    Jean Pena MD   [facility]    This note was partially dictated using voice recognition software and although actively proofread during transcription, it is possible some errors in transcription may have been overlooked.

## 2025-01-15 PROBLEM — L30.0 NUMMULAR ECZEMA: Status: ACTIVE | Noted: 2025-01-15

## 2025-01-15 NOTE — ASSESSMENT & PLAN NOTE
Counseled pt on a/w dry skin to use a moisturizer daily after showering or bathing.  Betamethasone cream prescribed for application to lesions when they appear.

## 2025-01-15 NOTE — ASSESSMENT & PLAN NOTE
Blood pressure not at goal today, the patient acutely anxious about appointment in a new location.  We will reassess at next visit.

## 2025-02-23 ENCOUNTER — HOSPITAL ENCOUNTER (EMERGENCY)
Facility: HOSPITAL | Age: 70
Discharge: HOME | End: 2025-02-23
Attending: EMERGENCY MEDICINE
Payer: MEDICARE

## 2025-02-23 VITALS
TEMPERATURE: 98 F | WEIGHT: 117.5 LBS | HEIGHT: 62 IN | SYSTOLIC BLOOD PRESSURE: 181 MMHG | BODY MASS INDEX: 21.62 KG/M2 | RESPIRATION RATE: 18 BRPM | OXYGEN SATURATION: 98 % | DIASTOLIC BLOOD PRESSURE: 85 MMHG | HEART RATE: 75 BPM

## 2025-02-23 DIAGNOSIS — R10.9 RIGHT FLANK PAIN: ICD-10-CM

## 2025-02-23 LAB
ALBUMIN SERPL BCP-MCNC: 4.1 G/DL (ref 3.5–5.2)
ALP SERPL-CCNC: 61 U/L (ref 55–135)
ALT SERPL W/O P-5'-P-CCNC: 23 U/L (ref 10–44)
ANION GAP SERPL CALC-SCNC: 9 MMOL/L (ref 8–16)
AST SERPL-CCNC: 38 U/L (ref 10–40)
BACTERIA #/AREA URNS HPF: NEGATIVE /HPF
BASOPHILS # BLD AUTO: 0.06 K/UL (ref 0–0.2)
BASOPHILS NFR BLD: 0.7 % (ref 0–1.9)
BILIRUB SERPL-MCNC: 0.3 MG/DL (ref 0.1–1)
BILIRUB UR QL STRIP: NEGATIVE
BUN SERPL-MCNC: 17 MG/DL (ref 8–23)
CALCIUM SERPL-MCNC: 9.3 MG/DL (ref 8.7–10.5)
CHLORIDE SERPL-SCNC: 103 MMOL/L (ref 95–110)
CLARITY UR: ABNORMAL
CO2 SERPL-SCNC: 25 MMOL/L (ref 23–29)
COLOR UR: YELLOW
CREAT SERPL-MCNC: 0.6 MG/DL (ref 0.5–1.4)
DIFFERENTIAL METHOD BLD: ABNORMAL
EOSINOPHIL # BLD AUTO: 0.3 K/UL (ref 0–0.5)
EOSINOPHIL NFR BLD: 3.2 % (ref 0–8)
ERYTHROCYTE [DISTWIDTH] IN BLOOD BY AUTOMATED COUNT: 14.3 % (ref 11.5–14.5)
EST. GFR  (NO RACE VARIABLE): >60 ML/MIN/1.73 M^2
GLUCOSE SERPL-MCNC: 136 MG/DL (ref 70–110)
GLUCOSE UR QL STRIP: NEGATIVE
HCT VFR BLD AUTO: 39.3 % (ref 37–48.5)
HGB BLD-MCNC: 12.6 G/DL (ref 12–16)
HGB UR QL STRIP: ABNORMAL
HYALINE CASTS #/AREA URNS LPF: 1.1 /LPF
IMM GRANULOCYTES # BLD AUTO: 0.04 K/UL (ref 0–0.04)
IMM GRANULOCYTES NFR BLD AUTO: 0.5 % (ref 0–0.5)
KETONES UR QL STRIP: NEGATIVE
LEUKOCYTE ESTERASE UR QL STRIP: ABNORMAL
LIPASE SERPL-CCNC: 46 U/L (ref 4–60)
LYMPHOCYTES # BLD AUTO: 1.7 K/UL (ref 1–4.8)
LYMPHOCYTES NFR BLD: 19.9 % (ref 18–48)
MCH RBC QN AUTO: 25.2 PG (ref 27–31)
MCHC RBC AUTO-ENTMCNC: 32.1 G/DL (ref 32–36)
MCV RBC AUTO: 79 FL (ref 82–98)
MICROSCOPIC COMMENT: ABNORMAL
MONOCYTES # BLD AUTO: 0.7 K/UL (ref 0.3–1)
MONOCYTES NFR BLD: 8 % (ref 4–15)
NEUTROPHILS # BLD AUTO: 5.7 K/UL (ref 1.8–7.7)
NEUTROPHILS NFR BLD: 67.7 % (ref 38–73)
NITRITE UR QL STRIP: NEGATIVE
NRBC BLD-RTO: 0 /100 WBC
PH UR STRIP: 7 [PH] (ref 5–8)
PLATELET # BLD AUTO: 218 K/UL (ref 150–450)
PMV BLD AUTO: 10.1 FL (ref 9.2–12.9)
POTASSIUM SERPL-SCNC: 4 MMOL/L (ref 3.5–5.1)
PROT SERPL-MCNC: 7.3 G/DL (ref 6–8.4)
PROT UR QL STRIP: NEGATIVE
RBC # BLD AUTO: 5 M/UL (ref 4–5.4)
RBC #/AREA URNS HPF: 4 /HPF (ref 0–4)
SODIUM SERPL-SCNC: 137 MMOL/L (ref 136–145)
SP GR UR STRIP: 1.02 (ref 1–1.03)
SQUAMOUS #/AREA URNS HPF: 1 /HPF
URN SPEC COLLECT METH UR: ABNORMAL
UROBILINOGEN UR STRIP-ACNC: 1 EU/DL
WBC # BLD AUTO: 8.46 K/UL (ref 3.9–12.7)
WBC #/AREA URNS HPF: 3 /HPF (ref 0–5)

## 2025-02-23 PROCEDURE — 63600175 PHARM REV CODE 636 W HCPCS: Mod: JZ,TB | Performed by: CLINICAL NURSE SPECIALIST

## 2025-02-23 PROCEDURE — 99285 EMERGENCY DEPT VISIT HI MDM: CPT | Mod: 25

## 2025-02-23 PROCEDURE — 25000003 PHARM REV CODE 250: Performed by: CLINICAL NURSE SPECIALIST

## 2025-02-23 PROCEDURE — 85025 COMPLETE CBC W/AUTO DIFF WBC: CPT | Performed by: CLINICAL NURSE SPECIALIST

## 2025-02-23 PROCEDURE — 81000 URINALYSIS NONAUTO W/SCOPE: CPT | Performed by: CLINICAL NURSE SPECIALIST

## 2025-02-23 PROCEDURE — 80053 COMPREHEN METABOLIC PANEL: CPT | Performed by: CLINICAL NURSE SPECIALIST

## 2025-02-23 PROCEDURE — 96374 THER/PROPH/DIAG INJ IV PUSH: CPT

## 2025-02-23 PROCEDURE — 36415 COLL VENOUS BLD VENIPUNCTURE: CPT | Performed by: CLINICAL NURSE SPECIALIST

## 2025-02-23 PROCEDURE — 83690 ASSAY OF LIPASE: CPT | Performed by: CLINICAL NURSE SPECIALIST

## 2025-02-23 RX ORDER — KETOROLAC TROMETHAMINE 10 MG/1
10 TABLET, FILM COATED ORAL EVERY 6 HOURS
Qty: 20 TABLET | Refills: 0 | Status: SHIPPED | OUTPATIENT
Start: 2025-02-23 | End: 2025-02-28

## 2025-02-23 RX ORDER — KETOROLAC TROMETHAMINE 30 MG/ML
15 INJECTION, SOLUTION INTRAMUSCULAR; INTRAVENOUS
Status: COMPLETED | OUTPATIENT
Start: 2025-02-23 | End: 2025-02-23

## 2025-02-23 RX ORDER — METHOCARBAMOL 500 MG/1
1000 TABLET, FILM COATED ORAL ONCE
Status: COMPLETED | OUTPATIENT
Start: 2025-02-23 | End: 2025-02-23

## 2025-02-23 RX ORDER — CYCLOBENZAPRINE HCL 10 MG
10 TABLET ORAL 3 TIMES DAILY PRN
Qty: 15 TABLET | Refills: 0 | Status: SHIPPED | OUTPATIENT
Start: 2025-02-23 | End: 2025-02-28

## 2025-02-23 RX ADMIN — KETOROLAC TROMETHAMINE 15 MG: 30 INJECTION, SOLUTION INTRAMUSCULAR; INTRAVENOUS at 07:02

## 2025-02-23 RX ADMIN — METHOCARBAMOL 1000 MG: 500 TABLET ORAL at 07:02

## 2025-02-24 DIAGNOSIS — Z00.00 ENCOUNTER FOR MEDICARE ANNUAL WELLNESS EXAM: ICD-10-CM

## 2025-02-24 NOTE — ED PROVIDER NOTES
Encounter Date: 2/23/2025       History     Chief Complaint   Patient presents with    Back Pain     Pt to the ER w/ complaints of R sided back pain under her scapula beginning yesterday. Reports strenuous activity prior. States she woke up w/ pain. Denies any GI/ symptoms.      69-year-old female presents emergency room with right flank pain under scapula started yesterday.  Patient reports she performs the strenuous activity yesterday.  Woke up with pain this morning.  Denies any abdominal pain,  symptoms.  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     Past Surgical History:   Procedure Laterality Date    APPENDECTOMY      CHOLECYSTECTOMY      ENDOSCOPIC ULTRASOUND OF UPPER GASTROINTESTINAL TRACT N/A 11/26/2019    Procedure: ULTRASOUND, UPPER GI TRACT, ENDOSCOPIC;  Surgeon: Britton Martinez MD;  Location: [facility];  Service: Endoscopy;  Laterality: N/A;    ENDOSCOPIC ULTRASOUND OF UPPER GASTROINTESTINAL TRACT N/A 6/24/2020    Procedure: ULTRASOUND, UPPER GI TRACT, ENDOSCOPIC;  Surgeon: Delfino Jasso MD;  Location: [facility] ([address] [place] [address]);  Service: Endoscopy;  Laterality: N/A;  EUS (linear) for abnormal CT scan. Urgent. Can be done by any [facility] physician.  Britton Martinez MD  Covid-19 test 6/22/20 at [facility] - pg  Speaks Grenadian;  offered and declined; daughter will accompany patient as interp    ENDOSCOPIC ULTRASOUND OF UPPER GASTROINTESTINAL TRACT N/A 5/26/2021    Procedure: ULTRASOUND, UPPER GI TRACT, ENDOSCOPIC;  Surgeon: Britton Martinez MD;  Location: [facility] ([address] [place] [address]);  Service:  Endoscopy;  Laterality: N/A;  Need EUS (linear) for dilated bile duct on MRI. Main or [facility]. 45 minutes.   Britton Martinez MD   Fully vaccinated - sending in copy of card and bringing it on day of procedure. ttr  *NEEDS Ethiopian *    ENDOSCOPIC ULTRASOUND OF UPPER GASTROINTESTINAL TRACT N/A 9/20/2024    Procedure: ULTRASOUND, UPPER GI TRACT, ENDOSCOPIC;  Surgeon: Britton Martinez MD;  Location: [facility];  Service: Endoscopy;  Laterality: N/A;    ESOPHAGOGASTRODUODENOSCOPY N/A 9/9/2020    Procedure: EGD (ESOPHAGOGASTRODUODENOSCOPY);  Surgeon: Devyn Miles MD;  Location: [facility] OR [address] [place] [address];  Service: General;  Laterality: N/A;    ESOPHAGOGASTRODUODENOSCOPY N/A 9/20/2024    Procedure: EGD (ESOPHAGOGASTRODUODENOSCOPY);  Surgeon: Britton Martinez MD;  Location: [facility];  Service: Endoscopy;  Laterality: N/A;  9/16/24-Precall complete-DS    HYSTERECTOMY  2012    elective    LAPAROSCOPIC LYSIS OF ADHESIONS  9/9/2020    Procedure: LYSIS, ADHESIONS, LAPAROSCOPIC;  Surgeon: Devyn Miles MD;  Location: [facility] OR [address] [place] [address];  Service: General;;    LYSIS OF ADHESIONS N/A 6/16/2022    Procedure: LYSIS, ADHESIONS;  Surgeon: Jacob Greco MD;  Location: [facility] OR [address] [place] [address];  Service: General;  Laterality: N/A;    ROBOT-ASSISTED SURGICAL REMOVAL OF STOMACH USING DA RACHEL XI N/A 9/9/2020    Procedure: XI ROBOTIC GASTRECTOMY/GIST PROXIMAL STOMACH;  Surgeon: Devyn Miles MD;  Location: [facility] OR [address] [place] [address];  Service: General;  Laterality: N/A;     Family History   Problem Relation Name Age of Onset    No Known Problems Mother      No Known Problems Father       Social History[1]  Review of Systems   Constitutional:  Negative for fever.   HENT:  Negative for sore throat.    Respiratory:  Negative for shortness of breath.    Cardiovascular:  Negative for chest pain.   Gastrointestinal:  Negative for nausea.   Genitourinary:  Positive for flank pain. Negative for dysuria.   Musculoskeletal:  Negative for back  pain.   Skin:  Negative for rash.   Neurological:  Negative for weakness.   Hematological:  Does not bruise/bleed easily.   All other systems reviewed and are negative.      Physical Exam     Initial Vitals [02/23/25 1931]   BP Pulse Resp Temp SpO2   (!) 197/81 70 18 98.3 °F (36.8 °C) 98 %      MAP       --         Physical Exam    Nursing note and vitals reviewed.  Constitutional: She appears well-developed and well-nourished.   HENT:   Head: Normocephalic and atraumatic.   Eyes: Pupils are equal, round, and reactive to light.   Neck:   Normal range of motion.  Cardiovascular:  Normal rate and regular rhythm.           Pulmonary/Chest: Breath sounds normal.   Abdominal: Abdomen is soft. Bowel sounds are normal.   Musculoskeletal:         General: Normal range of motion.      Cervical back: Normal range of motion.      Comments: Right CVA tenderness.     Neurological: She is alert and oriented to person, place, and time.   Skin: Skin is warm and dry.   No rashes noted to right flank.  Patient does have markings from coining that she had done recently.   Psychiatric: She has a normal mood and affect.         ED Course   Procedures  Labs Reviewed   CBC W/ AUTO DIFFERENTIAL - Abnormal       Result Value    WBC 8.46      RBC 5.00      Hemoglobin 12.6      Hematocrit 39.3      MCV 79 (*)     MCH 25.2 (*)     MCHC 32.1      RDW 14.3      Platelets 218      MPV 10.1      Immature Granulocytes 0.5      Gran # (ANC) 5.7      Immature Grans (Abs) 0.04      Lymph # 1.7      Mono # 0.7      Eos # 0.3      Baso # 0.06      nRBC 0      Gran % 67.7      Lymph % 19.9      Mono % 8.0      Eosinophil % 3.2      Basophil % 0.7      Differential Method Automated     COMPREHENSIVE METABOLIC PANEL - Abnormal    Sodium 137      Potassium 4.0      Chloride 103      CO2 25      Glucose 136 (*)     BUN 17      Creatinine 0.6      Calcium 9.3      Total Protein 7.3      Albumin 4.1      Total Bilirubin 0.3      Alkaline Phosphatase 61       AST 38      ALT 23      eGFR >60.0      Anion Gap 9     URINALYSIS, REFLEX TO URINE CULTURE - Abnormal    Specimen UA Urine, Clean Catch      Color, UA Yellow      Appearance, UA Cloudy (*)     pH, UA 7.0      Specific Gravity, UA 1.020      Protein, UA Negative      Glucose, UA Negative      Ketones, UA Negative      Bilirubin (UA) Negative      Occult Blood UA Trace (*)     Nitrite, UA Negative      Urobilinogen, UA 1.0      Leukocytes, UA Trace (*)     Narrative:     Preferred Collection Type->Urine, Clean Catch  Specimen Source->Urine   URINALYSIS MICROSCOPIC - Abnormal    RBC, UA 4      WBC, UA 3      Bacteria Negative      Squam Epithel, UA 1      Hyaline Casts, UA 1.1 (*)     Microscopic Comment SEE COMMENT      Narrative:     Preferred Collection Type->Urine, Clean Catch  Specimen Source->Urine   LIPASE    Lipase 46            Imaging Results              CT Renal Stone Study ABD Pelvis WO (Final result)  Result time 02/23/25 20:49:46      Final result by Mariah Coreas MD (02/23/25 20:49:46)                   Impression:      Incidental lipoma anterior to the left femoral head not significantly changed with no acute abnormality of abdomen or pelvis      Electronically signed by: Mariah Coreas MD  Date:    02/23/2025  Time:    20:49               Narrative:    EXAMINATION:  CT RENAL STONE STUDY ABD PELVIS WO    CLINICAL HISTORY:  Flank pain, kidney stone suspected;    TECHNIQUE:  Iterative reconstruction technique was used.    CT/Cardiac Nuclear exams in prior 12 months: 1    COMPARISON:  03/31/2024.    FINDINGS:  Gravity dependent changes at the lung bases.  There is been a cholecystectomy.  The non contrasted liver and spleen are normal.  The pancreas and adrenals are normal.  There are no renal stones.  There is what appears to be a renal vascular calcification on the left.  There is no hydronephrosis or hydroureter.  There is no bowel obstruction.  There is a lipoma in the muscle anterior to the left  femur head.  It measures 3.7 x 2.7 cm.  This is unchanged.  There are postsurgical changes of small bowel in the left pelvis                                       X-Ray Chest AP Portable (In process)                      Medications   ketorolac injection 15 mg (15 mg Intravenous Given 2/23/25 1959)   methocarbamoL tablet 1,000 mg (1,000 mg Oral Given 2/23/25 1959)     Medical Decision Making  Amount and/or Complexity of Data Reviewed  Labs: ordered.  Radiology: ordered.    Risk  Prescription drug management.                                      Clinical Impression:  Final diagnoses:  [R10.9] Right flank pain          ED Disposition Condition    Discharge Stable          ED Prescriptions       Medication Sig Dispense Start Date End Date Auth. Provider    cyclobenzaprine (FLEXERIL) 10 MG tablet Take 1 tablet (10 mg total) by mouth 3 (three) times daily as needed for Muscle spasms. 15 tablet 2/23/2025 2/28/2025 Annette Fierro NP    ketorolac (TORADOL) 10 mg tablet Take 1 tablet (10 mg total) by mouth every 6 (six) hours. for 5 days 20 tablet 2/23/2025 2/28/2025 Annette Fierro NP          Follow-up Information       Follow up With Specialties Details Why Contact Info    Jean Pena MD Internal Medicine  As needed [address] [facility] [address]  [phone]                   [1]   Social History  Tobacco Use    Smoking status: Never    Smokeless tobacco: Never   Substance Use Topics    Alcohol use: No    Drug use: No        Annette Fierro NP  02/23/25 [id number]

## 2025-03-26 ENCOUNTER — OFFICE VISIT (OUTPATIENT)
Facility: CLINIC | Age: 70
End: 2025-03-26
Payer: MEDICARE

## 2025-03-26 VITALS
SYSTOLIC BLOOD PRESSURE: 135 MMHG | WEIGHT: 114.19 LBS | HEIGHT: 62 IN | OXYGEN SATURATION: 95 % | DIASTOLIC BLOOD PRESSURE: 64 MMHG | TEMPERATURE: 99 F | BODY MASS INDEX: 21.02 KG/M2 | HEART RATE: 78 BPM

## 2025-03-26 DIAGNOSIS — S46.911D SHOULDER STRAIN, RIGHT, SUBSEQUENT ENCOUNTER: Primary | ICD-10-CM

## 2025-03-26 PROCEDURE — 1159F MED LIST DOCD IN RCRD: CPT | Mod: HCNC,CPTII,S$GLB, | Performed by: HOSPITALIST

## 2025-03-26 PROCEDURE — 3288F FALL RISK ASSESSMENT DOCD: CPT | Mod: HCNC,CPTII,S$GLB, | Performed by: HOSPITALIST

## 2025-03-26 PROCEDURE — 3075F SYST BP GE 130 - 139MM HG: CPT | Mod: HCNC,CPTII,S$GLB, | Performed by: HOSPITALIST

## 2025-03-26 PROCEDURE — 1126F AMNT PAIN NOTED NONE PRSNT: CPT | Mod: HCNC,CPTII,S$GLB, | Performed by: HOSPITALIST

## 2025-03-26 PROCEDURE — 99214 OFFICE O/P EST MOD 30 MIN: CPT | Mod: HCNC,S$GLB,, | Performed by: HOSPITALIST

## 2025-03-26 PROCEDURE — 99999 PR PBB SHADOW E&M-EST. PATIENT-LVL IV: CPT | Mod: PBBFAC,HCNC,, | Performed by: HOSPITALIST

## 2025-03-26 PROCEDURE — 3078F DIAST BP <80 MM HG: CPT | Mod: HCNC,CPTII,S$GLB, | Performed by: HOSPITALIST

## 2025-03-26 PROCEDURE — 1101F PT FALLS ASSESS-DOCD LE1/YR: CPT | Mod: HCNC,CPTII,S$GLB, | Performed by: HOSPITALIST

## 2025-03-26 PROCEDURE — 3008F BODY MASS INDEX DOCD: CPT | Mod: HCNC,CPTII,S$GLB, | Performed by: HOSPITALIST

## 2025-03-26 RX ORDER — DICLOFENAC SODIUM 10 MG/G
2 GEL TOPICAL 2 TIMES DAILY PRN
Qty: 200 G | Refills: 5 | Status: SHIPPED | OUTPATIENT
Start: 2025-03-26

## 2025-03-26 NOTE — ASSESSMENT & PLAN NOTE
Suspect patient likely strained a muscle in her back that is not completely healed over the past month.  Exam does not point to any rotator cuff pathology.  Refill for Voltaren sent and physical therapy referral placed.

## 2025-03-26 NOTE — PROGRESS NOTES
"Primary Care Provider Appointment - [facility]  Jean Pena MD      Subjective:      Patient ID: Meredith Keen is a 69 y.o. 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  Doing well today.  ED visit  for acute back/shoulder pain; dx'd w/ muscle strain and Rx'd muscle relaxer and NSAID which helped.  Also been using voltaren.  Still having pain w/ lifting so trying to avoid that.    Rash at last visit resolved; using moisturizer regularly.      : Pt seen with daughter-in-law Keli who translates.  Pt c/o itching on arms and back primarily at night in bed; sometimes present in daytime but not as prominent.  Feels like "bug bites."  Started about 3 w/a.  Has a rash in affected areas.  Uses CeraVe or Keri cream.  Normally only uses lotion on hands; but not on body due to itching.      : Video  Geovanny ([id number]) used for visit.  Feels well today; no complaints.  Had EUS in September; went well.  On Prolia q6mo ordered by her endocrinologist; last in  and will be due for another next month.        4Ms for Medical Decision-Making in Older Adults    Last Completed EAWV: None    MOBILITY:  Get Up and Go:      2024    10:05 AM   Get Up and Go   Trial 1 10 seconds     Activities of Daily Livin/26/2024    10:02 AM   Activities of Daily Living   Ambulation Independent   Dressing Independent   Transfers Independent   Toileting Continent of bladder;Continent of bowel   Feeding Independent   Cleaning home/Chores Independent   Telephone use Independent   Shopping Independent   Paying " rivera Independent   Taking meds Independent     Whisper Test:      2024    10:05 AM   Whisper Test   Whisper Test Normal     Disability Status:      2024    10:03 AM   Disability Status   Are you deaf or do you have serious difficulty hearing? N   Are you blind or do you have serious difficulty seeing, even when wearing glasses? N   Because of a physical, mental, or emotional condition, do you have serious difficulty concentrating, remembering, or making decisions? N   Do you have serious difficulty walking or climbing stairs? N   Do you have difficulty dressing or bathing? N   Because of a physical, mental, or emotional condition, do you have difficulty doing errands alone such as visiting a doctor's office or shopping? N     Nutrition Screenin/26/2024    10:02 AM   Nutrition Screening   Has food intake declined over the past three months due to loss of appetite, digestive problems, chewing or swallowing difficulties? No decrease in food intake   Involuntary weight loss during the last 3 months? No weight loss   Mobility? Goes out   Has the patient suffered psychological stress or acute disease in the past three months? No   Neuropsychological problems? No psychological problems   Body Mass Index (BMI)?  BMI 21 to less than 23   Screening Score 13   Interpretation Normal nutritional status    Screening Score: 0-7 Malnourished, 8-11 At Risk, 12-14 Normal    MENTATION:   Depression Patient Health Questionnaire:      2025     9:32 AM   Depression Patient Health Questionnaire   PHQ-4 Total Score 5     Has Dementia Dx: No    Cognitive Function Screening:       No data to display              Cognitive Function Screening Total - Less than 4 = Abnormal,  Greater than or equal to 4 = Normal    MEDICATIONS:  High Risk Medications:  Total Active Medications: 1  EScitalopram oxalate - 10 MG    WHAT MATTERS MOST:  Advance Care Planning   ACP Status:   Patient has had an ACP conversation  Living Will:  No  Power of : No  LaPOST: No    Social History     Socioeconomic History    Marital status:    Tobacco Use    Smoking status: Never    Smokeless tobacco: Never   Substance and Sexual Activity    Alcohol use: No    Drug use: No    Sexual activity: Yes     Partners: Male     Social Drivers of Health     Financial Resource Strain: Medium Risk (5/23/2024)    Overall Financial Resource Strain (CARDIA)     Difficulty of Paying Living Expenses: Somewhat hard   Food Insecurity: No Food Insecurity (5/23/2024)    Hunger Vital Sign     Worried About Running Out of Food in the Last Year: Never true     Ran Out of Food in the Last Year: Never true   Transportation Needs: No Transportation Needs (4/1/2024)    PRAPARE - Transportation     Lack of Transportation (Medical): No     Lack of Transportation (Non-Medical): No   Physical Activity: Insufficiently Active (5/23/2024)    Exercise Vital Sign     Days of Exercise per Week: 2 days     Minutes of Exercise per Session: 30 min   Stress: No Stress Concern Present (5/23/2024)    [place] [employer] - Occupational Stress Questionnaire     Feeling of Stress : Only a little   Housing Stability: Low Risk  (4/1/2024)    Housing Stability Vital Sign     Unable to Pay for Housing in the Last Year: No     Number of Places Lived in the Last Year: 1     Unstable Housing in the Last Year: No       Review of Systems   Constitutional:  Negative for activity change, appetite change, chills, fever and unexpected weight change.   HENT:  Negative for nasal congestion, hearing loss, postnasal drip, rhinorrhea, sinus pressure/congestion, sore throat and trouble swallowing.    Eyes:  Negative for photophobia, discharge and visual disturbance.   Respiratory:  Negative for cough, chest tightness, shortness of breath and wheezing.    Cardiovascular:  Negative for chest pain, palpitations and leg swelling.   Gastrointestinal:  Negative for abdominal distention, abdominal  "pain, change in bowel habit, constipation, diarrhea, nausea and vomiting.   Genitourinary:  Negative for difficulty urinating and hematuria.   Musculoskeletal:  Positive for back pain. Negative for arthralgias, myalgias and neck pain.   Integumentary:  Negative for rash and wound.   Neurological:  Negative for weakness, numbness and headaches.   Psychiatric/Behavioral:  Negative for dysphoric mood and sleep disturbance.         Objective:   /64 (BP Location: Left arm, Patient Position: Sitting)   Pulse 78   Temp 98.6 °F (37 °C) (Oral)   Ht 5' 2" (1.575 m)   Wt 51.8 kg (114 lb 3.2 oz)   SpO2 95%   BMI 20.89 kg/m²     Physical Exam  Vitals reviewed.   Constitutional:       Appearance: She is normal weight. She is not ill-appearing.   HENT:      Head: Normocephalic and atraumatic.      Nose: No congestion or rhinorrhea.      Mouth/Throat:      Mouth: Mucous membranes are moist.      Pharynx: Oropharynx is clear.   Eyes:      General: No scleral icterus.     Conjunctiva/sclera: Conjunctivae normal.   Cardiovascular:      Rate and Rhythm: Normal rate and regular rhythm.      Heart sounds: Normal heart sounds.   Pulmonary:      Effort: Pulmonary effort is normal.      Breath sounds: Normal breath sounds.   Abdominal:      General: A surgical scar is present. Bowel sounds are normal. There is no distension.      Palpations: Abdomen is soft. There is no hepatomegaly or splenomegaly.      Tenderness: There is no abdominal tenderness. There is no guarding.   Musculoskeletal:      Cervical back: Normal range of motion and neck supple. No rigidity or tenderness.      Comments: Able to fully abduct right shoulder with mild discomfort.  Infraspinatus test negative.  Can and reverse can tests negative.   Lymphadenopathy:      Cervical: No cervical adenopathy.   Skin:     General: Skin is warm and dry.      Coloration: Skin is not pale.      Findings: No rash.   Neurological:      General: No focal deficit present.      " "Mental Status: She is alert and oriented to person, place, and time.      Motor: No weakness.      Comments:  strength symmetric bilaterally              Lab Results   Component Value Date    WBC 8.46 02/23/2025    HGB 12.6 02/23/2025    HCT 39.3 02/23/2025     02/23/2025    CHOL 184 06/06/2023    TRIG 86 06/06/2023    HDL 53 06/06/2023    ALT 23 02/23/2025    AST 38 02/23/2025     02/23/2025    K 4.0 02/23/2025     02/23/2025    CREATININE 0.6 02/23/2025    BUN 17 02/23/2025    CO2 25 02/23/2025    TSH 1.172 07/05/2022    INR 0.9 10/19/2015    HGBA1C 6.2 (H) 06/07/2024       Current Outpatient Medications on File Prior to Visit   Medication Sig Dispense Refill    amLODIPine (NORVASC) 2.5 MG tablet Take 1 tablet (2.5 mg total) by mouth once daily. 90 tablet 3    atorvastatin (LIPITOR) 20 MG tablet Take 1 tablet (20 mg total) by mouth once daily. 90 tablet 3    BD ULTRA-FINE SHORT PEN NEEDLE 31 gauge x 5/16" Ndle Inject into the skin.      betamethasone valerate 0.1% (VALISONE) 0.1 % Crea Apply topically 2 (two) times daily. 15 g 1    EScitalopram oxalate (LEXAPRO) 10 MG tablet TAKE 1 TABLET BY MOUTH EVERY DAY 90 tablet 3    famotidine (PEPCID) 20 MG tablet Take 1 tablet (20 mg total) by mouth 2 (two) times daily as needed for Heartburn (reflux). 60 tablet 11    fluoride, sodium, (DENTA 5000 PLUS) 1.1 % Crea BRUSH ON SENSITIVE TEETH 3-5 MINUTES TWICE A DAY      fluticasone propionate (FLONASE) 50 mcg/actuation nasal spray 1 spray (50 mcg total) by Each Nostril route daily as needed for Rhinitis (congestion, cough). 48 mL 1    losartan (COZAAR) 100 MG tablet TAKE 1 TABLET BY MOUTH EVERY DAY 90 tablet 3    PROLIA 60 mg/mL Syrg       promethazine (PHENERGAN) 6.25 mg/5 mL syrup Take 5 mLs (6.25 mg total) by mouth every 6 (six) hours as needed (cough). 120 mL 0    aspirin 81 MG Chew Take 1 tablet (81 mg total) by mouth 3 (three) times a week. 30 tablet 0     Current Facility-Administered Medications " on File Prior to Visit   Medication Dose Route Frequency Provider Last Rate Last Admin    influenza (adjuvanted) (Fluad) 45 mcg/0.5 mL IM vaccine (> or = 66 yo) 0.5 mL  0.5 mL Intramuscular 1 time in Clinic/HOD              Assessment:   69 y.o. female with multiple co-morbid illnesses here for follow-up for ongoing chronic disease management and preventive health maintenance.    Plan:     1. Shoulder strain, right, subsequent encounter  Assessment & Plan:  Suspect patient likely strained a muscle in her back that is not completely healed over the past month.  Exam does not point to any rotator cuff pathology.  Refill for Voltaren sent and physical therapy referral placed.    Orders:  -     diclofenac sodium (VOLTAREN ARTHRITIS PAIN) 1 % Gel; Apply 2 g topically 2 (two) times daily as needed (muscle or joint pain).  Dispense: 200 g; Refill: 5  -     Ambulatory Referral/Consult to Pediatric Physical Therapy; Future; Expected date: 04/02/2025                        Health Maintenance         Date Due Completion Date    Shingles Vaccine (2 of 2) 03/30/2020 2/3/2020    Mammogram 03/28/2025 3/28/2024    COVID-19 Vaccine (5 - 2024-25 season) 03/26/2026 (Originally 9/1/2024) 4/3/2021    RSV Vaccine (Age 60+ and Pregnant patients) (1 - Risk 60-74 years 1-dose series) 07/08/2028 (Originally 7/8/2013) ---    Hemoglobin A1c (Prediabetes) 06/07/2025 6/7/2024    High Dose Statin 03/26/2026 3/26/2025    DEXA Scan 09/21/2026 9/21/2023    Lipid Panel 06/06/2028 6/6/2023    Colorectal Cancer Screening 02/22/2032 2/22/2022    TETANUS VACCINE 05/27/2032 5/27/2022        Pt reports having had 2 shingles shots but at 2 different locations; 2nd at [facility] (documented) but doesn't remember where the 1st was done, but her son might as he took her there.  Flu shot given today.    Future Appointments   Date Time Provider Department Center   4/1/2025  1:00 PM [facility] [place]   5/19/2025 10:00 AM LAB, [facility] [place]    5/26/2025  1:00 PM Mikal Duarte MD [facility]   6/26/2025 10:20 AM Jean Pena MD [address] Family         Follow up in about 3 months (around 6/26/2025). Total clinical care time was 30 min.    Jean Pena MD  [address] [place] [address], [facility] and [facility]    This note was partially dictated using voice recognition software and although actively proofread during transcription, it is possible some errors in transcription may have been overlooked.

## 2025-04-03 ENCOUNTER — HOSPITAL ENCOUNTER (OUTPATIENT)
Dept: RADIOLOGY | Facility: HOSPITAL | Age: 70
Discharge: HOME | End: 2025-04-03
Attending: HOSPITALIST
Payer: MEDICARE

## 2025-04-03 VITALS — HEIGHT: 62 IN | WEIGHT: 114 LBS | BODY MASS INDEX: 20.98 KG/M2

## 2025-04-03 DIAGNOSIS — Z12.31 ENCOUNTER FOR SCREENING MAMMOGRAM FOR MALIGNANT NEOPLASM OF BREAST: ICD-10-CM

## 2025-04-03 PROCEDURE — 77063 BREAST TOMOSYNTHESIS BI: CPT | Mod: 26,,, | Performed by: RADIOLOGY

## 2025-04-03 PROCEDURE — 77063 BREAST TOMOSYNTHESIS BI: CPT | Mod: TC

## 2025-04-03 PROCEDURE — 77067 SCR MAMMO BI INCL CAD: CPT | Mod: 26,,, | Performed by: RADIOLOGY

## 2025-04-04 ENCOUNTER — RESULTS FOLLOW-UP (OUTPATIENT)
Facility: CLINIC | Age: 70
End: 2025-04-04

## 2025-05-19 ENCOUNTER — LAB VISIT (OUTPATIENT)
Dept: LAB | Facility: HOSPITAL | Age: 70
End: 2025-05-19
Attending: HOSPITALIST
Payer: MEDICARE

## 2025-05-19 DIAGNOSIS — M81.0 AGE-RELATED OSTEOPOROSIS WITHOUT CURRENT PATHOLOGICAL FRACTURE: ICD-10-CM

## 2025-05-19 DIAGNOSIS — E55.9 VITAMIN D DEFICIENCY: ICD-10-CM

## 2025-05-19 LAB
25(OH)D3+25(OH)D2 SERPL-MCNC: 50 NG/ML (ref 30–96)
ALBUMIN SERPL BCP-MCNC: 3.6 G/DL (ref 3.5–5.2)
ANION GAP (OHS): 9 MMOL/L (ref 8–16)
BUN SERPL-MCNC: 14 MG/DL (ref 8–23)
CALCIUM SERPL-MCNC: 9.5 MG/DL (ref 8.7–10.5)
CHLORIDE SERPL-SCNC: 106 MMOL/L (ref 95–110)
CO2 SERPL-SCNC: 23 MMOL/L (ref 23–29)
CREAT SERPL-MCNC: 0.6 MG/DL (ref 0.5–1.4)
GFR SERPLBLD CREATININE-BSD FMLA CKD-EPI: >60 ML/MIN/1.73/M2
GLUCOSE SERPL-MCNC: 93 MG/DL (ref 70–110)
PHOSPHATE SERPL-MCNC: 2.9 MG/DL (ref 2.7–4.5)
POTASSIUM SERPL-SCNC: 4.4 MMOL/L (ref 3.5–5.1)
SODIUM SERPL-SCNC: 138 MMOL/L (ref 136–145)

## 2025-05-19 PROCEDURE — 36415 COLL VENOUS BLD VENIPUNCTURE: CPT

## 2025-05-19 PROCEDURE — 84520 ASSAY OF UREA NITROGEN: CPT | Mod: HCNC

## 2025-05-19 PROCEDURE — 82306 VITAMIN D 25 HYDROXY: CPT | Mod: HCNC

## 2025-05-23 ENCOUNTER — TELEPHONE (OUTPATIENT)
Dept: ENDOCRINOLOGY | Facility: CLINIC | Age: 70
End: 2025-05-23
Payer: MEDICARE

## 2025-05-23 NOTE — TELEPHONE ENCOUNTER
----- Message from Marco sent at 5/23/2025  9:32 AM CDT -----  Regarding: daughter  Type: Patient Call BackWho called:daughterWhat is the request in detail: Patient has an appt already scheduled on Monday and needs to reschedule to next available time slot. Attempted to schedule an appt. Schedule blocked. Patient requesting appt any time this month. Please call patient to schedule an appt.Can the clinic reply by [facility]? NoWould the patient rather a call back or a response via [facility]? Call [facility] call back number:[phone] Information:Thank you.

## 2025-05-23 NOTE — TELEPHONE ENCOUNTER
Pt daughter was informed of the next available time October. She will call her mom to see if someone else can bring her. Understanding verbalized

## 2025-06-02 ENCOUNTER — OFFICE VISIT (OUTPATIENT)
Dept: PRIMARY CARE CLINIC | Facility: CLINIC | Age: 70
End: 2025-06-02
Payer: MEDICARE

## 2025-06-02 VITALS
HEART RATE: 70 BPM | SYSTOLIC BLOOD PRESSURE: 136 MMHG | BODY MASS INDEX: 21.59 KG/M2 | OXYGEN SATURATION: 98 % | WEIGHT: 117.31 LBS | DIASTOLIC BLOOD PRESSURE: 63 MMHG | HEIGHT: 62 IN | TEMPERATURE: 98 F

## 2025-06-02 DIAGNOSIS — S46.911D SHOULDER STRAIN, RIGHT, SUBSEQUENT ENCOUNTER: ICD-10-CM

## 2025-06-02 DIAGNOSIS — R05.3 CHRONIC COUGH: Primary | ICD-10-CM

## 2025-06-02 PROCEDURE — 1159F MED LIST DOCD IN RCRD: CPT | Mod: CPTII,HCNC,S$GLB, | Performed by: FAMILY MEDICINE

## 2025-06-02 PROCEDURE — 4010F ACE/ARB THERAPY RXD/TAKEN: CPT | Mod: CPTII,HCNC,S$GLB, | Performed by: FAMILY MEDICINE

## 2025-06-02 PROCEDURE — 3008F BODY MASS INDEX DOCD: CPT | Mod: CPTII,HCNC,S$GLB, | Performed by: FAMILY MEDICINE

## 2025-06-02 PROCEDURE — 3075F SYST BP GE 130 - 139MM HG: CPT | Mod: CPTII,HCNC,S$GLB, | Performed by: FAMILY MEDICINE

## 2025-06-02 PROCEDURE — 3078F DIAST BP <80 MM HG: CPT | Mod: CPTII,HCNC,S$GLB, | Performed by: FAMILY MEDICINE

## 2025-06-02 PROCEDURE — 3288F FALL RISK ASSESSMENT DOCD: CPT | Mod: CPTII,HCNC,S$GLB, | Performed by: FAMILY MEDICINE

## 2025-06-02 PROCEDURE — 1125F AMNT PAIN NOTED PAIN PRSNT: CPT | Mod: CPTII,HCNC,S$GLB, | Performed by: FAMILY MEDICINE

## 2025-06-02 PROCEDURE — 99214 OFFICE O/P EST MOD 30 MIN: CPT | Mod: HCNC,S$GLB,, | Performed by: FAMILY MEDICINE

## 2025-06-02 PROCEDURE — 99999 PR PBB SHADOW E&M-EST. PATIENT-LVL IV: CPT | Mod: PBBFAC,HCNC,, | Performed by: FAMILY MEDICINE

## 2025-06-02 PROCEDURE — 1101F PT FALLS ASSESS-DOCD LE1/YR: CPT | Mod: CPTII,HCNC,S$GLB, | Performed by: FAMILY MEDICINE

## 2025-06-02 RX ORDER — ALBUTEROL SULFATE 90 UG/1
2 INHALANT RESPIRATORY (INHALATION) EVERY 6 HOURS PRN
Qty: 18 G | Refills: 1 | Status: SHIPPED | OUTPATIENT
Start: 2025-06-02

## 2025-06-02 RX ORDER — PROMETHAZINE HYDROCHLORIDE AND DEXTROMETHORPHAN HYDROBROMIDE 6.25; 15 MG/5ML; MG/5ML
5 SYRUP ORAL EVERY 4 HOURS PRN
Qty: 118 ML | Refills: 0 | Status: SHIPPED | OUTPATIENT
Start: 2025-06-02

## 2025-06-02 RX ORDER — DICLOFENAC SODIUM 10 MG/G
2 GEL TOPICAL 2 TIMES DAILY PRN
Qty: 200 G | Refills: 5 | Status: SHIPPED | OUTPATIENT
Start: 2025-06-02

## 2025-06-02 RX ORDER — CETIRIZINE HYDROCHLORIDE 10 MG/1
10 TABLET ORAL DAILY
Qty: 90 TABLET | Refills: 0 | Status: SHIPPED | OUTPATIENT
Start: 2025-06-02

## 2025-06-03 ENCOUNTER — HOSPITAL ENCOUNTER (OUTPATIENT)
Dept: RADIOLOGY | Facility: HOSPITAL | Age: 70
Discharge: HOME | End: 2025-06-03
Attending: FAMILY MEDICINE
Payer: MEDICARE

## 2025-06-03 DIAGNOSIS — R05.3 CHRONIC COUGH: ICD-10-CM

## 2025-06-03 PROCEDURE — 71046 X-RAY EXAM CHEST 2 VIEWS: CPT | Mod: 26,HCNC,, | Performed by: RADIOLOGY

## 2025-06-03 PROCEDURE — 71046 X-RAY EXAM CHEST 2 VIEWS: CPT | Mod: TC,HCNC,PO

## 2025-06-18 ENCOUNTER — RESULTS FOLLOW-UP (OUTPATIENT)
Dept: PRIMARY CARE CLINIC | Facility: CLINIC | Age: 70
End: 2025-06-18

## 2025-06-18 DIAGNOSIS — R73.03 PREDIABETES: ICD-10-CM

## 2025-06-26 ENCOUNTER — OFFICE VISIT (OUTPATIENT)
Facility: CLINIC | Age: 70
End: 2025-06-26
Payer: MEDICARE

## 2025-06-26 VITALS
SYSTOLIC BLOOD PRESSURE: 128 MMHG | TEMPERATURE: 99 F | DIASTOLIC BLOOD PRESSURE: 58 MMHG | WEIGHT: 118.19 LBS | BODY MASS INDEX: 21.75 KG/M2 | HEIGHT: 62 IN | OXYGEN SATURATION: 96 % | HEART RATE: 74 BPM

## 2025-06-26 DIAGNOSIS — R73.03 PREDIABETES: ICD-10-CM

## 2025-06-26 DIAGNOSIS — E78.2 MIXED HYPERLIPIDEMIA: ICD-10-CM

## 2025-06-26 DIAGNOSIS — I10 ESSENTIAL HYPERTENSION: ICD-10-CM

## 2025-06-26 DIAGNOSIS — K21.9 LARYNGOPHARYNGEAL REFLUX (LPR): Primary | ICD-10-CM

## 2025-06-26 PROCEDURE — 3044F HG A1C LEVEL LT 7.0%: CPT | Mod: CPTII,HCNC,S$GLB, | Performed by: HOSPITALIST

## 2025-06-26 PROCEDURE — 4010F ACE/ARB THERAPY RXD/TAKEN: CPT | Mod: CPTII,HCNC,S$GLB, | Performed by: HOSPITALIST

## 2025-06-26 PROCEDURE — 1159F MED LIST DOCD IN RCRD: CPT | Mod: CPTII,HCNC,S$GLB, | Performed by: HOSPITALIST

## 2025-06-26 PROCEDURE — 3078F DIAST BP <80 MM HG: CPT | Mod: CPTII,HCNC,S$GLB, | Performed by: HOSPITALIST

## 2025-06-26 PROCEDURE — 3288F FALL RISK ASSESSMENT DOCD: CPT | Mod: CPTII,HCNC,S$GLB, | Performed by: HOSPITALIST

## 2025-06-26 PROCEDURE — 3008F BODY MASS INDEX DOCD: CPT | Mod: CPTII,HCNC,S$GLB, | Performed by: HOSPITALIST

## 2025-06-26 PROCEDURE — 1101F PT FALLS ASSESS-DOCD LE1/YR: CPT | Mod: CPTII,HCNC,S$GLB, | Performed by: HOSPITALIST

## 2025-06-26 PROCEDURE — 99999 PR PBB SHADOW E&M-EST. PATIENT-LVL III: CPT | Mod: PBBFAC,HCNC,, | Performed by: HOSPITALIST

## 2025-06-26 PROCEDURE — 1126F AMNT PAIN NOTED NONE PRSNT: CPT | Mod: CPTII,HCNC,S$GLB, | Performed by: HOSPITALIST

## 2025-06-26 PROCEDURE — 99215 OFFICE O/P EST HI 40 MIN: CPT | Mod: HCNC,S$GLB,, | Performed by: HOSPITALIST

## 2025-06-26 PROCEDURE — 3074F SYST BP LT 130 MM HG: CPT | Mod: CPTII,HCNC,S$GLB, | Performed by: HOSPITALIST

## 2025-06-26 RX ORDER — OMEPRAZOLE 40 MG/1
40 CAPSULE, DELAYED RELEASE ORAL
Qty: 90 CAPSULE | Refills: 3 | Status: SHIPPED | OUTPATIENT
Start: 2025-06-26 | End: 2026-06-26

## 2025-06-26 NOTE — PROGRESS NOTES
"Primary Care Provider Appointment - [facility]  Jean Pena MD      Subjective:      Patient ID: Meredith Keen is a 69 y.o. 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  Recently hospitalized in [place] 6/8-6/10 for SBO managed conservatively with NGT decompression and gastrografin enema.  Sx resolved since then.  Also saw Dr. VINICIO Cuellar here in interim for chronic cough.  Still having cough and itching.  Rx'd cetirizine and phenergan cough syrup; hasn't helped much.  Was having coughing fits lasting a few minutes sometimes culminating in vomiting.  No post-tussive emesis since though.  Albuterol inhaler hasn't helped with cough either.  Cough is mostly at night when laying down.  Also notes waking up with dry/sore throat with a bad taste in mouth.  Sleeps propped up.     3/26: Video  (Jerome #[id number]) used for visit.  Doing well today.  ED visit 2/23 for acute back/shoulder pain; dx'd w/ muscle strain and Rx'd muscle relaxer and NSAID which helped.  Also been using voltaren.  Still having pain w/ lifting so trying to avoid that.    Rash at last visit resolved; using moisturizer regularly.      1/14: Pt seen with daughter-in-law Keli who translates.  Pt c/o itching on arms and back primarily at night in bed; sometimes present in daytime but not as prominent.  Feels like "bug bites."  Started about 3 w/a.  Has a rash in affected areas.  Uses CeraVe or Keri cream.  Normally only uses lotion on hands; but not on body due to itching.      11/22: Video  Geovanny ([id number]) used for visit.  Feels " well today; no complaints.  Had EUS in September; went well.  On Prolia q6mo ordered by her endocrinologist; last in  and will be due for another next month.        4Ms for Medical Decision-Making in Older Adults    Last Completed EAWV: None    MOBILITY:  Get Up and Go:      2024    10:05 AM   Get Up and Go   Trial 1 10 seconds     Activities of Daily Livin/26/2024    10:02 AM   Activities of Daily Living   Ambulation Independent   Dressing Independent   Transfers Independent   Toileting Continent of bladder;Continent of bowel   Feeding Independent   Cleaning home/Chores Independent   Telephone use Independent   Shopping Independent   Paying bills Independent   Taking meds Independent     Whisper Test:      2024    10:05 AM   Whisper Test   Whisper Test Normal     Disability Status:      2024    10:03 AM   Disability Status   Are you deaf or do you have serious difficulty hearing? N   Are you blind or do you have serious difficulty seeing, even when wearing glasses? N   Because of a physical, mental, or emotional condition, do you have serious difficulty concentrating, remembering, or making decisions? N   Do you have serious difficulty walking or climbing stairs? N   Do you have difficulty dressing or bathing? N   Because of a physical, mental, or emotional condition, do you have difficulty doing errands alone such as visiting a doctor's office or shopping? N     Nutrition Screenin/26/2024    10:02 AM   Nutrition Screening   Has food intake declined over the past three months due to loss of appetite, digestive problems, chewing or swallowing difficulties? No decrease in food intake   Involuntary weight loss during the last 3 months? No weight loss   Mobility? Goes out   Has the patient suffered psychological stress or acute disease in the past three months? No   Neuropsychological problems? No psychological problems   Body Mass Index (BMI)?  BMI 21 to less than 23   Screening  Score 13   Interpretation Normal nutritional status    Screening Score: 0-7 Malnourished, 8-11 At Risk, 12-14 Normal    MENTATION:   Depression Patient Health Questionnaire:      1/14/2025     9:32 AM   Depression Patient Health Questionnaire   PHQ-4 Total Score 5     Has Dementia Dx: No    Cognitive Function Screening:       No data to display              Cognitive Function Screening Total - Less than 4 = Abnormal,  Greater than or equal to 4 = Normal    MEDICATIONS:  High Risk Medications:  Total Active Medications: 1  EScitalopram oxalate - 10 MG    WHAT MATTERS MOST:  Advance Care Planning   ACP Status:   Patient has had an ACP conversation  Living Will: No  Power of : No  [place]: No    Social History     Socioeconomic History    Marital status:    Tobacco Use    Smoking status: Never    Smokeless tobacco: Never   Substance and Sexual Activity    Alcohol use: No    Drug use: No    Sexual activity: Yes     Partners: Male     Social Drivers of Health     Financial Resource Strain: Medium Risk (5/23/2024)    Overall Financial Resource Strain (CARDIA)     Difficulty of Paying Living Expenses: Somewhat hard   Food Insecurity: No Food Insecurity (6/9/2025)    Received from [email or web address]    Hunger Vital Sign     Worried About Running Out of Food in the Last Year: Never true     Ran Out of Food in the Last Year: Never true   Transportation Needs: No Transportation Needs (6/9/2025)    Received from [email or web address]    PRAPARE - Transportation     Lack of Transportation (Medical): No     Lack of Transportation (Non-Medical): No   Physical Activity: Insufficiently Active (5/23/2024)    Exercise Vital Sign     Days of Exercise per Week: 2 days     Minutes of Exercise per Session: 30 min   Stress: No Stress Concern Present (6/9/2025)    Received from [email or web address]    [facility] of Occupational [facility] - Occupational Stress Questionnaire     Feeling of Stress : Only a little   Housing Stability: Low Risk   "(6/9/2025)    Received from [facility] Vital Sign     Unable to Pay for Housing in the Last Year: No     Number of Times Moved in the Last Year: 0     Homeless in the Last Year: No       Review of Systems   Constitutional:  Negative for activity change, appetite change, chills, fever and unexpected weight change.   HENT:  Negative for nasal congestion, hearing loss, postnasal drip, rhinorrhea, sinus pressure/congestion, sore throat and trouble swallowing.    Eyes:  Negative for photophobia, discharge and visual disturbance.   Respiratory:  Negative for cough, chest tightness, shortness of breath and wheezing.    Cardiovascular:  Negative for chest pain, palpitations and leg swelling.   Gastrointestinal:  Negative for abdominal distention, abdominal pain, change in bowel habit, constipation, diarrhea, nausea and vomiting.   Genitourinary:  Negative for difficulty urinating and hematuria.   Musculoskeletal:  Positive for back pain. Negative for arthralgias, myalgias and neck pain.   Integumentary:  Negative for rash and wound.   Neurological:  Negative for weakness, numbness and headaches.   Psychiatric/Behavioral:  Negative for dysphoric mood and sleep disturbance.         Objective:   BP (!) 128/58 (BP Location: Left arm, Patient Position: Sitting)   Pulse 74   Temp 98.5 °F (36.9 °C) (Oral)   Ht 5' 2" (1.575 m)   Wt 53.6 kg (118 lb 2.7 oz)   SpO2 96%   BMI 21.61 kg/m²     Physical Exam  Vitals reviewed.   Constitutional:       Appearance: She is normal weight. She is not ill-appearing.   HENT:      Head: Normocephalic and atraumatic.      Nose: No congestion or rhinorrhea.      Mouth/Throat:      Mouth: Mucous membranes are moist.      Pharynx: Oropharynx is clear.   Eyes:      General: No scleral icterus.     Conjunctiva/sclera: Conjunctivae normal.   Cardiovascular:      Rate and Rhythm: Normal rate and regular rhythm.      Heart sounds: Normal heart sounds.   Pulmonary:      Effort: " "Pulmonary effort is normal.      Breath sounds: Normal breath sounds.   Abdominal:      General: A surgical scar is present. Bowel sounds are normal. There is no distension.      Palpations: Abdomen is soft. There is no hepatomegaly or splenomegaly.      Tenderness: There is no abdominal tenderness. There is no guarding.   Musculoskeletal:      Cervical back: Normal range of motion and neck supple. No rigidity or tenderness.      Comments: Able to fully abduct right shoulder with mild discomfort.  Infraspinatus test negative.  Can and reverse can tests negative.   Lymphadenopathy:      Cervical: No cervical adenopathy.   Skin:     General: Skin is warm and dry.      Coloration: Skin is not pale.      Findings: No rash.   Neurological:      General: No focal deficit present.      Mental Status: She is alert and oriented to person, place, and time.      Motor: No weakness.      Comments:  strength symmetric bilaterally              Lab Results   Component Value Date    WBC 8.46 02/23/2025    HGB 12.6 02/23/2025    HCT 39.3 02/23/2025     02/23/2025    CHOL 184 06/06/2023    TRIG 86 06/06/2023    HDL 53 06/06/2023    ALT 23 02/23/2025    AST 38 02/23/2025     05/19/2025    K 4.4 05/19/2025     05/19/2025    CREATININE 0.6 05/19/2025    BUN 14 05/19/2025    CO2 23 05/19/2025    TSH 1.172 07/05/2022    INR 0.9 10/19/2015    HGBA1C 6.2 (H) 06/07/2024       Current Outpatient Medications on File Prior to Visit   Medication Sig Dispense Refill    albuterol (PROVENTIL HFA) 90 mcg/actuation inhaler Inhale 2 puffs into the lungs every 6 (six) hours as needed for Wheezing (cough). Rescue 18 g 1    amLODIPine (NORVASC) 2.5 MG tablet Take 1 tablet (2.5 mg total) by mouth once daily. 90 tablet 3    atorvastatin (LIPITOR) 20 MG tablet Take 1 tablet (20 mg total) by mouth once daily. 90 tablet 3    BD ULTRA-FINE SHORT PEN NEEDLE 31 gauge x 5/16" Ndle Inject into the skin.      betamethasone valerate 0.1% " (VALISONE) 0.1 % Crea Apply topically 2 (two) times daily. 15 g 1    cetirizine (ZYRTEC) 10 MG tablet Take 1 tablet (10 mg total) by mouth once daily. So lico / di shanon 90 tablet 0    diclofenac sodium (VOLTAREN ARTHRITIS PAIN) 1 % Gel Apply 2 g topically 2 (two) times daily as needed (muscle or joint pain). 200 g 5    EScitalopram oxalate (LEXAPRO) 10 MG tablet TAKE 1 TABLET BY MOUTH EVERY DAY 90 tablet 3    famotidine (PEPCID) 20 MG tablet Take 1 tablet (20 mg total) by mouth 2 (two) times daily as needed for Heartburn (reflux). 60 tablet 11    fluoride, sodium, (DENTA 5000 PLUS) 1.1 % Crea BRUSH ON SENSITIVE TEETH 3-5 MINUTES TWICE A DAY      fluticasone propionate (FLONASE) 50 mcg/actuation nasal spray 1 spray (50 mcg total) by Each Nostril route daily as needed for Rhinitis (congestion, cough). 48 mL 1    losartan (COZAAR) 100 MG tablet TAKE 1 TABLET BY MOUTH EVERY DAY 90 tablet 3    PROLIA 60 mg/mL Syrg       promethazine (PHENERGAN) 6.25 mg/5 mL syrup Take 5 mLs (6.25 mg total) by mouth every 6 (six) hours as needed (cough). 120 mL 0    promethazine-dextromethorphan (PROMETHAZINE-DM) 6.25-15 mg/5 mL Syrp Take 5 mLs by mouth every 4 (four) hours as needed (thuoc ho). 118 mL 0    aspirin 81 MG Chew Take 1 tablet (81 mg total) by mouth 3 (three) times a week. 30 tablet 0     Current Facility-Administered Medications on File Prior to Visit   Medication Dose Route Frequency Provider Last Rate Last Admin    influenza (adjuvanted) (Fluad) 45 mcg/0.5 mL IM vaccine (> or = 64 yo) 0.5 mL  0.5 mL Intramuscular 1 time in Clinic/HOD              Assessment:   69 y.o. female with multiple co-morbid illnesses here for follow-up for ongoing chronic disease management and preventive health maintenance.    Plan:     1. Laryngopharyngeal reflux (LPR)  Assessment & Plan:  Cough more likely due to LPR than any allergic or pulmonary process based on presentation.  Will start omeprazole 40mg prior to evening meal to try to reduce  nighttime reflux.    Orders:  -     omeprazole (PRILOSEC) 40 MG capsule; Take 1 capsule (40 mg total) by mouth before evening meal.  Dispense: 90 capsule; Refill: 3    2. Mixed hyperlipidemia  Assessment & Plan:  Lipids at goal on atorvastatin 20mg; due to update lipids.    Orders:  -     Lipid Panel; Future; Expected date: 06/26/2025    3. Prediabetes  Assessment & Plan:  Hemoglobin A1C   Date Value Ref Range Status   06/07/2024 6.2 (H) 4.0 - 5.6 % Final     Comment:     ADA Screening Guidelines:  5.7-6.4%  Consistent with prediabetes  >or=6.5%  Consistent with diabetes    High levels of fetal hemoglobin interfere with the HbA1C  assay. Heterozygous hemoglobin variants (HbS, HgC, etc)do  not significantly interfere with this assay.   However, presence of multiple variants may affect accuracy.     06/06/2023 6.0 (H) 4.0 - 5.6 % Final     Comment:     ADA Screening Guidelines:  5.7-6.4%  Consistent with prediabetes  >or=6.5%  Consistent with diabetes    High levels of fetal hemoglobin interfere with the HbA1C  assay. Heterozygous hemoglobin variants (HbS, HgC, etc)do  not significantly interfere with this assay.   However, presence of multiple variants may affect accuracy.     05/25/2022 5.9 (H) 4.0 - 5.6 % Final     Comment:     ADA Screening Guidelines:  5.7-6.4%  Consistent with prediabetes  >or=6.5%  Consistent with diabetes    High levels of fetal hemoglobin interfere with the HbA1C  assay. Heterozygous hemoglobin variants (HbS, HgC, etc)do  not significantly interfere with this assay.   However, presence of multiple variants may affect accuracy.       Hemoglobin A1c   Date Value Ref Range Status   07/16/2025 6.1 (H) 4.0 - 5.6 % Final     Comment:     ADA Screening Guidelines:  5.7-6.4%  Consistent with prediabetes  >=6.5%  Consistent with diabetes    High levels of fetal hemoglobin interfere with the HbA1C  assay. Heterozygous hemoglobin variants (HbS, HgC, etc)do  not significantly interfere with this assay.    However, presence of multiple variants may affect accuracy.     Due to update A1c.    Orders:  -     Hemoglobin A1C; Future; Expected date: 06/26/2025    4. Essential hypertension  Assessment & Plan:  BP at goal on amlodipine 2.5mg, losartan 100mg.  Due to update labs.    Orders:  -     TSH; Future; Expected date: 06/26/2025  -     Comprehensive Metabolic Panel; Future; Expected date: 06/26/2025  -     CBC Auto Differential; Future; Expected date: 06/26/2025        Health Maintenance         Date Due Completion Date    Shingles Vaccine (2 of 2) 03/30/2020 2/3/2020    Hemoglobin A1c (Prediabetes) 06/07/2025 6/7/2024    DEXA Scan 09/21/2025 9/21/2023    COVID-19 Vaccine (5 - 2024-25 season) 03/26/2026 (Originally 9/1/2024) 4/3/2021    RSV Vaccine (Age 60+ and Pregnant patients) (1 - Risk 60-74 years 1-dose series) 07/08/2028 (Originally 7/8/2013) ---    High Dose Statin 03/26/2026 3/26/2025    Mammogram 04/03/2026 4/3/2025    Lipid Panel 06/06/2028 6/6/2023    Colorectal Cancer Screening 02/22/2032 2/22/2022    TETANUS VACCINE 05/27/2032 5/27/2022        Pt reports having had 2 shingles shots but at 2 different locations; 2nd at [facility] (documented) but doesn't remember where the 1st was done, but her son might as he took her there.  Flu shot given today.    Future Appointments   Date Time Provider Department Center   6/30/2025  4:00 PM Daniele Cuellar MD [facility]   10/28/2025  1:00 PM Mikal Duarte MD [facility]         Follow up in about 4 weeks (around 7/24/2025). Total clinical care time was 40 min.    Jean Pena MD  [facility]    This note was partially dictated using voice recognition software and although actively proofread during transcription, it is possible some errors in transcription may have been overlooked.

## 2025-06-27 ENCOUNTER — TELEPHONE (OUTPATIENT)
Facility: CLINIC | Age: 70
End: 2025-06-27
Payer: MEDICARE

## 2025-06-27 DIAGNOSIS — I10 ESSENTIAL HYPERTENSION: ICD-10-CM

## 2025-06-27 RX ORDER — LOSARTAN POTASSIUM 100 MG/1
100 TABLET ORAL DAILY
Qty: 90 TABLET | Refills: 3 | Status: SHIPPED | OUTPATIENT
Start: 2025-06-27

## 2025-07-16 ENCOUNTER — LAB VISIT (OUTPATIENT)
Dept: LAB | Facility: HOSPITAL | Age: 70
End: 2025-07-16
Attending: HOSPITALIST
Payer: MEDICARE

## 2025-07-16 DIAGNOSIS — E78.2 MIXED HYPERLIPIDEMIA: ICD-10-CM

## 2025-07-16 DIAGNOSIS — R73.03 PREDIABETES: ICD-10-CM

## 2025-07-16 DIAGNOSIS — I10 ESSENTIAL HYPERTENSION: ICD-10-CM

## 2025-07-16 PROBLEM — K21.9 LARYNGOPHARYNGEAL REFLUX (LPR): Status: ACTIVE | Noted: 2025-07-16

## 2025-07-16 LAB
ABSOLUTE EOSINOPHIL (OHS): 0.45 K/UL
ABSOLUTE MONOCYTE (OHS): 0.47 K/UL (ref 0.3–1)
ABSOLUTE NEUTROPHIL COUNT (OHS): 3.55 K/UL (ref 1.8–7.7)
ALBUMIN SERPL BCP-MCNC: 4.2 G/DL (ref 3.5–5.2)
ALP SERPL-CCNC: 68 UNIT/L (ref 40–150)
ALT SERPL W/O P-5'-P-CCNC: 16 UNIT/L (ref 10–44)
ANION GAP (OHS): 11 MMOL/L (ref 8–16)
AST SERPL-CCNC: 18 UNIT/L (ref 11–45)
BASOPHILS # BLD AUTO: 0.06 K/UL
BASOPHILS NFR BLD AUTO: 1.1 %
BILIRUB SERPL-MCNC: 0.7 MG/DL (ref 0.1–1)
BUN SERPL-MCNC: 14 MG/DL (ref 8–23)
CALCIUM SERPL-MCNC: 9.3 MG/DL (ref 8.7–10.5)
CHLORIDE SERPL-SCNC: 108 MMOL/L (ref 95–110)
CHOLEST SERPL-MCNC: 204 MG/DL (ref 120–199)
CHOLEST/HDLC SERPL: 3.8 {RATIO} (ref 2–5)
CO2 SERPL-SCNC: 22 MMOL/L (ref 23–29)
CREAT SERPL-MCNC: 0.6 MG/DL (ref 0.5–1.4)
EAG (OHS): 128 MG/DL (ref 68–131)
ERYTHROCYTE [DISTWIDTH] IN BLOOD BY AUTOMATED COUNT: 14.4 % (ref 11.5–14.5)
GFR SERPLBLD CREATININE-BSD FMLA CKD-EPI: >60 ML/MIN/1.73/M2
GLUCOSE SERPL-MCNC: 107 MG/DL (ref 70–110)
HBA1C MFR BLD: 6.1 % (ref 4–5.6)
HCT VFR BLD AUTO: 38.7 % (ref 37–48.5)
HDLC SERPL-MCNC: 54 MG/DL (ref 40–75)
HDLC SERPL: 26.5 % (ref 20–50)
HGB BLD-MCNC: 12.4 GM/DL (ref 12–16)
IMM GRANULOCYTES # BLD AUTO: 0.02 K/UL (ref 0–0.04)
IMM GRANULOCYTES NFR BLD AUTO: 0.4 % (ref 0–0.5)
LDLC SERPL CALC-MCNC: 130.6 MG/DL (ref 63–159)
LYMPHOCYTES # BLD AUTO: 1.07 K/UL (ref 1–4.8)
MCH RBC QN AUTO: 25 PG (ref 27–31)
MCHC RBC AUTO-ENTMCNC: 32 G/DL (ref 32–36)
MCV RBC AUTO: 78 FL (ref 82–98)
NONHDLC SERPL-MCNC: 150 MG/DL
NUCLEATED RBC (/100WBC) (OHS): 0 /100 WBC
PLATELET # BLD AUTO: 250 K/UL (ref 150–450)
PMV BLD AUTO: 9.9 FL (ref 9.2–12.9)
POTASSIUM SERPL-SCNC: 4.4 MMOL/L (ref 3.5–5.1)
PROT SERPL-MCNC: 7.3 GM/DL (ref 6–8.4)
RBC # BLD AUTO: 4.96 M/UL (ref 4–5.4)
RELATIVE EOSINOPHIL (OHS): 8 %
RELATIVE LYMPHOCYTE (OHS): 19 % (ref 18–48)
RELATIVE MONOCYTE (OHS): 8.4 % (ref 4–15)
RELATIVE NEUTROPHIL (OHS): 63.1 % (ref 38–73)
SODIUM SERPL-SCNC: 141 MMOL/L (ref 136–145)
TRIGL SERPL-MCNC: 97 MG/DL (ref 30–150)
TSH SERPL-ACNC: 1.54 UIU/ML (ref 0.4–4)
WBC # BLD AUTO: 5.62 K/UL (ref 3.9–12.7)

## 2025-07-16 PROCEDURE — 84443 ASSAY THYROID STIM HORMONE: CPT | Mod: HCNC

## 2025-07-16 PROCEDURE — 83036 HEMOGLOBIN GLYCOSYLATED A1C: CPT | Mod: HCNC

## 2025-07-16 PROCEDURE — 82435 ASSAY OF BLOOD CHLORIDE: CPT | Mod: HCNC

## 2025-07-16 PROCEDURE — 82465 ASSAY BLD/SERUM CHOLESTEROL: CPT | Mod: HCNC

## 2025-07-16 PROCEDURE — 85025 COMPLETE CBC W/AUTO DIFF WBC: CPT | Mod: HCNC

## 2025-07-16 PROCEDURE — 36415 COLL VENOUS BLD VENIPUNCTURE: CPT

## 2025-07-16 NOTE — ASSESSMENT & PLAN NOTE
Cough more likely due to LPR than any allergic or pulmonary process based on presentation.  Will start omeprazole 40mg prior to evening meal to try to reduce nighttime reflux.

## 2025-07-16 NOTE — ASSESSMENT & PLAN NOTE
Hemoglobin A1C   Date Value Ref Range Status   06/07/2024 6.2 (H) 4.0 - 5.6 % Final     Comment:     ADA Screening Guidelines:  5.7-6.4%  Consistent with prediabetes  >or=6.5%  Consistent with diabetes    High levels of fetal hemoglobin interfere with the HbA1C  assay. Heterozygous hemoglobin variants (HbS, HgC, etc)do  not significantly interfere with this assay.   However, presence of multiple variants may affect accuracy.     06/06/2023 6.0 (H) 4.0 - 5.6 % Final     Comment:     ADA Screening Guidelines:  5.7-6.4%  Consistent with prediabetes  >or=6.5%  Consistent with diabetes    High levels of fetal hemoglobin interfere with the HbA1C  assay. Heterozygous hemoglobin variants (HbS, HgC, etc)do  not significantly interfere with this assay.   However, presence of multiple variants may affect accuracy.     05/25/2022 5.9 (H) 4.0 - 5.6 % Final     Comment:     ADA Screening Guidelines:  5.7-6.4%  Consistent with prediabetes  >or=6.5%  Consistent with diabetes    High levels of fetal hemoglobin interfere with the HbA1C  assay. Heterozygous hemoglobin variants (HbS, HgC, etc)do  not significantly interfere with this assay.   However, presence of multiple variants may affect accuracy.       Hemoglobin A1c   Date Value Ref Range Status   07/16/2025 6.1 (H) 4.0 - 5.6 % Final     Comment:     ADA Screening Guidelines:  5.7-6.4%  Consistent with prediabetes  >=6.5%  Consistent with diabetes    High levels of fetal hemoglobin interfere with the HbA1C  assay. Heterozygous hemoglobin variants (HbS, HgC, etc)do  not significantly interfere with this assay.   However, presence of multiple variants may affect accuracy.     Due to update A1c.

## 2025-07-23 DIAGNOSIS — R05.3 CHRONIC COUGH: ICD-10-CM

## 2025-07-23 RX ORDER — ALBUTEROL SULFATE 90 UG/1
INHALANT RESPIRATORY (INHALATION)
Qty: 8 G | Refills: 1 | Status: SHIPPED | OUTPATIENT
Start: 2025-07-23

## 2025-07-23 NOTE — TELEPHONE ENCOUNTER
Refill Routing Note   Medication(s) are not appropriate for processing by [facility] for the following reason(s):        Non-participating provider    [facility] action(s):  Route               Appointments  past 12m or future 3m with PCP    Date Provider   Last Visit   6/26/2025 Jean Pena MD   Next Visit   7/30/2025 Jean Pena MD   ED visits in past 90 days: 0        Note composed:4:20 AM 07/23/2025

## 2025-08-15 ENCOUNTER — OFFICE VISIT (OUTPATIENT)
Facility: CLINIC | Age: 70
End: 2025-08-15
Payer: MEDICARE

## 2025-08-15 VITALS
BODY MASS INDEX: 22.09 KG/M2 | SYSTOLIC BLOOD PRESSURE: 127 MMHG | WEIGHT: 120.06 LBS | OXYGEN SATURATION: 98 % | HEART RATE: 73 BPM | HEIGHT: 62 IN | DIASTOLIC BLOOD PRESSURE: 62 MMHG

## 2025-08-15 DIAGNOSIS — G89.29 CHRONIC PAIN OF BOTH KNEES: ICD-10-CM

## 2025-08-15 DIAGNOSIS — M25.562 CHRONIC PAIN OF BOTH KNEES: ICD-10-CM

## 2025-08-15 DIAGNOSIS — K21.9 LARYNGOPHARYNGEAL REFLUX (LPR): ICD-10-CM

## 2025-08-15 DIAGNOSIS — M81.0 AGE-RELATED OSTEOPOROSIS WITHOUT CURRENT PATHOLOGICAL FRACTURE: Primary | ICD-10-CM

## 2025-08-15 DIAGNOSIS — M25.561 CHRONIC PAIN OF BOTH KNEES: ICD-10-CM

## 2025-08-15 PROCEDURE — 3008F BODY MASS INDEX DOCD: CPT | Mod: CPTII,S$GLB,, | Performed by: HOSPITALIST

## 2025-08-15 PROCEDURE — 99214 OFFICE O/P EST MOD 30 MIN: CPT | Mod: S$GLB,,, | Performed by: HOSPITALIST

## 2025-08-15 PROCEDURE — 4010F ACE/ARB THERAPY RXD/TAKEN: CPT | Mod: CPTII,S$GLB,, | Performed by: HOSPITALIST

## 2025-08-15 PROCEDURE — 3074F SYST BP LT 130 MM HG: CPT | Mod: CPTII,S$GLB,, | Performed by: HOSPITALIST

## 2025-08-15 PROCEDURE — 1126F AMNT PAIN NOTED NONE PRSNT: CPT | Mod: CPTII,S$GLB,, | Performed by: HOSPITALIST

## 2025-08-15 PROCEDURE — 3078F DIAST BP <80 MM HG: CPT | Mod: CPTII,S$GLB,, | Performed by: HOSPITALIST

## 2025-08-15 PROCEDURE — 3288F FALL RISK ASSESSMENT DOCD: CPT | Mod: CPTII,S$GLB,, | Performed by: HOSPITALIST

## 2025-08-15 PROCEDURE — 1101F PT FALLS ASSESS-DOCD LE1/YR: CPT | Mod: CPTII,S$GLB,, | Performed by: HOSPITALIST

## 2025-08-15 PROCEDURE — 1159F MED LIST DOCD IN RCRD: CPT | Mod: CPTII,S$GLB,, | Performed by: HOSPITALIST

## 2025-08-15 PROCEDURE — 99999 PR PBB SHADOW E&M-EST. PATIENT-LVL V: CPT | Mod: PBBFAC,,, | Performed by: HOSPITALIST

## 2025-08-15 PROCEDURE — 3044F HG A1C LEVEL LT 7.0%: CPT | Mod: CPTII,S$GLB,, | Performed by: HOSPITALIST

## 2025-08-24 PROBLEM — S46.911D SHOULDER STRAIN, RIGHT, SUBSEQUENT ENCOUNTER: Status: RESOLVED | Noted: 2025-03-26 | Resolved: 2025-08-24

## 2025-08-24 PROBLEM — M25.562 CHRONIC PAIN OF BOTH KNEES: Status: ACTIVE | Noted: 2025-08-24

## 2025-08-24 PROBLEM — G89.29 CHRONIC PAIN OF BOTH KNEES: Status: ACTIVE | Noted: 2025-08-24

## 2025-08-24 PROBLEM — M25.561 CHRONIC PAIN OF BOTH KNEES: Status: ACTIVE | Noted: 2025-08-24

## 2025-08-28 DIAGNOSIS — R05.3 CHRONIC COUGH: ICD-10-CM

## 2025-08-28 RX ORDER — CETIRIZINE HYDROCHLORIDE 10 MG/1
10 TABLET ORAL DAILY
Qty: 90 TABLET | Refills: 1 | Status: SHIPPED | OUTPATIENT
Start: 2025-08-28

## 2025-09-02 ENCOUNTER — TELEPHONE (OUTPATIENT)
Dept: PRIMARY CARE CLINIC | Facility: CLINIC | Age: 70
End: 2025-09-02
Payer: MEDICARE

## (undated) DEVICE — SYR 30CC LUER LOCK

## (undated) DEVICE — PACK DRAPE UNIVERSAL CONVERTOR

## (undated) DEVICE — SUT 1 48IN PDS II VIO MONO

## (undated) DEVICE — DRAPE ABDOMINAL TIBURON 14X11

## (undated) DEVICE — KIT PROCEDURE STER INLET CLOSU

## (undated) DEVICE — ELECTRODE REM PLYHSV RETURN 9

## (undated) DEVICE — TUBE SET SINGLE LUMEN FILTERED

## (undated) DEVICE — SOL NS 1000CC

## (undated) DEVICE — SOL ELECTROLUBE ANTI-STIC

## (undated) DEVICE — BAG TISS RETRV MONARCH 10MM

## (undated) DEVICE — DRAPE ARM DAVINCI XI

## (undated) DEVICE — TROCAR ENDOPATH XCEL 5X100MM

## (undated) DEVICE — LOOP VESSEL BLUE MAXI

## (undated) DEVICE — SUT MCRYL PLUS 4-0 PS2 27IN

## (undated) DEVICE — SUT PDS II O CTX MONO 60

## (undated) DEVICE — IRRIGATOR ENDOSCOPY DISP.

## (undated) DEVICE — DRAPE COLUMN DAVINCI XI

## (undated) DEVICE — SEALER VESSEL DAVINCI XI

## (undated) DEVICE — CANNULA ENDOPATH XCEL 5X100MM

## (undated) DEVICE — SEE MEDLINE ITEM 156902

## (undated) DEVICE — BLADE 4 INCH EDGE UN-INS

## (undated) DEVICE — STAPLER SUREFORM 60 SPU

## (undated) DEVICE — CANNULA REDUCER 12-8MM

## (undated) DEVICE — TRAY MINOR GEN SURG

## (undated) DEVICE — TRAY FOLEY 16FR INFECTION CONT

## (undated) DEVICE — CONTAINER SPECIMEN STRL 4OZ

## (undated) DEVICE — Device

## (undated) DEVICE — ADHESIVE DERMABOND ADVANCED

## (undated) DEVICE — OBTURATOR BLADELESS 8MM XI CLR

## (undated) DEVICE — CANNULA SEAL 12MM

## (undated) DEVICE — PACK UNIVERSAL SPLIT II

## (undated) DEVICE — STAPLER SKIN REGULAR

## (undated) DEVICE — ASSEMBLY HARMONIC ACE 8MM

## (undated) DEVICE — SYR SLIP TIP 20CC

## (undated) DEVICE — TOWEL OR XRAY WHITE 17X26IN

## (undated) DEVICE — SEAL UNIVERSAL 5MM-8MM XI

## (undated) DEVICE — SOL WATER STRL IRR 1000ML

## (undated) DEVICE — DRAPE SCOPE PILLOW WARMER

## (undated) DEVICE — TIP YANKAUERS BULB NO VENT

## (undated) DEVICE — TUBE PENROSE DRAIN 18IN X 3/8I

## (undated) DEVICE — NDL 18GA X1 1/2 REG BEVEL

## (undated) DEVICE — DRAPE INCISE IOBAN 2 23X17IN

## (undated) DEVICE — SUT 0 VICRYL / UR6 (J603)

## (undated) DEVICE — COVER LIGHT HANDLE

## (undated) DEVICE — SEALER VESSEL EXTEND